# Patient Record
Sex: FEMALE | Race: WHITE | NOT HISPANIC OR LATINO | Employment: OTHER | URBAN - NONMETROPOLITAN AREA
[De-identification: names, ages, dates, MRNs, and addresses within clinical notes are randomized per-mention and may not be internally consistent; named-entity substitution may affect disease eponyms.]

---

## 2017-01-03 ENCOUNTER — APPOINTMENT (OUTPATIENT)
Dept: PHYSICAL THERAPY | Facility: HOSPITAL | Age: 72
End: 2017-01-03

## 2017-01-06 ENCOUNTER — HOSPITAL ENCOUNTER (OUTPATIENT)
Dept: PHYSICAL THERAPY | Facility: HOSPITAL | Age: 72
Setting detail: THERAPIES SERIES
Discharge: HOME | End: 2017-01-06

## 2017-01-06 DIAGNOSIS — M54.50 ACUTE MIDLINE LOW BACK PAIN WITHOUT SCIATICA: Primary | ICD-10-CM

## 2017-01-06 PROCEDURE — 97110 THERAPEUTIC EXERCISES: CPT

## 2017-01-06 NOTE — PROGRESS NOTES
Outpatient Physical Therapy Ortho Treatment Note  [facility]     Patient Name: Shellie Villeda  : 1945  MRN: [id number]  Today's Date: 2017      Visit Date: 2017    Visit Dx:    ICD-10-CM ICD-9-CM   1. Acute midline low back pain without sciatica M54.5 724.2       Patient Active Problem List   Diagnosis   • Closed compression fracture of third lumbar vertebra   • Wedge compression fracture of third lumbar vertebra with delayed healing        Past Medical History   Diagnosis Date   • Osteoporosis    • Osteoporosis         Past Surgical History   Procedure Laterality Date   • Basal cell carcinoma excision       SEVERAL OFF FACE   • Kyphoplasty N/A 2016     Procedure: KYPHOPLASTY L3;  Surgeon: Ricky Ferguson MD;  Location: [facility];  Service:                              PT Assessment/Plan       17 1100          PT Assessment    Assessment Comments The patient is much more aware of her postural control faults.  The hip recruitment is improved as well as the lateral pelvic drift with midstance on the right.  Recommend continuing for another 2-3 weeks in order to improve upon the current deficits.  -RS      PT Plan    PT Plan Comments assess readiness for discharge next session.  We will likely continue an additional 2-3 weeks  -RS        User Key  (r) = Recorded By, (t) = Taken By, (c) = Cosigned By    Initials Name Provider Type    RS Kingsley Su, PT Physical Therapist                    Exercises       17 0800          Subjective Comments    Subjective Comments Patient is doing well.  THe HEP is not causing any pain at this time.  She is working on her posture.mild sharp pain noted in the thoracic with bending last week.  -RS      Subjective Pain    Able to rate subjective pain? yes  -RS      Pre-Treatment Pain Level 2  -RS      Post-Treatment Pain Level 2  -RS      Exercise 1    Exercise Name 1 CKC clamshells with feet on wall  -RS      Cueing 1 Demo  -RS       Sets 1 3  -RS      Reps 1 15   each side  -RS      Exercise 2    Exercise Name 2 bridge with femoral ER  -RS      Cueing 2 Verbal;Tactile  -RS      Equipment 2 Theraband  -RS      Resistance 2 Green  -RS      Sets 2 3  -RS      Reps 2 15  -RS      Time (Minutes) 2 2  -RS      Exercise 3    Exercise Name 3 sidelying hip abduction:  45 cm physioball (Gen)_  -RS      Sets 3 2  -RS      Reps 3 15  -RS      Exercise 4    Exercise Name 4 gen hip abduction wall ball:  45 cm physioball  -RS      Cueing 4 Verbal;Demo  -RS      Sets 4 3  -RS      Reps 4 15  -RS      Exercise 5    Exercise Name 5 back against wall:  thoracic endurance focusing on remaining upright with bilateral UE flexion.  -RS      Cueing 5 Verbal  -RS      Equipment 5 --   pink foam roller  -RS      Time (Minutes) 5 6  -RS        User Key  (r) = Recorded By, (t) = Taken By, (c) = Cosigned By    Initials Name Provider Type    RS Kingsley Su, PT Physical Therapist                               PT OP Goals       01/06/17 0800 12/27/16 1500       PT Short Term Goals    STG Date to Achieve 12/30/16  -RS 12/30/16  -RS     STG 1 Patient will be independent with a HEP for hip strengthening and abdominal strengthening.  -RS Patient will be independent with a HEP for hip strengthening and abdominal strengthening.  -RS     STG 1 Progress Progressing  -RS Progressing  -RS     Long Term Goals    LTG Date to Achieve 01/13/17  -RS 01/13/17  -RS     LTG 1 Patient will demonstrate a more neutral thoracic posture avoiding the upper thoracic kyphosis and posterior sway by discharge.  -RS Patient will demonstrate a more neutral thoracic posture avoiding the upper thoracic kyphosis and posterior sway by discharge.  -RS     LTG 1 Progress Ongoing  -RS Ongoing  -RS     LTG 2 Patient will be able to ambulate without excessive right lateral pelvic drift during midstance by discharge.  -RS Patient will be able to ambulate without excessive right lateral pelvic drift during  midstance by discharge.  -RS     LTG 2 Progress Progressing  -RS Progressing  -RS     LTG 3 Patient will score <20 on the Modified Oswestry Disability Index, indicating improvement in overall function and pain, by discharge.  -RS Patient will score <20 on the Modified Oswestry Disability Index, indicating improvement in overall function and pain, by discharge.  -RS     LTG 3 Progress Ongoing  -RS Ongoing  -RS     LTG 4 Patient will demonstrate bilateral glute med strength to at least 4/5 on MMT by discharge.  -RS Patient will demonstrate bilateral glute med strength to at least 4/5 on MMT by discharge.  -RS     LTG 4 Progress Ongoing  -RS Ongoing  -RS     LTG 4 Progress Comments  3+/5  -RS     LTG 5 Patient will be able to resume all recreational activities and housework with <2/10 low back pain (using VAS) by discharge.  -RS Patient will be able to resume all recreational activities and housework with <2/10 low back pain (using VAS) by discharge.  -RS     LTG 5 Progress Ongoing  -RS Ongoing  -RS     Time Calculation    PT Goal Re-Cert Due Date 01/15/17  -RS 01/15/17  -RS       User Key  (r) = Recorded By, (t) = Taken By, (c) = Cosigned By    Initials Name Provider Type    DAMON Su, PT Physical Therapist                Therapy Education       01/06/17 1100    Therapy Education    Given HEP;Posture/body mechanics  -RS    Program Reinforced  -RS    How Provided Verbal  -RS    Provided to Patient  -RS    Level of Understanding Verbalized  -RS      User Key  (r) = Recorded By, (t) = Taken By, (c) = Cosigned By    Initials Name Provider Type    DAMON Su, PT Physical Therapist                Time Calculation:   Start Time: 0850  Stop Time: 0935  Time Calculation (min): 45 min  PT Non-Billable Time (min): 5 min  Total Timed Code Minutes- PT: 40 minute(s)    Therapy Charges for Today     Code Description Service Date Service Provider Modifiers Qty    80211336413  PT THER PROC EA 15 MIN  1/6/2017 Kingsley Su, PT GP 3                    Kingsley Su, PT  1/6/2017

## 2017-01-10 ENCOUNTER — APPOINTMENT (OUTPATIENT)
Dept: PHYSICAL THERAPY | Facility: HOSPITAL | Age: 72
End: 2017-01-10

## 2017-01-10 ENCOUNTER — HOSPITAL ENCOUNTER (OUTPATIENT)
Dept: PHYSICAL THERAPY | Facility: HOSPITAL | Age: 72
Setting detail: THERAPIES SERIES
Discharge: HOME | End: 2017-01-10

## 2017-01-10 DIAGNOSIS — M54.50 ACUTE MIDLINE LOW BACK PAIN WITHOUT SCIATICA: Primary | ICD-10-CM

## 2017-01-10 PROCEDURE — G8979 MOBILITY GOAL STATUS: HCPCS

## 2017-01-10 PROCEDURE — G8980 MOBILITY D/C STATUS: HCPCS

## 2017-01-10 PROCEDURE — G8978 MOBILITY CURRENT STATUS: HCPCS

## 2017-01-10 PROCEDURE — 97110 THERAPEUTIC EXERCISES: CPT

## 2017-01-10 NOTE — THERAPY DISCHARGE NOTE
Outpatient Physical Therapy Ortho Treatment Note/Discharge Summary  [facility]     Patient Name: Shellie Villeda  : 1945  MRN: [id number]  Today's Date: 1/10/2017      Visit Date: 01/10/2017    Visit Dx:    ICD-10-CM ICD-9-CM   1. Acute midline low back pain without sciatica M54.5 724.2       Patient Active Problem List   Diagnosis   • Closed compression fracture of third lumbar vertebra   • Wedge compression fracture of third lumbar vertebra with delayed healing        Past Medical History   Diagnosis Date   • Osteoporosis    • Osteoporosis         Past Surgical History   Procedure Laterality Date   • Basal cell carcinoma excision       SEVERAL OFF FACE   • Kyphoplasty N/A 2016     Procedure: KYPHOPLASTY L3;  Surgeon: Ricky Ferguson MD;  Location: [facility];  Service:                              PT Assessment/Plan       01/10/17 1300 17 1100       PT Assessment    Assessment Comments The patient is doing very well at this time and is ready for discharge.  The patient is complaining of minor sharp pains in the lower thoracic spine that only last for a short time.  The patient was educated to speak with the MD about this on the follow up sessions going forward.  The pain is likely mechanical from prolonged kyphosis and combination of osteoporosis.   -RS The patient is much more aware of her postural control faults.  The hip recruitment is improved as well as the lateral pelvic drift with midstance on the right.  Recommend continuing for another 2-3 weeks in order to improve upon the current deficits.  -RS     PT Plan    PT Plan Comments d/c to HEP  -RS assess readiness for discharge next session.  We will likely continue an additional 2-3 weeks  -RS       User Key  (r) = Recorded By, (t) = Taken By, (c) = Cosigned By    Initials Name Provider Type    RS Kingsley Su, PT Physical Therapist                    Exercises       01/10/17 1200          Subjective Comments    Subjective  Comments Pt is still doing well.  no issues with sharp pain in the back other than 1-2 occasions  -RS      Subjective Pain    Able to rate subjective pain? yes  -RS      Pre-Treatment Pain Level 1  -RS      Post-Treatment Pain Level 1  -RS      Exercise 1    Exercise Name 1 supine huber clamshells  -RS      Cueing 1 Verbal  -RS      Equipment 1 Theraband  -RS      Resistance 1 Red  -RS      Sets 1 3  -RS      Reps 1 20  -RS      Exercise 2    Exercise Name 2 shuttle press:  green theraband around the knees  -RS      Cueing 2 Demo  -RS      Equipment 2 Theraband  -RS      Weights/Plates 2 5  -RS      Resistance 2 Green  -RS      Sets 2 3  -RS      Reps 2 20  -RS      Exercise 3    Exercise Name 3 EC standing balance on flat side of BOSU  -RS      Reps 3 5  -RS      Time (Seconds) 3 30  -RS      Exercise 4    Exercise Name 4 CKC clamshells (huber)  -RS      Cueing 4 Demo  -RS      Sets 4 3  -RS      Reps 4 15  -RS      Exercise 5    Exercise Name 5 went over HEP for discharge  -RS        User Key  (r) = Recorded By, (t) = Taken By, (c) = Cosigned By    Initials Name Provider Type    RS Kingsley Su, PT Physical Therapist                               PT OP Goals       01/10/17 1200 01/06/17 0800       PT Short Term Goals    STG Date to Achieve 12/30/16  -RS 12/30/16  -RS     STG 1 Patient will be independent with a HEP for hip strengthening and abdominal strengthening.  -RS Patient will be independent with a HEP for hip strengthening and abdominal strengthening.  -RS     STG 1 Progress Met  -RS Progressing  -RS     Long Term Goals    LTG Date to Achieve 01/13/17  -RS 01/13/17  -RS     LTG 1 Patient will demonstrate a more neutral thoracic posture avoiding the upper thoracic kyphosis and posterior sway by discharge.  -RS Patient will demonstrate a more neutral thoracic posture avoiding the upper thoracic kyphosis and posterior sway by discharge.  -RS     LTG 1 Progress Partially Met  -RS Ongoing  -RS     LTG 2  Patient will be able to ambulate without excessive right lateral pelvic drift during midstance by discharge.  -RS Patient will be able to ambulate without excessive right lateral pelvic drift during midstance by discharge.  -RS     LTG 2 Progress Progressing  -RS Progressing  -RS     LTG 3 Patient will score <20 on the Modified Oswestry Disability Index, indicating improvement in overall function and pain, by discharge.  -RS Patient will score <20 on the Modified Oswestry Disability Index, indicating improvement in overall function and pain, by discharge.  -RS     LTG 3 Progress Partially Met  -RS Ongoing  -RS     LTG 4 Patient will demonstrate bilateral glute med strength to at least 4/5 on MMT by discharge.  -RS Patient will demonstrate bilateral glute med strength to at least 4/5 on MMT by discharge.  -RS     LTG 4 Progress Partially Met  -RS Ongoing  -RS     LTG 4 Progress Comments 4-/5  -RS      LTG 5 Patient will be able to resume all recreational activities and housework with <2/10 low back pain (using VAS) by discharge.  -RS Patient will be able to resume all recreational activities and housework with <2/10 low back pain (using VAS) by discharge.  -RS     LTG 5 Progress Met  -RS Ongoing  -RS     LTG 5 Progress Comments 1-2/10  -RS      Time Calculation    PT Goal Re-Cert Due Date 01/15/17  -RS 01/15/17  -RS       User Key  (r) = Recorded By, (t) = Taken By, (c) = Cosigned By    Initials Name Provider Type    RS Kingsley Su, PT Physical Therapist                Therapy Education       01/10/17 1354    Therapy Education    Given HEP  -RS    Program New   SLS on each LE  -RS    How Provided Written  -RS    Provided to Patient  -RS    Level of Understanding Verbalized  -RS      01/06/17 1100    Therapy Education    Given HEP;Posture/body mechanics  -RS    Program Reinforced  -RS    How Provided Verbal  -RS    Provided to Patient  -RS    Level of Understanding Verbalized  -RS      User Key  (r) =  Recorded By, (t) = Taken By, (c) = Cosigned By    Initials Name Provider Type    RS Kingsley Su, PT Physical Therapist                Outcome Measures       01/10/17 1300          Modified Oswestry    Modified Oswestry Score/Comments 21  -RS      Functional Assessment    Outcome Measure Options Modifed Owestry  -RS        User Key  (r) = Recorded By, (t) = Taken By, (c) = Cosigned By    Initials Name Provider Type    RS Kingsley Su, PT Physical Therapist            Time Calculation:   Start Time: 1245  Stop Time: 1330  Time Calculation (min): 45 min  PT Non-Billable Time (min): 4 min  Total Timed Code Minutes- PT: 41 minute(s)    Therapy Charges for Today     Code Description Service Date Service Provider Modifiers Qty    88749090263 HC PT MOBILITY CURRENT 1/10/2017 Kingsley Su, PT GP, CJ 1    90337073208 HC PT MOBILITY PROJECTED 1/10/2017 Kingsley Su, PT GP, CI 1    10269302228 HC PT MOBILITY DISCHARGE 1/10/2017 Kingsley uS, PT GP, CJ 1    01087070244 HC PT THER PROC EA 15 MIN 1/10/2017 Kingsley Su, PT GP 3          PT G-Codes  Outcome Measure Options: Modifed Owestry  Score: 21  Functional Limitation: Mobility: Walking and moving around  Mobility: Walking and Moving Around Current Status (): At least 20 percent but less than 40 percent impaired, limited or restricted  Mobility: Walking and Moving Around Goal Status (): At least 1 percent but less than 20 percent impaired, limited or restricted  Mobility: Walking and Moving Around Discharge Status (): At least 20 percent but less than 40 percent impaired, limited or restricted     OP PT Discharge Summary  Date of Discharge: 01/10/17  Reason for Discharge: Independent  Outcomes Achieved: Patient able to partially acheive established goals, Refer to plan of care for updates on goals achieved  Discharge Destination: Home with home program  Discharge Instructions: Patient was instructed to call  if she needed anything.      Kingsley Su, PT  1/10/2017

## 2017-01-12 ENCOUNTER — APPOINTMENT (OUTPATIENT)
Dept: PHYSICAL THERAPY | Facility: HOSPITAL | Age: 72
End: 2017-01-12

## 2017-01-16 ENCOUNTER — DOCUMENTATION (OUTPATIENT)
Dept: PHYSICAL THERAPY | Facility: HOSPITAL | Age: 72
End: 2017-01-16

## 2017-02-02 PROCEDURE — G0123 SCREEN CERV/VAG THIN LAYER: HCPCS | Performed by: INTERNAL MEDICINE

## 2017-02-02 PROCEDURE — 87624 HPV HI-RISK TYP POOLED RSLT: CPT | Performed by: INTERNAL MEDICINE

## 2017-02-03 ENCOUNTER — LAB REQUISITION (OUTPATIENT)
Dept: LAB | Facility: HOSPITAL | Age: 72
End: 2017-02-03
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
## 2017-02-07 ENCOUNTER — OFFICE VISIT (OUTPATIENT)
Dept: NEUROSURGERY | Facility: CLINIC | Age: 72
End: 2017-02-07

## 2017-02-07 VITALS
WEIGHT: 112 LBS | SYSTOLIC BLOOD PRESSURE: 122 MMHG | HEIGHT: 64 IN | BODY MASS INDEX: 19.12 KG/M2 | DIASTOLIC BLOOD PRESSURE: 70 MMHG

## 2017-02-07 DIAGNOSIS — M54.50 CHRONIC BILATERAL LOW BACK PAIN WITHOUT SCIATICA: Primary | ICD-10-CM

## 2017-02-07 DIAGNOSIS — G89.29 CHRONIC BILATERAL LOW BACK PAIN WITHOUT SCIATICA: Primary | ICD-10-CM

## 2017-02-07 PROCEDURE — 99212 OFFICE O/P EST SF 10 MIN: CPT | Performed by: NEUROLOGICAL SURGERY

## 2017-02-07 NOTE — PROGRESS NOTES
Shellie Villeda  71 y.o.      Chief complaint:   Back pain    Subjective  71-year-old patient went for kyphoplasty at L4 did very well after that.  Most recently with been doing with her  chronic across the back pain. Worse as the day goes on. Better when she gets her feet.  She did a dedicated course of physical therapy for 4-6 weeks and she says it did benefit her to some degree but she has some by back pain she takes no medications for either.    BP: (122)/(70) 122/70      Objective    Neurologic Exam     Mental Status   Oriented to person, place, and time.   Attention: normal.   Speech: speech is normal   Level of consciousness: alert  Knowledge: good.     Cranial Nerves   Cranial nerves II through XII intact.     Motor Exam   Muscle bulk: normal  Overall muscle tone: normal    Strength   Strength 5/5 throughout.     Sensory Exam   Light touch normal.   Pinprick normal.     Gait, Coordination, and Reflexes     Gait  Gait: normal      Active Problems:    * No active hospital problems. *      Lab Results (last 24 hours)     ** No results found for the last 24 hours. **              Plan:   Shellie done very well after her kyphoplasty.  Right now were dealing with her more chronic low back pain she had before her fracture.  She has had some improvement with physical therapy but she still has some pain.  We did offer her referral to pain management for series of injections right now she says the pain is not so bad that she would be interested in that we will see her in follow-up in 3 months.    Ricky Ferguson MD  Answers for HPI/ROS submitted by the patient on 1/31/2017   Back pain  Chronicity: chronic  Onset: more than 1 year ago  Frequency: daily  Progression since onset: waxing and waning  Pain location: sacro-iliac  Pain quality: aching  Radiates to: does not radiate  Pain - numeric: 5/10  Pain is: worse during the day  Aggravated by: bending, sitting  abdominal pain: No  bladder incontinence: No  bowel  incontinence: No  chest pain: No  dysuria: No  fever: No  headaches: No  leg pain: No  numbness: No  paresis: No  paresthesias: No  pelvic pain: No  perianal numbness: No  tingling: No  weakness: No  weight loss: No  Risk factors: history of osteoporosis

## 2017-02-17 LAB
GEN CATEG CVX/VAG CYTO-IMP: NORMAL
LAB AP CASE REPORT: NORMAL
LAB AP GYN ADDITIONAL INFORMATION: NORMAL
Lab: NORMAL
PATH INTERP SPEC-IMP: NORMAL
STAT OF ADQ CVX/VAG CYTO-IMP: NORMAL

## 2017-02-22 ENCOUNTER — OFFICE VISIT (OUTPATIENT)
Dept: GASTROENTEROLOGY | Facility: CLINIC | Age: 72
End: 2017-02-22

## 2017-02-22 VITALS
BODY MASS INDEX: 19.66 KG/M2 | TEMPERATURE: 98.1 F | WEIGHT: 118 LBS | HEART RATE: 70 BPM | SYSTOLIC BLOOD PRESSURE: 112 MMHG | HEIGHT: 65 IN | OXYGEN SATURATION: 99 % | DIASTOLIC BLOOD PRESSURE: 68 MMHG

## 2017-02-22 DIAGNOSIS — R14.0 ABDOMINAL BLOATING: Primary | ICD-10-CM

## 2017-02-22 PROCEDURE — 99214 OFFICE O/P EST MOD 30 MIN: CPT | Performed by: NURSE PRACTITIONER

## 2017-02-22 NOTE — PROGRESS NOTES
[facility] GASTROENTEROLOGY - OFFICE NOTE    2/22/2017    Shellie Villeda   1945    Chief Complaint   Patient presents with   • GI Problem     bloating         HISTORY OF PRESENT ILLNESS    Shellie Villeda is a 71 y.o. female presents  with abdominal bloating.  She has noted generalized abdominal bloating over the last 6 months.  This has been intermittent.  This usually occurs in the evening after eating.  She has tried some digestive enzymes which did not help.  Passing gas does seem to help.  She does eat a lot of fresh fruits and vegetables.  She does move her bowels daily.  Denies constipation or diarrhea.  Denies rectal bleeding.  Denies unintentional weight loss.  No nausea or vomiting.  No fevers.  No abdominal pain.  Colonoscopy was 2006 by Dr. Harris which was normal.  No family history of colon cancer or polyps.  She has had no imaging studies of her abdomen.    Past Medical History   Diagnosis Date   • Compression fracture of lumbar vertebra    • Depression    • Disc degeneration, lumbar    • History of basal cell carcinoma    • History of hyperlipidemia    • History of insomnia    • History of multiple pulmonary nodules    • History of palpitations    • History of urinary tract infection    • Low back pain    • Osteoporosis    • Osteoporosis    • Skin cancer      basal cell   • Vitamin D insufficiency        Past Surgical History   Procedure Laterality Date   • Basal cell carcinoma excision       SEVERAL OFF FACE   • Kyphoplasty N/A 11/11/2016     Procedure: KYPHOPLASTY L3;  Surgeon: Ricky Ferguson MD;  Location: [facility] OR;  Service:    • Breast biopsy       benign   • Colonoscopy  12/09/2006       Outpatient Prescriptions Marked as Taking for the 2/22/17 encounter (Office Visit) with ROZINA Marcus   Medication Sig Dispense Refill   • calcium-vitamin D (OSCAL) 250-125 MG-UNIT per tablet Take 1 tablet by mouth daily.       • Cholecalciferol (VITAMIN D-3 PO) Take  by mouth.     •  denosumab (PROLIA) 60 MG/ML solution syringe Inject 60 mg under the skin 1 (One) Time.     • Strontium Chloride powder Strontium Chloride Powder  1 capsule daily to use with AlgaeCal to boost calcium         Arianna Norris M.D.;  Started 13-Aug-2015  Active         No Known Allergies    Social History     Social History   • Marital status: Single     Spouse name: N/A   • Number of children: N/A   • Years of education: N/A     Occupational History   • Not on file.     Social History Main Topics   • Smoking status: Never Smoker   • Smokeless tobacco: Not on file   • Alcohol use 4.2 oz/week     7 Glasses of wine per week      Comment: daily   • Drug use: No   • Sexual activity: Defer     Other Topics Concern   • Not on file     Social History Narrative       History reviewed. No pertinent family history.    Review of Systems   Constitutional: Negative for appetite change, chills, fatigue, fever and unexpected weight change.   HENT: Positive for hearing loss. Negative for sore throat and trouble swallowing.    Eyes: Negative for pain and visual disturbance.   Respiratory: Negative for cough, chest tightness, shortness of breath and wheezing.    Cardiovascular: Negative for chest pain and palpitations.   Gastrointestinal: Negative for abdominal distention, abdominal pain, anal bleeding, blood in stool, constipation, diarrhea, nausea, rectal pain and vomiting.        As mentioned in hpi   Endocrine: Negative for cold intolerance and heat intolerance.   Genitourinary: Negative for difficulty urinating, dysuria and hematuria.   Musculoskeletal: Positive for back pain. Negative for arthralgias.   Skin: Negative for color change and rash.   Neurological: Negative for dizziness, tremors, seizures, syncope, light-headedness and headaches.   Hematological: Negative for adenopathy. Does not bruise/bleed easily.   Psychiatric/Behavioral: Negative for confusion. The patient is not nervous/anxious.        Vitals:    02/22/17 0852  "  BP: 112/68   BP Location: Left arm   Patient Position: Sitting   Cuff Size: Adult   Pulse: 70   Temp: 98.1 °F (36.7 °C)   SpO2: 99%   Weight: 118 lb (53.5 kg)   Height: 64.5\" (163.8 cm)      Body mass index is 19.94 kg/(m^2).    Physical Exam   Constitutional: She is oriented to person, place, and time. She appears well-developed and well-nourished. No distress.   HENT:   Head: Normocephalic and atraumatic.   Mouth/Throat: Oropharynx is clear and moist.   Eyes: Pupils are equal, round, and reactive to light. No scleral icterus.   Neck: Normal range of motion. Neck supple. No JVD present. No tracheal deviation present.   Cardiovascular: Normal rate, regular rhythm, normal heart sounds and intact distal pulses.  Exam reveals no gallop and no friction rub.    No murmur heard.  Pulmonary/Chest: Effort normal and breath sounds normal. No stridor. No respiratory distress. She has no wheezes. She has no rales.   Abdominal: Soft. Bowel sounds are normal. She exhibits no distension and no mass. There is no tenderness. There is no rebound and no guarding.   Musculoskeletal: Normal range of motion. She exhibits no edema or deformity.   Neurological: She is alert and oriented to person, place, and time. No cranial nerve deficit.   Skin: Skin is warm and dry. No rash noted.   Psychiatric: She has a normal mood and affect. Her behavior is normal.   Vitals reviewed.              ASSESSMENT AND PLAN    Shellie was seen today for gi problem.    Diagnoses and all orders for this visit:    Abdominal bloating  -     Case request    Other orders  -     polyethylene glycol (GOLYTELY) 236 G solution; Take 4,000 mL by mouth 1 (One) Time for 1 dose. Take as directed per instruction sheet.     in regards to abdominal bloating, recommend trial of anti-gas pills such as altered Gas-X or Phazyme.  Also trial of fod map diet.  Also we will plan for colonoscopy.    COLONOSCOPY WITH ANESTHESIA (N/A) All risks, benefits, alternatives, and " indications of colonoscopy procedure have been discussed with the patient. Risks to include perforation of the colon requiring possible surgery or colostomy, risk of bleeding from biopsies or removal of colon tissue, possibility of missing a colon polyp or cancer, or adverse drug reaction.  Benefits to include the diagnosis and management of disease of the colon and rectum. Alternatives to include barium enema, radiographic evaluation, lab testing or no intervention. Pt verbalizes understanding and agrees.         Body mass index is 19.94 kg/(m^2).         ROZINA Christianson    EMR Dragon/transcription disclaimer:  Much of this encounter note is electronic transcription/translation of spoken language to printed text.  The electronic translation of spoken language may be erroneous, or at times, nonsensical words or phrases may be inadvertently transcribed.  Although I have reviewed the note for such errors, some may still exist.

## 2017-03-08 ENCOUNTER — ANESTHESIA EVENT (OUTPATIENT)
Dept: GASTROENTEROLOGY | Facility: HOSPITAL | Age: 72
End: 2017-03-08

## 2017-03-09 ENCOUNTER — ANESTHESIA (OUTPATIENT)
Dept: GASTROENTEROLOGY | Facility: HOSPITAL | Age: 72
End: 2017-03-09

## 2017-03-09 ENCOUNTER — TELEPHONE (OUTPATIENT)
Dept: GASTROENTEROLOGY | Facility: CLINIC | Age: 72
End: 2017-03-09

## 2017-03-09 ENCOUNTER — HOSPITAL ENCOUNTER (OUTPATIENT)
Facility: HOSPITAL | Age: 72
Setting detail: HOSPITAL OUTPATIENT SURGERY
Discharge: HOME | End: 2017-03-09
Attending: INTERNAL MEDICINE | Admitting: INTERNAL MEDICINE

## 2017-03-09 VITALS
HEART RATE: 65 BPM | HEIGHT: 64 IN | DIASTOLIC BLOOD PRESSURE: 95 MMHG | WEIGHT: 116 LBS | RESPIRATION RATE: 20 BRPM | OXYGEN SATURATION: 99 % | BODY MASS INDEX: 19.81 KG/M2 | SYSTOLIC BLOOD PRESSURE: 116 MMHG | TEMPERATURE: 97.2 F

## 2017-03-09 PROCEDURE — 25010000002 PROPOFOL 10 MG/ML EMULSION: Performed by: NURSE ANESTHETIST, CERTIFIED REGISTERED

## 2017-03-09 PROCEDURE — G0121 COLON CA SCRN NOT HI RSK IND: HCPCS | Performed by: INTERNAL MEDICINE

## 2017-03-09 RX ORDER — ONDANSETRON 2 MG/ML
4 INJECTION INTRAMUSCULAR; INTRAVENOUS ONCE AS NEEDED
Status: DISCONTINUED | OUTPATIENT
Start: 2017-03-09 | End: 2017-03-09 | Stop reason: HOSPADM

## 2017-03-09 RX ORDER — SODIUM CHLORIDE 0.9 % (FLUSH) 0.9 %
1-10 SYRINGE (ML) INJECTION AS NEEDED
Status: DISCONTINUED | OUTPATIENT
Start: 2017-03-09 | End: 2017-03-09 | Stop reason: HOSPADM

## 2017-03-09 RX ORDER — LIDOCAINE HYDROCHLORIDE 20 MG/ML
INJECTION, SOLUTION INFILTRATION; PERINEURAL AS NEEDED
Status: DISCONTINUED | OUTPATIENT
Start: 2017-03-09 | End: 2017-03-09 | Stop reason: SURG

## 2017-03-09 RX ORDER — PROPOFOL 10 MG/ML
VIAL (ML) INTRAVENOUS AS NEEDED
Status: DISCONTINUED | OUTPATIENT
Start: 2017-03-09 | End: 2017-03-09 | Stop reason: SURG

## 2017-03-09 RX ORDER — SODIUM CHLORIDE 9 MG/ML
100 INJECTION, SOLUTION INTRAVENOUS CONTINUOUS
Status: DISCONTINUED | OUTPATIENT
Start: 2017-03-09 | End: 2017-03-09 | Stop reason: HOSPADM

## 2017-03-09 RX ADMIN — LIDOCAINE HYDROCHLORIDE 50 MG: 20 INJECTION, SOLUTION INFILTRATION; PERINEURAL at 08:58

## 2017-03-09 RX ADMIN — SODIUM CHLORIDE 100 ML/HR: 9 INJECTION, SOLUTION INTRAVENOUS at 07:49

## 2017-03-09 RX ADMIN — PROPOFOL 200 MG: 10 INJECTION, EMULSION INTRAVENOUS at 08:58

## 2017-03-09 NOTE — PLAN OF CARE
Problem: Patient Care Overview (Adult)  Goal: Adult Individualization and Mutuality    03/09/17 0727   Individualization   Patient Specific Preferences none

## 2017-03-09 NOTE — H&P (VIEW-ONLY)
[facility] GASTROENTEROLOGY - OFFICE NOTE    2/22/2017    Shellie Villeda   1945    Chief Complaint   Patient presents with   • GI Problem     bloating         HISTORY OF PRESENT ILLNESS    Shellie Villeda is a 71 y.o. female presents  with abdominal bloating.  She has noted generalized abdominal bloating over the last 6 months.  This has been intermittent.  This usually occurs in the evening after eating.  She has tried some digestive enzymes which did not help.  Passing gas does seem to help.  She does eat a lot of fresh fruits and vegetables.  She does move her bowels daily.  Denies constipation or diarrhea.  Denies rectal bleeding.  Denies unintentional weight loss.  No nausea or vomiting.  No fevers.  No abdominal pain.  Colonoscopy was 2006 by Dr. Harris which was normal.  No family history of colon cancer or polyps.  She has had no imaging studies of her abdomen.    Past Medical History   Diagnosis Date   • Compression fracture of lumbar vertebra    • Depression    • Disc degeneration, lumbar    • History of basal cell carcinoma    • History of hyperlipidemia    • History of insomnia    • History of multiple pulmonary nodules    • History of palpitations    • History of urinary tract infection    • Low back pain    • Osteoporosis    • Osteoporosis    • Skin cancer      basal cell   • Vitamin D insufficiency        Past Surgical History   Procedure Laterality Date   • Basal cell carcinoma excision       SEVERAL OFF FACE   • Kyphoplasty N/A 11/11/2016     Procedure: KYPHOPLASTY L3;  Surgeon: Ricky Ferguson MD;  Location: [facility] OR;  Service:    • Breast biopsy       benign   • Colonoscopy  12/09/2006       Outpatient Prescriptions Marked as Taking for the 2/22/17 encounter (Office Visit) with ROZINA Marcus   Medication Sig Dispense Refill   • calcium-vitamin D (OSCAL) 250-125 MG-UNIT per tablet Take 1 tablet by mouth daily.       • Cholecalciferol (VITAMIN D-3 PO) Take  by mouth.     •  denosumab (PROLIA) 60 MG/ML solution syringe Inject 60 mg under the skin 1 (One) Time.     • Strontium Chloride powder Strontium Chloride Powder  1 capsule daily to use with AlgaeCal to boost calcium         Arianna Norris M.D.;  Started 13-Aug-2015  Active         No Known Allergies    Social History     Social History   • Marital status: Single     Spouse name: N/A   • Number of children: N/A   • Years of education: N/A     Occupational History   • Not on file.     Social History Main Topics   • Smoking status: Never Smoker   • Smokeless tobacco: Not on file   • Alcohol use 4.2 oz/week     7 Glasses of wine per week      Comment: daily   • Drug use: No   • Sexual activity: Defer     Other Topics Concern   • Not on file     Social History Narrative       History reviewed. No pertinent family history.    Review of Systems   Constitutional: Negative for appetite change, chills, fatigue, fever and unexpected weight change.   HENT: Positive for hearing loss. Negative for sore throat and trouble swallowing.    Eyes: Negative for pain and visual disturbance.   Respiratory: Negative for cough, chest tightness, shortness of breath and wheezing.    Cardiovascular: Negative for chest pain and palpitations.   Gastrointestinal: Negative for abdominal distention, abdominal pain, anal bleeding, blood in stool, constipation, diarrhea, nausea, rectal pain and vomiting.        As mentioned in hpi   Endocrine: Negative for cold intolerance and heat intolerance.   Genitourinary: Negative for difficulty urinating, dysuria and hematuria.   Musculoskeletal: Positive for back pain. Negative for arthralgias.   Skin: Negative for color change and rash.   Neurological: Negative for dizziness, tremors, seizures, syncope, light-headedness and headaches.   Hematological: Negative for adenopathy. Does not bruise/bleed easily.   Psychiatric/Behavioral: Negative for confusion. The patient is not nervous/anxious.        Vitals:    02/22/17 0852  "  BP: 112/68   BP Location: Left arm   Patient Position: Sitting   Cuff Size: Adult   Pulse: 70   Temp: 98.1 °F (36.7 °C)   SpO2: 99%   Weight: 118 lb (53.5 kg)   Height: 64.5\" (163.8 cm)      Body mass index is 19.94 kg/(m^2).    Physical Exam   Constitutional: She is oriented to person, place, and time. She appears well-developed and well-nourished. No distress.   HENT:   Head: Normocephalic and atraumatic.   Mouth/Throat: Oropharynx is clear and moist.   Eyes: Pupils are equal, round, and reactive to light. No scleral icterus.   Neck: Normal range of motion. Neck supple. No JVD present. No tracheal deviation present.   Cardiovascular: Normal rate, regular rhythm, normal heart sounds and intact distal pulses.  Exam reveals no gallop and no friction rub.    No murmur heard.  Pulmonary/Chest: Effort normal and breath sounds normal. No stridor. No respiratory distress. She has no wheezes. She has no rales.   Abdominal: Soft. Bowel sounds are normal. She exhibits no distension and no mass. There is no tenderness. There is no rebound and no guarding.   Musculoskeletal: Normal range of motion. She exhibits no edema or deformity.   Neurological: She is alert and oriented to person, place, and time. No cranial nerve deficit.   Skin: Skin is warm and dry. No rash noted.   Psychiatric: She has a normal mood and affect. Her behavior is normal.   Vitals reviewed.              ASSESSMENT AND PLAN    Shellie was seen today for gi problem.    Diagnoses and all orders for this visit:    Abdominal bloating  -     Case request    Other orders  -     polyethylene glycol (GOLYTELY) 236 G solution; Take 4,000 mL by mouth 1 (One) Time for 1 dose. Take as directed per instruction sheet.     in regards to abdominal bloating, recommend trial of anti-gas pills such as altered Gas-X or Phazyme.  Also trial of fod map diet.  Also we will plan for colonoscopy.    COLONOSCOPY WITH ANESTHESIA (N/A) All risks, benefits, alternatives, and " indications of colonoscopy procedure have been discussed with the patient. Risks to include perforation of the colon requiring possible surgery or colostomy, risk of bleeding from biopsies or removal of colon tissue, possibility of missing a colon polyp or cancer, or adverse drug reaction.  Benefits to include the diagnosis and management of disease of the colon and rectum. Alternatives to include barium enema, radiographic evaluation, lab testing or no intervention. Pt verbalizes understanding and agrees.         Body mass index is 19.94 kg/(m^2).         ROZINA Christianson    EMR Dragon/transcription disclaimer:  Much of this encounter note is electronic transcription/translation of spoken language to printed text.  The electronic translation of spoken language may be erroneous, or at times, nonsensical words or phrases may be inadvertently transcribed.  Although I have reviewed the note for such errors, some may still exist.

## 2017-03-09 NOTE — PLAN OF CARE
Problem: GI Endoscopy (Adult)  Goal: Signs and Symptoms of Listed Potential Problems Will be Absent or Manageable (GI Endoscopy)  Outcome: Outcome(s) achieved Date Met:  03/09/17 03/09/17 0918   GI Endoscopy   Problems Assessed (GI Endoscopy) all   Problems Present (GI Endoscopy) none

## 2017-03-09 NOTE — PLAN OF CARE
Problem: Patient Care Overview (Adult)  Goal: Plan of Care Review  Outcome: Outcome(s) achieved Date Met:  03/09/17 03/09/17 0918   Coping/Psychosocial Response Interventions   Plan Of Care Reviewed With patient;family   Patient Care Overview   Progress no change   Outcome Evaluation   Outcome Summary/Follow up Plan meets discharge criteria

## 2017-03-09 NOTE — PLAN OF CARE
Problem: Patient Care Overview (Adult)  Goal: Plan of Care Review  Outcome: Ongoing (interventions implemented as appropriate)    03/09/17 0914   Coping/Psychosocial Response Interventions   Plan Of Care Reviewed With patient   Patient Care Overview   Progress no change   Outcome Evaluation   Outcome Summary/Follow up Plan pt tolerated procedure well.

## 2017-05-03 ENCOUNTER — OFFICE VISIT (OUTPATIENT)
Dept: NEUROSURGERY | Facility: CLINIC | Age: 72
End: 2017-05-03

## 2017-05-03 VITALS
WEIGHT: 114 LBS | SYSTOLIC BLOOD PRESSURE: 122 MMHG | BODY MASS INDEX: 19.46 KG/M2 | DIASTOLIC BLOOD PRESSURE: 70 MMHG | HEIGHT: 64 IN

## 2017-05-03 DIAGNOSIS — G89.29 CHRONIC BILATERAL LOW BACK PAIN WITHOUT SCIATICA: ICD-10-CM

## 2017-05-03 DIAGNOSIS — M54.50 CHRONIC BILATERAL LOW BACK PAIN WITHOUT SCIATICA: ICD-10-CM

## 2017-05-03 DIAGNOSIS — Z78.9 NON-SMOKER: ICD-10-CM

## 2017-05-03 DIAGNOSIS — S32.030D CLOSED WEDGE COMPRESSION FRACTURE OF THIRD LUMBAR VERTEBRA WITH ROUTINE HEALING: Primary | ICD-10-CM

## 2017-05-03 PROCEDURE — 99213 OFFICE O/P EST LOW 20 MIN: CPT | Performed by: NURSE PRACTITIONER

## 2017-06-22 RX ORDER — ACETAMINOPHEN 160 MG
1 TABLET,DISINTEGRATING ORAL DAILY
COMMUNITY
End: 2020-01-28 | Stop reason: ALTCHOICE

## 2017-06-23 ENCOUNTER — OFFICE VISIT (OUTPATIENT)
Dept: INTERNAL MEDICINE | Age: 72
End: 2017-06-23
Payer: MEDICARE

## 2017-06-23 VITALS
DIASTOLIC BLOOD PRESSURE: 60 MMHG | HEIGHT: 65 IN | OXYGEN SATURATION: 98 % | HEART RATE: 70 BPM | BODY MASS INDEX: 19.16 KG/M2 | SYSTOLIC BLOOD PRESSURE: 104 MMHG | WEIGHT: 115 LBS

## 2017-06-23 DIAGNOSIS — E55.9 VITAMIN D DEFICIENCY: ICD-10-CM

## 2017-06-23 DIAGNOSIS — M80.00XD OSTEOPOROSIS WITH PATHOLOGICAL FRACTURE WITH ROUTINE HEALING: ICD-10-CM

## 2017-06-23 DIAGNOSIS — R14.0 ABDOMINAL DISTENTION: Primary | ICD-10-CM

## 2017-06-23 DIAGNOSIS — E78.00 PURE HYPERCHOLESTEROLEMIA: ICD-10-CM

## 2017-06-23 PROCEDURE — 3017F COLORECTAL CA SCREEN DOC REV: CPT | Performed by: INTERNAL MEDICINE

## 2017-06-23 PROCEDURE — G8427 DOCREV CUR MEDS BY ELIG CLIN: HCPCS | Performed by: INTERNAL MEDICINE

## 2017-06-23 PROCEDURE — 4040F PNEUMOC VAC/ADMIN/RCVD: CPT | Performed by: INTERNAL MEDICINE

## 2017-06-23 PROCEDURE — 1036F TOBACCO NON-USER: CPT | Performed by: INTERNAL MEDICINE

## 2017-06-23 PROCEDURE — 1123F ACP DISCUSS/DSCN MKR DOCD: CPT | Performed by: INTERNAL MEDICINE

## 2017-06-23 PROCEDURE — G8400 PT W/DXA NO RESULTS DOC: HCPCS | Performed by: INTERNAL MEDICINE

## 2017-06-23 PROCEDURE — 3014F SCREEN MAMMO DOC REV: CPT | Performed by: INTERNAL MEDICINE

## 2017-06-23 PROCEDURE — G8420 CALC BMI NORM PARAMETERS: HCPCS | Performed by: INTERNAL MEDICINE

## 2017-06-23 PROCEDURE — 99213 OFFICE O/P EST LOW 20 MIN: CPT | Performed by: INTERNAL MEDICINE

## 2017-06-23 PROCEDURE — 1090F PRES/ABSN URINE INCON ASSESS: CPT | Performed by: INTERNAL MEDICINE

## 2017-06-23 PROCEDURE — 4005F PHARM THX FOR OP RXD: CPT | Performed by: INTERNAL MEDICINE

## 2017-06-23 ASSESSMENT — ENCOUNTER SYMPTOMS
RHINORRHEA: 0
BACK PAIN: 1
SINUS PRESSURE: 0
DIARRHEA: 0
ABDOMINAL DISTENTION: 1
COUGH: 0
EYE REDNESS: 0
VOMITING: 0
NAUSEA: 0
ABDOMINAL PAIN: 0
SHORTNESS OF BREATH: 0
CONSTIPATION: 0

## 2017-06-28 ENCOUNTER — HOSPITAL ENCOUNTER (OUTPATIENT)
Dept: ULTRASOUND IMAGING | Age: 72
Discharge: HOME | End: 2017-06-28
Payer: MEDICARE

## 2017-06-28 DIAGNOSIS — R14.0 ABDOMINAL DISTENTION: ICD-10-CM

## 2017-06-28 PROCEDURE — 76856 US EXAM PELVIC COMPLETE: CPT

## 2017-08-03 ENCOUNTER — OFFICE VISIT (OUTPATIENT)
Dept: NEUROSURGERY | Facility: CLINIC | Age: 72
End: 2017-08-03

## 2017-08-03 VITALS
BODY MASS INDEX: 18.78 KG/M2 | SYSTOLIC BLOOD PRESSURE: 118 MMHG | HEIGHT: 64 IN | WEIGHT: 110 LBS | DIASTOLIC BLOOD PRESSURE: 68 MMHG

## 2017-08-03 DIAGNOSIS — Z78.9 NON-SMOKER: ICD-10-CM

## 2017-08-03 DIAGNOSIS — G89.29 CHRONIC BILATERAL LOW BACK PAIN WITHOUT SCIATICA: Primary | ICD-10-CM

## 2017-08-03 DIAGNOSIS — R14.0 ABDOMINAL BLOATING: ICD-10-CM

## 2017-08-03 DIAGNOSIS — M54.50 CHRONIC BILATERAL LOW BACK PAIN WITHOUT SCIATICA: Primary | ICD-10-CM

## 2017-08-03 DIAGNOSIS — IMO0001 NORMAL BODY MASS INDEX (BMI): ICD-10-CM

## 2017-08-03 DIAGNOSIS — S32.030D CLOSED WEDGE COMPRESSION FRACTURE OF THIRD LUMBAR VERTEBRA WITH ROUTINE HEALING: ICD-10-CM

## 2017-08-03 PROCEDURE — 99213 OFFICE O/P EST LOW 20 MIN: CPT | Performed by: NURSE PRACTITIONER

## 2017-08-03 NOTE — PROGRESS NOTES
"    Chief complaint:   Chief Complaint   Patient presents with   • Back Pain     Patient states she still will have pain from time to time        Subjective     HPI: This is a 71-year-old female patient who went to the operating room in November 2016 for an L3 kyphoplasty.  She is here in follow-up today.  The patient is is very clear that she did get relief from the pain she was having from the kyphoplasty.  She says that since then that she still has had a dull aching pain that is continuing to linger.  She is also felt that she is been slumping over more.  She did go through physical therapy and felt like this helped a little bit but she is still having the pain issue at this time.  She rates a scale 0-10 at a 4.  She does not really interfering with activities of daily living but she does feel tired as the day goes on.  The pain does happen more at the end of the day as opposed to the beginning.  Denies any leg pain.  Denies any numbness or tingling.  The pain does appear to be more in the thoracic lumbar junction.     Review of Systems   Musculoskeletal: Positive for back pain.   Neurological: Positive for weakness.         Objective      Vital Signs  /68 (BP Location: Right arm, Patient Position: Sitting)  Ht 64\" (162.6 cm)  Wt 110 lb (49.9 kg)  BMI 18.88 kg/m2    Physical Exam   Constitutional: She is oriented to person, place, and time. She appears well-developed and well-nourished.   HENT:   Head: Normocephalic.   Eyes: EOM are normal. Pupils are equal, round, and reactive to light.   Neck: Normal range of motion.   Pulmonary/Chest: Effort normal.   Musculoskeletal: Normal range of motion.        Lumbar back: She exhibits pain.   Neurological: She is alert and oriented to person, place, and time. She has normal strength and normal reflexes. No cranial nerve deficit or sensory deficit. Gait normal. GCS eye subscore is 4. GCS verbal subscore is 5. GCS motor subscore is 6.   Skin: Skin is warm. "   Psychiatric: She has a normal mood and affect. Her speech is normal and behavior is normal. Thought content normal.       Results Review: No new imaging          Assessment/Plan: At this point ominous and the patient for set of x-rays of her thoracic and lumbar spine to look at the structure of her spine and also for an MRI of her lumbar spine to make sure that there is no occult fracture that has occurred.  We will follow-up with her once the imaging is completed and she will see Dr. Ferguson.  Should she not have any other surgical issue she would be a good candidate for pain management injections.  We will follow-up with her once this is completed.  BMI shows that she is at a healthy weight.  She is a nonsmoker.         Shellie was seen today for back pain.    Diagnoses and all orders for this visit:    Chronic bilateral low back pain without sciatica  -     XR Spine Lumbar 4+ View; Future  -     XR Spine Thoracic 4+ View; Future  -     MRI Lumbar Spine Without Contrast; Future    Closed wedge compression fracture of third lumbar vertebra with routine healing  -     XR Spine Lumbar 4+ View; Future  -     XR Spine Thoracic 4+ View; Future  -     MRI Lumbar Spine Without Contrast; Future    Abdominal bloating    Non-smoker    Normal body mass index (BMI)      I discussed the patients findings and my recommendations with patient  Chato Howe, ROZINA  08/03/17  1:47 PM

## 2017-08-09 ENCOUNTER — HOSPITAL ENCOUNTER (OUTPATIENT)
Dept: MRI IMAGING | Facility: HOSPITAL | Age: 72
Discharge: HOME | End: 2017-08-09
Admitting: NURSE PRACTITIONER

## 2017-08-09 ENCOUNTER — HOSPITAL ENCOUNTER (OUTPATIENT)
Dept: GENERAL RADIOLOGY | Facility: HOSPITAL | Age: 72
Discharge: HOME | End: 2017-08-09

## 2017-08-09 DIAGNOSIS — M54.50 CHRONIC BILATERAL LOW BACK PAIN WITHOUT SCIATICA: ICD-10-CM

## 2017-08-09 DIAGNOSIS — S32.030D CLOSED WEDGE COMPRESSION FRACTURE OF THIRD LUMBAR VERTEBRA WITH ROUTINE HEALING: ICD-10-CM

## 2017-08-09 DIAGNOSIS — G89.29 CHRONIC BILATERAL LOW BACK PAIN WITHOUT SCIATICA: ICD-10-CM

## 2017-08-09 PROCEDURE — 72110 X-RAY EXAM L-2 SPINE 4/>VWS: CPT

## 2017-08-09 PROCEDURE — 72148 MRI LUMBAR SPINE W/O DYE: CPT

## 2017-08-09 PROCEDURE — 72072 X-RAY EXAM THORAC SPINE 3VWS: CPT

## 2017-09-26 ENCOUNTER — OFFICE VISIT (OUTPATIENT)
Dept: NEUROSURGERY | Facility: CLINIC | Age: 72
End: 2017-09-26

## 2017-09-26 VITALS
WEIGHT: 114 LBS | BODY MASS INDEX: 18.99 KG/M2 | SYSTOLIC BLOOD PRESSURE: 114 MMHG | HEIGHT: 65 IN | DIASTOLIC BLOOD PRESSURE: 70 MMHG

## 2017-09-26 DIAGNOSIS — M54.50 CHRONIC BILATERAL LOW BACK PAIN WITHOUT SCIATICA: Primary | ICD-10-CM

## 2017-09-26 DIAGNOSIS — IMO0001 NORMAL BODY MASS INDEX (BMI): ICD-10-CM

## 2017-09-26 DIAGNOSIS — G89.29 CHRONIC BILATERAL LOW BACK PAIN WITHOUT SCIATICA: Primary | ICD-10-CM

## 2017-09-26 DIAGNOSIS — Z78.9 NON-SMOKER: ICD-10-CM

## 2017-09-26 PROCEDURE — 99213 OFFICE O/P EST LOW 20 MIN: CPT | Performed by: NEUROLOGICAL SURGERY

## 2017-09-26 NOTE — PROGRESS NOTES
SUBJECTIVE:  Patient ID: Shellie Villeda is a 72 y.o. female is here today for follow-up.    Chief Complaint: Back pain  Chief Complaint   Patient presents with   • Back Pain     patient had imaging @ [facility] and is here to discuss results and options       HPI  72-year-old female that we have done a L3 kyphoplasty on 2016.  She developed some new back pain at the end of the summer which was centered along her low back.  She denies any falls or episode which would have precipitated this.  Since then she went to [place] on vacation and then fell and broke her left ankle and she is in a fiberglass cast today.  She says that her back pain however has improved in the last 4 weeks and that she feels that she is doing well from her back.    The following portions of the patient's history were reviewed and updated as appropriate: allergies, current medications, past family history, past medical history, past social history, past surgical history and problem list.    OBJECTIVE:    Review of Systems   Musculoskeletal: Positive for arthralgias and back pain.   All other systems reviewed and are negative.         Physical Exam   Constitutional: She is oriented to person, place, and time. She appears well-developed and well-nourished.   HENT:   Head: Normocephalic and atraumatic.   Right Ear: Hearing normal.   Left Ear: Hearing normal.   Eyes: EOM are normal. Pupils are equal, round, and reactive to light.   Neck: Normal range of motion.   Neurological: She is alert and oriented to person, place, and time. She has normal strength and normal reflexes. No cranial nerve deficit or sensory deficit. She displays a negative Romberg sign. GCS eye subscore is 4. GCS verbal subscore is 5. GCS motor subscore is 6. She displays no Babinski's sign on the right side. She displays no Babinski's sign on the left side.   Psychiatric: Her speech is normal. Judgment normal. Cognition and memory are normal.       Neurologic Exam     Mental Status    Oriented to person, place, and time.   Speech: speech is normal     Cranial Nerves     CN III, IV, VI   Pupils are equal, round, and reactive to light.  Extraocular motions are normal.     Motor Exam     Strength   Strength 5/5 throughout.    left foot swollen and bruised    Independent Review of Radiographic Studies:   MRI shows several acute compression fractures with a kyphotic deformity at the thoracolumbar junction.  There is no new issue on this MRI compared to last year.    ASSESSMENT/PLAN:  Shellie is doing well her back pain is improved.  Her MRI does not demonstrate any new disc herniation or compression fracture.  I only need see her on an as-needed basis.      1. Chronic bilateral low back pain without sciatica    2. Non-smoker    3. Normal body mass index (BMI)            Return if symptoms worsen or fail to improve.      Ricky Ferguson MD

## 2017-10-17 LAB
ALBUMIN SERPL-MCNC: 3.6 G/DL (ref 3.5–5.2)
ALP BLD-CCNC: 87 U/L (ref 35–104)
ALT SERPL-CCNC: 8 U/L (ref 5–33)
ANION GAP SERPL CALCULATED.3IONS-SCNC: 12 MMOL/L (ref 7–19)
AST SERPL-CCNC: 14 U/L (ref 5–32)
BASOPHILS ABSOLUTE: 0.1 K/UL (ref 0–0.2)
BASOPHILS RELATIVE PERCENT: 0.9 % (ref 0–1)
BILIRUB SERPL-MCNC: 0.3 MG/DL (ref 0.2–1.2)
BUN BLDV-MCNC: 15 MG/DL (ref 8–23)
CALCIUM SERPL-MCNC: 8.9 MG/DL (ref 8.8–10.2)
CHLORIDE BLD-SCNC: 102 MMOL/L (ref 98–111)
CO2: 29 MMOL/L (ref 22–29)
CREAT SERPL-MCNC: 0.5 MG/DL (ref 0.5–0.9)
EOSINOPHILS ABSOLUTE: 0.1 K/UL (ref 0–0.6)
EOSINOPHILS RELATIVE PERCENT: 2.1 % (ref 0–5)
GFR NON-AFRICAN AMERICAN: >60
GLUCOSE BLD-MCNC: 87 MG/DL (ref 74–109)
HCT VFR BLD CALC: 40.7 % (ref 37–47)
HEMOGLOBIN: 13.2 G/DL (ref 12–16)
LYMPHOCYTES ABSOLUTE: 2 K/UL (ref 1.1–4.5)
LYMPHOCYTES RELATIVE PERCENT: 30.8 % (ref 20–40)
MCH RBC QN AUTO: 30.2 PG (ref 27–31)
MCHC RBC AUTO-ENTMCNC: 32.4 G/DL (ref 33–37)
MCV RBC AUTO: 93.1 FL (ref 81–99)
MONOCYTES ABSOLUTE: 0.6 K/UL (ref 0–0.9)
MONOCYTES RELATIVE PERCENT: 9.4 % (ref 0–10)
NEUTROPHILS ABSOLUTE: 3.8 K/UL (ref 1.5–7.5)
NEUTROPHILS RELATIVE PERCENT: 56.6 % (ref 50–65)
PDW BLD-RTO: 12.6 % (ref 11.5–14.5)
PLATELET # BLD: 400 K/UL (ref 130–400)
PMV BLD AUTO: 9.9 FL (ref 9.4–12.3)
POTASSIUM SERPL-SCNC: 4.4 MMOL/L (ref 3.5–5)
RBC # BLD: 4.37 M/UL (ref 4.2–5.4)
SODIUM BLD-SCNC: 143 MMOL/L (ref 136–145)
TOTAL PROTEIN: 6.9 G/DL (ref 6.6–8.7)
VITAMIN D 25-HYDROXY: 45.6 NG/ML
WBC # BLD: 6.6 K/UL (ref 4.8–10.8)

## 2017-11-06 PROBLEM — M54.50 LOW BACK PAIN: Status: ACTIVE | Noted: 2017-02-07

## 2017-11-06 PROBLEM — S32.000A CLOSED WEDGE FRACTURE OF LUMBAR VERTEBRA (HCC): Status: ACTIVE | Noted: 2017-05-03

## 2017-11-21 LAB
ANION GAP SERPL CALCULATED.3IONS-SCNC: 11 MMOL/L (ref 7–19)
BUN BLDV-MCNC: 12 MG/DL (ref 8–23)
CALCIUM SERPL-MCNC: 9 MG/DL (ref 8.8–10.2)
CHLORIDE BLD-SCNC: 102 MMOL/L (ref 98–111)
CO2: 28 MMOL/L (ref 22–29)
CREAT SERPL-MCNC: 0.5 MG/DL (ref 0.5–0.9)
GFR NON-AFRICAN AMERICAN: >60
GLUCOSE BLD-MCNC: 91 MG/DL (ref 74–109)
POTASSIUM SERPL-SCNC: 4 MMOL/L (ref 3.5–5)
SODIUM BLD-SCNC: 141 MMOL/L (ref 136–145)

## 2017-12-12 ENCOUNTER — TRANSCRIBE ORDERS (OUTPATIENT)
Dept: PHYSICAL THERAPY | Facility: HOSPITAL | Age: 72
End: 2017-12-12

## 2017-12-12 DIAGNOSIS — M79.672 LEFT FOOT PAIN: Primary | ICD-10-CM

## 2017-12-15 ENCOUNTER — HOSPITAL ENCOUNTER (OUTPATIENT)
Dept: PHYSICAL THERAPY | Facility: HOSPITAL | Age: 72
Setting detail: THERAPIES SERIES
Discharge: HOME | End: 2017-12-15

## 2017-12-15 DIAGNOSIS — R29.898 WEAKNESS OF LEFT FOOT: ICD-10-CM

## 2017-12-15 DIAGNOSIS — M25.572 ACUTE LEFT ANKLE PAIN: Primary | ICD-10-CM

## 2017-12-15 PROCEDURE — 97161 PT EVAL LOW COMPLEX 20 MIN: CPT

## 2017-12-15 PROCEDURE — G8979 MOBILITY GOAL STATUS: HCPCS

## 2017-12-15 PROCEDURE — G8978 MOBILITY CURRENT STATUS: HCPCS

## 2017-12-15 NOTE — THERAPY EVALUATION
Outpatient Physical Therapy Ortho Initial Evaluation  [facility]     Patient Name: Shellie Villeda  : 1945  MRN: [id number]  Today's Date: 12/15/2017      Visit Date: 12/15/2017    Patient Active Problem List   Diagnosis   • Closed compression fracture of third lumbar vertebra   • Wedge compression fracture of third lumbar vertebra with delayed healing   • Chronic bilateral low back pain without sciatica   • Abdominal bloating   • Closed wedge compression fracture of third lumbar vertebra with routine healing   • Non-smoker   • Normal body mass index (BMI)        Past Medical History:   Diagnosis Date   • Compression fracture of lumbar vertebra    • Depression    • Disc degeneration, lumbar    • History of basal cell carcinoma    • History of hyperlipidemia    • History of insomnia    • History of multiple pulmonary nodules    • History of palpitations    • History of urinary tract infection    • Low back pain    • Osteoporosis    • Osteoporosis    • Skin cancer     basal cell   • Vitamin D insufficiency         Past Surgical History:   Procedure Laterality Date   • BASAL CELL CARCINOMA EXCISION      SEVERAL OFF FACE   • BREAST BIOPSY      benign   • COLONOSCOPY  2006   • COLONOSCOPY N/A 3/9/2017    Procedure: COLONOSCOPY WITH ANESTHESIA;  Surgeon: Arthur Harris MD;  Location: [facility] ENDOSCOPY;  Service:    • KYPHOPLASTY N/A 2016    Procedure: KYPHOPLASTY L3;  Surgeon: Ricky Ferguson MD;  Location: [facility] OR;  Service:        Visit Dx:     ICD-10-CM ICD-9-CM   1. Acute left ankle pain M25.572 719.47   2. Weakness of left foot M21.42 734             Patient History       12/15/17 0800          History    Chief Complaint Difficulty Walking;Muscle weakness;Pain  -RS      Type of Pain Ankle pain  -RS      Date Current Problem(s) Began 09/15/17  -RS      Brief Description of Current Complaint Patient fell while on vacation sustaining a tib/fib fracture that was managed with a cast and  walking boot.  she is currently out of the boot and transitioning into an athletic shoe at this time.  she has not fallen since the last session.    -RS      Previous treatment for THIS PROBLEM Other (comment)   walking boot/cast  -RS      Onset Date- PT 12/15/2017  -RS      Patient/Caregiver Goals Return to prior level of function;Improve mobility;Improve strength  -RS      Current Tobacco Use no  -RS      Smoking Status never smoker  -RS      Patient's Rating of General Health Excellent  -RS      Hand Dominance right-handed  -RS      What clinical tests have you had for this problem? X-ray  -RS      Results of Clinical Tests fracture  -RS      Are you or can you be pregnant No  -RS      Pain     Pain Location Ankle  -RS      Pain at Present 0  -RS      Fall Risk Assessment    Any falls in the past year: Yes  -RS      Number of falls reported in the last 12 months 1  -RS      Factors that contributed to the fall: Lost balance  -RS      Services    Prior Rehab/Home Health Experiences Yes  -RS      Do you plan to receive Home Health services in the near future No  -RS      Daily Activities    Primary Language English  -RS      Are you able to read Yes  -RS      Are you able to write Yes  -RS      Teaching needs identified Home Exercise Program;Management of Condition  -RS      Does patient have problems with the following? None  -RS      Pt Participated in POC and Goals Yes  -RS      Safety    Are you being hurt, hit, or frightened by anyone at home or in your life? No  -RS      Are you being neglected by a caregiver No  -RS        User Key  (r) = Recorded By, (t) = Taken By, (c) = Cosigned By    Initials Name Provider Type    RS Kingsley Su, PT DPT Physical Therapist                PT Ortho       12/15/17 0800    Posture/Observations    Thoracic Kyphosis Moderate;Increased  -RS    Rounded Shoulders Bilateral:;Moderate;Increased  -RS    Iliac crests Elevated;Standing posture   relative LLD  -RS    Sensory  Screen for Light Touch- Lower Quarter Clearing    L4 (medial lower leg/foot) Bilateral:;Intact  -RS    L5 (lateral lower leg/great toe) Bilateral:;Intact  -RS    S1 (bottom of foot) Bilateral:;Intact  -RS    Myotomal Screen- Lower Quarter Clearing    Hip flexion (L2) Left:;4 (Good)  -RS    Knee extension (L3) Left:;4 (Good)  -RS    Ankle DF (L4) Left:;3+ (Fair +);4- (Good -)  -RS    Ankle PF (S1) Left:;4 (Good)  -RS    Knee flexion (S2) Left:;4- (Good -);4 (Good)  -RS    Special Tests/Palpation    Special Tests/Palpation Ankle/Foot  -RS    Foot/Ankle Palpation    Distal Tib/Fib Joint Left:;Tender;Swollen  -RS    Lateral Malleolus Left:;Swollen  -RS    Medial Gastroc Left:;Tender;Atrophied  -RS    Lateral Gastroc Left:;Tender;Atrophied;Fibrotic  -RS    Posterior Tibialis Left:;Atrophied;Tender  -RS    Peroneals Left:;Atrophied  -RS    Foot/Ankle Palpation? Yes  -RS    Ankle Accessory Motions    Ankle Accessory Motions Tested? Yes  -RS    Distal tib/fib A-P glide Left:;Hypomobile  -RS    Talocrural (talotibial) distraction Left:;WNL  -RS    Talocrural (talotibial) A-P glide Left:;WNL  -RS    Subtalar medial/lateral tilt Left:;Hypomobile  -RS    Ankle/Foot Special Tests    Talar tilt test (instability) Left:;Negative  -RS    Inversion Stress Test Left:;Negative  -RS    Eversion Stress Test Left:;Negative  -RS    ROM (Range of Motion)    General ROM lower extremity range of motion deficits identified  -RS    General LE Assessment    ROM ankle, left: LE ROM deficit  -RS    ROM Detail Left knee 0-4-WNL  -RS    Left Ankle    Dorsiflexion AROM Deficit 2  -RS    Dorsiflexion PROM Deficit 5  -RS    Eversion AROM Deficit 5  -RS    MMT (Manual Muscle Testing)    General MMT Assessment Detail Peroneals:  4-/5; post tib:  4/5  -RS    Left Hip    Hip ABduction Gross Movement (3+/5) fair plus;(4-/5) good minus  -RS    Hip Abduction Gluteus Medius (3+/5) fair plus  -RS    Pathomechanics    Lower Extremity Pathomechanics Antalgic  "with midstance;Asymmetrical steps;Heel whip with terminal stance  -    Gait Assessment/Treatment    Gait, [place] Level independent  -    Gait, Gait Deviations left:;antalgic;loulou decreased;step length decreased;weight-shifting ability decreased;toe-to-floor clearance decreased  -    Gait, Comment Patient compensates at the trunk with forward lean, lateral pelvic drift to the right, and early termination of stance phase  -    Sensation    Sensation WNL? WNL  -      User Key  (r) = Recorded By, (t) = Taken By, (c) = Cosigned By    Initials Name Provider Type    RS Kingsley Su, PT DPT Physical Therapist                      Therapy Education  Education Details: long sitting or sitting towel gastroc stretch, seated soleus towel stretch  Given: HEP  Program: New  How Provided: Written  Provided to: Patient  Level of Understanding: Demonstrated, Verbalized           PT OP Goals       12/15/17 0800       PT Short Term Goals    STG Date to Achieve 01/05/18  -     STG 1 Patient will demonstrate less lateral ankle swelling with use of the taping and compression stockinette  -     STG 1 Progress New  -     STG 1 Progress Comments Size \"D\" given  -     STG 2 Patient will demonstrate passive left ankle DF to at least 5 degrees  -     STG 2 Progress New  -     Long Term Goals    LTG Date to Achieve 01/26/18  -     LTG 1 Patient will be independent with a comprehensive HEP by discharge  -     LTG 1 Progress New  -     LTG 2 Patien will demonstrate left active ankle DF to 10 degrees without compensation  -     LTG 2 Progress New  -     LTG 3 Patient will demonstrate global left ankle strength to 4+/5 on MMT including ability to perform 10 huber calf raises by discharge  -     LTG 3 Progress New  -     LTG 4 Patient will ambulate without gait compensation of pelvic drift or early termination of midstance by discharge  -     LTG 4 Progress New  -     LTG 5 Patient will " demonstrate no swelling in the left jennifer by discharge  -RS     LTG 5 Progress New  -RS     Time Calculation    PT Goal Re-Cert Due Date 01/14/18  -RS       User Key  (r) = Recorded By, (t) = Taken By, (c) = Cosigned By    Initials Name Provider Type    RS Kingsley Su, PT DPT Physical Therapist                PT Assessment/Plan       12/15/17 0800       PT Assessment    Functional Limitations Impaired gait;Performance in leisure activities  -RS     Impairments Balance;Gait;Edema;Joint mobility;Muscle strength;Pain;Posture;Range of motion  -RS     Assessment Comments Patient presents today s/p left ankle fracture with prior casting and wear of a walking boot.  The patient is currently full weight bearing at this time with no restrictions.  The patient does have significant osteoporosis that is known from a prior plan of care one year ago.  The patient does have a pronounced hallux valgus with a bunion that is not painful, but does effect great toe extension.  The patient has insufficient dorsiflexion at this time that is a contributing factor to the swelling and gait deviations.  The left knee is also not fully extended with quad reflexive inhibition.  The patient was taped today with Kinesiotape to offset the swelling.  Patient was also given a compression garmet as well.  I believe that the patient will do very well.  Thank you for allowing us to work with this patient.   -RS     Please refer to paper survey for additional self-reported information Yes  -RS     Rehab Potential Good  -RS     Patient/caregiver participated in establishment of treatment plan and goals Yes  -RS     Patient would benefit from skilled therapy intervention Yes  -RS     PT Plan    PT Frequency 2x/week  -RS     Predicted Duration of Therapy Intervention (days/wks) 6 weeks  -RS     Planned CPT's? PT EVAL LOW COMPLEXITY: 59699;PT THER PROC EA 15 MIN: 32539;PT MANUAL THERAPY EA 15 MIN: 81912;PT NEUROMUSC RE-EDUCATION EA 15 MIN:  61102;PT GAIT TRAINING EA 15 MIN: 25452;PT HOT OR COLD PACK TREAT MCARE;PT ELECTRICAL STIM UNATTEND: ;PT ELECTRICAL STIM ATTD EA 15 MIN: 80241  -RS     Physical Therapy Interventions (Optional Details) balance training;gait training;home exercise program;joint mobilization;manual therapy techniques;modalities;neuromuscular re-education;patient/family education;ROM (Range of Motion);stair training;strengthening;stretching;taping  -RS     PT Plan Comments We will work to decrease swelling and improve distal tib/fib mobility, improve ankle DF ROM, and work to improve global strength/intrinsic strength.  Modalities will be used sparingly for control of swelling.  We will avoid agreesive manual therapy due to significant osteoporosis managed with Prolia  -RS       User Key  (r) = Recorded By, (t) = Taken By, (c) = Cosigned By    Initials Name Provider Type    DAMON Su, PT DPT Physical Therapist                                        Time Calculation:   Start Time: 0750  Stop Time: 0845  Time Calculation (min): 55 min     Therapy Charges for Today     Code Description Service Date Service Provider Modifiers Qty    22441933364 HC PT MOBILITY CURRENT 12/15/2017 Kingsley Su, PT DPT GP, CJ 1    35491080951 HC PT MOBILITY PROJECTED 12/15/2017 Kingsley Su, PT DPT GP, CI 1    86180645552 HC PT EVAL LOW COMPLEXITY 4 12/15/2017 Kingsley Su, PT DPT GP 1          PT G-Codes  PT Professional Judgement Used?: (S) Yes (per patient report of normal)  Functional Limitation: Mobility: Walking and moving around  Mobility: Walking and Moving Around Current Status (): At least 20 percent but less than 40 percent impaired, limited or restricted  Mobility: Walking and Moving Around Goal Status (): At least 1 percent but less than 20 percent impaired, limited or restricted         ANA LILIA Su, PT DPT  12/15/2017

## 2017-12-20 ENCOUNTER — HOSPITAL ENCOUNTER (OUTPATIENT)
Dept: PHYSICAL THERAPY | Facility: HOSPITAL | Age: 72
Setting detail: THERAPIES SERIES
Discharge: HOME | End: 2017-12-20

## 2017-12-20 DIAGNOSIS — M25.572 ACUTE LEFT ANKLE PAIN: Primary | ICD-10-CM

## 2017-12-20 DIAGNOSIS — R29.898 WEAKNESS OF LEFT FOOT: ICD-10-CM

## 2017-12-20 PROCEDURE — 97140 MANUAL THERAPY 1/> REGIONS: CPT

## 2017-12-20 NOTE — THERAPY TREATMENT NOTE
Outpatient Physical Therapy Ortho Treatment Note  [facility]     Patient Name: Shellie Villeda  : 1945  MRN: [id number]  Today's Date: 2017      Visit Date: 2017    Visit Dx:    ICD-10-CM ICD-9-CM   1. Acute left ankle pain M25.572 719.47   2. Weakness of left foot M21.42 734       Patient Active Problem List   Diagnosis   • Closed compression fracture of third lumbar vertebra   • Wedge compression fracture of third lumbar vertebra with delayed healing   • Chronic bilateral low back pain without sciatica   • Abdominal bloating   • Closed wedge compression fracture of third lumbar vertebra with routine healing   • Non-smoker   • Normal body mass index (BMI)        Past Medical History:   Diagnosis Date   • Compression fracture of lumbar vertebra    • Depression    • Disc degeneration, lumbar    • History of basal cell carcinoma    • History of hyperlipidemia    • History of insomnia    • History of multiple pulmonary nodules    • History of palpitations    • History of urinary tract infection    • Low back pain    • Osteoporosis    • Osteoporosis    • Skin cancer     basal cell   • Vitamin D insufficiency         Past Surgical History:   Procedure Laterality Date   • BASAL CELL CARCINOMA EXCISION      SEVERAL OFF FACE   • BREAST BIOPSY      benign   • COLONOSCOPY  2006   • COLONOSCOPY N/A 3/9/2017    Procedure: COLONOSCOPY WITH ANESTHESIA;  Surgeon: Arthur Harris MD;  Location: [facility] ENDOSCOPY;  Service:    • KYPHOPLASTY N/A 2016    Procedure: KYPHOPLASTY L3;  Surgeon: Ricky Ferguson MD;  Location: [facility] OR;  Service:                              PT Assessment/Plan       17 1500       PT Assessment    Assessment Comments Patient was initially very sore and tender along the posterior tibialis and gastrocnemius medially.  The overall DF is still limited but improved to <5 degrees passively without pain.  The lateral ankle is still quite swollen with use of the  compression sleeve.  I do not suspect any sinister pathology at this time given the lack of symptoms, but we will closely monitor.  -RS     PT Plan    PT Plan Comments continue with soft tissue work to the calf/deep posterior compartment; improve DF; proprioception as ROM improves.  -RS       User Key  (r) = Recorded By, (t) = Taken By, (c) = Cosigned By    Initials Name Provider Type    RS Kingsley Su, PT DPT Physical Therapist                    Exercises       12/20/17 1500          Subjective Comments    Subjective Comments Pt states that the ankle is still swollen but not painful.  She has been working on her HEP  -RS      Subjective Pain    Able to rate subjective pain? yes  -RS      Pre-Treatment Pain Level 0  -RS      Post-Treatment Pain Level 1  -RS      Subjective Pain Comment 2/10 occasionally  -RS        User Key  (r) = Recorded By, (t) = Taken By, (c) = Cosigned By    Initials Name Provider Type    DAMON Su, PT DPT Physical Therapist                        Manual Rx (last 36 hours)      Manual Treatments       12/20/17 1500          Manual Rx 1    Manual Rx 1 Location (L) talocrural  -RS      Manual Rx 1 Type distraction  -RS      Manual Rx 1 Grade 2 sustained  -RS      Manual Rx 1 Duration 3  -RS      Manual Rx 2    Manual Rx 2 Location (L) talocrural  -RS      Manual Rx 2 Type post glide  -RS      Manual Rx 2 Grade 2/3 oscillatory  -RS      Manual Rx 2 Duration 3  -RS      Manual Rx 3    Manual Rx 3 Location left ankle  -RS      Manual Rx 3 Type DF PVMS   -RS      Manual Rx 3 Grade repetition  -RS      Manual Rx 3 Duration 4  -RS      Manual Rx 4    Manual Rx 4 Location (L) gastroc/soleus/posterior tibialis reclined on high/low table  -RS      Manual Rx 4 Type STM  -RS      Manual Rx 4 Grade min to mod OP  -RS      Manual Rx 4 Duration 30  -RS        User Key  (r) = Recorded By, (t) = Taken By, (c) = Cosigned By    Initials Name Provider Type    DAMON Su, PT  DPT Physical Therapist                PT OP Goals       12/20/17 1500       PT Short Term Goals    STG Date to Achieve 01/05/18  -RS     STG 1 Patient will demonstrate less lateral ankle swelling with use of the taping and compression stockinette  -RS     STG 1 Progress Ongoing  -RS     STG 1 Progress Comments still quite swollen overall lateral ankle  -RS     STG 2 Patient will demonstrate passive left ankle DF to at least 5 degrees  -     STG 2 Progress Ongoing  -RS     Long Term Goals    LTG Date to Achieve 01/26/18  -RS     LTG 1 Patient will be independent with a comprehensive HEP by discharge  -     LTG 1 Progress Ongoing  -RS     LTG 2 Patien will demonstrate left active ankle DF to 10 degrees without compensation  -     LTG 2 Progress Ongoing  -RS     LTG 3 Patient will demonstrate global left ankle strength to 4+/5 on MMT including ability to perform 10 huber calf raises by discharge  -     LTG 3 Progress Ongoing  -RS     LTG 4 Patient will ambulate without gait compensation of pelvic drift or early termination of midstance by discharge  -     LTG 4 Progress Ongoing  -     LTG 5 Patient will demonstrate no swelling in the left jennifer by discharge  -     LTG 5 Progress Ongoing  -     Time Calculation    PT Goal Re-Cert Due Date 01/14/18  [facility]       User Key  (r) = Recorded By, (t) = Taken By, (c) = Cosigned By    Initials Name Provider Type     Kingsley Su, PT DPT Physical Therapist          Therapy Education  Given: HEP, Symptoms/condition management  Program: Reinforced  How Provided: Verbal  Provided to: Patient  Level of Understanding: Verbalized              Time Calculation:   Start Time: 1545  Stop Time: 1630  Time Calculation (min): 45 min  Total Timed Code Minutes- PT: 40 minute(s)    Therapy Charges for Today     Code Description Service Date Service Provider Modifiers Qty    45795578675 HC PT MANUAL THERAPY EA 15 MIN 12/20/2017 Kingsley Su, PT DPT GP 3                     ANA LILIA Su, PT DPT  12/20/2017

## 2017-12-22 ENCOUNTER — HOSPITAL ENCOUNTER (OUTPATIENT)
Dept: PHYSICAL THERAPY | Facility: HOSPITAL | Age: 72
Setting detail: THERAPIES SERIES
Discharge: HOME | End: 2017-12-22

## 2017-12-22 ENCOUNTER — APPOINTMENT (OUTPATIENT)
Dept: PHYSICAL THERAPY | Facility: HOSPITAL | Age: 72
End: 2017-12-22

## 2017-12-22 DIAGNOSIS — M25.572 ACUTE LEFT ANKLE PAIN: Primary | ICD-10-CM

## 2017-12-22 DIAGNOSIS — R29.898 WEAKNESS OF LEFT FOOT: ICD-10-CM

## 2017-12-22 PROCEDURE — 97032 APPL MODALITY 1+ESTIM EA 15: CPT

## 2017-12-22 PROCEDURE — 97140 MANUAL THERAPY 1/> REGIONS: CPT

## 2017-12-22 NOTE — THERAPY TREATMENT NOTE
Outpatient Physical Therapy Ortho Treatment Note  [facility]     Patient Name: Shellie Villeda  : 1945  MRN: [id number]  Today's Date: 2017      Visit Date: 2017    Visit Dx:    ICD-10-CM ICD-9-CM   1. Acute left ankle pain M25.572 719.47   2. Weakness of left foot M21.42 734       Patient Active Problem List   Diagnosis   • Closed compression fracture of third lumbar vertebra   • Wedge compression fracture of third lumbar vertebra with delayed healing   • Chronic bilateral low back pain without sciatica   • Abdominal bloating   • Closed wedge compression fracture of third lumbar vertebra with routine healing   • Non-smoker   • Normal body mass index (BMI)        Past Medical History:   Diagnosis Date   • Compression fracture of lumbar vertebra    • Depression    • Disc degeneration, lumbar    • History of basal cell carcinoma    • History of hyperlipidemia    • History of insomnia    • History of multiple pulmonary nodules    • History of palpitations    • History of urinary tract infection    • Low back pain    • Osteoporosis    • Osteoporosis    • Skin cancer     basal cell   • Vitamin D insufficiency         Past Surgical History:   Procedure Laterality Date   • BASAL CELL CARCINOMA EXCISION      SEVERAL OFF FACE   • BREAST BIOPSY      benign   • COLONOSCOPY  2006   • COLONOSCOPY N/A 3/9/2017    Procedure: COLONOSCOPY WITH ANESTHESIA;  Surgeon: Arthur Harris MD;  Location: [facility] ENDOSCOPY;  Service:    • KYPHOPLASTY N/A 2016    Procedure: KYPHOPLASTY L3;  Surgeon: Ricky Ferguson MD;  Location: [facility] OR;  Service:                              PT Assessment/Plan       17 0900       PT Assessment    Assessment Comments The swelling had improved along the lateral ankle post treatment today.  The limited dorsiflexion is our major focus at this time.    -RS     PT Plan    PT Plan Comments continue to improve dorsiflexion; improve soft tissue restrictions;  proprioception  -RS       User Key  (r) = Recorded By, (t) = Taken By, (c) = Cosigned By    Initials Name Provider Type    RS Kingsley Su, PT DPT Physical Therapist                Modalities       12/22/17 0900          ELECTRICAL STIMULATION    Attended/Unattended Attended  -RS      Stimulation Type --   cold laser sweep inflammation mode  -RS      Max mAmp --   very minimal intensity  -RS      Location/Electrode Placement/Other (L) ankle   (L) lateral ankle  -RS      Rx Minutes 15 mins  -RS        User Key  (r) = Recorded By, (t) = Taken By, (c) = Cosigned By    Initials Name Provider Type    RS Kingsley Su, PT DPT Physical Therapist                Exercises       12/22/17 0900          Subjective Comments    Subjective Comments Pt feels that she is getting better; she is not having any new pains in the foot/ankle  -RS      Subjective Pain    Able to rate subjective pain? yes  -RS      Pre-Treatment Pain Level 0  -RS      Post-Treatment Pain Level 0  -RS        User Key  (r) = Recorded By, (t) = Taken By, (c) = Cosigned By    Initials Name Provider Type    RS Kingsley Su, PT DPT Physical Therapist                        Manual Rx (last 36 hours)      Manual Treatments       12/22/17 0900          Manual Rx 1    Manual Rx 1 Location (L) talocrural  -RS      Manual Rx 1 Type distraction  -RS      Manual Rx 1 Grade 2 sustained  -RS      Manual Rx 1 Duration 2  -RS      Manual Rx 2    Manual Rx 2 Location (L) talocrural  -RS      Manual Rx 2 Type post glide  -RS      Manual Rx 2 Grade 2/3 oscillatory  -RS      Manual Rx 2 Duration 2  -RS      Manual Rx 3    Manual Rx 3 Location left ankle  -RS      Manual Rx 3 Type DF PVMS   -RS      Manual Rx 3 Grade repetition  -RS      Manual Rx 3 Duration 2  -RS      Manual Rx 4    Manual Rx 4 Location (L) gastroc/soleus/posterior tibialis reclined on high/low table  -RS      Manual Rx 4 Type STM  -RS      Manual Rx 4 Grade min to mod OP  -RS       Manual Rx 4 Duration 20  -RS        User Key  (r) = Recorded By, (t) = Taken By, (c) = Cosigned By    Initials Name Provider Type    RS Kingsley Su, PT DPT Physical Therapist                PT OP Goals       12/22/17 1100 12/22/17 0900    PT Short Term Goals    STG Date to Achieve  01/05/18  -RS    STG 1  Patient will demonstrate less lateral ankle swelling with use of the taping and compression stockinette  -    STG 1 Progress  Ongoing  -    STG 1 Progress Comments  lateral left ankle is still quite swollen  -    STG 2  Patient will demonstrate passive left ankle DF to at least 5 degrees  -    STG 2 Progress  Partially Met  -    Long Term Goals    LTG Date to Achieve  01/26/18  -    LTG 1  Patient will be independent with a comprehensive HEP by discharge  -    LTG 1 Progress  Ongoing  -    LTG 2  Patien will demonstrate left active ankle DF to 10 degrees without compensation  -    LTG 2 Progress  Ongoing  -    LTG 3  Patient will demonstrate global left ankle strength to 4+/5 on MMT including ability to perform 10 huber calf raises by discharge  -    LTG 3 Progress  Ongoing  -    LTG 4  Patient will ambulate without gait compensation of pelvic drift or early termination of midstance by discharge  -    LTG 4 Progress  Ongoing  -    LTG 5  Patient will demonstrate no swelling in the left jennifer by discharge  -    LTG 5 Progress  Ongoing  -    Time Calculation    PT Goal Re-Cert Due Date --  -RS 01/14/18  [facility]      User Key  (r) = Recorded By, (t) = Taken By, (c) = Cosigned By    Initials Name Provider Type    RS Kingsley Su, PT DPT Physical Therapist                         Time Calculation:   Start Time: 0915  Stop Time: 1000  Time Calculation (min): 45 min  Total Timed Code Minutes- PT: 41 minute(s)    Therapy Charges for Today     Code Description Service Date Service Provider Modifiers Qty    69352548329 HC PT MANUAL THERAPY EA 15 MIN 12/22/2017 Kingsley Carson  Georges, PT DPT GP 2    [id number]  PT ELEC STIM EA-PER 15 MIN 12/22/2017 Kingsley Su, PT DPT GP 1                    ANA LILIA Su, PT DPT  12/22/2017

## 2017-12-28 PROBLEM — R41.3 MEMORY LOSS: Status: ACTIVE | Noted: 2017-12-28

## 2017-12-29 DIAGNOSIS — R41.3 MEMORY LOSS: ICD-10-CM

## 2017-12-29 LAB
ALBUMIN SERPL-MCNC: 4.3 G/DL (ref 3.5–5.2)
ALP BLD-CCNC: 61 U/L (ref 35–104)
ALT SERPL-CCNC: 20 U/L (ref 5–33)
ANION GAP SERPL CALCULATED.3IONS-SCNC: 13 MMOL/L (ref 7–19)
AST SERPL-CCNC: 21 U/L (ref 5–32)
BILIRUB SERPL-MCNC: 0.4 MG/DL (ref 0.2–1.2)
BUN BLDV-MCNC: 15 MG/DL (ref 8–23)
C-REACTIVE PROTEIN: 0.07 MG/DL (ref 0–0.5)
CALCIUM SERPL-MCNC: 9 MG/DL (ref 8.8–10.2)
CHLORIDE BLD-SCNC: 99 MMOL/L (ref 98–111)
CO2: 29 MMOL/L (ref 22–29)
CREAT SERPL-MCNC: 0.5 MG/DL (ref 0.5–0.9)
FOLATE: 11.7 NG/ML (ref 4.8–37.3)
GFR NON-AFRICAN AMERICAN: >60
GLUCOSE BLD-MCNC: 109 MG/DL (ref 74–109)
HCT VFR BLD CALC: 42.3 % (ref 37–47)
HEMOGLOBIN: 13.4 G/DL (ref 12–16)
MAGNESIUM: 2.1 MG/DL (ref 1.6–2.4)
MCH RBC QN AUTO: 29.2 PG (ref 27–31)
MCHC RBC AUTO-ENTMCNC: 31.7 G/DL (ref 33–37)
MCV RBC AUTO: 92.2 FL (ref 81–99)
PDW BLD-RTO: 13.9 % (ref 11.5–14.5)
PLATELET # BLD: 252 K/UL (ref 130–400)
PMV BLD AUTO: 10.4 FL (ref 9.4–12.3)
POTASSIUM SERPL-SCNC: 3.9 MMOL/L (ref 3.5–5)
RBC # BLD: 4.59 M/UL (ref 4.2–5.4)
SEDIMENTATION RATE, ERYTHROCYTE: 4 MM/HR (ref 0–25)
SODIUM BLD-SCNC: 141 MMOL/L (ref 136–145)
T4 FREE: 1.1 NG/DL (ref 0.9–1.7)
TOTAL PROTEIN: 6.8 G/DL (ref 6.6–8.7)
TSH SERPL DL<=0.05 MIU/L-ACNC: 2.86 UIU/ML (ref 0.27–4.2)
VITAMIN B-12: 474 PG/ML (ref 211–946)
WBC # BLD: 7.6 K/UL (ref 4.8–10.8)

## 2018-01-03 ENCOUNTER — HOSPITAL ENCOUNTER (OUTPATIENT)
Dept: PHYSICAL THERAPY | Facility: HOSPITAL | Age: 73
Setting detail: THERAPIES SERIES
Discharge: HOME | End: 2018-01-03

## 2018-01-03 DIAGNOSIS — M54.50 ACUTE MIDLINE LOW BACK PAIN WITHOUT SCIATICA: ICD-10-CM

## 2018-01-03 DIAGNOSIS — M25.572 ACUTE LEFT ANKLE PAIN: Primary | ICD-10-CM

## 2018-01-03 DIAGNOSIS — R29.898 WEAKNESS OF LEFT FOOT: ICD-10-CM

## 2018-01-03 PROCEDURE — 97110 THERAPEUTIC EXERCISES: CPT

## 2018-01-03 PROCEDURE — 97140 MANUAL THERAPY 1/> REGIONS: CPT

## 2018-01-03 NOTE — THERAPY TREATMENT NOTE
Outpatient Physical Therapy Ortho Treatment Note  [facility]     Patient Name: Shellie Villeda  : 1945  MRN: [id number]  Today's Date: 1/3/2018      Visit Date: 2018    Visit Dx:    ICD-10-CM ICD-9-CM   1. Acute left ankle pain M25.572 719.47   2. Weakness of left foot M21.42 734   3. Acute midline low back pain without sciatica M54.5 724.2       Patient Active Problem List   Diagnosis   • Closed compression fracture of third lumbar vertebra   • Wedge compression fracture of third lumbar vertebra with delayed healing   • Chronic bilateral low back pain without sciatica   • Abdominal bloating   • Closed wedge compression fracture of third lumbar vertebra with routine healing   • Non-smoker   • Normal body mass index (BMI)        Past Medical History:   Diagnosis Date   • Compression fracture of lumbar vertebra    • Depression    • Disc degeneration, lumbar    • History of basal cell carcinoma    • History of hyperlipidemia    • History of insomnia    • History of multiple pulmonary nodules    • History of palpitations    • History of urinary tract infection    • Low back pain    • Osteoporosis    • Osteoporosis    • Skin cancer     basal cell   • Vitamin D insufficiency         Past Surgical History:   Procedure Laterality Date   • BASAL CELL CARCINOMA EXCISION      SEVERAL OFF FACE   • BREAST BIOPSY      benign   • COLONOSCOPY  2006   • COLONOSCOPY N/A 3/9/2017    Procedure: COLONOSCOPY WITH ANESTHESIA;  Surgeon: Arthur Harris MD;  Location: [facility] ENDOSCOPY;  Service:    • KYPHOPLASTY N/A 2016    Procedure: KYPHOPLASTY L3;  Surgeon: Ricky Ferguson MD;  Location: [facility] OR;  Service:                              PT Assessment/Plan       18 0900       PT Assessment    Assessment Comments Patient had visibly noted less swelling today in the ankle with the MEL hose.  The partial WB proprioception did not result in any sharp pain or discomfort deep in the ankle joint today.   The fibularis muscles are still quite weak which was addressed with the HEP.  intrinsic strengthening will be initiated next session.  -RS     PT Plan    PT Plan Comments assess the effects of the HEP; continue with proprioception  -RS       User Key  (r) = Recorded By, (t) = Taken By, (c) = Cosigned By    Initials Name Provider Type    RS Kingsley Su, PT DPT Physical Therapist                    Exercises       01/03/18 0800          Subjective Comments    Subjective Comments Pt is wearing a size C MEL hose and feels that this is helping.  Minor lateral ankle discomfort noted 2 days ago that has improved.  ROM feels improves as well.  -RS      Subjective Pain    Able to rate subjective pain? yes  -RS      Pre-Treatment Pain Level 0  -RS      Post-Treatment Pain Level 0  -RS      Exercise 1    Exercise Name 1 isometric INV/EV  -RS      Cueing 1 Tactile  -RS      Sets 1 3  -RS      Reps 1 5  -RS      Time (Seconds) 1 20 sec hold  -RS      Additional Comments reclined  -RS      Exercise 2    Exercise Name 2 short sitting:  curved side of BOSU neutral foot sustained WB press  -RS      Cueing 2 Tactile  -RS      Sets 2 3  -RS      Reps 2 15  -RS      Time (Seconds) 2 5 sec hold  -RS      Exercise 3    Exercise Name 3 short sitting:  curved side of BOSU DF/PF to mid range  -RS      Cueing 3 Tactile  -RS      Sets 3 3  -RS      Reps 3 15  -RS      Additional Comments cued to remain neutral with ankle tripod  -RS      Exercise 4    Exercise Name 4 short sitting:  curved side of BOSU PF sustained hold mid range  -RS      Cueing 4 Tactile  -RS      Sets 4 3  -RS      Reps 4 15  -RS      Time (Seconds) 4 5 sec hold  -RS      Additional Comments no INV/EV with activity  -RS      Exercise 5    Exercise Name 5 short sitting:  curved side of BOSU foot flat inv/ev closed chain rocking  -RS      Cueing 5 Tactile  -RS      Sets 5 3  -RS      Reps 5 15  -RS      Additional Comments avoided pain  -RS        User Key  (r) =  Recorded By, (t) = Taken By, (c) = Cosigned By    Initials Name Provider Type    RS Kingsley Su, PT DPT Physical Therapist                        Manual Rx (last 36 hours)      Manual Treatments       01/03/18 0900          Manual Rx 1    Manual Rx 1 Location (L) talocrural  -RS      Manual Rx 1 Type distraction  -RS      Manual Rx 1 Grade 2 sustained  -RS      Manual Rx 1 Duration 3  -RS      Manual Rx 2    Manual Rx 2 Location (L) talocrural  -RS      Manual Rx 2 Type post glide  -RS      Manual Rx 2 Grade 2/3 oscillatory  -RS      Manual Rx 2 Duration 3  -RS      Manual Rx 3    Manual Rx 3 Location left ankle  -RS      Manual Rx 3 Type DF PVMS   -RS      Manual Rx 3 Grade repetition  -RS      Manual Rx 3 Duration 4  -RS        User Key  (r) = Recorded By, (t) = Taken By, (c) = Cosigned By    Initials Name Provider Type    RS Kingsley Su, PT DPT Physical Therapist                PT OP Goals       01/03/18 0800       PT Short Term Goals    STG Date to Achieve 01/05/18  -     STG 1 Patient will demonstrate less lateral ankle swelling with use of the taping and compression stockinette  -     STG 1 Progress Ongoing  -     STG 2 Patient will demonstrate passive left ankle DF to at least 5 degrees  -     STG 2 Progress Partially Met  -     Long Term Goals    LTG Date to Achieve 01/26/18  -     LTG 1 Patient will be independent with a comprehensive HEP by discharge  -     LTG 1 Progress Ongoing  -     LTG 2 Patien will demonstrate left active ankle DF to 10 degrees without compensation  -     LTG 2 Progress Progressing  -     LTG 2 Progress Comments >5 deg without compensation  -     LTG 3 Patient will demonstrate global left ankle strength to 4+/5 on MMT including ability to perform 10 huber calf raises by discharge  -     LTG 3 Progress Ongoing  -     LTG 4 Patient will ambulate without gait compensation of pelvic drift or early termination of midstance by discharge  -      LTG 4 Progress Ongoing  -RS     LTG 5 Patient will demonstrate no swelling in the left jennifer by discharge  -RS     LTG 5 Progress Ongoing  -RS     Time Calculation    PT Goal Re-Cert Due Date 01/14/18  -RS       User Key  (r) = Recorded By, (t) = Taken By, (c) = Cosigned By    Initials Name Provider Type    RS Kingsley Su, PT DPT Physical Therapist          Therapy Education  Education Details: long sitting red theraband INV/EV   Given: HEP  Program: New  How Provided: Written  Provided to: Patient  Level of Understanding: Demonstrated, Verbalized              Time Calculation:   Start Time: 0850  Stop Time: 0935  Time Calculation (min): 45 min  Total Timed Code Minutes- PT: 41 minute(s)    Therapy Charges for Today     Code Description Service Date Service Provider Modifiers Qty    91291816495 HC PT MANUAL THERAPY EA 15 MIN 1/3/2018 Kingsley Su, PT DPT GP 1    05228547070 HC PT THER PROC EA 15 MIN 1/3/2018 Kingsley Su, PT DPT GP 2                    ANA LILIA uS, PT DPT  1/3/2018

## 2018-01-05 ENCOUNTER — OFFICE VISIT (OUTPATIENT)
Dept: INTERNAL MEDICINE | Age: 73
End: 2018-01-05
Payer: MEDICARE

## 2018-01-05 ENCOUNTER — HOSPITAL ENCOUNTER (OUTPATIENT)
Dept: PHYSICAL THERAPY | Facility: HOSPITAL | Age: 73
Setting detail: THERAPIES SERIES
Discharge: HOME | End: 2018-01-05

## 2018-01-05 VITALS
SYSTOLIC BLOOD PRESSURE: 116 MMHG | DIASTOLIC BLOOD PRESSURE: 70 MMHG | BODY MASS INDEX: 18.66 KG/M2 | HEART RATE: 75 BPM | HEIGHT: 65 IN | OXYGEN SATURATION: 98 % | WEIGHT: 112 LBS

## 2018-01-05 DIAGNOSIS — H81.13 BENIGN PAROXYSMAL POSITIONAL VERTIGO DUE TO BILATERAL VESTIBULAR DISORDER: ICD-10-CM

## 2018-01-05 DIAGNOSIS — G89.29 CHRONIC BILATERAL LOW BACK PAIN WITHOUT SCIATICA: ICD-10-CM

## 2018-01-05 DIAGNOSIS — M54.50 CHRONIC BILATERAL LOW BACK PAIN WITHOUT SCIATICA: ICD-10-CM

## 2018-01-05 DIAGNOSIS — M25.572 ACUTE LEFT ANKLE PAIN: Primary | ICD-10-CM

## 2018-01-05 DIAGNOSIS — M80.00XD OSTEOPOROSIS WITH PATHOLOGICAL FRACTURE WITH ROUTINE HEALING: Primary | ICD-10-CM

## 2018-01-05 DIAGNOSIS — R29.898 WEAKNESS OF LEFT FOOT: ICD-10-CM

## 2018-01-05 PROCEDURE — 3017F COLORECTAL CA SCREEN DOC REV: CPT | Performed by: INTERNAL MEDICINE

## 2018-01-05 PROCEDURE — 1036F TOBACCO NON-USER: CPT | Performed by: INTERNAL MEDICINE

## 2018-01-05 PROCEDURE — 3014F SCREEN MAMMO DOC REV: CPT | Performed by: INTERNAL MEDICINE

## 2018-01-05 PROCEDURE — G8427 DOCREV CUR MEDS BY ELIG CLIN: HCPCS | Performed by: INTERNAL MEDICINE

## 2018-01-05 PROCEDURE — G8400 PT W/DXA NO RESULTS DOC: HCPCS | Performed by: INTERNAL MEDICINE

## 2018-01-05 PROCEDURE — G8484 FLU IMMUNIZE NO ADMIN: HCPCS | Performed by: INTERNAL MEDICINE

## 2018-01-05 PROCEDURE — 97110 THERAPEUTIC EXERCISES: CPT

## 2018-01-05 PROCEDURE — G8420 CALC BMI NORM PARAMETERS: HCPCS | Performed by: INTERNAL MEDICINE

## 2018-01-05 PROCEDURE — 4040F PNEUMOC VAC/ADMIN/RCVD: CPT | Performed by: INTERNAL MEDICINE

## 2018-01-05 PROCEDURE — 1090F PRES/ABSN URINE INCON ASSESS: CPT | Performed by: INTERNAL MEDICINE

## 2018-01-05 PROCEDURE — 99214 OFFICE O/P EST MOD 30 MIN: CPT | Performed by: INTERNAL MEDICINE

## 2018-01-05 PROCEDURE — 1123F ACP DISCUSS/DSCN MKR DOCD: CPT | Performed by: INTERNAL MEDICINE

## 2018-01-05 ASSESSMENT — ENCOUNTER SYMPTOMS
WHEEZING: 0
BACK PAIN: 1
NAUSEA: 1
SHORTNESS OF BREATH: 0

## 2018-01-05 ASSESSMENT — PATIENT HEALTH QUESTIONNAIRE - PHQ9
1. LITTLE INTEREST OR PLEASURE IN DOING THINGS: 0
2. FEELING DOWN, DEPRESSED OR HOPELESS: 0
SUM OF ALL RESPONSES TO PHQ QUESTIONS 1-9: 0
SUM OF ALL RESPONSES TO PHQ9 QUESTIONS 1 & 2: 0

## 2018-01-05 NOTE — PROGRESS NOTES
fractures follow. In terms of the odor her labs are normal and we do not notice anything she is going to follow in terms of the fuzzy tongue it seems that her mouth may be a little dry. Follow.

## 2018-01-05 NOTE — THERAPY TREATMENT NOTE
Outpatient Physical Therapy Ortho Treatment Note  [facility]     Patient Name: Shellie Villeda  : 1945  MRN: [id number]  Today's Date: 2018      Visit Date: 2018    Visit Dx:    ICD-10-CM ICD-9-CM   1. Acute left ankle pain M25.572 719.47   2. Weakness of left foot M21.42 734       Patient Active Problem List   Diagnosis   • Closed compression fracture of third lumbar vertebra   • Wedge compression fracture of third lumbar vertebra with delayed healing   • Chronic bilateral low back pain without sciatica   • Abdominal bloating   • Closed wedge compression fracture of third lumbar vertebra with routine healing   • Non-smoker   • Normal body mass index (BMI)        Past Medical History:   Diagnosis Date   • Compression fracture of lumbar vertebra    • Depression    • Disc degeneration, lumbar    • History of basal cell carcinoma    • History of hyperlipidemia    • History of insomnia    • History of multiple pulmonary nodules    • History of palpitations    • History of urinary tract infection    • Low back pain    • Osteoporosis    • Osteoporosis    • Skin cancer     basal cell   • Vitamin D insufficiency         Past Surgical History:   Procedure Laterality Date   • BASAL CELL CARCINOMA EXCISION      SEVERAL OFF FACE   • BREAST BIOPSY      benign   • COLONOSCOPY  2006   • COLONOSCOPY N/A 3/9/2017    Procedure: COLONOSCOPY WITH ANESTHESIA;  Surgeon: Arthur Harris MD;  Location: [facility] ENDOSCOPY;  Service:    • KYPHOPLASTY N/A 2016    Procedure: KYPHOPLASTY L3;  Surgeon: Ricky Ferguson MD;  Location: [facility] OR;  Service:                              PT Assessment/Plan       18 1300       PT Assessment    Assessment Comments Patient continues to improve ROM as inversion and eversion are WNL actively.  We did begn intrinsic strengthening without an increase in symptoms.  DF ROM and strength are improving.  Gait pattern is now free of forefoot compensation or lateral drift.   Patient is progressing nicely  -RS     PT Plan    PT Plan Comments Assess vertigo if still symptomatic next session after treating the foot/ankle  -RS       User Key  (r) = Recorded By, (t) = Taken By, (c) = Cosigned By    Initials Name Provider Type    RS Kingsley Su, PT DPT Physical Therapist                    Exercises       01/05/18 1300          Subjective Comments    Subjective Comments Pt is doing well today.  She is still having the spinning and her MD gave her exercises for this.  -RS      Subjective Pain    Able to rate subjective pain? yes  -RS      Pre-Treatment Pain Level 0  -RS      Post-Treatment Pain Level 0  -RS      Exercise 1    Exercise Name 1 stretched (L) ankle into DF with therapist OP; light distraction give  -RS      Cueing 1 Tactile  -RS      Time (Minutes) 1 10  -RS      Exercise 2    Exercise Name 2 (L) ankle 20 deg PF:  isometrics INV/EV eyes open/closed  -RS      Cueing 2 Tactile  -RS      Sets 2 2  -RS      Reps 2 10  -RS      Time (Seconds) 2 5 sec hold  -RS      Additional Comments each direction  -RS      Exercise 3    Exercise Name 3 reclined:  green TB resisted DF  -RS      Cueing 3 Tactile  -RS      Sets 3 3  -RS      Reps 3 15  -RS      Exercise 4    Exercise Name 4 reclined:  green TB resisted ankle PF  -RS      Cueing 4 Tactile  -RS      Sets 4 3  -RS      Reps 4 15  -RS      Exercise 5    Exercise Name 5 short sitting:  intrinsic towel curls mid range  -RS      Cueing 5 Demo  -RS      Sets 5 3  -RS      Reps 5 10  -RS      Time (Seconds) 5 5 sec hold  -RS      Exercise 6    Exercise Name 6 discussed HEP and reviewed previous theraband exercises.    -RS      Time (Minutes) 6 3  -RS        User Key  (r) = Recorded By, (t) = Taken By, (c) = Cosigned By    Initials Name Provider Type    RS Kingsley Su, PT DPT Physical Therapist                               PT OP Goals       01/05/18 1300       PT Short Term Goals    STG Date to Achieve 01/05/18  -RS     STG  1 Patient will demonstrate less lateral ankle swelling with use of the taping and compression stockinette  -     STG 1 Progress Progressing  -RS     STG 1 Progress Comments with use of MEL hose and compression  -     STG 2 Patient will demonstrate passive left ankle DF to at least 5 degrees  -     STG 2 Progress Met  -     Long Term Goals    LTG Date to Achieve 01/26/18  -     LTG 1 Patient will be independent with a comprehensive HEP by discharge  -     LTG 1 Progress Progressing  -     LTG 1 Progress Comments working on therband resisted inv/ev  -     LTG 2 Patien will demonstrate left active ankle DF to 10 degrees without compensation  -     LTG 2 Progress Progressing  -     LTG 3 Patient will demonstrate global left ankle strength to 4+/5 on MMT including ability to perform 10 huber calf raises by discharge  -     LTG 3 Progress Ongoing  -     LTG 4 Patient will ambulate without gait compensation of pelvic drift or early termination of midstance by discharge  -     LTG 4 Progress Ongoing  -     LTG 5 Patient will demonstrate no swelling in the left jennifer by discharge  -     LTG 5 Progress Ongoing  -     Time Calculation    PT Goal Re-Cert Due Date 01/14/18  -       User Key  (r) = Recorded By, (t) = Taken By, (c) = Cosigned By    Initials Name Provider Type     Kingsley Su, PT DPT Physical Therapist          Therapy Education  Education Details: continue previous theraband exercises  Given: HEP  Program: Reinforced  How Provided: Verbal, Demonstration  Provided to: Patient  Level of Understanding: Demonstrated, Verbalized              Time Calculation:   Start Time: 1300  Stop Time: 1345  Time Calculation (min): 45 min  Total Timed Code Minutes- PT: 41 minute(s)    Therapy Charges for Today     Code Description Service Date Service Provider Modifiers Qty    53313715040  PT THER PROC EA 15 MIN 1/5/2018 Kingsley Su, PT DPT GP 3                    ANA LILIA Carson  Georges, PT DPT  1/5/2018

## 2018-01-08 ENCOUNTER — HOSPITAL ENCOUNTER (OUTPATIENT)
Dept: PHYSICAL THERAPY | Facility: HOSPITAL | Age: 73
Setting detail: THERAPIES SERIES
Discharge: HOME | End: 2018-01-08

## 2018-01-08 DIAGNOSIS — R29.898 WEAKNESS OF LEFT FOOT: ICD-10-CM

## 2018-01-08 DIAGNOSIS — M54.50 ACUTE MIDLINE LOW BACK PAIN WITHOUT SCIATICA: ICD-10-CM

## 2018-01-08 DIAGNOSIS — M80.00XD OSTEOPOROSIS WITH PATHOLOGICAL FRACTURE WITH ROUTINE HEALING: Primary | ICD-10-CM

## 2018-01-08 DIAGNOSIS — M25.572 ACUTE LEFT ANKLE PAIN: Primary | ICD-10-CM

## 2018-01-08 PROCEDURE — 97140 MANUAL THERAPY 1/> REGIONS: CPT

## 2018-01-08 PROCEDURE — 97110 THERAPEUTIC EXERCISES: CPT

## 2018-01-08 NOTE — THERAPY TREATMENT NOTE
Outpatient Physical Therapy Ortho Treatment Note  [facility]     Patient Name: Shellie Villeda  : 1945  MRN: [id number]  Today's Date: 2018      Visit Date: 2018    Visit Dx:    ICD-10-CM ICD-9-CM   1. Acute left ankle pain M25.572 719.47   2. Weakness of left foot M21.42 734   3. Acute midline low back pain without sciatica M54.5 724.2       Patient Active Problem List   Diagnosis   • Closed compression fracture of third lumbar vertebra   • Wedge compression fracture of third lumbar vertebra with delayed healing   • Chronic bilateral low back pain without sciatica   • Abdominal bloating   • Closed wedge compression fracture of third lumbar vertebra with routine healing   • Non-smoker   • Normal body mass index (BMI)        Past Medical History:   Diagnosis Date   • Compression fracture of lumbar vertebra    • Depression    • Disc degeneration, lumbar    • History of basal cell carcinoma    • History of hyperlipidemia    • History of insomnia    • History of multiple pulmonary nodules    • History of palpitations    • History of urinary tract infection    • Low back pain    • Osteoporosis    • Osteoporosis    • Skin cancer     basal cell   • Vitamin D insufficiency         Past Surgical History:   Procedure Laterality Date   • BASAL CELL CARCINOMA EXCISION      SEVERAL OFF FACE   • BREAST BIOPSY      benign   • COLONOSCOPY  2006   • COLONOSCOPY N/A 3/9/2017    Procedure: COLONOSCOPY WITH ANESTHESIA;  Surgeon: Arthur Harris MD;  Location: [facility] ENDOSCOPY;  Service:    • KYPHOPLASTY N/A 2016    Procedure: KYPHOPLASTY L3;  Surgeon: Ricky Ferguson MD;  Location: [facility] OR;  Service:                              PT Assessment/Plan       18 1000       PT Assessment    Assessment Comments Patient's ankle ROM continues to improve as well as the swelling (which is non existent at this time).  Proprioception was initiated on the BAPS board today and this did not increase any  symptoms.  We will continue to progress proprioception going forward.  Quality of joint mobility is improving each week.    -RS     PT Plan    PT Plan Comments continue with ankle proprioception, functional strength, and begin standing calf strengthening  -RS       User Key  (r) = Recorded By, (t) = Taken By, (c) = Cosigned By    Initials Name Provider Type    RS Kingsley Su, PT DPT Physical Therapist                    Exercises       01/08/18 1000          Subjective Comments    Subjective Comments Pt is doing well overall.  She is not having any increased pain and feels her walking is much more stable.  -RS      Subjective Pain    Able to rate subjective pain? yes  -RS      Pre-Treatment Pain Level 0  -RS      Post-Treatment Pain Level 0  -RS      Exercise 1    Exercise Name 1 sitting BAPS board:  DF/PF level 2  -RS      Cueing 1 Demo  -RS      Sets 1 5  -RS      Time (Minutes) 1 1  -RS      Exercise 2    Exercise Name 2 sitting BAPS board:  INV/EV level 2  -RS      Cueing 2 Demo  -RS      Sets 2 5  -RS      Time (Minutes) 2 1  -RS      Exercise 3    Exercise Name 3 (L) partial lunge curved side of BOSU  -RS      Cueing 3 Verbal  -RS      Sets 3 2  -RS      Reps 3 20  -RS      Additional Comments (R) UE support  -RS      Exercise 4    Exercise Name 4 huber partial calf raises to 50% range with right UE support  -RS      Cueing 4 Verbal  -RS      Sets 4 3  -RS      Reps 4 12  -RS      Exercise 5    Exercise Name 5 assessed and treated for right posterior canalithiasis:  originally tested left side, but this was not symptomatic.  Patient had seen the MD and MD gave exercises, stating that she has a PT referral for this if needed.  Obtained informed consent verbally and explained rationale for treatment.  -RS      Time (Minutes) 5 10  -RS        User Key  (r) = Recorded By, (t) = Taken By, (c) = Cosigned By    Initials Name Provider Type    RS Kingsley Su, PT DPT Physical Therapist                         Manual Rx (last 36 hours)      Manual Treatments       01/08/18 0900          Manual Rx 1    Manual Rx 1 Location (L) talocrural  -RS      Manual Rx 1 Type distraction  -RS      Manual Rx 1 Grade 2 sustained  -RS      Manual Rx 1 Duration 3  -RS      Manual Rx 2    Manual Rx 2 Location (L) talocrural  -RS      Manual Rx 2 Type post glide  -RS      Manual Rx 2 Grade 2/3 oscillatory  -RS      Manual Rx 2 Duration 3  -RS      Manual Rx 3    Manual Rx 3 Location left ankle  -RS      Manual Rx 3 Type DF PVMS/lateral tibial glide PVMS (short of pain)  -RS      Manual Rx 3 Grade repetition  -RS      Manual Rx 3 Duration 4  -RS        User Key  (r) = Recorded By, (t) = Taken By, (c) = Cosigned By    Initials Name Provider Type    RS Kingsley Su, PT DPT Physical Therapist                PT OP Goals       01/08/18 1000       PT Short Term Goals    STG Date to Achieve 01/05/18  -     STG 1 Patient will demonstrate less lateral ankle swelling with use of the taping and compression stockinette  -     STG 1 Progress Progressing  -     STG 2 Patient will demonstrate passive left ankle DF to at least 5 degrees  -     STG 2 Progress Met  -     Long Term Goals    LTG Date to Achieve 01/26/18  -     LTG 1 Patient will be independent with a comprehensive HEP by discharge  -     LTG 1 Progress Progressing  -     LTG 2 Patien will demonstrate left active ankle DF to 10 degrees without compensation  -     LTG 2 Progress Progressing  -     LTG 2 Progress Comments 6 deg today  -     LTG 3 Patient will demonstrate global left ankle strength to 4+/5 on MMT including ability to perform 10 huber calf raises by discharge  -     LTG 3 Progress Partially Met  -     LTG 3 Progress Comments achieved except for calf raises  -     LTG 4 Patient will ambulate without gait compensation of pelvic drift or early termination of midstance by discharge  -     LTG 4 Progress Ongoing  -     LTG 5 Patient will  demonstrate no swelling in the left jennifer by discharge  -RS     LTG 5 Progress Ongoing  -RS     Time Calculation    PT Goal Re-Cert Due Date 01/14/18  -RS       User Key  (r) = Recorded By, (t) = Taken By, (c) = Cosigned By    Initials Name Provider Type    RS Kingsley Su, PT DPT Physical Therapist          Therapy Education  Given: HEP  Program: Reinforced  How Provided: Verbal  Provided to: Patient  Level of Understanding: Verbalized              Time Calculation:   Start Time: 1015  Stop Time: 1113  Time Calculation (min): 58 min  PT Non-Billable Time (min): 13 min  Total Timed Code Minutes- PT: 45 minute(s)    Therapy Charges for Today     Code Description Service Date Service Provider Modifiers Qty    61632257645 HC PT MANUAL THERAPY EA 15 MIN 1/8/2018 Kingsley Su, PT DPT GP 1    56897426462 HC PT THER PROC EA 15 MIN 1/8/2018 Kingsley Su, PT DPT GP 2                    ANA LILIA Su, PT DPT  1/8/2018

## 2018-01-10 ENCOUNTER — HOSPITAL ENCOUNTER (OUTPATIENT)
Dept: PHYSICAL THERAPY | Facility: HOSPITAL | Age: 73
Setting detail: THERAPIES SERIES
Discharge: HOME | End: 2018-01-10

## 2018-01-10 DIAGNOSIS — M25.572 ACUTE LEFT ANKLE PAIN: Primary | ICD-10-CM

## 2018-01-10 DIAGNOSIS — R29.898 WEAKNESS OF LEFT FOOT: ICD-10-CM

## 2018-01-10 PROCEDURE — G8978 MOBILITY CURRENT STATUS: HCPCS

## 2018-01-10 PROCEDURE — G8979 MOBILITY GOAL STATUS: HCPCS

## 2018-01-10 PROCEDURE — 97110 THERAPEUTIC EXERCISES: CPT

## 2018-01-10 NOTE — THERAPY PROGRESS REPORT/RE-CERT
Outpatient Physical Therapy Ortho Progress Note/G code  [facility]     Patient Name: Shellie Villeda  : 1945  MRN: [id number]  Today's Date: 1/10/2018      Visit Date: 01/10/2018    Visit Dx:    ICD-10-CM ICD-9-CM   1. Acute left ankle pain M25.572 719.47   2. Weakness of left foot M21.42 734       Patient Active Problem List   Diagnosis   • Closed compression fracture of third lumbar vertebra   • Wedge compression fracture of third lumbar vertebra with delayed healing   • Chronic bilateral low back pain without sciatica   • Abdominal bloating   • Closed wedge compression fracture of third lumbar vertebra with routine healing   • Non-smoker   • Normal body mass index (BMI)        Past Medical History:   Diagnosis Date   • Compression fracture of lumbar vertebra    • Depression    • Disc degeneration, lumbar    • History of basal cell carcinoma    • History of hyperlipidemia    • History of insomnia    • History of multiple pulmonary nodules    • History of palpitations    • History of urinary tract infection    • Low back pain    • Osteoporosis    • Osteoporosis    • Skin cancer     basal cell   • Vitamin D insufficiency         Past Surgical History:   Procedure Laterality Date   • BASAL CELL CARCINOMA EXCISION      SEVERAL OFF FACE   • BREAST BIOPSY      benign   • COLONOSCOPY  2006   • COLONOSCOPY N/A 3/9/2017    Procedure: COLONOSCOPY WITH ANESTHESIA;  Surgeon: Arthur Harris MD;  Location: [facility] ENDOSCOPY;  Service:    • KYPHOPLASTY N/A 2016    Procedure: KYPHOPLASTY L3;  Surgeon: Ricky Ferguson MD;  Location: [facility] OR;  Service:                              PT Assessment/Plan       01/10/18 1000       PT Assessment    Functional Limitations Impaired gait;Performance in leisure activities  -RS     Impairments Balance;Gait;Edema;Joint mobility;Muscle strength;Pain;Posture;Range of motion  -RS     Assessment Comments Patient is making great progress at this time.  The swelling,  ROM, and pain have all significantly improved.  Patient is still lacking eversion strength and calf strength at this time, and still strugges with proprioception.  We will continue to address these deficits going forward.  I anticipate that the patient will only require an addition 2-4 weeks if needed.   -RS     Please refer to paper survey for additional self-reported information Yes  -RS     Rehab Potential Good  -RS     Patient/caregiver participated in establishment of treatment plan and goals Yes  -RS     Patient would benefit from skilled therapy intervention Yes  -RS     PT Plan    PT Frequency 2x/week  -RS     Predicted Duration of Therapy Intervention (days/wks) 2-4 weeks  -RS     Planned CPT's? PT THER PROC EA 15 MIN: 37447;PT MANUAL THERAPY EA 15 MIN: 12223;PT NEUROMUSC RE-EDUCATION EA 15 MIN: 37190;PT GAIT TRAINING EA 15 MIN: 39629;PT ELECTRICAL STIM ATTD EA 15 MIN: 22055  -RS     Physical Therapy Interventions (Optional Details) balance training;gait training;home exercise program;joint mobilization;manual therapy techniques;modalities;neuromuscular re-education;patient/family education;ROM (Range of Motion);stair training;strengthening;stretching;swiss ball techniques;taping;transfer training  -RS     PT Plan Comments We will continue to progress both at home and in the clinic ankle ROM, ankle strength, and proprioception.  Modalities may be used PRN.    -RS       User Key  (r) = Recorded By, (t) = Taken By, (c) = Cosigned By    Initials Name Provider Type    RS Kingsley Su, PT DPT Physical Therapist                    Exercises       01/10/18 1000          Subjective Comments    Subjective Comments Patient is doing well.  No new issues at this time.  No pain but did notice a sharp twinge in the ankle x 2 yesterday.  She is still woozy from the vestibular treatment last session.  -RS      Subjective Pain    Able to rate subjective pain? yes  -RS      Pre-Treatment Pain Level 0  -RS       Post-Treatment Pain Level 0  -RS      Exercise 1    Exercise Name 1 sitting wobble board DF/PF  -RS      Cueing 1 Verbal  -RS      Sets 1 2  -RS      Reps 1 20  -RS      Exercise 2    Exercise Name 2 standing wobble board DF/PF with UE support  -RS      Cueing 2 Verbal  -RS      Sets 2 3  -RS      Reps 2 20  -RS      Exercise 3    Exercise Name 3 wall sits with PF isometric hold huber  -RS      Cueing 3 Demo  -RS      Sets 3 1  -RS      Reps 3 10  -RS      Time (Seconds) 3 5 sec hold  -RS      Exercise 4    Exercise Name 4 standing (L) LE on wobble board; right hip abductin  -RS      Cueing 4 Verbal  -RS      Sets 4 3  -RS      Reps 4 15  -RS      Exercise 5    Exercise Name 5 (L) SLS in front of CYBEX; right UE support as needed  -RS      Cueing 5 Verbal  -RS      Sets 5 2  -RS      Reps 5 5  -RS      Time (Seconds) 5 20 sec hold  -RS      Exercise 6    Exercise Name 6 assessed all goals and reviewed HEP for progress note.  -RS      Time (Minutes) 6 3  -RS        User Key  (r) = Recorded By, (t) = Taken By, (c) = Cosigned By    Initials Name Provider Type    RS Kingsley Su, PT DPT Physical Therapist                               PT OP Goals       01/10/18 1000       PT Short Term Goals    STG Date to Achieve 01/05/18  -RS     STG 1 Patient will demonstrate less lateral ankle swelling with use of the taping and compression stockinette  -RS     STG 1 Progress Met  -RS     STG 2 Patient will demonstrate passive left ankle DF to at least 5 degrees  -RS     STG 2 Progress Met  -RS     Long Term Goals    LTG Date to Achieve 01/26/18  -RS     LTG 1 Patient will be independent with a comprehensive HEP by discharge  -RS     LTG 1 Progress Partially Met  -RS     LTG 2 Patien will demonstrate left active ankle DF to 10 degrees without compensation  -RS     LTG 2 Progress Progressing  -RS     LTG 2 Progress Comments 6-7 deg; improving.  -RS     LTG 3 Patient will demonstrate global left ankle strength to 4+/5 on MMT  including ability to perform 10 huber calf raises by discharge  -     LTG 3 Progress Partially Met  -     LTG 4 Patient will ambulate without gait compensation of pelvic drift or early termination of midstance by discharge  -     LTG 4 Progress Progressing  -     LTG 4 Progress Comments much improved DF; less lateral drift noted  -RS     LTG 5 Patient will demonstrate no swelling in the left jennifer by discharge  -     LTG 5 Progress Progressing  -     LTG 5 Progress Comments slight increase noted around the lateral malleolus; improving with the MEL hose  -     Time Calculation    PT Goal Re-Cert Due Date 02/09/18  -       User Key  (r) = Recorded By, (t) = Taken By, (c) = Cosigned By    Initials Name Provider Type    RS Kingsley Su, PT DPT Physical Therapist          Therapy Education  Given: HEP, Symptoms/condition management  Program: Reinforced  How Provided: Verbal  Provided to: Patient  Level of Understanding: Verbalized              Time Calculation:   Start Time: 1005  Stop Time: 1048  Time Calculation (min): 43 min  PT Non-Billable Time (min): 3 min  Total Timed Code Minutes- PT: 40 minute(s)    Therapy Charges for Today     Code Description Service Date Service Provider Modifiers Qty    61446779190 HC PT MOBILITY CURRENT 1/10/2018 Kingsley Su, PT DPT GP, CI 1    18933191507 HC PT MOBILITY PROJECTED 1/10/2018 Kingsley Su, PT DPT GP, CI 1    26987006825 HC PT THER PROC EA 15 MIN 1/10/2018 Kingsley Su, PT DPT GP 3          PT G-Codes  PT Professional Judgement Used?: (S) Yes (per patient report of normal)  Functional Limitation: Mobility: Walking and moving around  Mobility: Walking and Moving Around Current Status (): At least 1 percent but less than 20 percent impaired, limited or restricted  Mobility: Walking and Moving Around Goal Status (): At least 1 percent but less than 20 percent impaired, limited or restricted         ANA LILIA Su, PT  DPT  1/10/2018

## 2018-01-16 ENCOUNTER — APPOINTMENT (OUTPATIENT)
Dept: PHYSICAL THERAPY | Facility: HOSPITAL | Age: 73
End: 2018-01-16

## 2018-01-17 ENCOUNTER — HOSPITAL ENCOUNTER (OUTPATIENT)
Dept: PHYSICAL THERAPY | Facility: HOSPITAL | Age: 73
Setting detail: THERAPIES SERIES
Discharge: HOME | End: 2018-01-17

## 2018-01-17 DIAGNOSIS — R29.898 WEAKNESS OF LEFT FOOT: ICD-10-CM

## 2018-01-17 DIAGNOSIS — M25.572 ACUTE LEFT ANKLE PAIN: Primary | ICD-10-CM

## 2018-01-17 PROCEDURE — 97110 THERAPEUTIC EXERCISES: CPT

## 2018-01-17 NOTE — THERAPY TREATMENT NOTE
Outpatient Physical Therapy Ortho Treatment Note  [facility]     Patient Name: Shellie Villeda  : 1945  MRN: [id number]  Today's Date: 2018      Visit Date: 2018    Visit Dx:    ICD-10-CM ICD-9-CM   1. Acute left ankle pain M25.572 719.47   2. Weakness of left foot M21.42 734       Patient Active Problem List   Diagnosis   • Closed compression fracture of third lumbar vertebra   • Wedge compression fracture of third lumbar vertebra with delayed healing   • Chronic bilateral low back pain without sciatica   • Abdominal bloating   • Closed wedge compression fracture of third lumbar vertebra with routine healing   • Non-smoker   • Normal body mass index (BMI)        Past Medical History:   Diagnosis Date   • Compression fracture of lumbar vertebra    • Depression    • Disc degeneration, lumbar    • History of basal cell carcinoma    • History of hyperlipidemia    • History of insomnia    • History of multiple pulmonary nodules    • History of palpitations    • History of urinary tract infection    • Low back pain    • Osteoporosis    • Osteoporosis    • Skin cancer     basal cell   • Vitamin D insufficiency         Past Surgical History:   Procedure Laterality Date   • BASAL CELL CARCINOMA EXCISION      SEVERAL OFF FACE   • BREAST BIOPSY      benign   • COLONOSCOPY  2006   • COLONOSCOPY N/A 3/9/2017    Procedure: COLONOSCOPY WITH ANESTHESIA;  Surgeon: Arthur Harris MD;  Location: [facility] ENDOSCOPY;  Service:    • KYPHOPLASTY N/A 2016    Procedure: KYPHOPLASTY L3;  Surgeon: Ricky Ferguson MD;  Location: [facility] OR;  Service:                              PT Assessment/Plan       18 1100       PT Assessment    Assessment Comments Patient is still not having as much spinning, so the vertigo seems a bit more in check at this time.  The left ankle is doing very well and we will likely d/c next week to focus on the shoulder pain.  Patient will get an order for the shouder from the  MD.  -RS     PT Plan    PT Plan Comments look to discharge next week; assess if the patient has the order for the shoulder.  -RS       User Key  (r) = Recorded By, (t) = Taken By, (c) = Cosigned By    Initials Name Provider Type    DAMON Su, PT DPT Physical Therapist                    Exercises       01/17/18 1000          Subjective Pain    Able to rate subjective pain? yes  -RS      Pre-Treatment Pain Level 0  -RS      Post-Treatment Pain Level 0  -RS      Exercise 1    Exercise Name 1 stretched (L) ankle into DF with therapist OP; light distraction give  -RS      Cueing 1 Tactile  -RS      Time (Minutes) 1 10  -RS      Exercise 2    Exercise Name 2 (L) ankle 20 deg PF:  isometrics INV/EV eyes open/closed  -RS      Cueing 2 Tactile  -RS      Sets 2 2  -RS      Reps 2 10  -RS      Time (Seconds) 2 5 sec hold  -RS      Exercise 3    Exercise Name 3 reclined:  green TB resisted DF  -RS      Cueing 3 Tactile  -RS      Sets 3 3  -RS      Reps 3 15  -RS      Exercise 4    Exercise Name 4 reclined:  green TB resisted ankle PF  -RS      Cueing 4 Tactile  -RS      Sets 4 3  -RS      Reps 4 15  -RS      Exercise 5    Exercise Name 5 short sitting:  intrinsic towel curls mid range  -RS      Cueing 5 Demo  -RS      Sets 5 3  -RS      Reps 5 10  -RS      Time (Seconds) 5 5 sec hold  -RS      Exercise 6    Exercise Name 6 (L) shuttle press:  (L) leg press with blue theraband disc under foot  -RS      Cueing 6 Verbal  -RS      Sets 6 3  -RS      Reps 6 15  -RS      Additional Comments 5 cord resistance  -RS        User Key  (r) = Recorded By, (t) = Taken By, (c) = Cosigned By    Initials Name Provider Type    DAMON Su, PT DPT Physical Therapist                               PT OP Goals       01/17/18 1000       PT Short Term Goals    STG Date to Achieve 01/05/18  -RS     STG 1 Patient will demonstrate less lateral ankle swelling with use of the taping and compression stockinette  -RS     STG 1  Progress Met  -RS     STG 2 Patient will demonstrate passive left ankle DF to at least 5 degrees  -     STG 2 Progress Met  -RS     Long Term Goals    LTG Date to Achieve 01/26/18  -     LTG 1 Patient will be independent with a comprehensive HEP by discharge  -     LTG 1 Progress Partially Met  -RS     LTG 2 Patien will demonstrate left active ankle DF to 10 degrees without compensation  -     LTG 2 Progress Partially Met  -RS     LTG 2 Progress Comments 8 deg before compensation  -     LTG 3 Patient will demonstrate global left ankle strength to 4+/5 on MMT including ability to perform 10 huber calf raises by discharge  -     LTG 3 Progress Partially Met  -RS     LTG 4 Patient will ambulate without gait compensation of pelvic drift or early termination of midstance by discharge  -     LTG 4 Progress Progressing  -     LTG 5 Patient will demonstrate no swelling in the left jennifer by discharge  -     LTG 5 Progress Progressing  -     Time Calculation    PT Goal Re-Cert Due Date 02/09/18  -       User Key  (r) = Recorded By, (t) = Taken By, (c) = Cosigned By    Initials Name Provider Type    RS Kingsley Su, PT DPT Physical Therapist          Therapy Education  Given: HEP, Symptoms/condition management  Program: Reinforced  How Provided: Verbal  Provided to: Patient  Level of Understanding: Verbalized              Time Calculation:   Start Time: 1020  Stop Time: 1105  Time Calculation (min): 45 min  Total Timed Code Minutes- PT: 45 minute(s)    Therapy Charges for Today     Code Description Service Date Service Provider Modifiers Qty    39960952585 HC PT THER PROC EA 15 MIN 1/17/2018 Kingsley Su, PT DPT GP 3                    ANA LILIA Su, PT DPT  1/17/2018

## 2018-01-19 ENCOUNTER — HOSPITAL ENCOUNTER (OUTPATIENT)
Dept: PHYSICAL THERAPY | Facility: HOSPITAL | Age: 73
Setting detail: THERAPIES SERIES
Discharge: HOME | End: 2018-01-19

## 2018-01-19 DIAGNOSIS — M25.572 ACUTE LEFT ANKLE PAIN: Primary | ICD-10-CM

## 2018-01-19 DIAGNOSIS — R29.898 WEAKNESS OF LEFT FOOT: ICD-10-CM

## 2018-01-19 PROCEDURE — 97110 THERAPEUTIC EXERCISES: CPT

## 2018-01-19 NOTE — THERAPY TREATMENT NOTE
Outpatient Physical Therapy Ortho Treatment Note  [facility]     Patient Name: Shellie Villeda  : 1945  MRN: [id number]  Today's Date: 2018      Visit Date: 2018    Visit Dx:    ICD-10-CM ICD-9-CM   1. Acute left ankle pain M25.572 719.47   2. Weakness of left foot M21.42 734       Patient Active Problem List   Diagnosis   • Closed compression fracture of third lumbar vertebra   • Wedge compression fracture of third lumbar vertebra with delayed healing   • Chronic bilateral low back pain without sciatica   • Abdominal bloating   • Closed wedge compression fracture of third lumbar vertebra with routine healing   • Non-smoker   • Normal body mass index (BMI)        Past Medical History:   Diagnosis Date   • Compression fracture of lumbar vertebra    • Depression    • Disc degeneration, lumbar    • History of basal cell carcinoma    • History of hyperlipidemia    • History of insomnia    • History of multiple pulmonary nodules    • History of palpitations    • History of urinary tract infection    • Low back pain    • Osteoporosis    • Osteoporosis    • Skin cancer     basal cell   • Vitamin D insufficiency         Past Surgical History:   Procedure Laterality Date   • BASAL CELL CARCINOMA EXCISION      SEVERAL OFF FACE   • BREAST BIOPSY      benign   • COLONOSCOPY  2006   • COLONOSCOPY N/A 3/9/2017    Procedure: COLONOSCOPY WITH ANESTHESIA;  Surgeon: Arthur Harris MD;  Location: [facility] ENDOSCOPY;  Service:    • KYPHOPLASTY N/A 2016    Procedure: KYPHOPLASTY L3;  Surgeon: Ricky Ferguson MD;  Location: [facility] OR;  Service:                              PT Assessment/Plan       18 1000       PT Assessment    Assessment Comments Patient will be ready for discharge after the next session to focus on the shoulder.  Most of the goals are met for the ankle and the patient has a comprehensive HEP.  Patient is also wishing to start working on the shoulder since the ankle is doing  well.  We will review and discuss the HEP for final prescription next session.    -RS     PT Plan    PT Plan Comments D/C after the next session.  -RS       User Key  (r) = Recorded By, (t) = Taken By, (c) = Cosigned By    Initials Name Provider Type    RS Kingsley Su, PT DPT Physical Therapist                    Exercises       01/19/18 1000          Subjective Comments    Subjective Comments Patient has had no falls since the last session.  No ankle pain or increased swelling is noted  -RS      Subjective Pain    Able to rate subjective pain? yes  -RS      Pre-Treatment Pain Level 0  -RS      Post-Treatment Pain Level 0  -RS      Exercise 1    Exercise Name 1 isometric INV/EV  -RS      Cueing 1 Tactile  -RS      Sets 1 3  -RS      Reps 1 5  -RS      Time (Seconds) 1 20 sec hold  -RS      Exercise 2    Exercise Name 2 short sitting:  curved side of BOSU neutral foot sustained WB press  -RS      Cueing 2 Tactile  -RS      Sets 2 3  -RS      Reps 2 15  -RS      Time (Seconds) 2 5 sec hold  -RS      Exercise 3    Exercise Name 3 short sitting:  curved side of BOSU DF/PF to mid range  -RS      Cueing 3 Tactile  -RS      Sets 3 3  -RS      Reps 3 15  -RS      Exercise 4    Exercise Name 4 short sitting:  curved side of BOSU PF sustained hold mid range  -RS      Cueing 4 Tactile  -RS      Sets 4 3  -RS      Reps 4 15  -RS      Time (Seconds) 4 5 sec hold  -RS      Exercise 5    Exercise Name 5 standing hip abduction wall ball (Left SLS) using 45 cm physioball  -RS      Cueing 5 Demo  -RS      Sets 5 3  -RS      Reps 5 15  -RS      Additional Comments chair used for (L) UE support  -RS      Exercise 6    Exercise Name 6 (L) SLS flat side of BOSU  -RS      Cueing 6 Demo  -RS      Sets 6 1  -RS      Reps 6 10  -RS      Time (Seconds) 6 15 sec hold  -RS      Additional Comments (R) UE support as needed  -RS        User Key  (r) = Recorded By, (t) = Taken By, (c) = Cosigned By    Initials Name Provider Type    RS  Kingsley Su, PT DPT Physical Therapist                               PT OP Goals       01/19/18 1000       PT Short Term Goals    STG Date to Achieve 01/05/18  -RS     STG 1 Patient will demonstrate less lateral ankle swelling with use of the taping and compression stockinette  -RS     STG 1 Progress Met  -RS     STG 2 Patient will demonstrate passive left ankle DF to at least 5 degrees  -RS     STG 2 Progress Met  -RS     Long Term Goals    LTG Date to Achieve 01/26/18  -RS     LTG 1 Patient will be independent with a comprehensive HEP by discharge  -RS     LTG 1 Progress Partially Met  -RS     LTG 2 Patien will demonstrate left active ankle DF to 10 degrees without compensation  -RS     LTG 2 Progress Partially Met  -RS     LTG 3 Patient will demonstrate global left ankle strength to 4+/5 on MMT including ability to perform 10 huber calf raises by discharge  -RS     LTG 3 Progress Partially Met  -RS     LTG 4 Patient will ambulate without gait compensation of pelvic drift or early termination of midstance by discharge  -RS     LTG 4 Progress Met  -RS     LTG 5 Patient will demonstrate no swelling in the left jennifer by discharge  -RS     LTG 5 Progress Progressing  -RS     LTG 5 Progress Comments slightly noted but vastly improved with compression garmet  -RS     Time Calculation    PT Goal Re-Cert Due Date 02/09/18  -       User Key  (r) = Recorded By, (t) = Taken By, (c) = Cosigned By    Initials Name Provider Type    DAMON Su, PT DPT Physical Therapist          Therapy Education  Given: HEP, Symptoms/condition management  Program: Reinforced  How Provided: Verbal  Provided to: Patient  Level of Understanding: Verbalized              Time Calculation:   Start Time: 1017  Stop Time: 1101  Time Calculation (min): 44 min  Total Timed Code Minutes- PT: 44 minute(s)    Therapy Charges for Today     Code Description Service Date Service Provider Modifiers Qty    37510673080 HC PT THER PROC EA 15  MIN 1/19/2018 Kingsley Su, PT DPT GP 3                    ANA LILIA Su, PT DPT  1/19/2018

## 2018-01-22 ENCOUNTER — HOSPITAL ENCOUNTER (OUTPATIENT)
Dept: PHYSICAL THERAPY | Facility: HOSPITAL | Age: 73
Setting detail: THERAPIES SERIES
Discharge: HOME | End: 2018-01-22

## 2018-01-22 DIAGNOSIS — M25.572 ACUTE LEFT ANKLE PAIN: Primary | ICD-10-CM

## 2018-01-22 DIAGNOSIS — R29.898 WEAKNESS OF LEFT FOOT: ICD-10-CM

## 2018-01-22 PROCEDURE — 97110 THERAPEUTIC EXERCISES: CPT

## 2018-01-22 NOTE — THERAPY TREATMENT NOTE
Outpatient Physical Therapy Ortho Treatment Note  [facility]     Patient Name: Shellie Villeda  : 1945  MRN: [id number]  Today's Date: 2018      Visit Date: 2018    Visit Dx:    ICD-10-CM ICD-9-CM   1. Acute left ankle pain M25.572 719.47   2. Weakness of left foot M21.42 734       Patient Active Problem List   Diagnosis   • Closed compression fracture of third lumbar vertebra   • Wedge compression fracture of third lumbar vertebra with delayed healing   • Chronic bilateral low back pain without sciatica   • Abdominal bloating   • Closed wedge compression fracture of third lumbar vertebra with routine healing   • Non-smoker   • Normal body mass index (BMI)        Past Medical History:   Diagnosis Date   • Compression fracture of lumbar vertebra    • Depression    • Disc degeneration, lumbar    • History of basal cell carcinoma    • History of hyperlipidemia    • History of insomnia    • History of multiple pulmonary nodules    • History of palpitations    • History of urinary tract infection    • Low back pain    • Osteoporosis    • Osteoporosis    • Skin cancer     basal cell   • Vitamin D insufficiency         Past Surgical History:   Procedure Laterality Date   • BASAL CELL CARCINOMA EXCISION      SEVERAL OFF FACE   • BREAST BIOPSY      benign   • COLONOSCOPY  2006   • COLONOSCOPY N/A 3/9/2017    Procedure: COLONOSCOPY WITH ANESTHESIA;  Surgeon: Arthur Harris MD;  Location: [facility] ENDOSCOPY;  Service:    • KYPHOPLASTY N/A 2016    Procedure: KYPHOPLASTY L3;  Surgeon: Ricky Ferguson MD;  Location: [facility] OR;  Service:                              PT Assessment/Plan       18 1000       PT Assessment    Assessment Comments Patient is nearing discharge at this time as we look to re focus the POC on the shoulder.  We did discuss the importance of continuing with a gym based program going forward which the patient agreed upon.  Gait is still symmetrical with improved  forefoot clearance during swing.  Patient is making excellent progress.  -RS     PT Plan    PT Plan Comments we will continue the POC For the remaining visits at this time.  Plan to begin shoulder rehab once MD order is received electronically.  -RS       User Key  (r) = Recorded By, (t) = Taken By, (c) = Cosigned By    Initials Name Provider Type    DAMON Su, PT DPT Physical Therapist                    Exercises       01/22/18 1000          Subjective Comments    Subjective Comments No ankle pain today; she did inquire about her shoulder from Dr. Norris  -RS      Subjective Pain    Able to rate subjective pain? yes  -RS      Pre-Treatment Pain Level 0  -RS      Exercise 1    Exercise Name 1 supine huber hip bridge using 65 cm physioball  -RS      Cueing 1 Verbal  -RS      Sets 1 3  -RS      Reps 1 15  -RS      Exercise 2    Exercise Name 2 supine (L) bridge progression:  (R) hip flexed to 90  -RS      Cueing 2 Verbal  -RS      Sets 2 3  -RS      Reps 2 12  -RS      Exercise 3    Exercise Name 3 supine (L) bridge progression:  (R) knee extended  -RS      Cueing 3 Verbal  -RS      Sets 3 3  -RS      Reps 3 12  -RS      Exercise 4    Exercise Name 4 (L) shuttle press:  5-6 cords (each set)  -RS      Cueing 4 Verbal  -RS      Sets 4 4  -RS      Reps 4 15  -RS      Additional Comments red dynadisk under the foot  -RS      Exercise 5    Exercise Name 5 (L) SLS flat side of BOSU  -RS      Cueing 5 Tactile  -RS      Sets 5 1  -RS      Reps 5 10  -RS      Time (Seconds) 5 15 sec hold  -RS      Additional Comments (R) UE support as needed.  -RS        User Key  (r) = Recorded By, (t) = Taken By, (c) = Cosigned By    Initials Name Provider Type    DAMON Su, PT DPT Physical Therapist                               PT OP Goals       01/22/18 1000       PT Short Term Goals    STG Date to Achieve 01/05/18  -RS     STG 1 Patient will demonstrate less lateral ankle swelling with use of the taping and  compression stockinette  -     STG 1 Progress Met  -RS     STG 2 Patient will demonstrate passive left ankle DF to at least 5 degrees  -     STG 2 Progress Met  -RS     Long Term Goals    LTG Date to Achieve 01/26/18  -     LTG 1 Patient will be independent with a comprehensive HEP by discharge  -     LTG 1 Progress Partially Met  -RS     LTG 2 Patien will demonstrate left active ankle DF to 10 degrees without compensation  -     LTG 2 Progress Partially Met  -RS     LTG 3 Patient will demonstrate global left ankle strength to 4+/5 on MMT including ability to perform 10 huber calf raises by discharge  -     LTG 3 Progress Met  -RS     LTG 3 Progress Comments 4+/5 in all major muscle groups.  -     LTG 4 Patient will ambulate without gait compensation of pelvic drift or early termination of midstance by discharge  -     LTG 4 Progress Met  -RS     LTG 5 Patient will demonstrate no swelling in the left jennifer by discharge  -     LTG 5 Progress Progressing  -     LTG 5 Progress Comments still wearinng compression stockinette  -     Time Calculation    PT Goal Re-Cert Due Date 02/09/18  -       User Key  (r) = Recorded By, (t) = Taken By, (c) = Cosigned By    Initials Name Provider Type    RS Kingsley Su, PT DPT Physical Therapist          Therapy Education  Given: HEP  Program: Reinforced  How Provided: Verbal  Provided to: Patient  Level of Understanding: Verbalized              Time Calculation:   Start Time: 1017  Stop Time: 1102  Time Calculation (min): 45 min  Total Timed Code Minutes- PT: 41 minute(s)    Therapy Charges for Today     Code Description Service Date Service Provider Modifiers Qty    59108777992 HC PT THER PROC EA 15 MIN 1/22/2018 Kingsley Su, PT DPT GP 3                    ANA LILIA Su, PT DPT  1/22/2018

## 2018-01-24 ENCOUNTER — HOSPITAL ENCOUNTER (OUTPATIENT)
Dept: PHYSICAL THERAPY | Facility: HOSPITAL | Age: 73
Setting detail: THERAPIES SERIES
Discharge: HOME | End: 2018-01-24

## 2018-01-24 DIAGNOSIS — M25.572 ACUTE LEFT ANKLE PAIN: Primary | ICD-10-CM

## 2018-01-24 DIAGNOSIS — M54.50 ACUTE MIDLINE LOW BACK PAIN WITHOUT SCIATICA: ICD-10-CM

## 2018-01-24 DIAGNOSIS — R29.898 WEAKNESS OF LEFT FOOT: ICD-10-CM

## 2018-01-24 PROCEDURE — 97110 THERAPEUTIC EXERCISES: CPT

## 2018-01-24 NOTE — THERAPY TREATMENT NOTE
Outpatient Physical Therapy Ortho Treatment Note  [facility]     Patient Name: Shellie Villeda  : 1945  MRN: [id number]  Today's Date: 2018      Visit Date: 2018    Visit Dx:    ICD-10-CM ICD-9-CM   1. Acute left ankle pain M25.572 719.47   2. Weakness of left foot M21.42 734   3. Acute midline low back pain without sciatica M54.5 724.2       Patient Active Problem List   Diagnosis   • Closed compression fracture of third lumbar vertebra   • Wedge compression fracture of third lumbar vertebra with delayed healing   • Chronic bilateral low back pain without sciatica   • Abdominal bloating   • Closed wedge compression fracture of third lumbar vertebra with routine healing   • Non-smoker   • Normal body mass index (BMI)        Past Medical History:   Diagnosis Date   • Compression fracture of lumbar vertebra    • Depression    • Disc degeneration, lumbar    • History of basal cell carcinoma    • History of hyperlipidemia    • History of insomnia    • History of multiple pulmonary nodules    • History of palpitations    • History of urinary tract infection    • Low back pain    • Osteoporosis    • Osteoporosis    • Skin cancer     basal cell   • Vitamin D insufficiency         Past Surgical History:   Procedure Laterality Date   • BASAL CELL CARCINOMA EXCISION      SEVERAL OFF FACE   • BREAST BIOPSY      benign   • COLONOSCOPY  2006   • COLONOSCOPY N/A 3/9/2017    Procedure: COLONOSCOPY WITH ANESTHESIA;  Surgeon: Arthur Harris MD;  Location: [facility] ENDOSCOPY;  Service:    • KYPHOPLASTY N/A 2016    Procedure: KYPHOPLASTY L3;  Surgeon: Ricky Ferguson MD;  Location: [facility] OR;  Service:                              PT Assessment/Plan       18 1020       PT Assessment    Assessment Comments Patient reports she is very close to her prior functional level.  She does tend to be more apprehensive with movement with the left ankle compared to the right ankle, but she reports more  from fear of movement and not from pain.  Her ankle is symmetrical to her right ankle for dorisflexion, however she continues to compensate at her end range.  She also continues to have swelling in the left ankle, and she continues to wear compression stockings for this.  Her HEP was reviewed with her today.  -JESUS MANUEL     PT Plan    PT Plan Comments We are planning to discharge and are cancelling her next appointments.  If we do not hear from patient in the next week, we will discharge.  Once we recieve MD order for the shoulder, we will transition to POC for shoulder.  -JESUS MANUEL       User Key  (r) = Recorded By, (t) = Taken By, (c) = Cosigned By    Initials Name Provider Type    JESUS MANUEL Valverde PTA Physical Therapy Assistant                    Exercises       01/24/18 1020          Subjective Comments    Subjective Comments Patient reports she has pain in the lateral ankle occasionally, which is new.  She reports this started a few days ago and describes it has quick twinges of pain.  She reports she is very close to being at baseline again, but she does continue to be more cautious with activities. She reports she continues to wear her compression stockings.  She reports her foot continues to fatigue in the evening.  -JESUS MANUEL      Subjective Pain    Able to rate subjective pain? yes  -JESUS MANUEL      Pre-Treatment Pain Level 0  -JESUS MANUEL      Post-Treatment Pain Level 0  -JESUS MANUEL      Exercise 1    Exercise Name 1 supine huber hip bridge using 65 cm physioball  -JESUS MANUEL      Cueing 1 Verbal  -JESUS MANUEL      Sets 1 3  -EJSUS MANUEL      Reps 1 15  -JESUS MANUEL      Exercise 2    Exercise Name 2 supine (L) bridge progression:  (R) hip flexed to 90  -JESUS MANUEL      Cueing 2 Verbal  -JESUS MANUEL      Sets 2 2  -JESUS MANUEL      Reps 2 12  -JESUS MANUEL      Exercise 3    Exercise Name 3 supine (L) bridge progression:  (R) knee extended  -JESUS MANUEL      Cueing 3 Verbal  -JESUS MANUEL      Sets 3 2  -JESUS MANUEL      Reps 3 12  -JESUS MANUEL      Exercise 4    Exercise Name 4 (L) resisted eversion and inversion in long sitting  -JESUS MANUEL      Cueing 4  Verbal;Tactile  -JESUS MANUEL      Sets 4 3   each  -JESUS MANUEL      Reps 4 15  -JESUS MANUEL      Additional Comments red band, reviewed for HEP  -JESUS MANUEL      Exercise 5    Exercise Name 5 assessed goals  -JESUS MANUEL        User Key  (r) = Recorded By, (t) = Taken By, (c) = Cosigned By    Initials Name Provider Type    JESUS MANUEL Valverde, PTA Physical Therapy Assistant                               PT OP Goals       01/24/18 1020       PT Short Term Goals    STG Date to Achieve 01/05/18  -JESUS MANUEL     STG 1 Patient will demonstrate less lateral ankle swelling with use of the taping and compression stockinette  -JESUS MANUEL     STG 1 Progress Met  -JESUS MANUEL     STG 2 Patient will demonstrate passive left ankle DF to at least 5 degrees  -JESUS MANUEL     STG 2 Progress Met  -JESUS MANUEL     Long Term Goals    LTG Date to Achieve 01/26/18  -JESUS MANUEL     LTG 1 Patient will be independent with a comprehensive HEP by discharge  -JESUS MANUEL     LTG 1 Progress Partially Met  -JESUS MANUEL     LTG 1 Progress Comments Reviewed HEP today  -JESUS MANUEL     LTG 2 Patien will demonstrate left active ankle DF to 10 degrees without compensation  -JESUS MANUEL     LTG 2 Progress Partially Met  -JESUS MANUEL     LTG 2 Progress Comments She could acheive 10 degrees today, however after 8 degrees she started to have compensations.  She also reported slight 3/10 burning sensation in toes and top of foot.  -JESUS MANUEL     LTG 3 Patient will demonstrate global left ankle strength to 4+/5 on MMT including ability to perform 10 huber calf raises by discharge  -JESUS MANUEL     LTG 3 Progress Met  -JESUS MANUEL     LTG 4 Patient will ambulate without gait compensation of pelvic drift or early termination of midstance by discharge  -JESUS MANUEL     LTG 4 Progress Met  -JESUS MANUEL     LTG 5 Patient will demonstrate no swelling in the left jennifer by discharge  -JESUS MANUEL     LTG 5 Progress Progressing  -JESUS MANUEL     LTG 5 Progress Comments She continues to have swelling compared to her right ankle. (L) ankle girth figure 8: 52 cm and (R) 48cm.  She continues to wear her compression stockings.   -JESUS MANUEL     Time Calculation    PT Goal  Re-Cert Due Date 02/09/18  -JESUS MANUEL       User Key  (r) = Recorded By, (t) = Taken By, (c) = Cosigned By    Initials Name Provider Type    JESUS MANUEL Valverde PTA Physical Therapy Assistant          Therapy Education  Education Details: reviewed resisted inversion/eversion  Given: HEP  Program: Reinforced  How Provided: Verbal, Written  Provided to: Patient  Level of Understanding: Verbalized              Time Calculation:   Start Time: 1020  Stop Time: 1106  Time Calculation (min): 46 min  Total Timed Code Minutes- PT: 46 minute(s)    Therapy Charges for Today     Code Description Service Date Service Provider Modifiers Qty    01124671254 HC PT THER PROC EA 15 MIN 1/24/2018 Dilan Valverde PTA GP 3                    Dilan Valverde PTA  1/24/2018

## 2018-01-26 ENCOUNTER — TRANSCRIBE ORDERS (OUTPATIENT)
Dept: PHYSICAL THERAPY | Facility: HOSPITAL | Age: 73
End: 2018-01-26

## 2018-01-26 DIAGNOSIS — M25.512 ACUTE PAIN OF LEFT SHOULDER: Primary | ICD-10-CM

## 2018-01-29 ENCOUNTER — APPOINTMENT (OUTPATIENT)
Dept: PHYSICAL THERAPY | Facility: HOSPITAL | Age: 73
End: 2018-01-29

## 2018-01-31 ENCOUNTER — DOCUMENTATION (OUTPATIENT)
Dept: PHYSICAL THERAPY | Facility: HOSPITAL | Age: 73
End: 2018-01-31

## 2018-01-31 ENCOUNTER — HOSPITAL ENCOUNTER (OUTPATIENT)
Dept: PHYSICAL THERAPY | Facility: HOSPITAL | Age: 73
Setting detail: THERAPIES SERIES
Discharge: HOME | End: 2018-01-31

## 2018-01-31 ENCOUNTER — APPOINTMENT (OUTPATIENT)
Dept: PHYSICAL THERAPY | Facility: HOSPITAL | Age: 73
End: 2018-01-31

## 2018-01-31 DIAGNOSIS — M25.572 ACUTE LEFT ANKLE PAIN: Primary | ICD-10-CM

## 2018-01-31 DIAGNOSIS — M25.512 LEFT SHOULDER PAIN, UNSPECIFIED CHRONICITY: Primary | ICD-10-CM

## 2018-01-31 DIAGNOSIS — R29.898 WEAKNESS OF LEFT FOOT: ICD-10-CM

## 2018-01-31 PROCEDURE — G8979 MOBILITY GOAL STATUS: HCPCS

## 2018-01-31 PROCEDURE — 97161 PT EVAL LOW COMPLEX 20 MIN: CPT

## 2018-01-31 PROCEDURE — G8978 MOBILITY CURRENT STATUS: HCPCS

## 2018-01-31 NOTE — THERAPY DISCHARGE NOTE
Outpatient Physical Therapy Discharge Summary         Patient Name: Shellie Villeda  : 1945  MRN: [id number]    Today's Date: 2018    Visit Dx:    ICD-10-CM ICD-9-CM   1. Acute left ankle pain M25.572 719.47   2. Weakness of left foot M21.42 734             PT OP Goals       18 0909       PT Short Term Goals    STG Date to Achieve 18  -JESUS MANUEL     STG 1 Patient will demonstrate less lateral ankle swelling with use of the taping and compression stockinette  -JESUS MANUEL     STG 1 Progress Met  -JESUS MANUEL     STG 2 Patient will demonstrate passive left ankle DF to at least 5 degrees  -JESUS MANUEL     STG 2 Progress Met  -JESUS MANUEL     Long Term Goals    LTG Date to Achieve 18  -JESUS MANUEL     LTG 1 Patient will be independent with a comprehensive HEP by discharge  -JESUS MANUEL     LTG 1 Progress Partially Met  -JESUS MANUEL     LTG 1 Progress Comments During her last visit her HEP was reviewed with her and a print out was given.  -JESUS MANUEL     LTG 2 Patien will demonstrate left active ankle DF to 10 degrees without compensation  -JESUS MANUEL     LTG 2 Progress Partially Met  -JESUS MANUEL     LTG 2 Progress Comments During her last visit she could achieve 10 degrees, however after 8 degrees she started to have compensations.  She also reported slight 3/10 burning sensation in toes and top of foot.  -JESUS MANUEL     LTG 3 Patient will demonstrate global left ankle strength to 4+/5 on MMT including ability to perform 10 huber calf raises by discharge  -JESUS MANUEL     LTG 3 Progress Met  -JESUS MANUEL     LTG 4 Patient will ambulate without gait compensation of pelvic drift or early termination of midstance by discharge  -JESUS MANUEL     LTG 4 Progress Met  -JESUS MANUEL     LTG 5 Patient will demonstrate no swelling in the left jennifer by discharge  -JESUS MANUEL     LTG 5 Progress Not Met  -JESUS MANUEL     LTG 5 Progress Comments During her last visit she continues to have swelling in her left ankle compared to her right ankle. (L) ankle girth figure 8: 52 cm and (R) 48cm.  She continued to wear her compression stockings.   -JESUS MANUEL       User  Key  (r) = Recorded By, (t) = Taken By, (c) = Cosigned By    Initials Name Provider Type    JESUS MANUEL Valverde PTA Physical Therapy Assistant          OP PT Discharge Summary  Date of Discharge: 01/31/18  Reason for Discharge: Independent  Outcomes Achieved: Patient able to partially acheive established goals, Refer to plan of care for updates on goals achieved  Discharge Destination: Home with home program  Discharge Instructions: Patient was seen a week ago and discussed discharging at that point.  Her HEP was reviewed with her, and she was aware to call our office if she had any problems.  However, it has been a week since we have heard from her and therefore discharging.  She also is coming in today for an evaluation on her shoulder aand therefore this POC is being discharged.      Time Calculation:                    Dilan Valverde PTA  1/31/2018

## 2018-02-02 ENCOUNTER — HOSPITAL ENCOUNTER (OUTPATIENT)
Dept: PHYSICAL THERAPY | Facility: HOSPITAL | Age: 73
Setting detail: THERAPIES SERIES
Discharge: HOME | End: 2018-02-02

## 2018-02-02 DIAGNOSIS — M25.512 LEFT SHOULDER PAIN, UNSPECIFIED CHRONICITY: Primary | ICD-10-CM

## 2018-02-02 PROCEDURE — 97140 MANUAL THERAPY 1/> REGIONS: CPT

## 2018-02-02 NOTE — THERAPY TREATMENT NOTE
Outpatient Physical Therapy Ortho Treatment Note  [facility]     Patient Name: Shellie Villeda  : 1945  MRN: [id number]  Today's Date: 2018      Visit Date: 2018    Visit Dx:    ICD-10-CM ICD-9-CM   1. Left shoulder pain, unspecified chronicity M25.512 719.41       Patient Active Problem List   Diagnosis   • Closed compression fracture of third lumbar vertebra   • Wedge compression fracture of third lumbar vertebra with delayed healing   • Chronic bilateral low back pain without sciatica   • Abdominal bloating   • Closed wedge compression fracture of third lumbar vertebra with routine healing   • Non-smoker   • Normal body mass index (BMI)        Past Medical History:   Diagnosis Date   • Compression fracture of lumbar vertebra    • Depression    • Disc degeneration, lumbar    • History of basal cell carcinoma    • History of hyperlipidemia    • History of insomnia    • History of multiple pulmonary nodules    • History of palpitations    • History of urinary tract infection    • Low back pain    • Osteoporosis    • Osteoporosis    • Skin cancer     basal cell   • Vitamin D insufficiency         Past Surgical History:   Procedure Laterality Date   • BASAL CELL CARCINOMA EXCISION      SEVERAL OFF FACE   • BREAST BIOPSY      benign   • COLONOSCOPY  2006   • COLONOSCOPY N/A 3/9/2017    Procedure: COLONOSCOPY WITH ANESTHESIA;  Surgeon: Arthur Harris MD;  Location: [facility] ENDOSCOPY;  Service:    • KYPHOPLASTY N/A 2016    Procedure: KYPHOPLASTY L3;  Surgeon: Ricky Ferguson MD;  Location: [facility] OR;  Service:              PT Ortho       18 1000    Posture/Observations    Alignment Options Thoracic kyphosis;Rounded shoulders  -RS    Observations Muscle atrophy  -RS    Posture/Observations Comments left posterior cuff atrophy  -RS    Quarter Clearing    Quarter Clearing Upper Quarter Clearing  -RS    DTR- Upper Quarter Clearing    Biceps (C5/6) Bilateral:;2- Normal response  -RS     Brachioradialis (C6) Bilateral:;2- Normal response  -RS    Sensory Screen for Light Touch- Upper Quarter Clearing    C4 (posterior shoulder) Bilateral:;Intact  -RS    C5 (lateral upper arm) Bilateral:;Intact  -RS    C6 (tip of thumb) Bilateral:;Intact  -RS    C7 (tip of 3rd finger) Bilateral:;Intact  -RS    C8 (tip of 5th finger) Bilateral:;Intact  -RS    T1 (medial lower arm) Bilateral:;Intact  -RS    Myotomal Screen- Upper Quarter Clearing    Shoulder flexion (C5) Left:;Unable to assess   pain  -RS    Elbow flexion/wrist extension (C6) Left:;5 (Normal)  -RS    Elbow extension/wrist flexion (C7) Left:;5 (Normal)  -RS    Finger flexion/ (C8) Left:;5 (Normal)  -RS    Finger abduction (T1) Left:;5 (Normal)  -RS     Left:;5 (Normal)  -RS    Cervical/Shoulder ROM Screen    Cervical flexion Normal  -RS    Cervical extension Normal  -RS    Cervical rotation Normal  -RS    Cervical quadrant (Spurling's) Normal   cleared   -RS    Special Tests/Palpation    Special Tests/Palpation Shoulder  -RS    Shoulder Girdle Palpation    Deltoid Left:;Atrophied  -RS    Infraspinatus Left:;Tender;Atrophied  -RS    Teres Minor Left:;Atrophied  -RS    Upper Trap Left:;Tender;Guarded/taut  -RS    Levator Scapula Left:;Tender  -RS    Middle Trap Left:;Atrophied  -RS    Lower Trap Left:;Atrophied  -RS    Shoulder Girdle Palpation? Yes  -RS    Shoulder Girdle Accessory Motions    Shoulder Girdle Accessory Motions Tested? Yes  -RS    Posterior glide of humerus Left:;WNL;Left pain  -RS    Inferior glide of humerus Left:;Hypomobile;Left pain  -RS    Superior mobility of scapula Left:;Hypomobile  -RS    Upward rotation mobility of scapula Left:;Hypomobile  -RS    Shoulder Impingement/Rotator Cuff Special Tests    Mason-Juan Francisco Test (RC Lesion vs. Bursitis) Left:;Positive   in scapular plane and horizontal add  -RS    Full Can Test (RC Lesion) Left:;Negative   painful  -RS    Drop Arm Test (Full Thickness RC Lesion) Left:;Negative  -RS     Skyla's Test, Dynamic (Subacromial Impingement) Left:;Positive  -RS    Shoulder Laxity/Instability Special Tests    Load and Shift Test Left:;Negative  -RS    Sulcus Sign, 0 Degrees Left:;Negative  -RS    Scapular Special Tests    Scapular Assistance Test (Scapular Dyskinesia) Left:;Positive  -RS    ROM (Range of Motion)    General ROM upper extremity range of motion deficits identified  -RS    Left Shoulder    Flexion AROM Deficit 88  -RS    Flexion PROM Deficit 100  -RS    ABduction AROM Deficit 78  -RS    ABduction PROM Deficit 90  -RS    ABduction ROM Details superior glide noted with guarding  -RS    External Rotation PROM Deficit 75  -RS    Internal Rotation PROM Deficit 10   at 90 deg abd; 38 deg IR at 30 deg abd  -RS    Impairments Contrib to Yelitza ROM Deficit joint capsule tightness;ligamentous tightness;pain  -RS    General UE Assessment    ROM shoulder, left: UE ROM deficit  -RS    ROM Detail cross body adduction:  limited to the left ear  -RS    MMT (Manual Muscle Testing)    General MMT Assessment other (see comments)  -RS    General MMT Assessment Detail Gross MMT left shoulder:  infraspinatus:  3+/5, supraspinatus:  3+/5; teres minor: 3+/5, subscapularis: 4/5  -RS    Pathomechanics    Upper Extremity Pathomechanics Limited scapular upward rotation;Limited thoracic extension;Excessive abduction/internal rotation with reaching;Limited glenohumeral internal rotation  -RS    Pathomechanics Comments Humeral anterior glide noted with superior glide at end of available AROM and PROM.    -RS    Sensation    Sensation WNL? WNL  -RS      User Key  (r) = Recorded By, (t) = Taken By, (c) = Cosigned By    Initials Name Provider Type    RS Kingsley Su, PT DPT Physical Therapist                            PT Assessment/Plan       02/02/18 0900       PT Assessment    Assessment Comments Today is the first session since the evaluation so no goals are met at this time.  The posterior and inferior capsule are  the most limited today and improved in flexibility after stretching today.  Patient understands that this will be a process.  -RS     PT Plan    PT Plan Comments continue with soft tissue mobility, stretching without superior glide; accessory motion.  -RS       User Key  (r) = Recorded By, (t) = Taken By, (c) = Cosigned By    Initials Name Provider Type    RS Kingsley Su, PT DPT Physical Therapist                    Exercises       02/02/18 0700          Subjective Comments    Subjective Comments Patient is doing well at this time.  She is not having pain at rest just with movement  -RS      Subjective Pain    Able to rate subjective pain? yes  -RS      Pre-Treatment Pain Level 0  -RS      Post-Treatment Pain Level 0  -RS      Subjective Pain Comment 0/10 at rest; 6/10 with motion  -RS        User Key  (r) = Recorded By, (t) = Taken By, (c) = Cosigned By    Initials Name Provider Type    RS Kingsley Su, PT DPT Physical Therapist                        Manual Rx (last 36 hours)      Manual Treatments       02/02/18 0700          Manual Rx 1    Manual Rx 1 Location (L) shoulder  -RS      Manual Rx 1 Type post/inf oscillatory  -RS      Manual Rx 1 Grade 2/3  -RS      Manual Rx 1 Duration 2 on each  -RS      Manual Rx 2    Manual Rx 2 Location (L) shoulder  -RS      Manual Rx 2 Type repetitive stretching into flexion avoiding superior glide  -RS      Manual Rx 2 Grade min OP end range  -RS      Manual Rx 2 Duration 12  -RS      Manual Rx 3    Manual Rx 3 Location (L) shoulder  -RS      Manual Rx 3 Type scapular plane abduction repetition  -RS      Manual Rx 3 Grade min OP end range  -RS      Manual Rx 3 Duration 12  -RS      Manual Rx 4    Manual Rx 4 Location (L) shoulder  -RS      Manual Rx 4 Type cross body adduction short of pain  -RS      Manual Rx 4 Grade repetition; min OP  -RS      Manual Rx 4 Duration 8  -RS      Manual Rx 5    Manual Rx 5 Location (L) shoulder  -RS      Manual Rx 5 Type  ER/IR LLLD holds in 45 deg abd  -RS      Manual Rx 5 Grade min OP  -RS      Manual Rx 5 Duration 3 each way  -RS        User Key  (r) = Recorded By, (t) = Taken By, (c) = Cosigned By    Initials Name Provider Type    DAMON Su, PT DPT Physical Therapist                PT OP Goals       02/02/18 0900       PT Short Term Goals    STG Date to Achieve 02/21/18  -RS     STG 1 Patient will improve left shoulder passive flexion to 120 or greater without guarding  -RS     STG 1 Progress Ongoing  -RS     STG 2 Patient will improve left shoulder passive abduction to 100 degrees without guarding  -RS     STG 2 Progress Ongoing  -RS     STG 3 Patient will improve left shoulder passive IR at 90 deg abduction to 25 degrees without guarding  -RS     STG 3 Progress Ongoing  -     Long Term Goals    LTG Date to Achieve 03/14/18  -     LTG 1 Patient will be independent with a comprehensive HEP by discharge  -     LTG 1 Progress Ongoing  -     LTG 1 Progress Comments progressed today  -     LTG 2 Patient will improve Quick Dash score to reflect <25% impairment using the G code calculator to demonstrate a clinically significant improvement in function by discharge  -     LTG 2 Progress Ongoing  -     LTG 3 Patient will improve (L) shoulder AROM to within 10 degrees of the (R) shoulder into flexion, abduction, ER, and IR at 90 degrees by discharge  -     LTG 3 Progress Ongoing  -     LTG 4 Patient will report ability to participate in ADL's such as dressing, bathing, putting on makeup with symptoms <3/10 in the left shoulder by discharge.  -     LTG 4 Progress Ongoing  -     LTG 5 Patient will improve gross posterior rotator cuff strength to 4+/5 on MMT by discharge  -     LTG 5 Progress Ongoing  -     Time Calculation    PT Goal Re-Cert Due Date 03/02/18  -       User Key  (r) = Recorded By, (t) = Taken By, (c) = Cosigned By    Initials Name Provider Type    DAMON Su, PT DPT  Physical Therapist          Therapy Education  Education Details: facing wall slides  Given: HEP  Program: New  How Provided: Written, Demonstration  Provided to: Patient  Level of Understanding: Demonstrated, Verbalized              Time Calculation:   Start Time: 0746  Stop Time: 0831  Time Calculation (min): 45 min  Total Timed Code Minutes- PT: 41 minute(s)    Therapy Charges for Today     Code Description Service Date Service Provider Modifiers Qty    38474726175 HC PT MANUAL THERAPY EA 15 MIN 2/2/2018 Kingsley Su, PT DPT GP 3                    ANA LILIA Su, PT DPT  2/2/2018

## 2018-02-05 ENCOUNTER — HOSPITAL ENCOUNTER (OUTPATIENT)
Dept: PHYSICAL THERAPY | Facility: HOSPITAL | Age: 73
Setting detail: THERAPIES SERIES
Discharge: HOME | End: 2018-02-05

## 2018-02-05 DIAGNOSIS — M25.512 LEFT SHOULDER PAIN, UNSPECIFIED CHRONICITY: Primary | ICD-10-CM

## 2018-02-05 PROCEDURE — 97140 MANUAL THERAPY 1/> REGIONS: CPT

## 2018-02-05 PROCEDURE — 97110 THERAPEUTIC EXERCISES: CPT

## 2018-02-05 NOTE — THERAPY TREATMENT NOTE
Outpatient Physical Therapy Ortho Treatment Note  [facility]     Patient Name: Shellie Villeda  : 1945  MRN: [id number]  Today's Date: 2018      Visit Date: 2018    Visit Dx:    ICD-10-CM ICD-9-CM   1. Left shoulder pain, unspecified chronicity M25.512 719.41       Patient Active Problem List   Diagnosis   • Closed compression fracture of third lumbar vertebra   • Wedge compression fracture of third lumbar vertebra with delayed healing   • Chronic bilateral low back pain without sciatica   • Abdominal bloating   • Closed wedge compression fracture of third lumbar vertebra with routine healing   • Non-smoker   • Normal body mass index (BMI)        Past Medical History:   Diagnosis Date   • Compression fracture of lumbar vertebra    • Depression    • Disc degeneration, lumbar    • History of basal cell carcinoma    • History of hyperlipidemia    • History of insomnia    • History of multiple pulmonary nodules    • History of palpitations    • History of urinary tract infection    • Low back pain    • Osteoporosis    • Osteoporosis    • Skin cancer     basal cell   • Vitamin D insufficiency         Past Surgical History:   Procedure Laterality Date   • BASAL CELL CARCINOMA EXCISION      SEVERAL OFF FACE   • BREAST BIOPSY      benign   • COLONOSCOPY  2006   • COLONOSCOPY N/A 3/9/2017    Procedure: COLONOSCOPY WITH ANESTHESIA;  Surgeon: Arthur Harris MD;  Location: [facility] ENDOSCOPY;  Service:    • KYPHOPLASTY N/A 2016    Procedure: KYPHOPLASTY L3;  Surgeon: Ricky Ferguson MD;  Location: [facility] OR;  Service:                              PT Assessment/Plan       18 0904       PT Assessment    Assessment Comments She did not have pain today.  She continues to be limited into flexion and abduction, but special care was taken to avoid superior glide of humerus during PROM.  She was introduced to shoulder stability activities today without increasing pain.  -JESUS MANUEL     PT Plan    PT  Plan Comments We will continue with increasing her mobility and soft tissue stretching.  We will also continue with shoulder stability activities and proximal strengthening.  -JESUS MANUEL       User Key  (r) = Recorded By, (t) = Taken By, (c) = Cosigned By    Initials Name Provider Type    JESUS MANUEL Valverde PTA Physical Therapy Assistant                    Exercises       02/05/18 0904          Subjective Comments    Subjective Comments She reports over the weekend her shoulder was better.  She thinks slowly she is improving.  She reports she is getting intermitent  pain in her lower back, but she thinks this is from sitting alot.  -JESUS MANUEL      Subjective Pain    Able to rate subjective pain? yes  -JESUS MANUEL      Pre-Treatment Pain Level 0  -JESUS MANUEL      Post-Treatment Pain Level 0  -JESUS MANUEL      Exercise 1    Exercise Name 1 place and holds at 90 degree flexion  -JESUS MANUEL      Cueing 1 Verbal;Tactile  -JESUS MANUEL      Sets 1 3  -JESUS MANUEL      Time (Seconds) 1 30 seconds each  -JESUS MANUEL      Exercise 2    Exercise Name 2 place and holds at 90 degree flexion with minimal pertubations  -JESUS MANUEL      Cueing 2 Verbal;Tactile  -JESUS MANUEL      Sets 2 3  -JESUS MANUEL      Time (Seconds) 2 30 seconds each  -JESUS MANUEL      Exercise 3    Exercise Name 3 cw/ccw at 90 degree flexion  -JESUS MANUEL      Cueing 3 Verbal;Tactile  -JESUS MANUEL      Sets 3 2  -JESUS MANUEL      Reps 3 10   each direction  -JESUS MANUEL        User Key  (r) = Recorded By, (t) = Taken By, (c) = Cosigned By    Initials Name Provider Type    JESUS MANUEL Valverde PTA Physical Therapy Assistant                        Manual Rx (last 36 hours)      Manual Treatments       02/05/18 0907          Manual Rx 1    Manual Rx 1 Location (L) shoulder  -JESUS MANUEL      Manual Rx 1 Type post/inf oscillatory  -JESUS MANUEL      Manual Rx 1 Grade 1/2  -JESUS MANUEL      Manual Rx 1 Duration 2 on each ( 4 total)  -JESUS MANUEL      Manual Rx 2    Manual Rx 2 Location (L) shoulder  -JESUS MANUEL      Manual Rx 2 Type repetitive stretching into flexion avoiding superior glide  -JESUS MANUEL      Manual Rx 2 Grade min OP end range  -JESUS MANUEL       Manual Rx 2 Duration 8  -JESUS MANUEL      Manual Rx 3    Manual Rx 3 Location (L) shoulder  -JESUS MANUEL      Manual Rx 3 Type abduction repetition  -JESUS MANUEL      Manual Rx 3 Duration 5  -JESUS MANUEL      Manual Rx 4    Manual Rx 4 Location (L) shoulder  -JESUS MANUEL      Manual Rx 4 Type cross body adduction short of pain  -JESUS MANUEL      Manual Rx 4 Grade repetition  -JESUS MANUEL      Manual Rx 4 Duration 3  -JESUS MANUEL      Manual Rx 5    Manual Rx 5 Location (L) shoulder  -JESUS MANUEL      Manual Rx 5 Type ER/IR LLLD holds in 45 deg abd  -JESUS MANUEL      Manual Rx 5 Grade min OP  -JESUS MANUEL      Manual Rx 5 Duration 3 each way( 6 total)  -JESUS MANUEL        User Key  (r) = Recorded By, (t) = Taken By, (c) = Cosigned By    Initials Name Provider Type    JESUS MANUEL Valverde, PTA Physical Therapy Assistant                PT OP Goals       02/05/18 0904       PT Short Term Goals    STG Date to Achieve 02/21/18  -JESUS MANUEL     STG 1 Patient will improve left shoulder passive flexion to 120 or greater without guarding  -JESUS MANUEL     STG 1 Progress Ongoing  -JESUS MANUEL     STG 2 Patient will improve left shoulder passive abduction to 100 degrees without guarding  -JESUS MANUEL     STG 2 Progress Ongoing  -JESUS MANUEL     STG 3 Patient will improve left shoulder passive IR at 90 deg abduction to 25 degrees without guarding  -JESUS MANUEL     STG 3 Progress Ongoing  -JESUS MANUEL     Long Term Goals    LTG Date to Achieve 03/14/18  -JESUS MANUEL     LTG 1 Patient will be independent with a comprehensive HEP by discharge  -JESUS MANUEL     LTG 1 Progress Ongoing  -JESUS MANUEL     LTG 2 Patient will improve Quick Dash score to reflect <25% impairment using the G code calculator to demonstrate a clinically significant improvement in function by discharge  -JESUS MANUEL     LTG 2 Progress Ongoing  -JESUS MANUEL     LTG 3 Patient will improve (L) shoulder AROM to within 10 degrees of the (R) shoulder into flexion, abduction, ER, and IR at 90 degrees by discharge  -JESUS MANUEL     LTG 3 Progress Ongoing  -JESUS MANUEL     LTG 4 Patient will report ability to participate in ADL's such as dressing, bathing, putting on makeup with symptoms <3/10 in  the left shoulder by discharge.  -JESUS MANUEL     LTG 4 Progress Ongoing  -JESUS MANUEL     LTG 4 Progress Comments She reports the pain can reach an 8/10,  but she stops when she gets into pain.  -JESUS MANUEL     LTG 5 Patient will improve gross posterior rotator cuff strength to 4+/5 on MMT by discharge  -JESUS MANUEL     LTG 5 Progress Ongoing  -JESUS MANUEL     Time Calculation    PT Goal Re-Cert Due Date 03/02/18  -JESUS MANUEL       User Key  (r) = Recorded By, (t) = Taken By, (c) = Cosigned By    Initials Name Provider Type    JESUS MANUEL Valverde PTA Physical Therapy Assistant          Therapy Education  Given: HEP  Program: Reinforced  How Provided: Verbal  Provided to: Patient  Level of Understanding: Verbalized              Time Calculation:   Start Time: 0904  Stop Time: 0945  Time Calculation (min): 41 min  Total Timed Code Minutes- PT: 41 minute(s)    Therapy Charges for Today     Code Description Service Date Service Provider Modifiers Qty    21743881531 HC PT MANUAL THERAPY EA 15 MIN 2/5/2018 Dilan Valverde PTA GP 2    27127783145 HC PT THER PROC EA 15 MIN 2/5/2018 Dilan Valverde PTA GP 1                    Dilan Valverde PTA  2/5/2018

## 2018-02-07 ENCOUNTER — HOSPITAL ENCOUNTER (OUTPATIENT)
Dept: PHYSICAL THERAPY | Facility: HOSPITAL | Age: 73
Setting detail: THERAPIES SERIES
Discharge: HOME | End: 2018-02-07

## 2018-02-07 DIAGNOSIS — M25.512 LEFT SHOULDER PAIN, UNSPECIFIED CHRONICITY: Primary | ICD-10-CM

## 2018-02-07 PROCEDURE — 97140 MANUAL THERAPY 1/> REGIONS: CPT

## 2018-02-07 PROCEDURE — 97110 THERAPEUTIC EXERCISES: CPT

## 2018-02-07 NOTE — THERAPY TREATMENT NOTE
Outpatient Physical Therapy Ortho Treatment Note  [facility]     Patient Name: Shellie Villeda  : 1945  MRN: [id number]  Today's Date: 2018      Visit Date: 2018    Visit Dx:    ICD-10-CM ICD-9-CM   1. Left shoulder pain, unspecified chronicity M25.512 719.41       Patient Active Problem List   Diagnosis   • Closed compression fracture of third lumbar vertebra   • Wedge compression fracture of third lumbar vertebra with delayed healing   • Chronic bilateral low back pain without sciatica   • Abdominal bloating   • Closed wedge compression fracture of third lumbar vertebra with routine healing   • Non-smoker   • Normal body mass index (BMI)        Past Medical History:   Diagnosis Date   • Compression fracture of lumbar vertebra    • Depression    • Disc degeneration, lumbar    • History of basal cell carcinoma    • History of hyperlipidemia    • History of insomnia    • History of multiple pulmonary nodules    • History of palpitations    • History of urinary tract infection    • Low back pain    • Osteoporosis    • Osteoporosis    • Skin cancer     basal cell   • Vitamin D insufficiency         Past Surgical History:   Procedure Laterality Date   • BASAL CELL CARCINOMA EXCISION      SEVERAL OFF FACE   • BREAST BIOPSY      benign   • COLONOSCOPY  2006   • COLONOSCOPY N/A 3/9/2017    Procedure: COLONOSCOPY WITH ANESTHESIA;  Surgeon: Arthur Harris MD;  Location: [facility] ENDOSCOPY;  Service:    • KYPHOPLASTY N/A 2016    Procedure: KYPHOPLASTY L3;  Surgeon: Ricky Ferguson MD;  Location: [facility] OR;  Service:                              PT Assessment/Plan       18 1000       PT Assessment    Assessment Comments Patient did have three adhesions release today with PROM.  The IR is still the most limited at 90 deg abduction today which was the most painful motion as well.  Less superior glide is noted with PROM and supine AAROM.  Patient is doing well.    -RS     PT Plan    PT  Plan Comments as ROM progresses add stability and proprioception.  -RS       User Key  (r) = Recorded By, (t) = Taken By, (c) = Cosigned By    Initials Name Provider Type    RS Kingsley Su, PT DPT Physical Therapist                    Exercises       02/07/18 1000          Subjective Comments    Subjective Comments pt is doing well with no falls.    -RS      Subjective Pain    Able to rate subjective pain? yes  -RS      Pre-Treatment Pain Level 0  -RS      Post-Treatment Pain Level 0  -RS      Exercise 1    Exercise Name 1 supine AAROM huber wand flexion  -RS      Cueing 1 Verbal  -RS      Sets 1 5  -RS      Reps 1 15  -RS      Additional Comments 2# dowel  -RS      Exercise 2    Exercise Name 2 reviewed thoracic posture and mechanics with shoulder flexion.  -RS        User Key  (r) = Recorded By, (t) = Taken By, (c) = Cosigned By    Initials Name Provider Type    RS Kingsley Su, PT DPT Physical Therapist                        Manual Rx (last 36 hours)      Manual Treatments       02/07/18 1000          Manual Rx 1    Manual Rx 1 Location (L) shoulder  -RS      Manual Rx 1 Type post/inf oscillatory  -RS      Manual Rx 1 Grade 1/2  -RS      Manual Rx 1 Duration 2 on each ( 4 total)  -RS      Manual Rx 2    Manual Rx 2 Location (L) shoulder  -RS      Manual Rx 2 Type repetitive stretching into flexion avoiding superior glide  -RS      Manual Rx 2 Grade min OP end range  -RS      Manual Rx 2 Duration 8  -RS      Manual Rx 3    Manual Rx 3 Location (L) shoulder  -RS      Manual Rx 3 Type abduction repetition  -RS      Manual Rx 3 Duration 5  -RS      Manual Rx 4    Manual Rx 4 Location (L) shoulder  -RS      Manual Rx 4 Type cross body adduction short of pain  -RS      Manual Rx 4 Grade repetition  -RS      Manual Rx 4 Duration 3  -RS      Manual Rx 5    Manual Rx 5 Location (L) shoulder  -RS      Manual Rx 5 Type ER/IR LLLD holds in 45 deg abd  -RS      Manual Rx 5 Grade min OP  -RS      Manual Rx 5  Duration 3 each way( 6 total)  -        User Key  (r) = Recorded By, (t) = Taken By, (c) = Cosigned By    Initials Name Provider Type    RS Kingsley Su, PT DPT Physical Therapist                PT OP Goals       02/07/18 1000       PT Short Term Goals    STG Date to Achieve 02/21/18  -     STG 1 Patient will improve left shoulder passive flexion to 120 or greater without guarding  -RS     STG 1 Progress Partially Met  -RS     STG 1 Progress Comments motion is met with slight guarding noted  -RS     STG 2 Patient will improve left shoulder passive abduction to 100 degrees without guarding  -RS     STG 2 Progress Ongoing  -RS     STG 3 Patient will improve left shoulder passive IR at 90 deg abduction to 25 degrees without guarding  -RS     STG 3 Progress Ongoing  -     Long Term Goals    LTG Date to Achieve 03/14/18  -     LTG 1 Patient will be independent with a comprehensive HEP by discharge  -     LTG 1 Progress Progressing  -     LTG 1 Progress Comments compliant with this  -     LTG 2 Patient will improve Quick Dash score to reflect <25% impairment using the G code calculator to demonstrate a clinically significant improvement in function by discharge  -     LTG 2 Progress Ongoing  -RS     LTG 3 Patient will improve (L) shoulder AROM to within 10 degrees of the (R) shoulder into flexion, abduction, ER, and IR at 90 degrees by discharge  -     LTG 3 Progress Ongoing  -     LTG 4 Patient will report ability to participate in ADL's such as dressing, bathing, putting on makeup with symptoms <3/10 in the left shoulder by discharge.  -     LTG 4 Progress Ongoing  -     LTG 5 Patient will improve gross posterior rotator cuff strength to 4+/5 on MMT by discharge  -[facility] 5 Progress Ongoing  -     Time Calculation    PT Goal Re-Cert Due Date 03/02/18  -       User Key  (r) = Recorded By, (t) = Taken By, (c) = Cosigned By    Initials Name Provider Type    RS Kingsley Su,  PT DPT Physical Therapist          Therapy Education  Given: HEP, Symptoms/condition management  Program: Reinforced  How Provided: Verbal  Provided to: Patient  Level of Understanding: Verbalized              Time Calculation:   Start Time: 1025  Stop Time: 1110  Time Calculation (min): 45 min  Total Timed Code Minutes- PT: 41 minute(s)    Therapy Charges for Today     Code Description Service Date Service Provider Modifiers Qty    60401019295 HC PT THER PROC EA 15 MIN 2/7/2018 Kingsley Su, PT DPT GP 1    05748609827 HC PT MANUAL THERAPY EA 15 MIN 2/7/2018 Kingsley Su, PT DPT GP 2                    ANA LILIA Su, PT DPT  2/7/2018

## 2018-02-12 ENCOUNTER — HOSPITAL ENCOUNTER (OUTPATIENT)
Dept: PHYSICAL THERAPY | Facility: HOSPITAL | Age: 73
Setting detail: THERAPIES SERIES
Discharge: HOME | End: 2018-02-12

## 2018-02-12 DIAGNOSIS — M25.572 ACUTE LEFT ANKLE PAIN: ICD-10-CM

## 2018-02-12 DIAGNOSIS — M25.512 LEFT SHOULDER PAIN, UNSPECIFIED CHRONICITY: Primary | ICD-10-CM

## 2018-02-12 PROCEDURE — 97110 THERAPEUTIC EXERCISES: CPT

## 2018-02-12 PROCEDURE — 97140 MANUAL THERAPY 1/> REGIONS: CPT

## 2018-02-12 NOTE — THERAPY TREATMENT NOTE
Outpatient Physical Therapy Ortho Treatment Note  [facility]     Patient Name: Shellie Villeda  : 1945  MRN: [id number]  Today's Date: 2018      Visit Date: 2018    Visit Dx:    ICD-10-CM ICD-9-CM   1. Left shoulder pain, unspecified chronicity M25.512 719.41   2. Acute left ankle pain M25.572 719.47       Patient Active Problem List   Diagnosis   • Closed compression fracture of third lumbar vertebra   • Wedge compression fracture of third lumbar vertebra with delayed healing   • Chronic bilateral low back pain without sciatica   • Abdominal bloating   • Closed wedge compression fracture of third lumbar vertebra with routine healing   • Non-smoker   • Normal body mass index (BMI)        Past Medical History:   Diagnosis Date   • Compression fracture of lumbar vertebra    • Depression    • Disc degeneration, lumbar    • History of basal cell carcinoma    • History of hyperlipidemia    • History of insomnia    • History of multiple pulmonary nodules    • History of palpitations    • History of urinary tract infection    • Low back pain    • Osteoporosis    • Osteoporosis    • Skin cancer     basal cell   • Vitamin D insufficiency         Past Surgical History:   Procedure Laterality Date   • BASAL CELL CARCINOMA EXCISION      SEVERAL OFF FACE   • BREAST BIOPSY      benign   • COLONOSCOPY  2006   • COLONOSCOPY N/A 3/9/2017    Procedure: COLONOSCOPY WITH ANESTHESIA;  Surgeon: Arthur Harris MD;  Location: [facility] ENDOSCOPY;  Service:    • KYPHOPLASTY N/A 2016    Procedure: KYPHOPLASTY L3;  Surgeon: Ricky Ferguson MD;  Location: [facility] OR;  Service:                              PT Assessment/Plan       18 1023       PT Assessment    Assessment Comments Patient's range of motion continues to improve with less guarding and less superior glide during PROM/AAROM.  During AAROM she did begin to compensate around 120 degrees of flexion and therefore she was instructed to not go  beyond that point for HEP. She does lack proprioception in the left shoulder during activities today.  She only had pain during cross body horizontal adduction at end range, and this was not pushed past pain today.  -JESUS MANUEL     PT Plan    PT Plan Comments We will continue to work on increasing ROM and progressing towards proprioception and stability.  -JESUS MANUEL       User Key  (r) = Recorded By, (t) = Taken By, (c) = Cosigned By    Initials Name Provider Type    JESUS MANUEL Valverde PTA Physical Therapy Assistant                    Exercises       02/12/18 1023          Subjective Comments    Subjective Comments Patient reports since last treatment her shoulder seems to be loosened up.  She feels her shoulder is better.  She reports her back is still hurting.  -JESUS MANUEL      Subjective Pain    Able to rate subjective pain? yes  -JESUS MANUEL      Pre-Treatment Pain Level 0  -JESUS MANUEL      Post-Treatment Pain Level 0  -JESUS MANUEL      Exercise 1    Exercise Name 1 supine AAROM huber wand flexion  -JESUS MANUEL      Cueing 1 Verbal  -JESUS MANUEL      Sets 1 5  -JESUS MANUEL      Reps 1 15  -JESUS MANUEL      Additional Comments 2# dowel.  Added to HEP  -JESUS MANUEL      Exercise 2    Exercise Name 2 place and holds at 90 and 110 degree flexion  -JESUS MANUEL      Cueing 2 Verbal;Tactile  -JESUS MANUEL      Sets 2 2  -JESUS MANUEL      Time (Seconds) 2 30 seconds each position  -JESUS MANUEL      Exercise 3    Exercise Name 3 place and holds at 90 degree flexion with minimal pertubations  -JESUS MANUEL      Cueing 3 Verbal;Tactile  -JESUS MANUEL      Sets 3 3  -JESUS MANUEL      Time (Seconds) 3 30 seconds each  -JESUS MANUEL        User Key  (r) = Recorded By, (t) = Taken By, (c) = Cosigned By    Initials Name Provider Type    JESUS MANUEL Valverde PTA Physical Therapy Assistant                        Manual Rx (last 36 hours)      Manual Treatments       02/12/18 1025          Manual Rx 1    Manual Rx 1 Location (L) shoulder  -JESUS MANUEL      Manual Rx 1 Type post/inf oscillatory  -JESUS MANUEL      Manual Rx 1 Grade 1/2  -JESUS MANUEL      Manual Rx 1 Duration 2 on each ( 4 total)  -JESUS MANUEL      Manual Rx 2     Manual Rx 2 Location (L) shoulder  -JESUS MANUEL      Manual Rx 2 Type repetitive stretching into flexion avoiding superior glide  -JESUS MANUEL      Manual Rx 2 Grade min OP end range  -JESUS MANUEL      Manual Rx 2 Duration 8  -JESUS MANUEL      Manual Rx 3    Manual Rx 3 Location (L) shoulder  -JESUS MANUEL      Manual Rx 3 Type scapular plane abduction repetition  -JESUS MANUEL      Manual Rx 3 Grade min OP end range  -JESUS MANUEL      Manual Rx 3 Duration 5  -JESUS MANUEL      Manual Rx 4    Manual Rx 4 Location (L) shoulder  -JESUS MANUEL      Manual Rx 4 Type cross body adduction short of pain  -JESUS MANUEL      Manual Rx 4 Grade repetition  -JESUS MANUEL      Manual Rx 4 Duration 3  -JESUS MANUEL      Manual Rx 5    Manual Rx 5 Location (L) shoulder  -JESUS MANUEL      Manual Rx 5 Type ER/IR LLLD holds in 45 deg abd  -JESUS MANUEL      Manual Rx 5 Grade min OP  -JESUS MANUEL      Manual Rx 5 Duration 3 each way( 6 total)  -JESUS MANUEL        User Key  (r) = Recorded By, (t) = Taken By, (c) = Cosigned By    Initials Name Provider Type    JESUS MANUEL Valverde, PTA Physical Therapy Assistant                PT OP Goals       02/12/18 1023       PT Short Term Goals    STG Date to Achieve 02/21/18  -JESUS MANUEL     STG 1 Patient will improve left shoulder passive flexion to 120 or greater without guarding  -JESUS MANUEL     STG 1 Progress Partially Met  -JESUS MANUEL     STG 1 Progress Comments She did not have as much guarding today.  -JESUS MANUEL     STG 2 Patient will improve left shoulder passive abduction to 100 degrees without guarding  -JESUS MANUEL     STG 2 Progress Ongoing  -JESUS MANUEL     STG 3 Patient will improve left shoulder passive IR at 90 deg abduction to 25 degrees without guarding  -JESUS MANUEL     STG 3 Progress Ongoing  -JESUS MANUEL     STG 3 Progress Comments She continues to lack IR  -JESUS MANUEL     Long Term Goals    LTG Date to Achieve 03/14/18  -JESUS MANUEL     LTG 1 Patient will be independent with a comprehensive HEP by discharge  -JESUS MANUEL     LTG 1 Progress Progressing  -JESUS MANUEL     LTG 1 Progress Comments added AAROM flexion with cane  -JESUS MANUEL     LTG 2 Patient will improve Quick Dash score to reflect <25% impairment using the G code  calculator to demonstrate a clinically significant improvement in function by discharge  -JESUS MANUEL     LTG 2 Progress Ongoing  -JESUS MANUEL     LTG 3 Patient will improve (L) shoulder AROM to within 10 degrees of the (R) shoulder into flexion, abduction, ER, and IR at 90 degrees by discharge  -JESUS MANUEL     LTG 3 Progress Ongoing  -JESUS MANUEL     LTG 4 Patient will report ability to participate in ADL's such as dressing, bathing, putting on makeup with symptoms <3/10 in the left shoulder by discharge.  -JESUS MANUEL     LTG 4 Progress Ongoing  -JESUS MANUEL     LTG 5 Patient will improve gross posterior rotator cuff strength to 4+/5 on MMT by discharge  -     LTG 5 Progress Ongoing  -JESUS MANUEL     Time Calculation    PT Goal Re-Cert Due Date 03/02/18  -       User Key  (r) = Recorded By, (t) = Taken By, (c) = Cosigned By    Initials Name Provider Type    JESUS MANUEL Valverde PTA Physical Therapy Assistant          Therapy Education  Education Details: supine AAROM flexion with cane  Given: HEP  Program: New  How Provided: Verbal, Written  Provided to: Patient  Level of Understanding: Verbalized, Demonstrated              Time Calculation:   Start Time: 1023  Stop Time: 1115  Time Calculation (min): 52 min  Total Timed Code Minutes- PT: 52 minute(s)    Therapy Charges for Today     Code Description Service Date Service Provider Modifiers Qty    29031696873 HC PT MANUAL THERAPY EA 15 MIN 2/12/2018 Dilan Valverde PTA GP 2    47830387877 HC PT THER PROC EA 15 MIN 2/12/2018 Dilan Valverde PTA GP 1                    Dilan Valverde PTA  2/12/2018

## 2018-02-15 ENCOUNTER — HOSPITAL ENCOUNTER (OUTPATIENT)
Dept: PHYSICAL THERAPY | Facility: HOSPITAL | Age: 73
Setting detail: THERAPIES SERIES
Discharge: HOME | End: 2018-02-15

## 2018-02-15 DIAGNOSIS — M25.572 ACUTE LEFT ANKLE PAIN: ICD-10-CM

## 2018-02-15 DIAGNOSIS — M25.512 LEFT SHOULDER PAIN, UNSPECIFIED CHRONICITY: Primary | ICD-10-CM

## 2018-02-15 PROCEDURE — 97110 THERAPEUTIC EXERCISES: CPT

## 2018-02-15 PROCEDURE — 97140 MANUAL THERAPY 1/> REGIONS: CPT

## 2018-02-15 NOTE — THERAPY TREATMENT NOTE
Outpatient Physical Therapy Ortho Treatment Note  [facility]     Patient Name: Shellie Villeda  : 1945  MRN: [id number]  Today's Date: 2/15/2018      Visit Date: 02/15/2018    Visit Dx:    ICD-10-CM ICD-9-CM   1. Left shoulder pain, unspecified chronicity M25.512 719.41   2. Acute left ankle pain M25.572 719.47       Patient Active Problem List   Diagnosis   • Closed compression fracture of third lumbar vertebra   • Wedge compression fracture of third lumbar vertebra with delayed healing   • Chronic bilateral low back pain without sciatica   • Abdominal bloating   • Closed wedge compression fracture of third lumbar vertebra with routine healing   • Non-smoker   • Normal body mass index (BMI)        Past Medical History:   Diagnosis Date   • Compression fracture of lumbar vertebra    • Depression    • Disc degeneration, lumbar    • History of basal cell carcinoma    • History of hyperlipidemia    • History of insomnia    • History of multiple pulmonary nodules    • History of palpitations    • History of urinary tract infection    • Low back pain    • Osteoporosis    • Osteoporosis    • Skin cancer     basal cell   • Vitamin D insufficiency         Past Surgical History:   Procedure Laterality Date   • BASAL CELL CARCINOMA EXCISION      SEVERAL OFF FACE   • BREAST BIOPSY      benign   • COLONOSCOPY  2006   • COLONOSCOPY N/A 3/9/2017    Procedure: COLONOSCOPY WITH ANESTHESIA;  Surgeon: Arthur Harris MD;  Location: [facility] ENDOSCOPY;  Service:    • KYPHOPLASTY N/A 2016    Procedure: KYPHOPLASTY L3;  Surgeon: Ricky Ferguson MD;  Location: [facility] OR;  Service:                              PT Assessment/Plan       02/15/18 1305       PT Assessment    Assessment Comments Patient's range continues to improve with each session.  She has 135 degrees of PROM shoulder flexion, which is a big improvement. She is not having superior glide with flexion.   We are progressing towards more proprioception  "and strengthening as her motion continues to improve.    -JESUS MANUEL     PT Plan    PT Plan Comments We will continue with ROM and progress towards more proximal strengthening.  -JESUS MANUEL       User Key  (r) = Recorded By, (t) = Taken By, (c) = Cosigned By    Initials Name Provider Type    JESUS MANUEL Valverde PTA Physical Therapy Assistant                    Exercises       02/15/18 1305          Subjective Comments    Subjective Comments Patient reports she is doing better.  She reports she was able to put her sweatshirt on with minimal pain.  Her back is not hurting today.  -JESUS MANUEL      Subjective Pain    Able to rate subjective pain? yes  -JESUS MANUEL      Pre-Treatment Pain Level 0  -JESUS MANUEL      Post-Treatment Pain Level 0  -JESUS MANUEL      Exercise 1    Exercise Name 1 educated on sleeping position for \"bridging\" the shoulder in sidelying  -JESUS MANUEL      Cueing 1 Verbal;Tactile  -JESUS MANUEL      Sets 1 --  -JESUS MANUEL      Reps 1 --  -JESUS MANUEL      Additional Comments --  -JESUS MANUEL      Exercise 2    Exercise Name 2 supine AAROM huber wand flexion to 130  degree flexion  -JESUS MANUEL      Cueing 2 Verbal  -JESUS MANUEL      Sets 2 2  -JESUS MANUEL      Reps 2 15  -JESUS MANUEL      Additional Comments 2# dowel. Painfree  -JESUS MANUEL      Exercise 3    Exercise Name 3 place and holds at 90,110 degree flexion with pertubations with flex bar  -JESUS MANUEL      Cueing 3 Verbal;Tactile  -JESUS MANUEL      Sets 3 3  -JESUS MANUEL      Time (Seconds) 3 30 seconds each  -JESUS MANUEL      Additional Comments last set with EC  -JESUS MANUEL      Exercise 4    Exercise Name 4 cw/ccw at 90 degree flexion  -JESUS MANUEL      Cueing 4 Verbal;Tactile  -JESUS MANUEL      Sets 4 2   each direction  -JESUS MANUEL      Reps 4 20  -JESUS MANUEL      Additional Comments 1# weight  -JESUS MANUEL        User Key  (r) = Recorded By, (t) = Taken By, (c) = Cosigned By    Initials Name Provider Type    JESUS MANUEL Valverde PTA Physical Therapy Assistant                        Manual Rx (last 36 hours)      Manual Treatments       02/15/18 1310          Manual Rx 1    Manual Rx 1 Location (L) shoulder  -JESUS MANUEL      Manual Rx 1 Type repetitive stretching " into flexion avoiding superior glide  -JESUS MANUEL      Manual Rx 1 Grade min OP end range  -JESUS MANUEL      Manual Rx 1 Duration 8  -JESUS MANUEL      Manual Rx 2    Manual Rx 2 Location (L) shoulder  -JESUS MANUEL      Manual Rx 2 Type scapular plane abduction repetition  -JESUS MANUEL      Manual Rx 2 Grade min OP end range  -JESUS MANUEL      Manual Rx 2 Duration 8  -JESUS MANUEL      Manual Rx 3    Manual Rx 3 Location (L) shoulder  -JESUS MANUEL      Manual Rx 3 Type cross body adduction short of pain  -JESUS MANUEL      Manual Rx 3 Grade repetition  -JESUS MANUEL      Manual Rx 3 Duration 3  -JESUS MANUEL      Manual Rx 4    Manual Rx 4 Location (L) shoulder  -JESUS MANUEL      Manual Rx 4 Type ER/IR LLLD holds in 45 deg abd  -JESUS MANUEL      Manual Rx 4 Grade min OP  -JESUS MANUEL      Manual Rx 4 Duration 3 each way( 6 total)  -JESUS MANUEL      Manual Rx 5    Manual Rx 5 Location (L) shoulder   -JESUS MANUEL      Manual Rx 5 Type IR LLLD  hold at 70 degree  abduction  -JESUS MANUEL      Manual Rx 5 Duration 3  -JESUS MANUEL        User Key  (r) = Recorded By, (t) = Taken By, (c) = Cosigned By    Initials Name Provider Type    JESUS MANUEL Valverde, PTA Physical Therapy Assistant                PT OP Goals       02/15/18 1305       PT Short Term Goals    STG Date to Achieve 02/21/18  -JESUS MANUEL     STG 1 Patient will improve left shoulder passive flexion to 120 or greater without guarding  -JESUS MANUEL     STG 1 Progress Partially Met  -JESUS MANUEL     STG 1 Progress Comments PROM 135 degrees today with minimal guarding at end range  -JESUS MANUEL     STG 2 Patient will improve left shoulder passive abduction to 100 degrees without guarding  -JESUS MANUEL     STG 2 Progress Ongoing  -JESUS MANUEL     STG 3 Patient will improve left shoulder passive IR at 90 deg abduction to 25 degrees without guarding  -JESUS MANUEL     STG 3 Progress Ongoing;Progressing  -JESUS MANUEL     STG 3 Progress Comments She continues to lack IR, but she did have 23 degrees at 70 degree shoulder abduction.  She does tend to have guarding at end range still, but no pain.  -JESUS MANUEL     Long Term Goals    LTG Date to Achieve 03/14/18  -JESUS MANUEL     LTG 1 Patient will be independent with a  comprehensive HEP by discharge  -JESUS MANUEL     LTG 1 Progress Progressing  -JESUS MANUEL     LTG 1 Progress Comments reviewed HEP and the importance of HEP  -JESUS MANUEL     LTG 2 Patient will improve Quick Dash score to reflect <25% impairment using the G code calculator to demonstrate a clinically significant improvement in function by discharge  -JESUS MANUEL     LTG 2 Progress Ongoing  -JESUS MANUEL     LTG 3 Patient will improve (L) shoulder AROM to within 10 degrees of the (R) shoulder into flexion, abduction, ER, and IR at 90 degrees by discharge  -JESUS MANUEL     LTG 3 Progress Ongoing  -JESUS MANUEL     LTG 4 Patient will report ability to participate in ADL's such as dressing, bathing, putting on makeup with symptoms <3/10 in the left shoulder by discharge.  -JESUS MANUEL     LTG 4 Progress Ongoing  -JESUS MANUEL     LTG 5 Patient will improve gross posterior rotator cuff strength to 4+/5 on MMT by discharge  -JESUS MANUEL     LTG 5 Progress Ongoing  -JESUS MANUEL     Time Calculation    PT Goal Re-Cert Due Date 03/02/18  -       User Key  (r) = Recorded By, (t) = Taken By, (c) = Cosigned By    Initials Name Provider Type    JESUS MANUEL Valverde PTA Physical Therapy Assistant          Therapy Education  Education Details: reviewed HEP and the importance of compliance with HEP.  Issued written program for AAROM with flexion with cane  Given: HEP  Program: Reinforced  How Provided: Verbal, Written  Provided to: Patient  Level of Understanding: Verbalized, Demonstrated              Time Calculation:   Start Time: 1305  Stop Time: 1405  Time Calculation (min): 60 min  Total Timed Code Minutes- PT: 60 minute(s)    Therapy Charges for Today     Code Description Service Date Service Provider Modifiers Qty    23299435599 HC PT MANUAL THERAPY EA 15 MIN 2/15/2018 Dilan Valverde PTA GP 2    48377857356 HC PT THER PROC EA 15 MIN 2/15/2018 Dilan Valverde PTA GP 2                    Dilan Valverde PTA  2/15/2018

## 2018-02-19 ENCOUNTER — HOSPITAL ENCOUNTER (OUTPATIENT)
Dept: PHYSICAL THERAPY | Facility: HOSPITAL | Age: 73
Setting detail: THERAPIES SERIES
Discharge: HOME | End: 2018-02-19

## 2018-02-19 DIAGNOSIS — M25.512 LEFT SHOULDER PAIN, UNSPECIFIED CHRONICITY: Primary | ICD-10-CM

## 2018-02-19 PROCEDURE — 97140 MANUAL THERAPY 1/> REGIONS: CPT

## 2018-02-19 PROCEDURE — 97110 THERAPEUTIC EXERCISES: CPT

## 2018-02-19 NOTE — THERAPY TREATMENT NOTE
Outpatient Physical Therapy Ortho Treatment Note  [facility]     Patient Name: Shellie Villeda  : 1945  MRN: [id number]  Today's Date: 2018      Visit Date: 2018    Visit Dx:    ICD-10-CM ICD-9-CM   1. Left shoulder pain, unspecified chronicity M25.512 719.41       Patient Active Problem List   Diagnosis   • Closed compression fracture of third lumbar vertebra   • Wedge compression fracture of third lumbar vertebra with delayed healing   • Chronic bilateral low back pain without sciatica   • Abdominal bloating   • Closed wedge compression fracture of third lumbar vertebra with routine healing   • Non-smoker   • Normal body mass index (BMI)        Past Medical History:   Diagnosis Date   • Compression fracture of lumbar vertebra    • Depression    • Disc degeneration, lumbar    • History of basal cell carcinoma    • History of hyperlipidemia    • History of insomnia    • History of multiple pulmonary nodules    • History of palpitations    • History of urinary tract infection    • Low back pain    • Osteoporosis    • Osteoporosis    • Skin cancer     basal cell   • Vitamin D insufficiency         Past Surgical History:   Procedure Laterality Date   • BASAL CELL CARCINOMA EXCISION      SEVERAL OFF FACE   • BREAST BIOPSY      benign   • COLONOSCOPY  2006   • COLONOSCOPY N/A 3/9/2017    Procedure: COLONOSCOPY WITH ANESTHESIA;  Surgeon: Arthur Harris MD;  Location: [facility] ENDOSCOPY;  Service:    • KYPHOPLASTY N/A 2016    Procedure: KYPHOPLASTY L3;  Surgeon: Ricky Ferguson MD;  Location: [facility] OR;  Service:                              PT Assessment/Plan       18 1108       PT Assessment    Assessment Comments She overall had slight increase in guarding with PROM today compared to the previous session.  She also continues to be most limited in internal rotation.  She is slowly being progressed with more proprioception and stability activities.  Her AAROM and AROM continues  to improve as well.   She did have slight guarding in her left pectoralis minor and biceps today, which is where she has some referred pain.  -JESUS MANUEL     PT Plan    PT Plan Comments We will continue to increase ROM while also progressing proximal strengthening and proprioception.  -JESUS MANUEL       User Key  (r) = Recorded By, (t) = Taken By, (c) = Cosigned By    Initials Name Provider Type    JESUS MANUEL Valverde PTA Physical Therapy Assistant                    Exercises       02/19/18 1108          Subjective Comments    Subjective Comments Patient reports she was good over the weekend.  She reports her shoulder has been a little sore with  movement.  -JESUS MANUEL      Subjective Pain    Able to rate subjective pain? yes  -JESUS MANUEL      Pre-Treatment Pain Level 2   with movement, 0/10 at rest  -JESUS MANUEL      Post-Treatment Pain Level 0  -JESUS MANUEL      Exercise 1    Exercise Name 1 supine AAROM huber wand flexion to 130  degree flexion  -JESUS MANUEL      Cueing 1 Verbal;Tactile  -JESUS MANUEL      Sets 1 1  -JESUS MANUEL      Reps 1 20  -JESUS MANUEL      Additional Comments 2# dowel.  -JESUS MANUEL      Exercise 2    Exercise Name 2 place and holds at 90,110 degree flexion with pertubations  -JESUS MANUEL      Cueing 2 Verbal;Tactile  -JESUS MANUEL      Sets 2 3  -JESUS MANUEL      Time (Seconds) 2 30 seconds each position  -JESUS MANUEL      Additional Comments 1# dumbell  -JESUS MANUEL      Exercise 3    Exercise Name 3 standing cw/ccw at wall  -JESUS MANUEL      Cueing 3 Verbal;Tactile  -JESUS MANUEL      Reps 3 20   each direction  -JESUS MANUEL      Additional Comments added to HEP  -JESUS MANUEL        User Key  (r) = Recorded By, (t) = Taken By, (c) = Cosigned By    Initials Name Provider Type    JESUS MANUEL Valverde PTA Physical Therapy Assistant                        Manual Rx (last 36 hours)      Manual Treatments       02/19/18 1110          Manual Rx 1    Manual Rx 1 Location (L) shoulder  -JESUS MANUEL      Manual Rx 1 Type repetitive stretching into flexion and abduction in scaption avoiding superior glide  -JESUS MANUEL      Manual Rx 1 Grade min OP end range  -JESUS MANUEL      Manual Rx 1 Duration 15   -JESUS MANUEL      Manual Rx 2    Manual Rx 2 Location (L) shoulder   -JESUS MANUEL      Manual Rx 2 Type IR LLLD holds in 45 degree abd and 70 degree abduction( with towel under arm)  -JESUS MANUEL      Manual Rx 2 Grade 30 seconds holds each stretch  -JESUS MANUEL      Manual Rx 2 Duration 6  -JESUS MANUEL      Manual Rx 3    Manual Rx 3 Location (L) shoulder  -JESUS MANUEL      Manual Rx 3 Type cross body adduction short of pain  -JESUS MANUEL      Manual Rx 3 Grade repetition  -JESUS MANUEL      Manual Rx 3 Duration 2  -JESUS MANUEL      Manual Rx 4    Manual Rx 4 Location (L) pectoralis minor and biceps  -JESUS MANUEL      Manual Rx 4 Type STM  -JESUS MANUEL      Manual Rx 4 Grade min  -JESUS MANUEL      Manual Rx 4 Duration 2  -JESUS MANUEL        User Key  (r) = Recorded By, (t) = Taken By, (c) = Cosigned By    Initials Name Provider Type    JESUS MANUEL Valverde, PTA Physical Therapy Assistant                PT OP Goals       02/19/18 1108       PT Short Term Goals    STG Date to Achieve 02/21/18  -JESUS MANUEL     STG 1 Patient will improve left shoulder passive flexion to 120 or greater without guarding  -JESUS MANUEL     STG 1 Progress Partially Met  -JESUS MANUEL     STG 2 Patient will improve left shoulder passive abduction to 100 degrees without guarding  -JESUS MANUEL     STG 2 Progress Ongoing  -JESUS MANUEL     STG 3 Patient will improve left shoulder passive IR at 90 deg abduction to 25 degrees without guarding  -JESUS MANUEL     STG 3 Progress Ongoing;Progressing  -JESUS MANUEL     STG 3 Progress Comments She continues to lack IR with more guarding present today than previous session  -JESUS MANUEL     Long Term Goals    LTG Date to Achieve 03/14/18  -JESUS MANUEL     LTG 1 Patient will be independent with a comprehensive HEP by discharge  -JESUS MANUEL     LTG 1 Progress Progressing  -JESUS MANUEL     LTG 2 Patient will improve Quick Dash score to reflect <25% impairment using the G code calculator to demonstrate a clinically significant improvement in function by discharge  -JESUS MANUEL     LTG 2 Progress Ongoing  -JESUS MANUEL     LTG 3 Patient will improve (L) shoulder AROM to within 10 degrees of the (R) shoulder into flexion, abduction, ER, and IR  at 90 degrees by discharge  -JESUS MANUEL     LTG 3 Progress Ongoing  -JESUS MANUEL     LTG 4 Patient will report ability to participate in ADL's such as dressing, bathing, putting on makeup with symptoms <3/10 in the left shoulder by discharge.  -JESUS MANUEL     LTG 4 Progress Ongoing  -JESUS MANUEL     LTG 5 Patient will improve gross posterior rotator cuff strength to 4+/5 on MMT by discharge  -JESUS MANUEL     LTG 5 Progress Ongoing  -JESUS MANUEL     Time Calculation    PT Goal Re-Cert Due Date 03/20/18  -JESUS MANUEL       User Key  (r) = Recorded By, (t) = Taken By, (c) = Cosigned By    Initials Name Provider Type    JESUS MANUEL Valverde PTA Physical Therapy Assistant          Therapy Education  Education Details: standing cw/ccw at wall, sleeping position to create bridge for shoulder  Given: HEP  Program: New  How Provided: Verbal, Written  Provided to: Patient  Level of Understanding: Verbalized, Demonstrated              Time Calculation:   Start Time: 1108  Stop Time: 1155  Time Calculation (min): 47 min  Total Timed Code Minutes- PT: 47 minute(s)    Therapy Charges for Today     Code Description Service Date Service Provider Modifiers Qty    17842703287 HC PT MANUAL THERAPY EA 15 MIN 2/19/2018 Dilan Valverde PTA GP 2    02968966814 HC PT THER PROC EA 15 MIN 2/19/2018 Dilan Valverde PTA GP 1                    Dilan Valverde PTA  2/19/2018

## 2018-02-21 ENCOUNTER — HOSPITAL ENCOUNTER (OUTPATIENT)
Dept: PHYSICAL THERAPY | Facility: HOSPITAL | Age: 73
Setting detail: THERAPIES SERIES
Discharge: HOME | End: 2018-02-21

## 2018-02-21 DIAGNOSIS — M25.512 LEFT SHOULDER PAIN, UNSPECIFIED CHRONICITY: Primary | ICD-10-CM

## 2018-02-21 PROCEDURE — 97110 THERAPEUTIC EXERCISES: CPT

## 2018-02-21 PROCEDURE — 97140 MANUAL THERAPY 1/> REGIONS: CPT

## 2018-02-21 NOTE — THERAPY TREATMENT NOTE
Outpatient Physical Therapy Ortho Treatment Note  [facility]     Patient Name: Shellie Villeda  : 1945  MRN: [id number]  Today's Date: 2018      Visit Date: 2018    Visit Dx:    ICD-10-CM ICD-9-CM   1. Left shoulder pain, unspecified chronicity M25.512 719.41       Patient Active Problem List   Diagnosis   • Closed compression fracture of third lumbar vertebra   • Wedge compression fracture of third lumbar vertebra with delayed healing   • Chronic bilateral low back pain without sciatica   • Abdominal bloating   • Closed wedge compression fracture of third lumbar vertebra with routine healing   • Non-smoker   • Normal body mass index (BMI)        Past Medical History:   Diagnosis Date   • Compression fracture of lumbar vertebra    • Depression    • Disc degeneration, lumbar    • History of basal cell carcinoma    • History of hyperlipidemia    • History of insomnia    • History of multiple pulmonary nodules    • History of palpitations    • History of urinary tract infection    • Low back pain    • Osteoporosis    • Osteoporosis    • Skin cancer     basal cell   • Vitamin D insufficiency         Past Surgical History:   Procedure Laterality Date   • BASAL CELL CARCINOMA EXCISION      SEVERAL OFF FACE   • BREAST BIOPSY      benign   • COLONOSCOPY  2006   • COLONOSCOPY N/A 3/9/2017    Procedure: COLONOSCOPY WITH ANESTHESIA;  Surgeon: Arthur Harris MD;  Location: [facility] ENDOSCOPY;  Service:    • KYPHOPLASTY N/A 2016    Procedure: KYPHOPLASTY L3;  Surgeon: Ricky Ferguson MD;  Location: [facility] OR;  Service:                              PT Assessment/Plan       18 0808       PT Assessment    Assessment Comments Patient's ROM continues to progressively improve each session especially with flexion, but she continues to be most limited with internal rotation.  Her proprioception is improving as well.  Today we began to progress towards proximal strengthening.  -JESUS MANUEL     PT Plan     PT Plan Comments We will contine to work on ROM, proprioception, and proximal strengthening.  -JESUS MANUEL       User Key  (r) = Recorded By, (t) = Taken By, (c) = Cosigned By    Initials Name Provider Type    JESUS MANUEL Valverde PTA Physical Therapy Assistant                    Exercises       02/21/18 0803          Subjective Comments    Subjective Comments She reports she is about the same.  She reports she slept with bridge with pillows and reports she went to sleep well.  -JESUS MANUEL      Subjective Pain    Able to rate subjective pain? yes  -JESUS MANUEL      Pre-Treatment Pain Level 0  -JESUS MANUEL      Post-Treatment Pain Level 0  -JESUS MANUEL      Exercise 1    Exercise Name 1 place and holds at 45 ,90,110, 130 degree flexion with pertubations  -JESUS MANUEL      Cueing 1 Verbal;Tactile  -JESUS MANUEL      Sets 1 2  -JESUS MANUEL      Time (Seconds) 1 30 seconds  -JESUS MANUEL      Additional Comments 1#  -JESUS MANUEL      Exercise 2    Exercise Name 2 supine SA punches  -JESUS MANUEL      Cueing 2 Tactile;Verbal  -JESUS MANUEL      Sets 2 2  -JESUS MANUEL      Reps 2 15  -JESUS MANUEL      Exercise 3    Exercise Name 3 (R) sidelying (L) ER place and holds  -JESUS MANUEL      Cueing 3 Verbal;Tactile  -JESUS MANUEL      Sets 3 3  -JESUS MANUEL      Time (Seconds) 3 15 seconds  -JESUSM ANUEL      Additional Comments 1#, towel under arm  -JESUS MANUEL      Exercise 4    Exercise Name 4 sidelying (L) ER AROM  -JESUS MANUEL      Cueing 4 Verbal;Tactile  -JESUS MANUEL      Sets 4 2  -JESUS MANUEL      Reps 4 10  -JESUS MANUEL      Additional Comments towel under arm  -JESUS MANUEL      Exercise 5    Exercise Name 5 standing cw/ccw at wall  -JESUS MANUEL      Cueing 5 Verbal  -JESUS MANUEL      Sets 5 1  -JESUS MANUEL      Reps 5 20   each direction  -JESUS MANUEL        User Key  (r) = Recorded By, (t) = Taken By, (c) = Cosigned By    Initials Name Provider Type    JESUS MANUEL Valverde PTA Physical Therapy Assistant                        Manual Rx (last 36 hours)      Manual Treatments       02/21/18 0806          Manual Rx 1    Manual Rx 1 Location (L) shoulder  -JESUS MANUEL      Manual Rx 1 Type repetitive stretching into flexion and abduction in scaption avoiding superior glide  -JESUS MANUEL       Manual Rx 1 Grade min OP end range  -JESUS MANUEL      Manual Rx 1 Duration 10  -JESUS MANUEL      Manual Rx 2    Manual Rx 2 Location (L) shoulder   -JESUS MANUEL      Manual Rx 2 Type IR LLLD holds in 45 degree abd and 90 degree abduction( with towel under arm)  -JESUS MANUEL      Manual Rx 2 Grade 30 seconds holds each stretch  -JESUS MANUEL      Manual Rx 2 Duration 6  -JESUS MANUEL        User Key  (r) = Recorded By, (t) = Taken By, (c) = Cosigned By    Initials Name Provider Type    JESUS MANUEL Valverde, PTA Physical Therapy Assistant                PT OP Goals       02/21/18 0808       PT Short Term Goals    STG Date to Achieve 02/21/18  -JESUS MANUEL     STG 1 Patient will improve left shoulder passive flexion to 120 or greater without guarding  -JESUS MANUEL     STG 1 Progress Partially Met  -JESUS MANUEL     STG 1 Progress Comments 142 PROM today  -JESUS MANUEL     STG 2 Patient will improve left shoulder passive abduction to 100 degrees without guarding  -JESUS MANUEL     STG 2 Progress Ongoing  -JESUS MANUEL     STG 3 Patient will improve left shoulder passive IR at 90 deg abduction to 25 degrees without guarding  -JESUS MANUEL     STG 3 Progress Ongoing;Progressing  -JESUS MANUEL     STG 3 Progress Comments 30 degrees at 45 degree abduction, 12 degrees at 90 degree abduction  -JESUS MANUEL     Long Term Goals    LTG Date to Achieve 03/14/18  -JESUS MANUEL     LTG 1 Patient will be independent with a comprehensive HEP by discharge  -JESUS MANUEL     LTG 1 Progress Progressing  -JESUS MANUEL     LTG 2 Patient will improve Quick Dash score to reflect <25% impairment using the G code calculator to demonstrate a clinically significant improvement in function by discharge  -JESUS MANUEL     LTG 2 Progress Ongoing  -JESUS MANUEL     LTG 3 Patient will improve (L) shoulder AROM to within 10 degrees of the (R) shoulder into flexion, abduction, ER, and IR at 90 degrees by discharge  -JESUS MANUEL     LTG 3 Progress Ongoing  -JESUS MANUEL     LTG 3 Progress Comments (L) 138 (R) 160 AROM flexion  -JESUS MANUEL     LTG 4 Patient will report ability to participate in ADL's such as dressing, bathing, putting on makeup with symptoms <3/10  in the left shoulder by discharge.  -JESUS MANUEL     LTG 4 Progress Ongoing  -JESUS MANUEL     LTG 5 Patient will improve gross posterior rotator cuff strength to 4+/5 on MMT by discharge  -JESUS MANUEL     LTG 5 Progress Ongoing  -JESUS MANUEL     Time Calculation    PT Goal Re-Cert Due Date 03/20/18  -JESUS MANUEL       User Key  (r) = Recorded By, (t) = Taken By, (c) = Cosigned By    Initials Name Provider Type    JESUS MANUEL Valverde PTA Physical Therapy Assistant          Therapy Education  Given: HEP  Program: Reinforced  How Provided: Verbal  Provided to: Patient  Level of Understanding: Verbalized              Time Calculation:   Start Time: 0803  Stop Time: 0845  Time Calculation (min): 42 min  Total Timed Code Minutes- PT: 42 minute(s)    Therapy Charges for Today     Code Description Service Date Service Provider Modifiers Qty    48000816990 HC PT MANUAL THERAPY EA 15 MIN 2/21/2018 Dilan Valverde PTA GP 1    30684930856 HC PT THER PROC EA 15 MIN 2/21/2018 Dialn Valverde PTA GP 2                    Dilan Valverde PTA  2/21/2018

## 2018-02-26 ENCOUNTER — HOSPITAL ENCOUNTER (OUTPATIENT)
Dept: PHYSICAL THERAPY | Facility: HOSPITAL | Age: 73
Setting detail: THERAPIES SERIES
Discharge: HOME | End: 2018-02-26

## 2018-02-26 DIAGNOSIS — M25.512 LEFT SHOULDER PAIN, UNSPECIFIED CHRONICITY: Primary | ICD-10-CM

## 2018-02-26 PROCEDURE — 97110 THERAPEUTIC EXERCISES: CPT

## 2018-02-26 PROCEDURE — 97140 MANUAL THERAPY 1/> REGIONS: CPT

## 2018-02-26 NOTE — THERAPY TREATMENT NOTE
Outpatient Physical Therapy Ortho Treatment Note  [facility]     Patient Name: Shellie Villeda  : 1945  MRN: [id number]  Today's Date: 2018      Visit Date: 2018    Visit Dx:    ICD-10-CM ICD-9-CM   1. Left shoulder pain, unspecified chronicity M25.512 719.41       Patient Active Problem List   Diagnosis   • Closed compression fracture of third lumbar vertebra   • Wedge compression fracture of third lumbar vertebra with delayed healing   • Chronic bilateral low back pain without sciatica   • Abdominal bloating   • Closed wedge compression fracture of third lumbar vertebra with routine healing   • Non-smoker   • Normal body mass index (BMI)        Past Medical History:   Diagnosis Date   • Compression fracture of lumbar vertebra    • Depression    • Disc degeneration, lumbar    • History of basal cell carcinoma    • History of hyperlipidemia    • History of insomnia    • History of multiple pulmonary nodules    • History of palpitations    • History of urinary tract infection    • Low back pain    • Osteoporosis    • Osteoporosis    • Skin cancer     basal cell   • Vitamin D insufficiency         Past Surgical History:   Procedure Laterality Date   • BASAL CELL CARCINOMA EXCISION      SEVERAL OFF FACE   • BREAST BIOPSY      benign   • COLONOSCOPY  2006   • COLONOSCOPY N/A 3/9/2017    Procedure: COLONOSCOPY WITH ANESTHESIA;  Surgeon: Arthur Harris MD;  Location: [facility] ENDOSCOPY;  Service:    • KYPHOPLASTY N/A 2016    Procedure: KYPHOPLASTY L3;  Surgeon: Ricky Ferguson MD;  Location: [facility] OR;  Service:                              PT Assessment/Plan       18 0800       PT Assessment    Assessment Comments Barbara's internal rotation improved greatly today, but she does continue to have guarding and fear avoidance with this motion.  She met two of her short term goals today for range of motion for flexion and abduction,  but she continues to lack motion compared to her  uninvolved shoulder.  Her proprioception has improved, but she continues to have decreased proximal strength.  -JESUS MANUEL     PT Plan    PT Plan Comments We will continue with ROM and proximal strengthening.  She is due G-code and progress note next session.  -JESUS MANUEL       User Key  (r) = Recorded By, (t) = Taken By, (c) = Cosigned By    Initials Name Provider Type    JESUS MANUEL Valverde PTA Physical Therapy Assistant                    Exercises       02/26/18 0800          Subjective Comments    Subjective Comments Patient reports she did her HEP over the weekend, and she is doing well.  She reports she still has pain when she is washing her hair with a pulling pain in the shoulder.  -JESUS MANUEL      Subjective Pain    Able to rate subjective pain? yes  -JESUS MANUEL      Pre-Treatment Pain Level 0  -JESUS MANUEL      Post-Treatment Pain Level 0  -JESUS MANUEL      Exercise 1    Exercise Name 1 small arch flexion: 45 degrees to 120 degrees  -JESUS MANUEL      Cueing 1 Verbal;Tactile  -JESUS MANUEL      Sets 1 3  -JESUS MANUEL      Time (Minutes) 1 1 minute each  -JESUS MANUEL      Additional Comments 2#  -JESUS MANUEL      Exercise 2    Exercise Name 2 (R) sidelying (L) ER place and holds  -JESUS MANUEL      Cueing 2 Verbal;Tactile  -JESUS MANUEL      Sets 2 3  -JESUS MANUEL      Time (Seconds) 2 15 seconds   -JESUS MANUEL      Additional Comments 2#, towel under arm  -JESUS MANUEL      Exercise 3    Exercise Name 3 sidelying (L) ER AROM  -JESUS MANUEL      Cueing 3 Verbal;Tactile  -JESUS MANUEL      Sets 3 3  -JESUS MANUEL      Reps 3 10  -JESUS MANUEL      Additional Comments 1#, towel under arm  -JESUS MANUEL        User Key  (r) = Recorded By, (t) = Taken By, (c) = Cosigned By    Initials Name Provider Type    JESUS MANUEL Valverde PTA Physical Therapy Assistant                        Manual Rx (last 36 hours)      Manual Treatments       02/26/18 0805          Manual Rx 1    Manual Rx 1 Location (L) shoulder  -JESUS MANUEL      Manual Rx 1 Type repetitive stretching into flexion and abduction in scaption avoiding superior glide  -JESUS MANUEL      Manual Rx 1 Grade min OP end range  -JESUS MANUEL      Manual Rx 1 Duration 11  -JESUS MANUEL       Manual Rx 2    Manual Rx 2 Location (L) shoulder bicep  -JESUS MANUEL      Manual Rx 2 Type STM  -JESUS MANUEL      Manual Rx 2 Grade min  -JESUS MANUEL      Manual Rx 2 Duration 4  -JESUS MANUEL      Manual Rx 3    Manual Rx 3 Location (L) shoulder   -JESUS MANUEL      Manual Rx 3 Type IR LLLD holds at side,45 degree abd, and 90 degree abduction( with towel under arm)  -JESUS MANUEL      Manual Rx 3 Grade 30 seconds holds each stretch  -JESUS MANUEL      Manual Rx 3 Duration 8  -JESUS MANUEL        User Key  (r) = Recorded By, (t) = Taken By, (c) = Cosigned By    Initials Name Provider Type    JESUS MANUEL Valverde, PTA Physical Therapy Assistant                PT OP Goals       02/26/18 0800       PT Short Term Goals    STG Date to Achieve 02/21/18  -JESUS MANUEL     STG 1 Patient will improve left shoulder passive flexion to 120 or greater without guarding  -JESUS MANUEL     STG 1 Progress Met  -JESUS MANUEL     STG 1 Progress Comments 140 PROM  -JESUS MANUEL     STG 2 Patient will improve left shoulder passive abduction to 100 degrees without guarding  -JESUS MANUEL     STG 2 Progress Met  -JESUS MANUEL     STG 2 Progress Comments 120 degrees today  -JESUS MANUEL     STG 3 Patient will improve left shoulder passive IR at 90 deg abduction to 25 degrees without guarding  -JESUS MANUEL     STG 3 Progress Progressing;Partially Met  -JESUS MANUEL     STG 3 Progress Comments 30 degrees at 90 degrees today, but she does continue to have guarding along her shoulder during this motion  -JESUS MANUEL     Long Term Goals    LTG Date to Achieve 03/14/18  -JESUS MANUEL     LTG 1 Patient will be independent with a comprehensive HEP by discharge  -JESUS MANUEL     LTG 1 Progress Progressing  -JESUS MANUEL     LTG 2 Patient will improve Quick Dash score to reflect <25% impairment using the G code calculator to demonstrate a clinically significant improvement in function by discharge  -JESUS MANUEL     LTG 2 Progress Ongoing  -JESUS MANUEL     LTG 3 Patient will improve (L) shoulder AROM to within 10 degrees of the (R) shoulder into flexion, abduction, ER, and IR at 90 degrees by discharge  -JESUS MANUEL     LTG 3 Progress Ongoing  -JESUS MANUEL     LTG 4 Patient will  report ability to participate in ADL's such as dressing, bathing, putting on makeup with symptoms <3/10 in the left shoulder by discharge.  -JESUS MANUEL     LTG 4 Progress Ongoing  -JESUS MANUEL     LTG 4 Progress Comments She has the most difficulty with washing her hair.  -JESUS MANUEL     LTG 5 Patient will improve gross posterior rotator cuff strength to 4+/5 on MMT by discharge  -JESUS MANUEL     LTG 5 Progress Ongoing  -JESUS MANUEL     Time Calculation    PT Goal Re-Cert Due Date 03/02/18  -JESUS MANUEL       User Key  (r) = Recorded By, (t) = Taken By, (c) = Cosigned By    Initials Name Provider Type    JESUS MANUEL Valverde PTA Physical Therapy Assistant          Therapy Education  Education Details: sidelying ER AROM wtih 1# weight dumbell; she thinks she has a 1# weight  Program: New  How Provided: Verbal, Written  Provided to: Patient  Level of Understanding: Verbalized, Demonstrated              Time Calculation:   Start Time: 0800  Stop Time: 0847  Time Calculation (min): 47 min  Total Timed Code Minutes- PT: 47 minute(s)    Therapy Charges for Today     Code Description Service Date Service Provider Modifiers Qty    57546626613 HC PT MANUAL THERAPY EA 15 MIN 2/26/2018 Dilan Valverde PTA GP 2    04850022389 HC PT THER PROC EA 15 MIN 2/26/2018 Dilan Valverde PTA GP 1                    Dilan Valverde PTA  2/26/2018

## 2018-02-28 ENCOUNTER — HOSPITAL ENCOUNTER (OUTPATIENT)
Dept: PHYSICAL THERAPY | Facility: HOSPITAL | Age: 73
Setting detail: THERAPIES SERIES
Discharge: HOME | End: 2018-02-28

## 2018-02-28 DIAGNOSIS — M25.512 LEFT SHOULDER PAIN, UNSPECIFIED CHRONICITY: Primary | ICD-10-CM

## 2018-02-28 PROCEDURE — G8979 MOBILITY GOAL STATUS: HCPCS

## 2018-02-28 PROCEDURE — 97110 THERAPEUTIC EXERCISES: CPT

## 2018-02-28 PROCEDURE — G8978 MOBILITY CURRENT STATUS: HCPCS

## 2018-02-28 PROCEDURE — 97140 MANUAL THERAPY 1/> REGIONS: CPT

## 2018-03-05 ENCOUNTER — HOSPITAL ENCOUNTER (OUTPATIENT)
Dept: PHYSICAL THERAPY | Facility: HOSPITAL | Age: 73
Setting detail: THERAPIES SERIES
Discharge: HOME | End: 2018-03-05

## 2018-03-05 DIAGNOSIS — M25.512 LEFT SHOULDER PAIN, UNSPECIFIED CHRONICITY: Primary | ICD-10-CM

## 2018-03-05 PROCEDURE — 97140 MANUAL THERAPY 1/> REGIONS: CPT

## 2018-03-05 PROCEDURE — 97110 THERAPEUTIC EXERCISES: CPT

## 2018-03-05 NOTE — THERAPY TREATMENT NOTE
Outpatient Physical Therapy Ortho Treatment Note  [facility]     Patient Name: Shellie Villeda  : 1945  MRN: [id number]  Today's Date: 3/5/2018      Visit Date: 2018    Visit Dx:    ICD-10-CM ICD-9-CM   1. Left shoulder pain, unspecified chronicity M25.512 719.41       Patient Active Problem List   Diagnosis   • Closed compression fracture of third lumbar vertebra   • Wedge compression fracture of third lumbar vertebra with delayed healing   • Chronic bilateral low back pain without sciatica   • Abdominal bloating   • Closed wedge compression fracture of third lumbar vertebra with routine healing   • Non-smoker   • Normal body mass index (BMI)        Past Medical History:   Diagnosis Date   • Compression fracture of lumbar vertebra    • Depression    • Disc degeneration, lumbar    • History of basal cell carcinoma    • History of hyperlipidemia    • History of insomnia    • History of multiple pulmonary nodules    • History of palpitations    • History of urinary tract infection    • Low back pain    • Osteoporosis    • Osteoporosis    • Skin cancer     basal cell   • Vitamin D insufficiency         Past Surgical History:   Procedure Laterality Date   • BASAL CELL CARCINOMA EXCISION      SEVERAL OFF FACE   • BREAST BIOPSY      benign   • COLONOSCOPY  2006   • COLONOSCOPY N/A 3/9/2017    Procedure: COLONOSCOPY WITH ANESTHESIA;  Surgeon: Arthur Harris MD;  Location: [facility] ENDOSCOPY;  Service:    • KYPHOPLASTY N/A 2016    Procedure: KYPHOPLASTY L3;  Surgeon: Ricky Ferguson MD;  Location: [facility] OR;  Service:                              PT Assessment/Plan       18 1116       PT Assessment    Assessment Comments Patient's pain continues to be gone at rest, but she does continue to have pain with certain movements into left shoulder.  Even though a formal assessment was not made today, upon observation her internal rotation was improved today.  Her proprioception continues to  improve, and we are gradually increasing her resistance and strengthening while avoiding pain.  -JESUS MANUEL     PT Plan    PT Plan Comments We will continue to work on shoulder mobility and ROM and continue with proprioception and proximal strengthening.  -JESUS MANUEL       User Key  (r) = Recorded By, (t) = Taken By, (c) = Cosigned By    Initials Name Provider Type    JESUS MANUEL Valverde PTA Physical Therapy Assistant                    Exercises       03/05/18 1116          Subjective Comments    Subjective Comments She reports she has had a lot of pain in her back lately, and she thinks it is from the cane HEP exercise.  She reports she does not have pain at rest,  but continues to have pain certain movements into left shoulder.  -JESUS MANUEL      Subjective Pain    Able to rate subjective pain? yes  -JESUS MANUEL      Pre-Treatment Pain Level 0  -JESUS MANUEL      Post-Treatment Pain Level 0  -JESUS MANUEL      Exercise 1    Exercise Name 1 supine (L) shoulder flexion full available range with 1# weight  -JESUS MANUEL      Cueing 1 Verbal;Tactile  -JESUS MANUEL      Sets 1 3  -JESUS MANUEL      Reps 1 10  -JESUS MANUEL      Additional Comments added to HEP  -JESUS MANUEL      Exercise 2    Exercise Name 2 sidelying (L) ER AROM  -JESUS MANUEL      Cueing 2 Verbal;Tactile  -JESUS MANUEL      Sets 2 3  -JESUS MANUEL      Reps 2 10  -JESUS MANUEL      Additional Comments 1#, towel under arm  -JESUS MANUEL      Exercise 3    Exercise Name 3 (R) sidelying (L) ER place and holds  -JESUS MANUEL      Cueing 3 Verbal;Tactile  -JESUS MANUEL      Sets 3 3  -JESUS MANUEL      Time (Minutes) 3 --  -JESUS MANUEL      Time (Seconds) 3 15-20 seconds  -JESUS MANUEL      Additional Comments with green flex bar  -JESUS MANUEL      Exercise 4    Exercise Name 4 standing [facility] wall clocks 1-5 with red theraband  -JESUS MANUEL      Cueing 4 Verbal;Tactile  -JESUS MANUEL      Sets 4 1  -JESUS MANUEL      Time (Minutes) 4 1 minute each  -JESUS MANUEL      Additional Comments huber UE  -JESUS MANUEL        User Key  (r) = Recorded By, (t) = Taken By, (c) = Cosigned By    Initials Name Provider Type    JESUS MANUEL Valverde PTA Physical Therapy Assistant                        Manual Rx (last 36 hours)       Manual Treatments       03/05/18 1119          Manual Rx 1    Manual Rx 1 Location (L) shoulder  -JESUS MANUEL      Manual Rx 1 Type repetitive stretching into flexion and abduction in scaption avoiding superior glide  -JESUS MANUEL      Manual Rx 1 Grade min OP end range  -JESUS MANUEL      Manual Rx 1 Duration 10  -JESUS MANUEL      Manual Rx 2    Manual Rx 2 Location (L) shoulder cross body adduction repetitive  -JESUS MANUEL      Manual Rx 2 Grade min  -JESUS MANUEL      Manual Rx 2 Duration 4  -JESUS MANUEL      Manual Rx 3    Manual Rx 3 Location (L) shoulder   -JESUS MANUEL      Manual Rx 3 Type IR LLLD holds at side,45 degree abd, and 90 degree abduction( with towel under arm)  -JESUS MANUEL      Manual Rx 3 Grade 30-45 second holds each stretch  -JESUS MANUEL      Manual Rx 3 Duration 6  -JESUS MANUEL        User Key  (r) = Recorded By, (t) = Taken By, (c) = Cosigned By    Initials Name Provider Type    JESUS MANUEL Valverde, PTA Physical Therapy Assistant                PT OP Goals       03/05/18 1116       PT Short Term Goals    STG Date to Achieve 03/14/18  -JESUS MANUEL     STG 1 Patient will improve left shoulder passive flexion to 120 or greater without guarding  -JESUS MANUEL     STG 1 Progress Met  -JESUS MANUEL     STG 2 Patient will improve left shoulder passive abduction to 100 degrees without guarding  -JESUS MANUEL     STG 2 Progress Met  -JESUS MANUEL     STG 3 Patient will improve left shoulder passive IR at 90 deg abduction to 25 degrees without guarding  -JESUS MANUEL     STG 3 Progress Partially Met  -JESUS MANUEL     Long Term Goals    LTG Date to Achieve 03/28/18  -JESUS MANUEL     LTG 1 Patient will be independent with a comprehensive HEP by discharge  -JESUS MANUEL     LTG 1 Progress Progressing  -JESUS MANUEL     LTG 2 Patient will improve Quick Dash score to reflect <25% impairment using the G code calculator to demonstrate a clinically significant improvement in function by discharge  -JESUS MANUEL     LTG 2 Progress Met  -JESUS MANUEL     LTG 3 Patient will improve (L) shoulder AROM to within 10 degrees of the (R) shoulder into flexion, abduction, ER, and IR at 90 degrees by discharge  -JESUS MANUEL     LTG 3  Progress Ongoing  -JESUS MANUEL     LTG 4 Patient will report ability to participate in ADL's such as dressing, bathing, putting on makeup with symptoms <3/10 in the left shoulder by discharge.  -JESUS MANUEL     LTG 4 Progress Progressing  -JESUS MANUEL     LTG 5 Patient will improve gross posterior rotator cuff strength to 4+/5 on MMT by discharge  -JESUS MANUEL     LTG 5 Progress Progressing  -JESUS MANUEL     LTG 5 Progress Comments We continue to gradually increase resistance and strengthening as proprioception and ROM continue to improve  -JESUS MANUEL     Time Calculation    PT Goal Re-Cert Due Date 03/30/18  -JESUS MANUEL       User Key  (r) = Recorded By, (t) = Taken By, (c) = Cosigned By    Initials Name Provider Type    JESUS MANUEL Valverde PTA Physical Therapy Assistant          Therapy Education  Education Details: replaced hooklying AAROM flexion with wand to unilateral (L) flexion with 1# weight  Given: HEP  Program: New  How Provided: Verbal, Written  Provided to: Patient  Level of Understanding: Verbalized, Demonstrated              Time Calculation:   Start Time: 1116  Stop Time: 1202  Time Calculation (min): 46 min  Total Timed Code Minutes- PT: 46 minute(s)    Therapy Charges for Today     Code Description Service Date Service Provider Modifiers Qty    49522262549 HC PT MANUAL THERAPY EA 15 MIN 3/5/2018 Dilan Valverde PTA GP 1    34479522220 HC PT THER PROC EA 15 MIN 3/5/2018 Dilan Valverde PTA GP 2                    Dilan Valverde PTA  3/5/2018

## 2018-03-07 ENCOUNTER — HOSPITAL ENCOUNTER (OUTPATIENT)
Dept: PHYSICAL THERAPY | Facility: HOSPITAL | Age: 73
Setting detail: THERAPIES SERIES
Discharge: HOME | End: 2018-03-07

## 2018-03-07 DIAGNOSIS — M25.512 LEFT SHOULDER PAIN, UNSPECIFIED CHRONICITY: Primary | ICD-10-CM

## 2018-03-07 PROCEDURE — 97110 THERAPEUTIC EXERCISES: CPT

## 2018-03-12 ENCOUNTER — HOSPITAL ENCOUNTER (OUTPATIENT)
Dept: PHYSICAL THERAPY | Facility: HOSPITAL | Age: 73
Setting detail: THERAPIES SERIES
Discharge: HOME | End: 2018-03-12

## 2018-03-12 DIAGNOSIS — M25.512 LEFT SHOULDER PAIN, UNSPECIFIED CHRONICITY: Primary | ICD-10-CM

## 2018-03-12 PROCEDURE — 97110 THERAPEUTIC EXERCISES: CPT

## 2018-03-12 NOTE — THERAPY TREATMENT NOTE
Outpatient Physical Therapy Ortho Treatment Note  [facility]     Patient Name: Shellie Villeda  : 1945  MRN: [id number]  Today's Date: 3/12/2018      Visit Date: 2018    Visit Dx:    ICD-10-CM ICD-9-CM   1. Left shoulder pain, unspecified chronicity M25.512 719.41       Patient Active Problem List   Diagnosis   • Closed compression fracture of third lumbar vertebra   • Wedge compression fracture of third lumbar vertebra with delayed healing   • Chronic bilateral low back pain without sciatica   • Abdominal bloating   • Closed wedge compression fracture of third lumbar vertebra with routine healing   • Non-smoker   • Normal body mass index (BMI)        Past Medical History:   Diagnosis Date   • Compression fracture of lumbar vertebra    • Depression    • Disc degeneration, lumbar    • History of basal cell carcinoma    • History of hyperlipidemia    • History of insomnia    • History of multiple pulmonary nodules    • History of palpitations    • History of urinary tract infection    • Low back pain    • Osteoporosis    • Osteoporosis    • Skin cancer     basal cell   • Vitamin D insufficiency         Past Surgical History:   Procedure Laterality Date   • BASAL CELL CARCINOMA EXCISION      SEVERAL OFF FACE   • BREAST BIOPSY      benign   • COLONOSCOPY  2006   • COLONOSCOPY N/A 3/9/2017    Procedure: COLONOSCOPY WITH ANESTHESIA;  Surgeon: Arthur Harris MD;  Location: [facility] ENDOSCOPY;  Service:    • KYPHOPLASTY N/A 2016    Procedure: KYPHOPLASTY L3;  Surgeon: Ricky Ferguson MD;  Location: [facility] OR;  Service:                              PT Assessment/Plan     Row Name 18 1100          PT Assessment    Assessment Comments Patient is making good progress at this time.  We plan to discharge next session to a [facility] as the ROM is progressing nicely and the patient is having minimal pain.  The condition will continue to improve with time as we move forward regardless.   -RS         PT Plan    PT Plan Comments [facility] HEP for final session next session as we move toward discharge.  -RS       User Key  (r) = Recorded By, (t) = Taken By, (c) = Cosigned By    Initials Name Provider Type    RS Kingsley Su, PT DPT Physical Therapist                    Exercises     Row Name 03/12/18 1100 03/12/18 1000          Subjective Pain    Able to rate subjective pain? yes  -RS  --     Pre-Treatment Pain Level 0  -RS  --     Post-Treatment Pain Level 0  -RS  --        Exercise 1    Exercise Name 1  -- supine (L) shoulder flexion full available range with 1# weight  -RS     Cueing 1  -- Verbal;Tactile  -RS     Sets 1  -- 3  -RS     Reps 1  -- 10  -RS        Exercise 2    Exercise Name 2  -- sidelying (L) ER AROM  -RS     Cueing 2  -- Verbal;Tactile  -RS     Sets 2  -- 3  -RS     Reps 2  -- 10  -RS        Exercise 3    Exercise Name 3  -- (R) sidelying (L) ER place and holds  -RS     Cueing 3  -- Verbal;Tactile  -RS     Sets 3  -- 3  -RS     Time (Seconds) 3  -- 15-20 seconds  -RS        Exercise 4    Exercise Name 4  -- standing [facility] wall clocks 1-5 with red theraband  -RS     Cueing 4  -- Verbal;Tactile  -RS     Time 4  -- 1 minute each  -RS     Sets 4  -- 1  -RS        Exercise 5    Exercise Name 5  -- standing perturbations with green therabar and red theraband resistance  -RS     Cueing 5  -- Tactile  -RS     Time 5  -- 20-30  -RS     Sets 5  -- 3  -RS        Exercise 6    Exercise Name 6  -- supine therapist resisted PNF low to high chop (L) UE  -RS     Cueing 6  -- Tactile  -RS     Sets 6  -- 3  -RS     Reps 6  -- 12  -RS        Exercise 7    Exercise Name 7  -- UBE 3f/3b with level 2.0 resistance  -RS     Time 7  -- 6  -RS       User Key  (r) = Recorded By, (t) = Taken By, (c) = Cosigned By    Initials Name Provider Type    DAMON Su, PT DPT Physical Therapist                               PT OP Goals     Row Name 03/12/18 1100          PT Short Term Goals    STG Date to Achieve  03/14/18  -RS     STG 1 Patient will improve left shoulder passive flexion to 120 or greater without guarding  -RS     STG 1 Progress Met  -RS     STG 2 Patient will improve left shoulder passive abduction to 100 degrees without guarding  -RS     STG 2 Progress Met  -RS     STG 3 Patient will improve left shoulder passive IR at 90 deg abduction to 25 degrees without guarding  -RS     STG 3 Progress Met  -RS        Long Term Goals    LTG Date to Achieve 03/28/18  -RS     LTG 1 Patient will be independent with a comprehensive HEP by discharge  -RS     LTG 1 Progress Progressing  -RS     LTG 2 Patient will improve Quick Dash score to reflect <25% impairment using the G code calculator to demonstrate a clinically significant improvement in function by discharge  -RS     LTG 2 Progress Met  -RS     LTG 3 Patient will improve (L) shoulder AROM to within 10 degrees of the (R) shoulder into flexion, abduction, ER, and IR at 90 degrees by discharge  -RS     LTG 3 Progress Ongoing  -RS     LTG 4 Patient will report ability to participate in ADL's such as dressing, bathing, putting on makeup with symptoms <3/10 in the left shoulder by discharge.  -RS     LTG 4 Progress Partially Met  -RS     LTG 5 Patient will improve gross posterior rotator cuff strength to 4+/5 on MMT by discharge  -RS     LTG 5 Progress Partially Met  -RS     LTG 5 Progress Comments 4/t to 4+/5 post cuff  -RS        Time Calculation    PT Goal Re-Cert Due Date 03/30/18  [facility]       User Key  (r) = Recorded By, (t) = Taken By, (c) = Cosigned By    Initials Name Provider Type    RS Kingsley Su, PT DPT Physical Therapist                         Time Calculation:   Start Time: 1115  Stop Time: 1200  Time Calculation (min): 45 min  Total Timed Code Minutes- PT: 41 minute(s)    Therapy Charges for Today     Code Description Service Date Service Provider Modifiers Qty    22277795178 HC PT THER PROC EA 15 MIN 3/12/2018 Kingsley Su, PT DPT GP 3                     ANA LILIA Su, PT DPT  3/12/2018

## 2018-03-14 ENCOUNTER — HOSPITAL ENCOUNTER (OUTPATIENT)
Dept: PHYSICAL THERAPY | Facility: HOSPITAL | Age: 73
Setting detail: THERAPIES SERIES
Discharge: HOME | End: 2018-03-14

## 2018-03-14 DIAGNOSIS — M25.512 LEFT SHOULDER PAIN, UNSPECIFIED CHRONICITY: Primary | ICD-10-CM

## 2018-03-14 PROCEDURE — 97110 THERAPEUTIC EXERCISES: CPT

## 2018-03-14 NOTE — THERAPY DISCHARGE NOTE
Outpatient Physical Therapy Ortho Treatment Note/Discharge Summary  [facility]     Patient Name: Shellie Villeda  : 1945  MRN: [id number]  Today's Date: 3/14/2018      Visit Date: 2018    Visit Dx:    ICD-10-CM ICD-9-CM   1. Left shoulder pain, unspecified chronicity M25.512 719.41       Patient Active Problem List   Diagnosis   • Closed compression fracture of third lumbar vertebra   • Wedge compression fracture of third lumbar vertebra with delayed healing   • Chronic bilateral low back pain without sciatica   • Abdominal bloating   • Closed wedge compression fracture of third lumbar vertebra with routine healing   • Non-smoker   • Normal body mass index (BMI)        Past Medical History:   Diagnosis Date   • Compression fracture of lumbar vertebra    • Depression    • Disc degeneration, lumbar    • History of basal cell carcinoma    • History of hyperlipidemia    • History of insomnia    • History of multiple pulmonary nodules    • History of palpitations    • History of urinary tract infection    • Low back pain    • Osteoporosis    • Osteoporosis    • Skin cancer     basal cell   • Vitamin D insufficiency         Past Surgical History:   Procedure Laterality Date   • BASAL CELL CARCINOMA EXCISION      SEVERAL OFF FACE   • BREAST BIOPSY      benign   • COLONOSCOPY  2006   • COLONOSCOPY N/A 3/9/2017    Procedure: COLONOSCOPY WITH ANESTHESIA;  Surgeon: Arthur Harris MD;  Location: [facility] ENDOSCOPY;  Service:    • KYPHOPLASTY N/A 2016    Procedure: KYPHOPLASTY L3;  Surgeon: Ricky Ferguson MD;  Location: [facility] OR;  Service:                              PT Assessment/Plan     Row Name 18 1100          PT Assessment    Assessment Comments Patient has met all but one goal at this time.  The Quick DASH score is markedly improved.  See outcome assessment for details.  Patient was instructed that the ROM deficits will resolve with time.  Patient was also instructed to avoid  sharp pain with the HEP.  Patient made excelllent progress with rehab.    -RS        PT Plan    PT Plan Comments D/C to HEP  -RS       User Key  (r) = Recorded By, (t) = Taken By, (c) = Cosigned By    Initials Name Provider Type    RS Kingsley Su, PT DPT Physical Therapist                    Exercises     Row Name 03/14/18 1100             Subjective Pain    Able to rate subjective pain? yes  -RS      Pre-Treatment Pain Level 0  -RS      Post-Treatment Pain Level 0  -RS         Exercise 1    Exercise Name 1 supine (L) shoulder flexion full available range with 1# weight  -RS      Cueing 1 Verbal;Tactile  -RS      Sets 1 3  -RS      Reps 1 10  -RS         Exercise 2    Exercise Name 2 sidelying (L) ER AROM  -RS      Cueing 2 Verbal;Tactile  -RS      Sets 2 3  -RS      Reps 2 10  -RS         Exercise 3    Exercise Name 3 (R) sidelying (L) ER place and holds  -RS      Cueing 3 Verbal;Tactile  -RS      Sets 3 3  -RS      Time (Seconds) 3 15-20 seconds  -RS         Exercise 4    Exercise Name 4 standing [facility] wall clocks 1-5 with red theraband  -RS      Cueing 4 Verbal;Tactile  -RS      Time 4 1 minute each  -RS      Sets 4 1  -RS         Exercise 5    Exercise Name 5 standing perturbations with green therabar and red theraband resistance  -RS      Cueing 5 Tactile  -RS      Time 5 20-30  -RS      Sets 5 3  -RS         Exercise 6    Exercise Name 6 supine therapist resisted PNF low to high chop (L) UE  -RS      Time 6 5  -RS      Cueing 6 Tactile  -RS      Sets 6 3  -RS      Reps 6 12  -RS         Exercise 7    Exercise Name 7 UBE 3f/3b with level 2.0 resistance  -RS      Time 7 6  -RS        User Key  (r) = Recorded By, (t) = Taken By, (c) = Cosigned By    Initials Name Provider Type    RS Kingsley Su, PT DPT Physical Therapist                               PT OP Goals     Row Name 03/14/18 1100          PT Short Term Goals    STG Date to Achieve 03/14/18  -RS     STG 1 Patient will improve left  shoulder passive flexion to 120 or greater without guarding  -RS     STG 1 Progress Met  -RS     STG 2 Patient will improve left shoulder passive abduction to 100 degrees without guarding  -RS     STG 2 Progress Met  -RS     STG 3 Patient will improve left shoulder passive IR at 90 deg abduction to 25 degrees without guarding  -RS     STG 3 Progress Met  -RS        Long Term Goals    LTG Date to Achieve 03/28/18  -RS     LTG 1 Patient will be independent with a comprehensive HEP by discharge  -RS     LTG 1 Progress Met  -RS     LTG 2 Patient will improve Quick Dash score to reflect <25% impairment using the G code calculator to demonstrate a clinically significant improvement in function by discharge  -RS     LTG 2 Progress Met  -RS     LTG 3 Patient will improve (L) shoulder AROM to within 10 degrees of the (R) shoulder into flexion, abduction, ER, and IR at 90 degrees by discharge  -RS     LTG 3 Progress Not Met  -RS     LTG 3 Progress Comments (R) 146 (L) 123 flexion; 85 strict abductin  -RS     LTG 4 Patient will report ability to participate in ADL's such as dressing, bathing, putting on makeup with symptoms <3/10 in the left shoulder by discharge.  -RS     LTG 4 Progress Met  -RS     LTG 5 Patient will improve gross posterior rotator cuff strength to 4+/5 on MMT by discharge  -RS     LTG 5 Progress Met  -RS        Time Calculation    PT Goal Re-Cert Due Date 03/30/18  -RS       User Key  (r) = Recorded By, (t) = Taken By, (c) = Cosigned By    Initials Name Provider Type    RS Kingsley Su, PT DPT Physical Therapist               Outcome Measure Options: Quick DASH  Quick DASH  Open a tight or new jar.: No Difficulty  Do heavy household chores (e.g., wash walls, wash floors): Mild Difficulty  Carry a shopping bag or briefcase: No Difficulty  Wash your back: Mild Difficulty  Use a knife to cut food: No Difficulty  Recreational activities in which you take some force or impact through your arm, should or  hand (e.g. golf, hammering, tennis, etc.): Mild Difficulty  During the past week, to what extent has your arm, shoulder, or hand problem interfered with your normal social activites with family, friends, neighbors or groups?: Slightly  During the past week, were you limited in your work or other regular daily activities as a result of your arm, shoulder or hand problem?: Slightly Limited  Arm, Shoulder, or hand pain: Mild  Tingling (pins and needles) in your arm, shoulder, or hand: None  During the past week, how much difficulty have you had sleeping because of the pain in your arm, shoulder or hand?: No difficulty  Number of Questions Answered: 11  Quick DASH Score: 13.64  Quick Dash Comments: 1-19%         Time Calculation:   Start Time: 1120  Stop Time: 1200  Time Calculation (min): 40 min  Total Timed Code Minutes- PT: 40 minute(s)    Therapy Charges for Today     Code Description Service Date Service Provider Modifiers Qty    97574896868 HC PT THER PROC EA 15 MIN 3/14/2018 Kingsley Su, PT DPT GP 3          PT G-Codes  Outcome Measure Options: Quick DASH  Functional Limitation: Carrying, moving and handling objects     OP PT Discharge Summary  Date of Discharge: 03/14/18  Reason for Discharge: Maximum functional potential achieved, Independent  Outcomes Achieved: Refer to plan of care for updates on goals achieved  Discharge Destination: Home with home program  Discharge Instructions/Additional Comments: Patient will call if she needs anything going forward      ANA LILIA Su, PT DPT  3/14/2018

## 2018-04-25 LAB
ALBUMIN SERPL-MCNC: 4.4 G/DL (ref 3.5–5.2)
ALP BLD-CCNC: 44 U/L (ref 35–104)
ALT SERPL-CCNC: 9 U/L (ref 5–33)
ANION GAP SERPL CALCULATED.3IONS-SCNC: 14 MMOL/L (ref 7–19)
AST SERPL-CCNC: 15 U/L (ref 5–32)
BASOPHILS ABSOLUTE: 0.1 K/UL (ref 0–0.2)
BASOPHILS RELATIVE PERCENT: 0.8 % (ref 0–1)
BILIRUB SERPL-MCNC: 0.4 MG/DL (ref 0.2–1.2)
BUN BLDV-MCNC: 22 MG/DL (ref 8–23)
CALCIUM SERPL-MCNC: 9.2 MG/DL (ref 8.8–10.2)
CHLORIDE BLD-SCNC: 101 MMOL/L (ref 98–111)
CO2: 28 MMOL/L (ref 22–29)
CREAT SERPL-MCNC: 0.5 MG/DL (ref 0.5–0.9)
EOSINOPHILS ABSOLUTE: 0.1 K/UL (ref 0–0.6)
EOSINOPHILS RELATIVE PERCENT: 1.7 % (ref 0–5)
GFR NON-AFRICAN AMERICAN: >60
GLUCOSE BLD-MCNC: 105 MG/DL (ref 74–109)
HCT VFR BLD CALC: 42.9 % (ref 37–47)
HEMOGLOBIN: 13.8 G/DL (ref 12–16)
LYMPHOCYTES ABSOLUTE: 1.4 K/UL (ref 1.1–4.5)
LYMPHOCYTES RELATIVE PERCENT: 22.8 % (ref 20–40)
MCH RBC QN AUTO: 29.8 PG (ref 27–31)
MCHC RBC AUTO-ENTMCNC: 32.2 G/DL (ref 33–37)
MCV RBC AUTO: 92.7 FL (ref 81–99)
MONOCYTES ABSOLUTE: 0.7 K/UL (ref 0–0.9)
MONOCYTES RELATIVE PERCENT: 10.3 % (ref 0–10)
NEUTROPHILS ABSOLUTE: 4.1 K/UL (ref 1.5–7.5)
NEUTROPHILS RELATIVE PERCENT: 64.1 % (ref 50–65)
PDW BLD-RTO: 13.7 % (ref 11.5–14.5)
PLATELET # BLD: 248 K/UL (ref 130–400)
PMV BLD AUTO: 10.8 FL (ref 9.4–12.3)
POTASSIUM SERPL-SCNC: 4.2 MMOL/L (ref 3.5–5)
RBC # BLD: 4.63 M/UL (ref 4.2–5.4)
SODIUM BLD-SCNC: 143 MMOL/L (ref 136–145)
TOTAL PROTEIN: 6.8 G/DL (ref 6.6–8.7)
VITAMIN D 25-HYDROXY: 48.9 NG/ML
WBC # BLD: 6.3 K/UL (ref 4.8–10.8)

## 2018-05-21 DIAGNOSIS — R14.0 ABDOMINAL DISTENTION: ICD-10-CM

## 2018-05-21 DIAGNOSIS — M80.00XD OSTEOPOROSIS WITH PATHOLOGICAL FRACTURE WITH ROUTINE HEALING: ICD-10-CM

## 2018-05-21 DIAGNOSIS — E78.00 PURE HYPERCHOLESTEROLEMIA: ICD-10-CM

## 2018-05-21 DIAGNOSIS — E55.9 VITAMIN D DEFICIENCY: ICD-10-CM

## 2018-05-21 LAB
ALBUMIN SERPL-MCNC: 4.1 G/DL (ref 3.5–5.2)
ALP BLD-CCNC: 44 U/L (ref 35–104)
ALT SERPL-CCNC: 20 U/L (ref 5–33)
ANION GAP SERPL CALCULATED.3IONS-SCNC: 12 MMOL/L (ref 7–19)
AST SERPL-CCNC: 20 U/L (ref 5–32)
BILIRUB SERPL-MCNC: 0.3 MG/DL (ref 0.2–1.2)
BUN BLDV-MCNC: 13 MG/DL (ref 8–23)
CALCIUM SERPL-MCNC: 8.8 MG/DL (ref 8.8–10.2)
CHLORIDE BLD-SCNC: 102 MMOL/L (ref 98–111)
CHOLESTEROL, TOTAL: 246 MG/DL (ref 160–199)
CO2: 27 MMOL/L (ref 22–29)
CREAT SERPL-MCNC: 0.5 MG/DL (ref 0.5–0.9)
GFR NON-AFRICAN AMERICAN: >60
GLUCOSE BLD-MCNC: 93 MG/DL (ref 74–109)
HCT VFR BLD CALC: 40.9 % (ref 37–47)
HDLC SERPL-MCNC: 85 MG/DL (ref 65–121)
HEMOGLOBIN: 13.3 G/DL (ref 12–16)
LDL CHOLESTEROL CALCULATED: 149 MG/DL
MCH RBC QN AUTO: 30.4 PG (ref 27–31)
MCHC RBC AUTO-ENTMCNC: 32.5 G/DL (ref 33–37)
MCV RBC AUTO: 93.4 FL (ref 81–99)
PDW BLD-RTO: 13.7 % (ref 11.5–14.5)
PLATELET # BLD: 239 K/UL (ref 130–400)
PMV BLD AUTO: 10.6 FL (ref 9.4–12.3)
POTASSIUM SERPL-SCNC: 4.2 MMOL/L (ref 3.5–5)
RBC # BLD: 4.38 M/UL (ref 4.2–5.4)
SODIUM BLD-SCNC: 141 MMOL/L (ref 136–145)
TOTAL PROTEIN: 6.8 G/DL (ref 6.6–8.7)
TRIGL SERPL-MCNC: 62 MG/DL (ref 0–149)
TSH SERPL DL<=0.05 MIU/L-ACNC: 3.38 UIU/ML (ref 0.27–4.2)
VITAMIN D 25-HYDROXY: 48.9 NG/ML
WBC # BLD: 5.4 K/UL (ref 4.8–10.8)

## 2018-06-15 ENCOUNTER — TRANSCRIBE ORDERS (OUTPATIENT)
Dept: ADMINISTRATIVE | Facility: HOSPITAL | Age: 73
End: 2018-06-15

## 2018-06-15 DIAGNOSIS — Z78.0 ASYMPTOMATIC POSTMENOPAUSAL STATE: ICD-10-CM

## 2018-06-15 DIAGNOSIS — M80.00XD OSTEOPOROSIS WITH PATHOLOGICAL FRACTURE WITH ROUTINE HEALING: Primary | ICD-10-CM

## 2018-06-26 ENCOUNTER — TRANSCRIBE ORDERS (OUTPATIENT)
Dept: ADMINISTRATIVE | Facility: HOSPITAL | Age: 73
End: 2018-06-26

## 2018-06-26 DIAGNOSIS — M54.50 CHRONIC BILATERAL LOW BACK PAIN WITHOUT SCIATICA: Primary | ICD-10-CM

## 2018-06-26 DIAGNOSIS — G89.29 CHRONIC BILATERAL LOW BACK PAIN WITHOUT SCIATICA: Primary | ICD-10-CM

## 2018-06-27 ENCOUNTER — HOSPITAL ENCOUNTER (OUTPATIENT)
Dept: GENERAL RADIOLOGY | Facility: HOSPITAL | Age: 73
Discharge: HOME | End: 2018-06-27

## 2018-06-27 ENCOUNTER — TRANSCRIBE ORDERS (OUTPATIENT)
Dept: PHYSICAL THERAPY | Facility: HOSPITAL | Age: 73
End: 2018-06-27

## 2018-06-27 ENCOUNTER — TRANSCRIBE ORDERS (OUTPATIENT)
Dept: ADMINISTRATIVE | Facility: HOSPITAL | Age: 73
End: 2018-06-27

## 2018-06-27 ENCOUNTER — HOSPITAL ENCOUNTER (OUTPATIENT)
Dept: BONE DENSITY | Facility: HOSPITAL | Age: 73
Discharge: HOME | End: 2018-06-27
Admitting: INTERNAL MEDICINE

## 2018-06-27 DIAGNOSIS — Z78.0 ASYMPTOMATIC POSTMENOPAUSAL STATE: ICD-10-CM

## 2018-06-27 DIAGNOSIS — G89.29 CHRONIC BILATERAL LOW BACK PAIN WITHOUT SCIATICA: ICD-10-CM

## 2018-06-27 DIAGNOSIS — M54.50 CHRONIC BILATERAL LOW BACK PAIN WITHOUT SCIATICA: ICD-10-CM

## 2018-06-27 DIAGNOSIS — M80.00XD OSTEOPOROSIS WITH PATHOLOGICAL FRACTURE WITH ROUTINE HEALING: ICD-10-CM

## 2018-06-27 DIAGNOSIS — S32.030A CLOSED WEDGE COMPRESSION FRACTURE OF THIRD LUMBAR VERTEBRA, INITIAL ENCOUNTER: Primary | ICD-10-CM

## 2018-06-27 DIAGNOSIS — M54.50 CHRONIC BILATERAL LOW BACK PAIN WITHOUT SCIATICA: Primary | ICD-10-CM

## 2018-06-27 DIAGNOSIS — G89.29 CHRONIC BILATERAL LOW BACK PAIN WITHOUT SCIATICA: Primary | ICD-10-CM

## 2018-06-27 PROCEDURE — 72070 X-RAY EXAM THORAC SPINE 2VWS: CPT

## 2018-06-27 PROCEDURE — 77080 DXA BONE DENSITY AXIAL: CPT

## 2018-06-27 PROCEDURE — 72100 X-RAY EXAM L-S SPINE 2/3 VWS: CPT

## 2018-07-05 ENCOUNTER — HOSPITAL ENCOUNTER (OUTPATIENT)
Dept: PHYSICAL THERAPY | Facility: HOSPITAL | Age: 73
Setting detail: THERAPIES SERIES
Discharge: HOME | End: 2018-07-05

## 2018-07-05 DIAGNOSIS — G89.29 CHRONIC RIGHT-SIDED LOW BACK PAIN, WITH SCIATICA PRESENCE UNSPECIFIED: Primary | ICD-10-CM

## 2018-07-05 DIAGNOSIS — M54.16 LUMBAR RADICULOPATHY: ICD-10-CM

## 2018-07-05 DIAGNOSIS — M54.5 CHRONIC RIGHT-SIDED LOW BACK PAIN, WITH SCIATICA PRESENCE UNSPECIFIED: Primary | ICD-10-CM

## 2018-07-05 PROCEDURE — 97161 PT EVAL LOW COMPLEX 20 MIN: CPT

## 2018-07-05 PROCEDURE — G8979 MOBILITY GOAL STATUS: HCPCS

## 2018-07-05 PROCEDURE — G8978 MOBILITY CURRENT STATUS: HCPCS

## 2018-07-05 NOTE — THERAPY EVALUATION
Outpatient Physical Therapy Ortho Initial Evaluation  [facility]     Patient Name: Shellie Villeda  : 1945  MRN: [id number]  Today's Date: 2018      Visit Date: 2018    Patient Active Problem List   Diagnosis   • Closed compression fracture of third lumbar vertebra (CMS/HCC)   • Wedge compression fracture of third lumbar vertebra with delayed healing   • Chronic bilateral low back pain without sciatica   • Abdominal bloating   • Closed wedge compression fracture of third lumbar vertebra with routine healing   • Non-smoker   • Normal body mass index (BMI)        Past Medical History:   Diagnosis Date   • Compression fracture of lumbar vertebra (CMS/HCC)    • Depression    • Disc degeneration, lumbar    • History of basal cell carcinoma    • History of hyperlipidemia    • History of insomnia    • History of multiple pulmonary nodules    • History of palpitations    • History of urinary tract infection    • Low back pain    • Osteoporosis    • Osteoporosis    • Skin cancer     basal cell   • Vitamin D insufficiency         Past Surgical History:   Procedure Laterality Date   • BASAL CELL CARCINOMA EXCISION      SEVERAL OFF FACE   • BREAST BIOPSY      benign   • COLONOSCOPY  2006   • COLONOSCOPY N/A 3/9/2017    Procedure: COLONOSCOPY WITH ANESTHESIA;  Surgeon: Arthru Harris MD;  Location: [facility] ENDOSCOPY;  Service:    • KYPHOPLASTY N/A 2016    Procedure: KYPHOPLASTY L3;  Surgeon: Ricky Ferguson MD;  Location: [facility] OR;  Service:        Visit Dx:     ICD-10-CM ICD-9-CM   1. Chronic right-sided low back pain, with sciatica presence unspecified M54.5 724.2    G89.29 338.29   2. Lumbar radiculopathy M54.16 724.4             Patient History     Row Name 18 1400             History    Chief Complaint Pain  -RS      Type of Pain Back pain;Lower Extremity / Leg   right LE  -RS      Date Current Problem(s) Began 18  -RS      Brief Description of Current Complaint Patient  reports an increase in back pain and right LE radicular pain over a month ago.  She denies any specific ANAND (patient has chronic memory problems).  The left LE does not have any reported pain.  No N/T is reported in either LE's.  No saddle anesthesia is noted as well.  Pain is improved with laying down.  Symptoms are noted with standing/weight bearing.  No bowel or bladder changes are reported.  Patient has had recent DEXA scan and X rays.    -RS      Previous treatment for THIS PROBLEM Rehabilitation  -RS      Patient/Caregiver Goals Relieve pain;Return to prior level of function  -RS      Current Tobacco Use no  -RS      Smoking Status never smoker  -RS      Patient's Rating of General Health Very good  -RS      Hand Dominance right-handed  -RS      Occupation/sports/leisure activities patient is retired and does travel frequently  -RS      How has patient tried to help current problem? resting, laying down when pain is increased  -RS      What clinical tests have you had for this problem? X-ray  -RS      Results of Clinical Tests no new compression fractures  -RS      Are you or can you be pregnant No  -RS         Pain     Pain Location Back  -RS      Pain at Present 4  -RS      Pain at Best 1  -RS      Pain at Worst 6;7  -RS      Pain Frequency Intermittent;Several days a week  -RS      Pain Description Aching;Radiating  -RS      Pain Comments Pain is reported down the right LE only to the lateral ankle.  No left sided pain.  No numbness or tingling is noted  -RS      What position do you sleep in? Right sidelying  -RS         Fall Risk Assessment    Any falls in the past year: Yes  -RS      Number of falls reported in the last 12 months 1  -RS      Factors that contributed to the fall: Lost balance  -RS         Services    Prior Rehab/Home Health Experiences Yes  -RS      When was the prior experience with Rehab/Home Health January to March 2018  -RS      Do you plan to receive Home Health services in the near  future No  -RS         Daily Activities    Primary Language English  -RS      Are you able to read Yes  -RS      Are you able to write Yes  -RS      Teaching needs identified Home Exercise Program;Management of Condition  -RS      Does patient have problems with the following? None  -RS      Pt Participated in POC and Goals Yes  -RS         Safety    Are you being hurt, hit, or frightened by anyone at home or in your life? No  -RS      Are you being neglected by a caregiver No  -RS        User Key  (r) = Recorded By, (t) = Taken By, (c) = Cosigned By    Initials Name Provider Type    RS Kingsley Su, PT, DPT, OCS Physical Therapist                PT Ortho     Row Name 07/05/18 1400       Posture/Observations    Alignment Options Thoracic kyphosis;Lumbar lordosis;Iliac crests  -RS    Thoracic Kyphosis Moderate;Increased;Sitting posture;Standing posture  -RS    Lumbar lordosis Moderate;Increased;Standing posture  -RS    Iliac crests Right:;Elevated;Standing posture  -RS    Observations Muscle atrophy  -RS    Posture/Observations Comments exaggerated kypholordosis with prominent TL junction  -RS       Quarter Clearing    Quarter Clearing Lower Quarter Clearing  -RS       DTR- Lower Quarter Clearing    Patellar tendon (L2-4) Bilateral:;2- Normal response  -RS    Achilles tendon (S1-2) Bilateral:;2- Normal response  -RS       Neural Tension Signs- Lower Quarter Clearing    Slump Right:;Negative  -RS    Well Slump Right:;Negative  -RS    SLR Right:;Negative  -RS       Sensory Screen for Light Touch- Lower Quarter Clearing    L1 (inguinal area) Bilateral:;Intact  -RS    L2 (anterior mid thigh) Bilateral:;Intact  -RS    L3 (distal anterior thigh) Bilateral:;Intact  -RS    L4 (medial lower leg/foot) Bilateral:;Intact  -RS    L5 (lateral lower leg/great toe) Bilateral:;Intact  -RS    S1 (bottom of foot) Bilateral:;Intact  -RS       Myotomal Screen- Lower Quarter Clearing    Hip flexion (L2) Right:;2+ (Poor  +);Left:;3- (Fair -)  -RS    Knee extension (L3) Bilateral:;5 (Normal)  -RS    Ankle DF (L4) Right:;5 (Normal);Left:;4 (Good)  -RS    Great toe extension (L5) Bilateral:;5 (Normal)  -RS    Ankle PF (S1) Bilateral:;4 (Good)  -RS    Knee flexion (S2) Bilateral:;4+ (Good +)  -RS       Lumbar ROM Screen- Lower Quarter Clearing    Lumbar Flexion Impaired   no reversal of curve (partially due to comp fx_  -RS    Lumbar Extension Impaired  -RS    Lumbar Lateral Flexion Impaired   right due to iliac crest height  -RS       SI/Hip Screen- Lower Quarter Clearing    Pain in Shelley's area Negative  -RS       Special Tests/Palpation    Special Tests/Palpation Lumbar/SI  -RS       Lumbosacral Palpation    Piriformis Right:;Guarded/taut  -RS    Gluteus Adal Right:;Atrophied  -RS    Erector Spinae (Paraspinals) Bilateral:;Guarded/taut  -RS    Lumbosacral Palpation? Yes  -RS       Lumbar/SI Special Tests    Trendelenburg Test (Gluteus Medius Weakness) Bilateral:;Positive  -RS    Mala Uche Test (HNP) Negative  -RS    CARLOS (hip vs. SI Dysfunction) Bilateral:;Negative  -RS    Lumbar/SI Special Tests Comments no reproduction of right LE pain noted  -RS       General ROM    RT Lower Ext Rt Hip Internal Rotation;Rt Hip External Rotation  -RS    LT Lower Ext Lt Hip Internal Rotation;Lt Hip External Rotation  -RS    GENERAL ROM COMMENTS Right hip extension shy of neutral before lumbar compensation  -RS       Right Lower Ext    Rt Hip External Rotation PROM 25  -RS    Rt Hip Internal Rotation PROM 27  -RS       Left Lower Ext    Lt Hip External Rotation PROM 25  -RS    Lt Hip Internal Rotation PROM 29  -RS       MMT (Manual Muscle Testing)    Additional Documentation General Assessment (Manual Muscle Testing) (Group)  -RS       General Assessment (Manual Muscle Testing)    General Manual Muscle Testing (MMT) Assessment lower extremity strength deficits identified  -RS       Lower Extremity (Manual Muscle Testing)    Lower Extremity:  Manual Muscle Testing (MMT) right hip strength deficit  -RS       Right Hip (Manual Muscle Testing)    Right Hip Manual Muscle Testing (MMT) extension;abduction;external rotation  -RS    MMT: Extension, Right Hip extension  -RS    MMT, Gross Movement: Right Hip Extension (3+/5) fair plus  -RS    MMT: ABduction, Right Hip abduction  -RS    MMT, Gross Movement: Right Hip ABduction (3-/5) fair minus  -RS    MMT, Gross Movement: Right Hip External (Lateral) Rotation (3+/5) fair plus  -RS    MMT, Right Hip: Manual Muscle Testing (MMT) TFL substitution noted  -RS       Pathomechanics    Lower Extremity Pathomechanics Trendelenburg with midstance;Asymmetrical steps  -RS    Spine Pathomechanics Bends knees with attempted lumbar extension;Hinges into extension at one segment in lumbar;Limited lumbar flattening with forward bend;Excessive thoracic kyphosis with forward bend  -RS    Pathomechanics Comments Poor lumbopelvic control noted with ambulation, especially during midstance.  -RS      User Key  (r) = Recorded By, (t) = Taken By, (c) = Cosigned By    Initials Name Provider Type    RS Kingsley Su, PT, DPT, OCS Physical Therapist                      Therapy Education  Education Details: ambulating with stable hips avoiding lateral drift  Given: Symptoms/condition management, Mobility training  Program: New  How Provided: Verbal, Demonstration  Provided to: Patient  Level of Understanding: Verbalized, Demonstrated           PT OP Goals     Row Name 07/05/18 1400          PT Short Term Goals    STG Date to Achieve 07/26/18  -RS     STG 1 Patient will perform a supported SLS without hip drop or lateral pelvic drift  -RS     STG 1 Progress New  -RS     STG 2 Patient will report no right LE pain below the knee  -RS     STG 2 Progress New  -RS        Long Term Goals    LTG Date to Achieve 08/16/18  -RS     LTG 1 Patient will be independent with a comprehensive HEP  -RS     LTG 1 Progress New  -RS     LTG 2 Patient  will improve Modified Oswestry score by 9 points indicating clinically significant change   -RS     LTG 2 Progress New  -RS     LTG 3 Patient will improve (R) hip abductor strength to at least 4/5 on MMT  -RS     LTG 3 Progress New  -RS     LTG 4 Patient will demonstrate no lateral hip drift during midstance on either LE with observed ambulation  -RS     LTG 4 Progress New  -RS     LTG 5 Patient will return to ambulating for exercise and performing all ADL's with symptoms <3/10  -RS     LTG 5 Progress New  -RS        Time Calculation    PT Goal Re-Cert Due Date 08/04/18  -RS       User Key  (r) = Recorded By, (t) = Taken By, (c) = Cosigned By    Initials Name Provider Type    RS Kingsley Su, PT, DPT, OCS Physical Therapist                PT Assessment/Plan     Row Name 07/05/18 1400          PT Assessment    Functional Limitations Performance in leisure activities;Limitation in home management;Limitations in community activities  -RS     Impairments Balance;Impaired aerobic capacity;Joint mobility;Muscle strength;Pain;Posture;Range of motion  -RS     Assessment Comments Patient returns to the clinic with an acute exacerbation of chronic back pain.  This was treated in 2016 and had excellent results.  The patient does not show any signs of sinister pathology or new compression fractures.  The pain down the right LE is consistent with referred pain vs. radicular pain.  The upper lumbar compression fractures are keeping the lumbar spine hyperlordotic.  The apparent leg length descrepancy does place the lumbar spine into a chronic right side bend.  This posturing is exaggerated with midstance during gait due to poor gluteal strength.  Deficits in abdominal and gluteal strength also place more strain on the lumbar spine with activity.  A true lumbar stenosis pattern is not observed at this time. Patient is highly motivated to participate in rehab and we thank you for allowing us to work with this patient.   -RS      Please refer to paper survey for additional self-reported information Yes  -RS     Rehab Potential Good  -RS     Patient/caregiver participated in establishment of treatment plan and goals Yes  -RS     Patient would benefit from skilled therapy intervention Yes  -RS        PT Plan    PT Frequency 2x/week  -RS     Predicted Duration of Therapy Intervention (Therapy Eval) 6 weeks  -RS     Planned CPT's? PT EVAL LOW COMPLEXITY: 58902;PT THER PROC EA 15 MIN: 37778;PT MANUAL THERAPY EA 15 MIN: 34888;PT NEUROMUSC RE-EDUCATION EA 15 MIN: 52775;PT GAIT TRAINING EA 15 MIN: 71746;PT HOT OR COLD PACK TREAT MCARE;PT ELECTRICAL STIM UNATTEND: ;PT ELECTRICAL STIM ATTD EA 15 MIN: 39119  -RS     Physical Therapy Interventions (Optional Details) balance training;home exercise program;joint mobilization;lumbar stabilization;manual therapy techniques;modalities;neuromuscular re-education;patient/family education;postural re-education;ROM (Range of Motion);stair training;strengthening;stretching;swiss ball techniques  -RS     PT Plan Comments We will work to address the abdominal and hip strength deficits, hip extension mobility deficits, and postural endurance to reduce lumbar strain.  Modalities may only be sparingly used for pain control.  Patient will be progressed on a comprehensive HEP.  -RS       User Key  (r) = Recorded By, (t) = Taken By, (c) = Cosigned By    Initials Name Provider Type    RS Kingsley Su, PT, DPT, OCS Physical Therapist                              Outcome Measure Options: Modifed Owestry  Modified Oswestry  Modified Oswestry Score/Comments: 18%       Time Calculation:     Therapy Suggested Charges     Code   Minutes Charges    None             Start Time: 1446  Stop Time: 1546  Time Calculation (min): 60 min     Therapy Charges for Today     Code Description Service Date Service Provider Modifiers Qty    43814543003  PT MOBILITY CURRENT 7/5/2018 Kingsley Su, PT, DPT, OCS GP, CI  1    79970573573 HC PT MOBILITY PROJECTED 7/5/2018 Kingsley Su, PT, DPT, OCS GP,  1    03258016601 HC PT EVAL LOW COMPLEXITY 4 7/5/2018 Kingsley Su, PT, DPT, OCS GP 1          PT G-Codes  Outcome Measure Options: Renetta Mccollum  Score: 18%  Functional Limitation: Mobility: Walking and moving around  Mobility: Walking and Moving Around Current Status (): At least 1 percent but less than 20 percent impaired, limited or restricted  Mobility: Walking and Moving Around Goal Status (): 0 percent impaired, limited or restricted         ANA LILIA Su, PT, DPT, OCS  7/5/2018

## 2018-07-09 ENCOUNTER — HOSPITAL ENCOUNTER (OUTPATIENT)
Dept: PHYSICAL THERAPY | Facility: HOSPITAL | Age: 73
Setting detail: THERAPIES SERIES
Discharge: HOME | End: 2018-07-09

## 2018-07-09 DIAGNOSIS — G89.29 CHRONIC RIGHT-SIDED LOW BACK PAIN, WITH SCIATICA PRESENCE UNSPECIFIED: Primary | ICD-10-CM

## 2018-07-09 DIAGNOSIS — M54.5 CHRONIC RIGHT-SIDED LOW BACK PAIN, WITH SCIATICA PRESENCE UNSPECIFIED: Primary | ICD-10-CM

## 2018-07-09 DIAGNOSIS — M54.16 LUMBAR RADICULOPATHY: ICD-10-CM

## 2018-07-09 PROCEDURE — 97140 MANUAL THERAPY 1/> REGIONS: CPT

## 2018-07-09 PROCEDURE — 97110 THERAPEUTIC EXERCISES: CPT

## 2018-07-09 NOTE — THERAPY TREATMENT NOTE
Outpatient Physical Therapy Ortho Treatment Note  [facility]     Patient Name: Shellie Villeda  : 1945  MRN: [id number]  Today's Date: 2018      Visit Date: 2018    Visit Dx:    ICD-10-CM ICD-9-CM   1. Chronic right-sided low back pain, with sciatica presence unspecified M54.5 724.2    G89.29 338.29   2. Lumbar radiculopathy M54.16 724.4       Patient Active Problem List   Diagnosis   • Closed compression fracture of third lumbar vertebra (CMS/HCC)   • Wedge compression fracture of third lumbar vertebra with delayed healing   • Chronic bilateral low back pain without sciatica   • Abdominal bloating   • Closed wedge compression fracture of third lumbar vertebra with routine healing   • Non-smoker   • Normal body mass index (BMI)        Past Medical History:   Diagnosis Date   • Compression fracture of lumbar vertebra (CMS/HCC)    • Depression    • Disc degeneration, lumbar    • History of basal cell carcinoma    • History of hyperlipidemia    • History of insomnia    • History of multiple pulmonary nodules    • History of palpitations    • History of urinary tract infection    • Low back pain    • Osteoporosis    • Osteoporosis    • Skin cancer     basal cell   • Vitamin D insufficiency         Past Surgical History:   Procedure Laterality Date   • BASAL CELL CARCINOMA EXCISION      SEVERAL OFF FACE   • BREAST BIOPSY      benign   • COLONOSCOPY  2006   • COLONOSCOPY N/A 3/9/2017    Procedure: COLONOSCOPY WITH ANESTHESIA;  Surgeon: Arthur Harris MD;  Location: [facility] ENDOSCOPY;  Service:    • KYPHOPLASTY N/A 2016    Procedure: KYPHOPLASTY L3;  Surgeon: Ricky Ferguson MD;  Location: [facility] OR;  Service:                              PT Assessment/Plan     Row Name 18 0800          PT Assessment    Assessment Comments (P)  This is the patient's first visit since the initial evaluation, therefore no goals have been met at this time. Patient continues to demonstrate limited  hip extension mobility and weakness in her gluteals, bilaterally. The focus of today's treatment was improving her hip extension mobility and beginning to strengthen her gluteals with isometric exercises. The patient did well with her exercises, but fatigued quickly. Patient also requires moderate cueing due to her short term memory deficits.  -PD        PT Plan    PT Plan Comments (P)  We will continue to work on improving her hip extension mobility, bilaterally. We will also continue to address the weakness in her abdominals and gluteals, which will reduce the stress being placed on the lumbar spine.   -PD       User Key  (r) = Recorded By, (t) = Taken By, (c) = Cosigned By    Initials Name Provider Type    PD Timur Stallworth, PT Student PT Student                    Exercises     Row Name 07/09/18 0800             Subjective Comments    Subjective Comments (P)  Patient reports she is feeling well this morning, but admits her back pain is usually better in the morning. She reports she has not fallen since the initial evaluation.  -PD         Subjective Pain    Able to rate subjective pain? (P)  yes  -PD      Pre-Treatment Pain Level (P)  0  -PD      Post-Treatment Pain Level (P)  0  -PD         Total Minutes    63985 - PT Therapeutic Exercise Minutes (P)  28  -PD      13663 - PT Manual Therapy Minutes (P)  12  -PD         Exercise 1    Exercise Name 1 (P)  huber side-lying clamshells with knee stacked on two pillows  -PD      Cueing 1 (P)  Verbal;Tactile  -PD      Sets 1 (P)  3  -PD      Reps 1 (P)  10  -PD      Time 1 (P)  3 sec hold  -PD         Exercise 2    Exercise Name 2 (P)  huber place and hold hip abduction  -PD      Cueing 2 (P)  Verbal;Tactile  -PD      Sets 2 (P)  --  -PD      Reps 2 (P)  5  -PD      Time 2 (P)  10 sec hold  -PD         Exercise 3    Exercise Name 3 (P)  Seated heel pushes with green Theraband around knees  -PD      Cueing 3 (P)  Demo;Verbal  -PD      Sets 3 (P)  2  -PD      Reps 3 (P)  10   -PD      Time 3 (P)  5 sec hold  -PD        User Key  (r) = Recorded By, (t) = Taken By, (c) = Cosigned By    Initials Name Provider Type    PD Timur HECK Basim, PT Student PT Student                        Manual Rx (last 36 hours)      Manual Treatments     Row Name 07/09/18 0800 07/09/18 0700          Total Minutes    40849 - PT Manual Therapy Minutes (P)  12  -PD  --        Manual Rx 1    Manual Rx 1 Location (P)  huber side-lying hip extension  -PD (P)  --  -PD     Manual Rx 1 Type (P)  oscillatory P-A mobilization  -PD (P)  --  -PD     Manual Rx 1 Grade (P)  2/3  -PD (P)  --  -PD     Manual Rx 1 Duration (P)  6  -PD (P)  --  -PD        Manual Rx 2    Manual Rx 2 Location (P)  huber side-lying hip flexor stretch  -PD  --     Manual Rx 2 Type (P)  mod  -PD  --     Manual Rx 2 Grade (P)  sustained  -PD  --     Manual Rx 2 Duration (P)  6  -PD  --       User Key  (r) = Recorded By, (t) = Taken By, (c) = Cosigned By    Initials Name Provider Type    PD Timur Stallworth, PT Student PT Student                PT OP Goals     Row Name 07/09/18 0800          PT Short Term Goals    STG Date to Achieve (P)  07/26/18  -PD     STG 1 (P)  Patient will perform a supported SLS without hip drop or lateral pelvic drift  -PD     STG 1 Progress (P)  Ongoing  -PD     STG 2 (P)  Patient will report no right LE pain below the knee  -PD     STG 2 Progress (P)  Ongoing  -PD     STG 2 Progress Comments (P)  no right LE pain below the knee this visit  -PD        Long Term Goals    LTG Date to Achieve (P)  08/16/18  -PD     LTG 1 (P)  Patient will be independent with a comprehensive HEP  -PD     LTG 1 Progress (P)  Ongoing  -PD     LTG 2 (P)  Patient will improve Modified Oswestry score by 9 points indicating clinically significant change   -PD     LTG 2 Progress (P)  Ongoing  -PD     LTG 3 (P)  Patient will improve (R) hip abductor strength to at least 4/5 on MMT  -PD     LTG 3 Progress (P)  Ongoing  -PD     LTG 4 (P)  Patient will demonstrate no  lateral hip drift during midstance on either LE with observed ambulation  -PD     LTG 4 Progress (P)  Ongoing  -PD     LTG 5 (P)  Patient will return to ambulating for exercise and performing all ADL's with symptoms <3/10  -PD     LTG 5 Progress (P)  Ongoing  -PD        Time Calculation    PT Goal Re-Cert Due Date (P)  08/04/18  -PD       User Key  (r) = Recorded By, (t) = Taken By, (c) = Cosigned By    Initials Name Provider Type    PD Timur Stallworth, PT Student PT Student          Therapy Education  Education Details: (P) sleeping with pillow between knees; seated heel pushes  Given: (P) HEP, Symptoms/condition management  Program: (P) New  How Provided: (P) Verbal, Demonstration, Written  Provided to: (P) Patient  Level of Understanding: (P) Verbalized, Demonstrated              Time Calculation:   Start Time: (P) 0835  Stop Time: (P) 0918  Time Calculation (min): (P) 43 min  Total Timed Code Minutes- PT: (P) 40 minute(s)  Therapy Suggested Charges     Code   Minutes Charges    88685 (CPT®) Hc Pt Neuromusc Re Education Ea 15 Min      79117 (CPT®) Hc Pt Ther Proc Ea 15 Min 28 2    27138 (CPT®) Hc Gait Training Ea 15 Min      07595 (CPT®) Hc Pt Therapeutic Act Ea 15 Min      71320 (CPT®) Hc Pt Manual Therapy Ea 15 Min 12 1    76041 (CPT®) Hc Pt Ther Massage- Per 15 Min      49525 (CPT®) Hc Pt Iontophoresis Ea 15 Min      29786 (CPT®) Hc Pt Elec Stim Ea-Per 15 Min      16601 (CPT®) Hc Pt Ultrasound Ea 15 Min      25942 (CPT®) Hc Pt Self Care/Mgmt/Train Ea 15 Min      Total  40 3        Therapy Charges for Today     Code Description Service Date Service Provider Modifiers Qty    46197420123 HC PT THER PROC EA 15 MIN 7/9/2018 Timur Stallworth, PT Student GP 2    97839262853 HC PT MANUAL THERAPY EA 15 MIN 7/9/2018 Timur Stallworth, PT Student GP 1                    Timur Stallworth, PT Student  7/9/2018

## 2018-07-10 ENCOUNTER — OFFICE VISIT (OUTPATIENT)
Dept: INTERNAL MEDICINE | Age: 73
End: 2018-07-10
Payer: MEDICARE

## 2018-07-10 VITALS
OXYGEN SATURATION: 98 % | HEIGHT: 64 IN | BODY MASS INDEX: 18.78 KG/M2 | DIASTOLIC BLOOD PRESSURE: 74 MMHG | HEART RATE: 78 BPM | WEIGHT: 110 LBS | SYSTOLIC BLOOD PRESSURE: 126 MMHG

## 2018-07-10 DIAGNOSIS — M80.00XD OSTEOPOROSIS WITH PATHOLOGICAL FRACTURE WITH ROUTINE HEALING: ICD-10-CM

## 2018-07-10 DIAGNOSIS — R41.3 MEMORY LOSS: ICD-10-CM

## 2018-07-10 DIAGNOSIS — Z23 NEED FOR PROPHYLACTIC VACCINATION AND INOCULATION AGAINST VARICELLA: ICD-10-CM

## 2018-07-10 DIAGNOSIS — Z12.31 ENCOUNTER FOR SCREENING MAMMOGRAM FOR BREAST CANCER: ICD-10-CM

## 2018-07-10 DIAGNOSIS — Z00.00 ROUTINE GENERAL MEDICAL EXAMINATION AT A HEALTH CARE FACILITY: Primary | ICD-10-CM

## 2018-07-10 DIAGNOSIS — G89.29 CHRONIC BILATERAL LOW BACK PAIN WITHOUT SCIATICA: ICD-10-CM

## 2018-07-10 DIAGNOSIS — M54.50 CHRONIC BILATERAL LOW BACK PAIN WITHOUT SCIATICA: ICD-10-CM

## 2018-07-10 PROCEDURE — 4040F PNEUMOC VAC/ADMIN/RCVD: CPT | Performed by: INTERNAL MEDICINE

## 2018-07-10 PROCEDURE — G8420 CALC BMI NORM PARAMETERS: HCPCS | Performed by: INTERNAL MEDICINE

## 2018-07-10 PROCEDURE — 99213 OFFICE O/P EST LOW 20 MIN: CPT | Performed by: INTERNAL MEDICINE

## 2018-07-10 PROCEDURE — 3017F COLORECTAL CA SCREEN DOC REV: CPT | Performed by: INTERNAL MEDICINE

## 2018-07-10 PROCEDURE — 1036F TOBACCO NON-USER: CPT | Performed by: INTERNAL MEDICINE

## 2018-07-10 PROCEDURE — G8400 PT W/DXA NO RESULTS DOC: HCPCS | Performed by: INTERNAL MEDICINE

## 2018-07-10 PROCEDURE — 1090F PRES/ABSN URINE INCON ASSESS: CPT | Performed by: INTERNAL MEDICINE

## 2018-07-10 PROCEDURE — 1101F PT FALLS ASSESS-DOCD LE1/YR: CPT | Performed by: INTERNAL MEDICINE

## 2018-07-10 PROCEDURE — 1123F ACP DISCUSS/DSCN MKR DOCD: CPT | Performed by: INTERNAL MEDICINE

## 2018-07-10 PROCEDURE — G0439 PPPS, SUBSEQ VISIT: HCPCS | Performed by: INTERNAL MEDICINE

## 2018-07-10 PROCEDURE — G8427 DOCREV CUR MEDS BY ELIG CLIN: HCPCS | Performed by: INTERNAL MEDICINE

## 2018-07-10 ASSESSMENT — LIFESTYLE VARIABLES
HAS A RELATIVE, FRIEND, DOCTOR, OR ANOTHER HEALTH PROFESSIONAL EXPRESSED CONCERN ABOUT YOUR DRINKING OR SUGGESTED YOU CUT DOWN: 0
HOW MANY STANDARD DRINKS CONTAINING ALCOHOL DO YOU HAVE ON A TYPICAL DAY: 0
HOW OFTEN DURING THE LAST YEAR HAVE YOU NEEDED AN ALCOHOLIC DRINK FIRST THING IN THE MORNING TO GET YOURSELF GOING AFTER A NIGHT OF HEAVY DRINKING: 0
AUDIT TOTAL SCORE: 4
AUDIT-C TOTAL SCORE: 4
HOW OFTEN DURING THE LAST YEAR HAVE YOU HAD A FEELING OF GUILT OR REMORSE AFTER DRINKING: 0
HOW OFTEN DO YOU HAVE A DRINK CONTAINING ALCOHOL: 4
HOW OFTEN DURING THE LAST YEAR HAVE YOU BEEN UNABLE TO REMEMBER WHAT HAPPENED THE NIGHT BEFORE BECAUSE YOU HAD BEEN DRINKING: 0
HOW OFTEN DO YOU HAVE SIX OR MORE DRINKS ON ONE OCCASION: 0
HOW OFTEN DURING THE LAST YEAR HAVE YOU FAILED TO DO WHAT WAS NORMALLY EXPECTED FROM YOU BECAUSE OF DRINKING: 0
HAVE YOU OR SOMEONE ELSE BEEN INJURED AS A RESULT OF YOUR DRINKING: 0
HOW OFTEN DURING THE LAST YEAR HAVE YOU FOUND THAT YOU WERE NOT ABLE TO STOP DRINKING ONCE YOU HAD STARTED: 0

## 2018-07-10 ASSESSMENT — ENCOUNTER SYMPTOMS
COUGH: 0
NAUSEA: 0
EYE REDNESS: 0
DIARRHEA: 0
SHORTNESS OF BREATH: 0
SINUS PRESSURE: 0
ABDOMINAL DISTENTION: 0
CONSTIPATION: 0
EYE DISCHARGE: 0
ABDOMINAL PAIN: 0

## 2018-07-10 ASSESSMENT — PATIENT HEALTH QUESTIONNAIRE - PHQ9: SUM OF ALL RESPONSES TO PHQ QUESTIONS 1-9: 0

## 2018-07-10 ASSESSMENT — ANXIETY QUESTIONNAIRES: GAD7 TOTAL SCORE: 0

## 2018-07-10 NOTE — PROGRESS NOTES
Medicare Annual Wellness Visit  Name: Patience Elise Date: 2018   MRN: [id number] Sex: Female   Age: 81 y.o. Ethnicity: Non-/Non    : 1945 Race: Trent Jensen is here for Medicare AWV (medicare annual wellness visit)    Screenings for behavioral, psychosocial and functional/safety risks, and cognitive dysfunction are all negative except as indicated below. These results, as well as other patient data from the [address] [facility] [address] form, are documented in Flowsheets linked to this Encounter. No Known Allergies    Prior to Visit Medications    Medication Sig Taking? Authorizing Provider   zoster recombinant adjuvanted vaccine (SHINGRIX) 50 MCG SUSR injection 50 MCG IM then repeat 2-6 months. Yes Livier Romano MD   denosumab (PROLIA) 60 MG/ML SOLN SC injection Inject 60 mg into the skin once Indications: every 6 months Yes Historical Provider, MD   Cholecalciferol (VITAMIN D3) 2000 units CAPS Take 1 capsule by mouth daily Yes Historical Provider, MD   Strontium Chloride POWD 1 capsule by Does not apply route daily Indications: to help boost calcium (use with AlgaeCal) Yes Historical Provider, MD   calcium-vitamin D (OSCAL) 250-125 MG-UNIT per tablet Take 1 tablet by mouth daily.    Yes Historical Provider, MD       Past Medical History:   Diagnosis Date    Cancer ([address]     basil cell skin cancers removed     Low back pain 2017    Osteoporosis     Osteoporosis with pathological fracture with routine healing 2017     Past Surgical History:   Procedure Laterality Date    SKIN CANCER EXCISION         Family History   Problem Relation Age of Onset    Kidney Disease Mother     Cancer Father         Prostate        CareTeam (Including outside providers/suppliers regularly involved in providing care):   Patient Care Team:  Livier Romano MD as PCP - General (Internal Medicine)    Wt Readings from Last 3 Encounters:   07/10/18 110 lb (49.9 kg)   18 112 lb (50.8 removed     Low back pain 2/7/2017    Osteoporosis     Osteoporosis with pathological fracture with routine healing 6/23/2017       Past Surgical History:   Procedure Laterality Date    SKIN CANCER EXCISION         Family History   Problem Relation Age of Onset    Kidney Disease Mother     Cancer Father         Prostate        Social History     Social History    Marital status: Single     Spouse name: N/A    Number of children: N/A    Years of education: N/A     Occupational History    Not on file. Social History Main Topics    Smoking status: Never Smoker    Smokeless tobacco: Never Used    Alcohol use 1.8 oz/week     3 Glasses of wine per week    Drug use: No    Sexual activity: Not on file     Other Topics Concern    Not on file     Social History Narrative    No narrative on file       No Known Allergies    Current Outpatient Prescriptions   Medication Sig Dispense Refill    zoster recombinant adjuvanted vaccine (SHINGRIX) 50 MCG SUSR injection 50 MCG IM then repeat 2-6 months. 0.5 mL 1    denosumab (PROLIA) 60 MG/ML SOLN SC injection Inject 60 mg into the skin once Indications: every 6 months      Cholecalciferol (VITAMIN D3) 2000 units CAPS Take 1 capsule by mouth daily      Strontium Chloride POWD 1 capsule by Does not apply route daily Indications: to help boost calcium (use with AlgaeCal)      calcium-vitamin D (OSCAL) 250-125 MG-UNIT per tablet Take 1 tablet by mouth daily. No current facility-administered medications for this visit. Review of Systems   Constitutional: Positive for fatigue. Negative for chills and fever. HENT: Negative for congestion and sinus pressure. Eyes: Negative for discharge and redness. Respiratory: Negative for cough and shortness of breath. Cardiovascular: Negative for chest pain, palpitations and leg swelling. Gastrointestinal: Negative for abdominal distention, abdominal pain, constipation, diarrhea and nausea.         Gets nipple discharge. Results for orders placed or performed in visit on 05/21/18   CBC   Result Value Ref Range    WBC 5.4 4.8 - 10.8 K/uL    RBC 4.38 4.20 - 5.40 M/uL    Hemoglobin 13.3 12.0 - 16.0 g/dL    Hematocrit 40.9 37.0 - 47.0 %    MCV 93.4 81.0 - 99.0 fL    MCH 30.4 27.0 - 31.0 pg    MCHC 32.5 (L) 33.0 - 37.0 g/dL    RDW 13.7 11.5 - 14.5 %    Platelets 181 182 - 404 K/uL    MPV 10.6 9.4 - 12.3 fL   Comprehensive Metabolic Panel   Result Value Ref Range    Sodium 141 136 - 145 mmol/L    Potassium 4.2 3.5 - 5.0 mmol/L    Chloride 102 98 - 111 mmol/L    CO2 27 22 - 29 mmol/L    Anion Gap 12 7 - 19 mmol/L    Glucose 93 74 - 109 mg/dL    BUN 13 8 - 23 mg/dL    CREATININE 0.5 0.5 - 0.9 mg/dL    GFR Non-African American >60 >60    Calcium 8.8 8.8 - 10.2 mg/dL    Total Protein 6.8 6.6 - 8.7 g/dL    Alb 4.1 3.5 - 5.2 g/dL    Total Bilirubin 0.3 0.2 - 1.2 mg/dL    Alkaline Phosphatase 44 35 - 104 U/L    ALT 20 5 - 33 U/L    AST 20 5 - 32 U/L   TSH without Reflex   Result Value Ref Range    TSH 3.380 0.270 - 4.200 uIU/mL   Vitamin D 25 Hydroxy   Result Value Ref Range    Vit D, 25-Hydroxy 48.9 >=30 ng/mL   Lipid Panel   Result Value Ref Range    Cholesterol, Total 246 (H) 160 - 199 mg/dL    Triglycerides 62 0 - 149 mg/dL    HDL 85 65 - 121 mg/dL    LDL Calculated 149 <100 mg/dL       ASSESSMENT/ PLAN:  1. Routine general medical examination at a health care facility  Chart medications labs vaccines reviewed we recommended a shingles vaccine and the pneumonia vaccine she declines. We recommend continued Prolia for osteoporosis it is not due yet she's going to think about it. We talked about calcium and vitamin D requirements. We talked about healthy diet exercise and activities. 2. Encounter for screening mammogram for breast cancer    - GHULAM Digital Screen Bilateral [[id number]]; Future    3.  Need for prophylactic vaccination and inoculation against varicella    - zoster recombinant adjuvanted vaccine [facility]) 50

## 2018-07-11 ASSESSMENT — ENCOUNTER SYMPTOMS: BACK PAIN: 1

## 2018-07-13 ENCOUNTER — HOSPITAL ENCOUNTER (OUTPATIENT)
Dept: PHYSICAL THERAPY | Facility: HOSPITAL | Age: 73
Setting detail: THERAPIES SERIES
Discharge: HOME | End: 2018-07-13

## 2018-07-13 DIAGNOSIS — G89.29 CHRONIC RIGHT-SIDED LOW BACK PAIN, WITH SCIATICA PRESENCE UNSPECIFIED: Primary | ICD-10-CM

## 2018-07-13 DIAGNOSIS — M54.5 CHRONIC RIGHT-SIDED LOW BACK PAIN, WITH SCIATICA PRESENCE UNSPECIFIED: Primary | ICD-10-CM

## 2018-07-13 DIAGNOSIS — M54.16 LUMBAR RADICULOPATHY: ICD-10-CM

## 2018-07-13 PROCEDURE — 97110 THERAPEUTIC EXERCISES: CPT

## 2018-07-13 PROCEDURE — 97140 MANUAL THERAPY 1/> REGIONS: CPT

## 2018-07-13 NOTE — THERAPY TREATMENT NOTE
Outpatient Physical Therapy Ortho Treatment Note  [facility]     Patient Name: Shellie Villeda  : 1945  MRN: [id number]  Today's Date: 2018      Visit Date: 2018    Visit Dx:    ICD-10-CM ICD-9-CM   1. Chronic right-sided low back pain, with sciatica presence unspecified M54.5 724.2    G89.29 338.29   2. Lumbar radiculopathy M54.16 724.4       Patient Active Problem List   Diagnosis   • Closed compression fracture of third lumbar vertebra (CMS/HCC)   • Wedge compression fracture of third lumbar vertebra with delayed healing   • Chronic bilateral low back pain without sciatica   • Abdominal bloating   • Closed wedge compression fracture of third lumbar vertebra with routine healing   • Non-smoker   • Normal body mass index (BMI)        Past Medical History:   Diagnosis Date   • Compression fracture of lumbar vertebra (CMS/HCC)    • Depression    • Disc degeneration, lumbar    • History of basal cell carcinoma    • History of hyperlipidemia    • History of insomnia    • History of multiple pulmonary nodules    • History of palpitations    • History of urinary tract infection    • Low back pain    • Osteoporosis    • Osteoporosis    • Skin cancer     basal cell   • Vitamin D insufficiency         Past Surgical History:   Procedure Laterality Date   • BASAL CELL CARCINOMA EXCISION      SEVERAL OFF FACE   • BREAST BIOPSY      benign   • COLONOSCOPY  2006   • COLONOSCOPY N/A 3/9/2017    Procedure: COLONOSCOPY WITH ANESTHESIA;  Surgeon: Arthur Harris MD;  Location: [facility] ENDOSCOPY;  Service:    • KYPHOPLASTY N/A 2016    Procedure: KYPHOPLASTY L3;  Surgeon: Ricky Ferguson MD;  Location: [facility] OR;  Service:                              PT Assessment/Plan     Row Name 18 1000          PT Assessment    Assessment Comments (P)  The patient is doing well with therapy and continues to tolerate her exercise program without increased pain or disomfort. The focus of today's treatment  was to continue to improve her hip extension mobility and address the tightness in her hip flexors, bilaterally. Patient's exercises were progressed to continue strengthening the hip abductors and extensors with light resistance to reduce the overall stress being placed on the lumbar spine.  -PD        PT Plan    PT Plan Comments (P)  We will continue to work on improving her hip extension mobility, bilaterally. We will continue to strengthen her hip abductors and extensors and incorporate balance activities to decrease her overall fall risk.  -PD       User Key  (r) = Recorded By, (t) = Taken By, (c) = Cosigned By    Initials Name Provider Type    PD Timur UMANG Stallworth, PT Student PT Student                    Exercises     Row Name 07/13/18 1100 07/13/18 1000          Subjective Comments    Subjective Comments  -- (P)  Patient reports she is feeling well today, but admits most of her pain occurs in the late afternoon/early evening. She reports being compliant with her HEP, but has a couple of questions regarding her exercises.  -PD        Subjective Pain    Able to rate subjective pain?  -- (P)  yes  -PD     Pre-Treatment Pain Level  -- (P)  0  -PD     Post-Treatment Pain Level  -- (P)  0  -PD        Total Minutes    28063 - PT Therapeutic Exercise Minutes  -- (P)  33  -PD     48079 - PT Manual Therapy Minutes (P)  --  -PD (P)  12  -PD        Exercise 1    Exercise Name 1  -- (P)  huber side-lying clamshells using red Theraband with knee stacked on one pillow  -PD     Cueing 1  -- (P)  Verbal;Tactile  -PD     Sets 1  -- (P)  3  -PD     Reps 1  -- (P)  10  -PD     Time 1  -- (P)  3 sec hold  -PD        Exercise 2    Exercise Name 2  -- (P)  supine bridges with isometric hip abduction using green Theraband  -PD     Cueing 2  -- (P)  Verbal;Tactile  -PD     Sets 2  -- (P)  3  -PD     Reps 2  -- (P)  10  -PD     Time 2  -- (P)  3 sec hold  -PD       User Key  (r) = Recorded By, (t) = Taken By, (c) = Cosigned By    Initials  Name Provider Type    PD Timur Stallworth, PT Student PT Student                        Manual Rx (last 36 hours)      Manual Treatments     Row Name 07/13/18 1100 07/13/18 1000          Total Minutes    25472 - PT Manual Therapy Minutes (P)  --  -PD (P)  12  -PD        Manual Rx 1    Manual Rx 1 Location (P)  --  -PD (P)  huber side-lying hip extension  -PD     Manual Rx 1 Type (P)  --  -PD (P)  oscillatory P-A mobilization  -PD     Manual Rx 1 Grade (P)  --  -PD (P)  2/3  -PD     Manual Rx 1 Duration (P)  --  -PD (P)  6  -PD        Manual Rx 2    Manual Rx 2 Location (P)  --  -PD (P)  huber side-lying hip flexor stretch  -PD     Manual Rx 2 Type (P)  --  -PD (P)  mod  -PD     Manual Rx 2 Grade (P)  --  -PD (P)  sustained  -PD     Manual Rx 2 Duration (P)  --  -PD (P)  6  -PD       User Key  (r) = Recorded By, (t) = Taken By, (c) = Cosigned By    Initials Name Provider Type    PD Timur Stallworth, PT Student PT Student                PT OP Goals     Row Name 07/13/18 1000          PT Short Term Goals    STG Date to Achieve (P)  07/26/18  -PD     STG 1 (P)  Patient will perform a supported SLS without hip drop or lateral pelvic drift  -PD     STG 1 Progress (P)  Ongoing  -PD     STG 2 (P)  Patient will report no right LE pain below the knee  -PD     STG 2 Progress (P)  Progressing  -PD     STG 2 Progress Comments (P)  Patient reports she has not experienced pain below the knee in the right LE.  -PD        Long Term Goals    LTG Date to Achieve (P)  08/16/18  -PD     LTG 1 (P)  Patient will be independent with a comprehensive HEP  -PD     LTG 1 Progress (P)  Ongoing  -PD     LTG 1 Progress Comments (P)  Patient reports she has been compliant with her HEP, but is unsure is she is doing her exercises collect. Reviewed all components of the HEP.  -PD     LTG 2 (P)  Patient will improve Modified Oswestry score by 9 points indicating clinically significant change   -PD     LTG 2 Progress (P)  Ongoing  -PD     LTG 3 (P)  Patient  will improve (R) hip abductor strength to at least 4/5 on MMT  -PD     LTG 3 Progress (P)  Ongoing  -PD     LTG 4 (P)  Patient will demonstrate no lateral hip drift during midstance on either LE with observed ambulation  -PD     LTG 4 Progress (P)  Ongoing  -PD     LTG 5 (P)  Patient will return to ambulating for exercise and performing all ADL's with symptoms <3/10  -PD     LTG 5 Progress (P)  Ongoing  -PD        Time Calculation    PT Goal Re-Cert Due Date (P)  08/04/18  -PD       User Key  (r) = Recorded By, (t) = Taken By, (c) = Cosigned By    Initials Name Provider Type    PD Timur Stallworth, PT Student PT Student          Therapy Education  Education Details: (P) reviwed seated heel pushses for HEP  Given: (P) HEP  Program: (P) Reinforced  How Provided: (P) Verbal, Demonstration  Provided to: (P) Patient  Level of Understanding: (P) Verbalized, Demonstrated              Time Calculation:   Start Time: (P) 1058  Stop Time: (P) 1148  Time Calculation (min): (P) 50 min  Total Timed Code Minutes- PT: (P) 45 minute(s)  Therapy Suggested Charges     Code   Minutes Charges    66878 (CPT®) Hc Pt Neuromusc Re Education Ea 15 Min      75258 (CPT®) Hc Pt Ther Proc Ea 15 Min 33 2    65464 (CPT®) Hc Gait Training Ea 15 Min      01946 (CPT®) Hc Pt Therapeutic Act Ea 15 Min      31970 (CPT®) Hc Pt Manual Therapy Ea 15 Min 12 1    48611 (CPT®) Hc Pt Ther Massage- Per 15 Min      32207 (CPT®) Hc Pt Iontophoresis Ea 15 Min      45099 (CPT®) Hc Pt Elec Stim Ea-Per 15 Min      98300 (CPT®) Hc Pt Ultrasound Ea 15 Min      22026 (CPT®) Hc Pt Self Care/Mgmt/Train Ea 15 Min      Total  45 3        Therapy Charges for Today     Code Description Service Date Service Provider Modifiers Qty    43551799610 HC PT THER PROC EA 15 MIN 7/13/2018 Timur Stallworth, PT Student GP 2    73560132883 HC PT MANUAL THERAPY EA 15 MIN 7/13/2018 Timur Stallworth, PT Student GP 1                    Timur Stallworth, PT Student  7/13/2018

## 2018-07-17 ENCOUNTER — HOSPITAL ENCOUNTER (OUTPATIENT)
Dept: PHYSICAL THERAPY | Facility: HOSPITAL | Age: 73
Setting detail: THERAPIES SERIES
Discharge: HOME | End: 2018-07-17

## 2018-07-17 DIAGNOSIS — M54.16 LUMBAR RADICULOPATHY: ICD-10-CM

## 2018-07-17 DIAGNOSIS — G89.29 CHRONIC RIGHT-SIDED LOW BACK PAIN, WITH SCIATICA PRESENCE UNSPECIFIED: Primary | ICD-10-CM

## 2018-07-17 DIAGNOSIS — M54.5 CHRONIC RIGHT-SIDED LOW BACK PAIN, WITH SCIATICA PRESENCE UNSPECIFIED: Primary | ICD-10-CM

## 2018-07-17 PROCEDURE — 97110 THERAPEUTIC EXERCISES: CPT

## 2018-07-17 NOTE — THERAPY TREATMENT NOTE
Outpatient Physical Therapy Ortho Treatment Note  [facility]     Patient Name: Shellie Villeda  : 1945  MRN: [id number]  Today's Date: 2018      Visit Date: 2018    Visit Dx:    ICD-10-CM ICD-9-CM   1. Chronic right-sided low back pain, with sciatica presence unspecified M54.5 724.2    G89.29 338.29   2. Lumbar radiculopathy M54.16 724.4       Patient Active Problem List   Diagnosis   • Closed compression fracture of third lumbar vertebra (CMS/HCC)   • Wedge compression fracture of third lumbar vertebra with delayed healing   • Chronic bilateral low back pain without sciatica   • Abdominal bloating   • Closed wedge compression fracture of third lumbar vertebra with routine healing   • Non-smoker   • Normal body mass index (BMI)        Past Medical History:   Diagnosis Date   • Compression fracture of lumbar vertebra (CMS/HCC)    • Depression    • Disc degeneration, lumbar    • History of basal cell carcinoma    • History of hyperlipidemia    • History of insomnia    • History of multiple pulmonary nodules    • History of palpitations    • History of urinary tract infection    • Low back pain    • Osteoporosis    • Osteoporosis    • Skin cancer     basal cell   • Vitamin D insufficiency         Past Surgical History:   Procedure Laterality Date   • BASAL CELL CARCINOMA EXCISION      SEVERAL OFF FACE   • BREAST BIOPSY      benign   • COLONOSCOPY  2006   • COLONOSCOPY N/A 3/9/2017    Procedure: COLONOSCOPY WITH ANESTHESIA;  Surgeon: Arthur Harris MD;  Location: [facility] ENDOSCOPY;  Service:    • KYPHOPLASTY N/A 2016    Procedure: KYPHOPLASTY L3;  Surgeon: Ricky Ferguson MD;  Location: [facility] OR;  Service:                              PT Assessment/Plan     Row Name 18 1400 18 1300       PT Assessment    Assessment Comments --  -RS (r) PD (t) RS (c) The patient is continuing to tolerate therapy without increase pain in her low back or right lower extremity. The focus  "of today's treatment was continuing to improve her proximal hip strength and transitioning to more activities in weight bearing positions. The patient tolerated exercise progression with minimal complaints of muscle soreness in her gluteals, bilaterally. We will continue to work on addressing her strength deficits to decrease the stress on the lumbar spine with functional activities such as walking and squatting.  -RS (r) PD (t) RS (c)       PT Plan    PT Plan Comments  -- Continue to improve her limited hip extension mobility and improve the strength of her gluteals and spinal extensors with an emphasis on functional activities and decreasing her overall fall risk.  -RS (r) PD (t) RS (c)      User Key  (r) = Recorded By, (t) = Taken By, (c) = Cosigned By    Initials Name Provider Type    RS Kingsley Su, PT, DPT, OCS Physical Therapist    ION Stallworth, PT Student PT Student                    Exercises     Row Name 07/17/18 1300             Subjective Comments    Subjective Comments Patient reports she is feeling well today and that she is not having any pain in her low back or leg.  -RS (r) PD (t) RS (c)         Subjective Pain    Able to rate subjective pain? yes  -RS (r) PD (t) RS (c)      Pre-Treatment Pain Level 0  -RS (r) PD (t) RS (c)      Post-Treatment Pain Level 0  -RS (r) PD (t) RS (c)         Total Minutes    52230 - PT Therapeutic Exercise Minutes 30  -RS (r) PD (t) RS (c)      19838 - PT Manual Therapy Minutes 10  -RS (r) PD (t) RS (c)         Exercise 1    Exercise Name 1 side-lying hip extension/abduction with Physioball  -RS (r) PD (t) RS (c)      Cueing 1 Verbal;Tactile  -RS (r) PD (t) RS (c)      Sets 1 3  -RS (r) PD (t) RS (c)      Reps 1 10  -RS (r) PD (t) RS (c)      Time 1 2 sec hold  -RS (r) PD (t) RS (c)      Additional Comments bilateral  -RS (r) PD (t) RS (c)         Exercise 2    Exercise Name 2 standing hip abduction on 2\" box step  -RS (r) PD (t) RS (c)      Cueing 2 " Demo;Tactile  -RS (r) PD (t) RS (c)      Sets 2 2  -RS (r) PD (t) RS (c)      Reps 2 15  -RS (r) PD (t) RS (c)      Time 2 3 sec hold  -RS (r) PD (t) RS (c)      Additional Comments bilateral; verbal cues provided at pelvis/trunk to ensurge neutral pelvic alignment during exercise  -RS (r) PD (t) RS (c)         Exercise 3    Exercise Name 3 mini-squats on shuttle press; 5 bands  -RS (r) PD (t) RS (c)      Cueing 3 Verbal;Tactile  -RS (r) PD (t) RS (c)      Sets 3 2  -RS (r) PD (t) RS (c)      Reps 3 15  -RS (r) PD (t) RS (c)      Time 3 2 sec hold  -RS (r) PD (t) RS (c)        User Key  (r) = Recorded By, (t) = Taken By, (c) = Cosigned By    Initials Name Provider Type    RS Kingsley Su, PT, DPT, OCS Physical Therapist    PD Timur Stallworth, PT Student PT Student                        Manual Rx (last 36 hours)      Manual Treatments     Row Name 07/17/18 1300             Total Minutes    13346 - PT Manual Therapy Minutes 10  -RS (r) PD (t) RS (c)         Manual Rx 1    Manual Rx 1 Location huber side-lying hip extension  -RS (r) PD (t) RS (c)      Manual Rx 1 Type oscillatory P-A mobilization  -RS (r) PD (t) RS (c)      Manual Rx 1 Grade 2/3  -RS (r) PD (t) RS (c)      Manual Rx 1 Duration 4  -RS (r) PD (t) RS (c)         Manual Rx 2    Manual Rx 2 Location huber side-lying hip flexor stretch  -RS (r) PD (t) RS (c)      Manual Rx 2 Type mod  -RS (r) PD (t) RS (c)      Manual Rx 2 Grade sustained  -RS (r) PD (t) RS (c)      Manual Rx 2 Duration 6  -RS (r) PD (t) RS (c)        User Key  (r) = Recorded By, (t) = Taken By, (c) = Cosigned By    Initials Name Provider Type    RS Kingsley Su, PT, DPT, OCS Physical Therapist    PD Timur Stallworth, PT Student PT Student                PT OP Goals     Row Name 07/17/18 1300          PT Short Term Goals    STG Date to Achieve 07/26/18  -RS (r) PD (t) RS (c)     STG 1 Patient will perform a supported SLS without hip drop or lateral pelvic drift  -RS (r) PD (t) RS  (c)     STG 1 Progress Ongoing  -RS (r) PD (t) RS (c)     STG 2 Patient will report no right LE pain below the knee  -RS (r) PD (t) RS (c)     STG 2 Progress Progressing  -RS (r) PD (t) RS (c)     STG 2 Progress Comments Patient reports she continues to experience occasional pain in her right LE that radiates below the knee, but states it has not been as severe over the past week as before starting therapy.  -RS (r) PD (t) RS (c)        Long Term Goals    LTG Date to Achieve 08/16/18  -RS (r) PD (t) RS (c)     LTG 1 Patient will be independent with a comprehensive HEP  -RS (r) PD (t) RS (c)     LTG 1 Progress Ongoing  -RS (r) PD (t) RS (c)     LTG 2 Patient will improve Modified Oswestry score by 9 points indicating clinically significant change   -RS (r) PD (t) RS (c)     LTG 2 Progress Ongoing  -RS (r) PD (t) RS (c)     LTG 3 Patient will improve (R) hip abductor strength to at least 4/5 on MMT  -RS (r) PD (t) RS (c)     LTG 3 Progress Ongoing  -RS (r) PD (t) RS (c)     LTG 4 Patient will demonstrate no lateral hip drift during midstance on either LE with observed ambulation  -RS (r) PD (t) RS (c)     LTG 4 Progress Ongoing  -RS (r) PD (t) RS (c)     LTG 5 Patient will return to ambulating for exercise and performing all ADL's with symptoms <3/10  -RS (r) PD (t) RS (c)     LTG 5 Progress Ongoing  -RS (r) PD (t) RS (c)        Time Calculation    PT Goal Re-Cert Due Date 08/04/18  -RS (r) PD (t) RS (c)       User Key  (r) = Recorded By, (t) = Taken By, (c) = Cosigned By    Initials Name Provider Type    RS Kingsley Su, PT, DPT, OCS Physical Therapist    PD Timur Stallworth, PT Student PT Student          Therapy Education  Education Details: sidelying clamshells with green Theraband; reviewed HEP  Given: HEP, Posture/body mechanics  Program: New  How Provided: Verbal, Written  Provided to: Patient  Level of Understanding: Verbalized              Time Calculation:   Start Time: 1355  Stop Time: 1440  Time  Calculation (min): 45 min  Total Timed Code Minutes- PT: 40 minute(s)  Therapy Suggested Charges     Code   Minutes Charges    11548 (CPT®) Hc Pt Neuromusc Re Education Ea 15 Min      40611 (CPT®) Hc Pt Ther Proc Ea 15 Min 30 2    41202 (CPT®) Hc Gait Training Ea 15 Min      97697 (CPT®) Hc Pt Therapeutic Act Ea 15 Min      86505 (CPT®) Hc Pt Manual Therapy Ea 15 Min 10 1    73562 (CPT®) Hc Pt Ther Massage- Per 15 Min      84865 (CPT®) Hc Pt Iontophoresis Ea 15 Min      68300 (CPT®) Hc Pt Elec Stim Ea-Per 15 Min      65506 (CPT®) Hc Pt Ultrasound Ea 15 Min      77424 (CPT®) Hc Pt Self Care/Mgmt/Train Ea 15 Min      Total  40 3        Therapy Charges for Today     Code Description Service Date Service Provider Modifiers Qty    94209795453 HC PT THER PROC EA 15 MIN 7/17/2018 Timur Stallworth, PT Student GP 2                    Timur Stallworth, PT Student  7/17/2018

## 2018-07-20 ENCOUNTER — HOSPITAL ENCOUNTER (OUTPATIENT)
Dept: PHYSICAL THERAPY | Facility: HOSPITAL | Age: 73
Setting detail: THERAPIES SERIES
Discharge: HOME | End: 2018-07-20

## 2018-07-20 DIAGNOSIS — M54.16 LUMBAR RADICULOPATHY: ICD-10-CM

## 2018-07-20 DIAGNOSIS — G89.29 CHRONIC RIGHT-SIDED LOW BACK PAIN, WITH SCIATICA PRESENCE UNSPECIFIED: Primary | ICD-10-CM

## 2018-07-20 DIAGNOSIS — M54.5 CHRONIC RIGHT-SIDED LOW BACK PAIN, WITH SCIATICA PRESENCE UNSPECIFIED: Primary | ICD-10-CM

## 2018-07-20 PROCEDURE — 97110 THERAPEUTIC EXERCISES: CPT

## 2018-07-20 NOTE — THERAPY TREATMENT NOTE
Outpatient Physical Therapy Ortho Treatment Note  [facility]     Patient Name: Shellie Villeda  : 1945  MRN: [id number]  Today's Date: 2018      Visit Date: 2018    Visit Dx:    ICD-10-CM ICD-9-CM   1. Chronic right-sided low back pain, with sciatica presence unspecified M54.5 724.2    G89.29 338.29   2. Lumbar radiculopathy M54.16 724.4       Patient Active Problem List   Diagnosis   • Closed compression fracture of third lumbar vertebra (CMS/HCC)   • Wedge compression fracture of third lumbar vertebra with delayed healing   • Chronic bilateral low back pain without sciatica   • Abdominal bloating   • Closed wedge compression fracture of third lumbar vertebra with routine healing   • Non-smoker   • Normal body mass index (BMI)        Past Medical History:   Diagnosis Date   • Compression fracture of lumbar vertebra (CMS/HCC)    • Depression    • Disc degeneration, lumbar    • History of basal cell carcinoma    • History of hyperlipidemia    • History of insomnia    • History of multiple pulmonary nodules    • History of palpitations    • History of urinary tract infection    • Low back pain    • Osteoporosis    • Osteoporosis    • Skin cancer     basal cell   • Vitamin D insufficiency         Past Surgical History:   Procedure Laterality Date   • BASAL CELL CARCINOMA EXCISION      SEVERAL OFF FACE   • BREAST BIOPSY      benign   • COLONOSCOPY  2006   • COLONOSCOPY N/A 3/9/2017    Procedure: COLONOSCOPY WITH ANESTHESIA;  Surgeon: Arthur Harris MD;  Location: [facility] ENDOSCOPY;  Service:    • KYPHOPLASTY N/A 2016    Procedure: KYPHOPLASTY L3;  Surgeon: Ricky Ferguson MD;  Location: [facility] OR;  Service:                              PT Assessment/Plan     Row Name 18 1400          PT Assessment    Assessment Comments (P)  The patient is doing well with therapy and continues to tolerate exercise program without pain. She did report increased pain today due to her  appointment being later in the afternoon, which is usually when she experiences the most low back pain. The focus of today's treatment was to continue strengthening her hip abductors and spinal extensors to reduce the stress on the lumbar spine, with an emphasis on closed kinetic chain activities. Balance exercises were also incorporated due to her osteoporosis and risk for potential compression fracture. Patient reported significantly improved pain at the end of the treatment session and seemed pleased with this.  -PD        PT Plan    PT Plan Comments (P)  We will continue to work on improving her proximal hip and trunk musculature with an emphasis on closed kinetic chain, functional activities. We will also continue to bolster her home exercise program as tolerated.  -PD       User Key  (r) = Recorded By, (t) = Taken By, (c) = Cosigned By    Initials Name Provider Type    PD Timur Stallworth, PT Student PT Student                    Exercises     Row Name 07/20/18 1400             Subjective Comments    Subjective Comments (P)  Patient states she is doing well this afternoon, but reports this is her worst time of the day and she feels as if she may experience an increase in her low back pain. She denies experiencing a fall since her last treatment session.  -PD         Subjective Pain    Able to rate subjective pain? (P)  yes  -PD      Pre-Treatment Pain Level (P)  2  -PD      Post-Treatment Pain Level (P)  0  -PD         Total Minutes    56412 - PT Therapeutic Exercise Minutes (P)  41  -PD         Exercise 1    Exercise Name 1 (P)  huber side-lying hip flexor stretch  -PD      Cueing 1 (P)  Tactile  -PD      Reps 1 (P)  3  -PD      Time 1 (P)  30 sec hold  -PD         Exercise 2    Exercise Name 2 (P)  standing glut med trunk lean  -PD      Cueing 2 (P)  Tactile  -PD      Reps 2 (P)  15  -PD      Time 2 (P)  2 sec hold  -PD      Additional Comments (P)  bilateraal  -PD         Exercise 3    Exercise Name 3 (P)   sidesteppers with red Theraband  -PD      Cueing 3 (P)  Demo;Verbal  -PD      Reps 3 (P)  2 laps  -PD      Time 3 (P)  40 feet each  -PD      Additional Comments (P)  160 feet total  -PD         Exercise 4    Exercise Name 4 (P)  SLS with eyes closed  -PD      Cueing 4 (P)  Verbal;Tactile  -PD      Reps 4 (P)  3  -PD      Time 4 (P)  30 sec each  -PD      Additional Comments (P)  bilateral  -PD         Exercise 5    Exercise Name 5 (P)  bridging with hip abduction hold using green Theraband  -PD      Cueing 5 (P)  Verbal;Tactile  -PD      Sets 5 (P)  2  -PD      Reps 5 (P)  15  -PD      Time 5 (P)  5 sec hold  -PD      Additional Comments (P)  added to HEP  -PD        User Key  (r) = Recorded By, (t) = Taken By, (c) = Cosigned By    Initials Name Provider Type    PD Timur Stallworth, PT Student PT Student                               PT OP Goals     Row Name 07/20/18 1400          PT Short Term Goals    STG Date to Achieve (P)  07/26/18  -PD     STG 1 (P)  Patient will perform a supported SLS without hip drop or lateral pelvic drift  -PD     STG 1 Progress (P)  Ongoing  -PD     STG 1 Progress Comments (P)  Patient continues to demonstrate trendelenburg sign during SLS, bilaterally (right>left)  -PD     STG 2 (P)  Patient will report no right LE pain below the knee  -PD     STG 2 Progress (P)  Progressing  -PD     STG 2 Progress Comments (P)  Patient reports she continues to experience pain in her right LE, but states it is not as frequent and usually only occurs when she sneezes.  -PD        Long Term Goals    LTG Date to Achieve (P)  08/16/18  -PD     LTG 1 (P)  Patient will be independent with a comprehensive HEP  -PD     LTG 1 Progress (P)  Ongoing  -PD     LTG 2 (P)  Patient will improve Modified Oswestry score by 9 points indicating clinically significant change   -PD     LTG 2 Progress (P)  Ongoing  -PD     LTG 3 (P)  Patient will improve (R) hip abductor strength to at least 4/5 on MMT  -PD     LTG 3 Progress  (P)  Ongoing  -PD     LTG 4 (P)  Patient will demonstrate no lateral hip drift during midstance on either LE with observed ambulation  -PD     LTG 4 Progress (P)  Ongoing  -PD     LTG 5 (P)  Patient will return to ambulating for exercise and performing all ADL's with symptoms <3/10  -PD     LTG 5 Progress (P)  Ongoing  -PD        Time Calculation    PT Goal Re-Cert Due Date (P)  08/04/18  -PD       User Key  (r) = Recorded By, (t) = Taken By, (c) = Cosigned By    Initials Name Provider Type    PD Timur Stallworth, PT Student PT Student          Therapy Education  Education Details: (P) supine bridges with hip abduction hold using green Theraband  Given: (P) HEP  Program: (P) New  How Provided: (P) Verbal, Written  Provided to: (P) Patient  Level of Understanding: (P) Verbalized, Demonstrated              Time Calculation:   Start Time: (P) 1447  Stop Time: (P) 1533  Time Calculation (min): (P) 46 min  Total Timed Code Minutes- PT: (P) 41 minute(s)  Therapy Suggested Charges     Code   Minutes Charges    82501 (CPT®) Hc Pt Neuromusc Re Education Ea 15 Min      65760 (CPT®) Hc Pt Ther Proc Ea 15 Min 41 3    00734 (CPT®) Hc Gait Training Ea 15 Min      49260 (CPT®) Hc Pt Therapeutic Act Ea 15 Min      74556 (CPT®) Hc Pt Manual Therapy Ea 15 Min      28149 (CPT®) Hc Pt Ther Massage- Per 15 Min      84996 (CPT®) Hc Pt Iontophoresis Ea 15 Min      01398 (CPT®) Hc Pt Elec Stim Ea-Per 15 Min      61522 (CPT®) Hc Pt Ultrasound Ea 15 Min      74516 (CPT®) Hc Pt Self Care/Mgmt/Train Ea 15 Min      Total  41 3        Therapy Charges for Today     Code Description Service Date Service Provider Modifiers Qty    55364298567 HC PT THER PROC EA 15 MIN 7/20/2018 Timur Stallworth, PT Student GP 3                    Timur Stallworth, PT Student  7/20/2018

## 2018-07-24 ENCOUNTER — HOSPITAL ENCOUNTER (OUTPATIENT)
Dept: PHYSICAL THERAPY | Facility: HOSPITAL | Age: 73
Setting detail: THERAPIES SERIES
Discharge: HOME | End: 2018-07-24

## 2018-07-24 DIAGNOSIS — G89.29 CHRONIC RIGHT-SIDED LOW BACK PAIN, WITH SCIATICA PRESENCE UNSPECIFIED: Primary | ICD-10-CM

## 2018-07-24 DIAGNOSIS — M54.16 LUMBAR RADICULOPATHY: ICD-10-CM

## 2018-07-24 DIAGNOSIS — M54.5 CHRONIC RIGHT-SIDED LOW BACK PAIN, WITH SCIATICA PRESENCE UNSPECIFIED: Primary | ICD-10-CM

## 2018-07-24 PROCEDURE — 97110 THERAPEUTIC EXERCISES: CPT

## 2018-07-24 NOTE — THERAPY TREATMENT NOTE
Outpatient Physical Therapy Ortho Treatment Note  [facility]     Patient Name: Shellie Villeda  : 1945  MRN: [id number]  Today's Date: 2018      Visit Date: 2018    Visit Dx:    ICD-10-CM ICD-9-CM   1. Chronic right-sided low back pain, with sciatica presence unspecified M54.5 724.2    G89.29 338.29   2. Lumbar radiculopathy M54.16 724.4       Patient Active Problem List   Diagnosis   • Closed compression fracture of third lumbar vertebra (CMS/HCC)   • Wedge compression fracture of third lumbar vertebra with delayed healing   • Chronic bilateral low back pain without sciatica   • Abdominal bloating   • Closed wedge compression fracture of third lumbar vertebra with routine healing   • Non-smoker   • Normal body mass index (BMI)        Past Medical History:   Diagnosis Date   • Compression fracture of lumbar vertebra (CMS/HCC)    • Depression    • Disc degeneration, lumbar    • History of basal cell carcinoma    • History of hyperlipidemia    • History of insomnia    • History of multiple pulmonary nodules    • History of palpitations    • History of urinary tract infection    • Low back pain    • Osteoporosis    • Osteoporosis    • Skin cancer     basal cell   • Vitamin D insufficiency         Past Surgical History:   Procedure Laterality Date   • BASAL CELL CARCINOMA EXCISION      SEVERAL OFF FACE   • BREAST BIOPSY      benign   • COLONOSCOPY  2006   • COLONOSCOPY N/A 3/9/2017    Procedure: COLONOSCOPY WITH ANESTHESIA;  Surgeon: Arthur Harris MD;  Location: [facility] ENDOSCOPY;  Service:    • KYPHOPLASTY N/A 2016    Procedure: KYPHOPLASTY L3;  Surgeon: Ricky Ferguson MD;  Location: [facility] OR;  Service:                              PT Assessment/Plan     Row Name 18 1300          PT Assessment    Assessment Comments (P)  Patient is doing well with therapy and continues to tolerate exercise progression without increased pain in her low back or right lower extremity. The  patient's exercise program was progressed today to continue strengthening her proximal hip musculature and spinal extensors to reduce the stress on the lumbar spine with functional activities. Patient tolerated her exercise program today, but did report increased muscle soreness with standing hip abduction exercises. We will continue to advance her exercise program to improve her dynamic lumbopelvic stability as tolerated.  -PD        PT Plan    PT Plan Comments (P)  We will continue to progress her exercise program to continue strengthening her core and proximal hip musculature, with an emphasis on functional activities such as squatting and lifting. We will also continue to address her balance deficits and bolster her HEP as appropriate.  -PD       User Key  (r) = Recorded By, (t) = Taken By, (c) = Cosigned By    Initials Name Provider Type    PD Timur Stallworth, PT Student PT Student                    Exercises     Row Name 07/24/18 1300             Subjective Comments    Subjective Comments (P)  Patient states she is doing well today and reports that she is not having any low back pain this afternoon. She states she was a little sore in her hips after her last treatment session. She denies experiencing any falls since her last treatment session.  -PD         Subjective Pain    Able to rate subjective pain? (P)  yes  -PD      Pre-Treatment Pain Level (P)  0  -PD      Post-Treatment Pain Level (P)  0  -PD         Total Minutes    53157 - PT Therapeutic Exercise Minutes (P)  39  -PD         Exercise 1    Exercise Name 1 (P)  huber side-lying hip flexor stretch  -PD      Cueing 1 (P)  Tactile  -PD      Reps 1 (P)  3  -PD      Time 1 (P)  30 sec hold  -PD         Exercise 2    Exercise Name 2 (P)  bridges on Bosu ball  -PD      Cueing 2 (P)  Tactile;Verbal  -PD      Sets 2 (P)  2  -PD      Reps 2 (P)  10  -PD      Time 2 (P)  5 sec hold  -PD         Exercise 3    Exercise Name 3 (P)  standing hip abduction with red  Theraband around knees  -PD      Cueing 3 (P)  Demo;Tactile  -PD      Sets 3 (P)  2  -PD      Reps 3 (P)  10  -PD      Time 3 (P)  3 sec hold  -PD      Additional Comments (P)  bilateral  -PD         Exercise 4    Exercise Name 4 (P)  standing hip flex/abd with 55 cm Physioball  -PD      Cueing 4 (P)  Demo;Tactile  -PD      Sets 4 (P)  1  -PD      Reps 4 (P)  10  -PD      Time 4 (P)  3 sec hold  -PD      Additional Comments (P)  bilateral  -PD         Exercise 5    Exercise Name 5 (P)  mini-squats on shuttle press with 6 bands  -PD      Cueing 5 (P)  Verbal;Tactile  -PD      Sets 5 (P)  2  -PD      Reps 5 (P)  15  -PD      Time 5 (P)  2 sec hold  -PD        User Key  (r) = Recorded By, (t) = Taken By, (c) = Cosigned By    Initials Name Provider Type    PD Timur Stallworth, PT Student PT Student                               PT OP Goals     Row Name 07/24/18 1300          PT Short Term Goals    STG Date to Achieve (P)  07/26/18  -PD     STG 1 (P)  Patient will perform a supported SLS without hip drop or lateral pelvic drift  -PD     STG 1 Progress (P)  Ongoing  -PD     STG 2 (P)  Patient will report no right LE pain below the knee  -PD     STG 2 Progress (P)  Met  -PD     STG 2 Progress Comments (P)  Patient reports she has not experienced pain in her right LE below the knee over the past week, especially with sneezing.  -PD        Long Term Goals    LTG Date to Achieve (P)  08/16/18  -PD     LTG 1 (P)  Patient will be independent with a comprehensive HEP  -PD     LTG 1 Progress (P)  Ongoing  -PD     LTG 2 (P)  Patient will improve Modified Oswestry score by 9 points indicating clinically significant change   -PD     LTG 2 Progress (P)  Ongoing  -PD     LTG 3 (P)  Patient will improve (R) hip abductor strength to at least 4/5 on MMT  -PD     LTG 3 Progress (P)  Ongoing  -PD     LTG 4 (P)  Patient will demonstrate no lateral hip drift during midstance on either LE with observed ambulation  -PD     LTG 4 Progress (P)   Ongoing  -PD     LTG 5 (P)  Patient will return to ambulating for exercise and performing all ADL's with symptoms <3/10  -PD     LTG 5 Progress (P)  Ongoing  -PD        Time Calculation    PT Goal Re-Cert Due Date (P)  08/04/18  -PD       User Key  (r) = Recorded By, (t) = Taken By, (c) = Cosigned By    Initials Name Provider Type    PD Timur Stallworth, PT Student PT Student          Therapy Education  Given: (P) HEP  Program: (P) New  How Provided: (P) Verbal  Provided to: (P) Patient  Level of Understanding: (P) Verbalized, Demonstrated              Time Calculation:   Start Time: (P) 1350  Stop Time: (P) 1432  Time Calculation (min): (P) 42 min  Total Timed Code Minutes- PT: (P) 39 minute(s)  Therapy Suggested Charges     Code   Minutes Charges    32389 (CPT®) Hc Pt Neuromusc Re Education Ea 15 Min      32604 (CPT®) Hc Pt Ther Proc Ea 15 Min 39 3    90126 (CPT®) Hc Gait Training Ea 15 Min      99189 (CPT®) Hc Pt Therapeutic Act Ea 15 Min      65183 (CPT®) Hc Pt Manual Therapy Ea 15 Min      37658 (CPT®) Hc Pt Ther Massage- Per 15 Min      02505 (CPT®) Hc Pt Iontophoresis Ea 15 Min      10464 (CPT®) Hc Pt Elec Stim Ea-Per 15 Min      47018 (CPT®) Hc Pt Ultrasound Ea 15 Min      75030 (CPT®) Hc Pt Self Care/Mgmt/Train Ea 15 Min      Total  39 3        Therapy Charges for Today     Code Description Service Date Service Provider Modifiers Qty    73384210036 HC PT THER PROC EA 15 MIN 7/24/2018 Timur Stallworth, PT Student GP 3                    Timur Stallworth, PT Student  7/24/2018

## 2018-07-27 ENCOUNTER — HOSPITAL ENCOUNTER (OUTPATIENT)
Dept: PHYSICAL THERAPY | Facility: HOSPITAL | Age: 73
Setting detail: THERAPIES SERIES
Discharge: HOME | End: 2018-07-27

## 2018-07-27 DIAGNOSIS — M54.5 CHRONIC RIGHT-SIDED LOW BACK PAIN, WITH SCIATICA PRESENCE UNSPECIFIED: Primary | ICD-10-CM

## 2018-07-27 DIAGNOSIS — M54.16 LUMBAR RADICULOPATHY: ICD-10-CM

## 2018-07-27 DIAGNOSIS — G89.29 CHRONIC RIGHT-SIDED LOW BACK PAIN, WITH SCIATICA PRESENCE UNSPECIFIED: Primary | ICD-10-CM

## 2018-07-27 PROCEDURE — 97110 THERAPEUTIC EXERCISES: CPT

## 2018-07-27 NOTE — THERAPY TREATMENT NOTE
Outpatient Physical Therapy Ortho Treatment Note  [facility]     Patient Name: Shellie Villeda  : 1945  MRN: [id number]  Today's Date: 2018      Visit Date: 2018    Visit Dx:    ICD-10-CM ICD-9-CM   1. Chronic right-sided low back pain, with sciatica presence unspecified M54.5 724.2    G89.29 338.29   2. Lumbar radiculopathy M54.16 724.4       Patient Active Problem List   Diagnosis   • Closed compression fracture of third lumbar vertebra (CMS/HCC)   • Wedge compression fracture of third lumbar vertebra with delayed healing   • Chronic bilateral low back pain without sciatica   • Abdominal bloating   • Closed wedge compression fracture of third lumbar vertebra with routine healing   • Non-smoker   • Normal body mass index (BMI)        Past Medical History:   Diagnosis Date   • Compression fracture of lumbar vertebra (CMS/HCC)    • Depression    • Disc degeneration, lumbar    • History of basal cell carcinoma    • History of hyperlipidemia    • History of insomnia    • History of multiple pulmonary nodules    • History of palpitations    • History of urinary tract infection    • Low back pain    • Osteoporosis    • Osteoporosis    • Skin cancer     basal cell   • Vitamin D insufficiency         Past Surgical History:   Procedure Laterality Date   • BASAL CELL CARCINOMA EXCISION      SEVERAL OFF FACE   • BREAST BIOPSY      benign   • COLONOSCOPY  2006   • COLONOSCOPY N/A 3/9/2017    Procedure: COLONOSCOPY WITH ANESTHESIA;  Surgeon: Arthur Harris MD;  Location: [facility] ENDOSCOPY;  Service:    • KYPHOPLASTY N/A 2016    Procedure: KYPHOPLASTY L3;  Surgeon: Ricky Ferguson MD;  Location: [facility] OR;  Service:                              PT Assessment/Plan     Row Name 18 0900          PT Assessment    Assessment Comments (P)  Patient did report increased discomfort in her left lumbar spine and right hip today, but relates this increase in her symptoms to her increased activity  over the past few days. The patient continues to tolerate her exercise program without increased pain in her low back or right leg. The patient does demonstrate improved upright posture with gait, but continues to exhibit excessive lateral drift with midstance, bilaterally, which places more stress on the lumbar spine with loading activities. Patient's proximal hip strength has also improved since the initial evaluation, but she still exhibits weakness in her bilateral hip abductors, which is likely contributing to her low back and hip pain in the late afternoon.  -PD        PT Plan    PT Plan Comments (P)  We will continue to work on improving her proximal hip strength to reduce the strain on the lumbar spine when loaded during functional activities. We will continue to bolster her HEP as appropriate to assess her ability to independently manage her pain following discharge.  -PD       User Key  (r) = Recorded By, (t) = Taken By, (c) = Cosigned By    Initials Name Provider Type    PD Timur Stallworth, PT Student PT Student                    Exercises     Row Name 07/27/18 0900             Subjective Comments    Subjective Comments (P)  Patient reports she has started to experience a little more pain in the left side of her lumbar spine. She also reports her right hip has felt a little weak over the past few days, but she relates her weakness due to her increased activity She denies experiencing any falls since her last treatment session.  -PD         Subjective Pain    Able to rate subjective pain? (P)  yes  -PD      Pre-Treatment Pain Level (P)  0  -PD      Post-Treatment Pain Level (P)  0  -PD         Total Minutes    89890 - PT Therapeutic Exercise Minutes (P)  38  -PD         Exercise 1    Exercise Name 1 (P)  huber side-lying hip flexor stretch  -PD      Cueing 1 (P)  Tactile  -PD      Reps 1 (P)  3  -PD      Time 1 (P)  30 sec hold  -PD         Exercise 2    Exercise Name 2 (P)  bridges on Bosu ball with blue  side down  -PD      Cueing 2 (P)  Tactile;Verbal  -PD      Sets 2 (P)  2  -PD      Reps 2 (P)  10  -PD         Exercise 3    Exercise Name 3 (P)  sidesteppers with green Theraband  -PD      Cueing 3 (P)  Verbal;Tactile  -PD      Reps 3 (P)  2 laps  -PD      Time 3 (P)  40 feet each way  -PD      Additional Comments (P)  160 feet total  -PD        User Key  (r) = Recorded By, (t) = Taken By, (c) = Cosigned By    Initials Name Provider Type    PD Timur Stallworth, PT Student PT Student                               PT OP Goals     Row Name 07/27/18 0900          PT Short Term Goals    STG Date to Achieve (P)  07/26/18  -PD     STG 1 (P)  Patient will perform a supported SLS without hip drop or lateral pelvic drift  -PD     STG 1 Progress (P)  Ongoing  -PD     STG 2 (P)  Patient will report no right LE pain below the knee  -PD     STG 2 Progress (P)  Met  -PD        Long Term Goals    LTG Date to Achieve (P)  08/16/18  -PD     LTG 1 (P)  Patient will be independent with a comprehensive HEP  -PD     LTG 1 Progress (P)  Ongoing  -PD     LTG 2 (P)  Patient will improve Modified Oswestry score by 9 points indicating clinically significant change   -PD     LTG 2 Progress (P)  Ongoing  -PD     LTG 3 (P)  Patient will improve (R) hip abductor strength to at least 4/5 on MMT  -PD     LTG 3 Progress (P)  Progressing  -PD     LTG 3 Progress Comments (P)  4-/5 (L); 3+/5 (R)  -PD     LTG 4 (P)  Patient will demonstrate no lateral hip drift during midstance on either LE with observed ambulation  -PD     LTG 4 Progress (P)  Ongoing  -PD     LTG 4 Progress Comments (P)  Patient continues to demonstrate lateral hip drift during midstance with gait, but exhibits improved upright trunk posture with ambulation this date.  -PD     LTG 5 (P)  Patient will return to ambulating for exercise and performing all ADL's with symptoms <3/10  -PD     LTG 5 Progress (P)  Ongoing  -PD        Time Calculation    PT Goal Re-Cert Due Date (P)  08/04/18   -PD       User Key  (r) = Recorded By, (t) = Taken By, (c) = Cosigned By    Initials Name Provider Type    PD Timur Stallworth, PT Student PT Student          Therapy Education  Education Details: (P) standing hip abduction/extension at countertop  Given: (P) HEP  Program: (P) New  How Provided: (P) Verbal, Written  Provided to: (P) Patient  Level of Understanding: (P) Verbalized, Demonstrated              Time Calculation:   Start Time: (P) 0920  Stop Time: (P) 1001  Time Calculation (min): (P) 41 min  Total Timed Code Minutes- PT: (P) 38 minute(s)  Therapy Suggested Charges     Code   Minutes Charges    22934 (CPT®) Hc Pt Neuromusc Re Education Ea 15 Min      69458 (CPT®) Hc Pt Ther Proc Ea 15 Min 38 3    84619 (CPT®) Hc Gait Training Ea 15 Min      24845 (CPT®) Hc Pt Therapeutic Act Ea 15 Min      70044 (CPT®) Hc Pt Manual Therapy Ea 15 Min      80216 (CPT®) Hc Pt Ther Massage- Per 15 Min      63463 (CPT®) Hc Pt Iontophoresis Ea 15 Min      93622 (CPT®) Hc Pt Elec Stim Ea-Per 15 Min      18464 (CPT®) Hc Pt Ultrasound Ea 15 Min      91561 (CPT®) Hc Pt Self Care/Mgmt/Train Ea 15 Min      Total  38 3        Therapy Charges for Today     Code Description Service Date Service Provider Modifiers Qty    63122939649 HC PT THER PROC EA 15 MIN 7/27/2018 Timur Stallworth, PT Student GP 3                    Timur Stallworth, PT Student  7/27/2018

## 2018-07-30 ENCOUNTER — HOSPITAL ENCOUNTER (OUTPATIENT)
Dept: WOMENS IMAGING | Age: 73
Discharge: HOME | End: 2018-07-30
Payer: MEDICARE

## 2018-07-30 DIAGNOSIS — Z12.31 ENCOUNTER FOR SCREENING MAMMOGRAM FOR BREAST CANCER: ICD-10-CM

## 2018-07-30 PROCEDURE — 77063 BREAST TOMOSYNTHESIS BI: CPT

## 2018-07-31 ENCOUNTER — HOSPITAL ENCOUNTER (OUTPATIENT)
Dept: PHYSICAL THERAPY | Facility: HOSPITAL | Age: 73
Setting detail: THERAPIES SERIES
Discharge: HOME | End: 2018-07-31

## 2018-07-31 DIAGNOSIS — M54.16 LUMBAR RADICULOPATHY: ICD-10-CM

## 2018-07-31 DIAGNOSIS — G89.29 CHRONIC RIGHT-SIDED LOW BACK PAIN, WITH SCIATICA PRESENCE UNSPECIFIED: Primary | ICD-10-CM

## 2018-07-31 DIAGNOSIS — M54.5 CHRONIC RIGHT-SIDED LOW BACK PAIN, WITH SCIATICA PRESENCE UNSPECIFIED: Primary | ICD-10-CM

## 2018-07-31 PROCEDURE — 97110 THERAPEUTIC EXERCISES: CPT

## 2018-08-02 ENCOUNTER — HOSPITAL ENCOUNTER (OUTPATIENT)
Dept: PHYSICAL THERAPY | Facility: HOSPITAL | Age: 73
Setting detail: THERAPIES SERIES
Discharge: HOME | End: 2018-08-02

## 2018-08-02 DIAGNOSIS — G89.29 CHRONIC RIGHT-SIDED LOW BACK PAIN, WITH SCIATICA PRESENCE UNSPECIFIED: Primary | ICD-10-CM

## 2018-08-02 DIAGNOSIS — M54.5 CHRONIC RIGHT-SIDED LOW BACK PAIN, WITH SCIATICA PRESENCE UNSPECIFIED: Primary | ICD-10-CM

## 2018-08-02 DIAGNOSIS — M54.16 LUMBAR RADICULOPATHY: ICD-10-CM

## 2018-08-02 PROCEDURE — 97110 THERAPEUTIC EXERCISES: CPT

## 2018-08-02 PROCEDURE — G8978 MOBILITY CURRENT STATUS: HCPCS

## 2018-08-02 PROCEDURE — G8979 MOBILITY GOAL STATUS: HCPCS

## 2018-08-02 PROCEDURE — 97140 MANUAL THERAPY 1/> REGIONS: CPT

## 2018-08-02 NOTE — THERAPY PROGRESS REPORT/RE-CERT
Outpatient Physical Therapy Ortho Progress Note  [facility]     Patient Name: Shellie Villeda  : 1945  MRN: [id number]  Today's Date: 2018      Visit Date: 2018    Visit Dx:    ICD-10-CM ICD-9-CM   1. Chronic right-sided low back pain, with sciatica presence unspecified M54.5 724.2    G89.29 338.29   2. Lumbar radiculopathy M54.16 724.4       Patient Active Problem List   Diagnosis   • Closed compression fracture of third lumbar vertebra (CMS/HCC)   • Wedge compression fracture of third lumbar vertebra with delayed healing   • Chronic bilateral low back pain without sciatica   • Abdominal bloating   • Closed wedge compression fracture of third lumbar vertebra with routine healing   • Non-smoker   • Normal body mass index (BMI)        Past Medical History:   Diagnosis Date   • Compression fracture of lumbar vertebra (CMS/HCC)    • Depression    • Disc degeneration, lumbar    • History of basal cell carcinoma    • History of hyperlipidemia    • History of insomnia    • History of multiple pulmonary nodules    • History of palpitations    • History of urinary tract infection    • Low back pain    • Osteoporosis    • Osteoporosis    • Skin cancer     basal cell   • Vitamin D insufficiency         Past Surgical History:   Procedure Laterality Date   • BASAL CELL CARCINOMA EXCISION      SEVERAL OFF FACE   • BREAST BIOPSY      benign   • COLONOSCOPY  2006   • COLONOSCOPY N/A 3/9/2017    Procedure: COLONOSCOPY WITH ANESTHESIA;  Surgeon: Arthur Harris MD;  Location: [facility] ENDOSCOPY;  Service:    • KYPHOPLASTY N/A 2016    Procedure: KYPHOPLASTY L3;  Surgeon: Ricky Ferguson MD;  Location: [facility] OR;  Service:                              PT Assessment/Plan     Row Name 18 0900          PT Assessment    Functional Limitations (P)  Performance in leisure activities;Limitation in home management;Limitations in community activities  -PD     Impairments (P)  Balance;Impaired aerobic  capacity;Joint mobility;Muscle strength;Pain;Posture;Range of motion  -PD     Assessment Comments (P)  Overall, the patient is doing well with therapy and continues to make steady progress toward all of her goals. The patient demonstrates improvements in her core and proximal hip musculature, as well as her ability to complete functional activities without pain. The patient does continue to exhibit some proximal hip weakness which will need to be addressed to improve her performance with functional activities. The patient will also benefit from additional therapy to improve her balance to reduce her fall risk and potential for fracture given her osteporosis.  -PD     Please refer to paper survey for additional self-reported information (P)  Yes  -PD     Rehab Potential (P)  Good  -PD     Patient/caregiver participated in establishment of treatment plan and goals (P)  Yes  -PD     Patient would benefit from skilled therapy intervention (P)  Yes  -PD        PT Plan    PT Frequency (P)  2x/week  -PD     Predicted Duration of Therapy Intervention (Therapy Eval) (P)  3-4 weeks  -PD     Planned CPT's? (P)  PT THER PROC EA 15 MIN: 98952;PT MANUAL THERAPY EA 15 MIN: 14306;PT NEUROMUSC RE-EDUCATION EA 15 MIN: 56856;PT GAIT TRAINING EA 15 MIN: 90233;PT HOT OR COLD PACK TREAT MCARE;PT ELECTRICAL STIM UNATTEND: ;PT ELECTRICAL STIM ATTD EA 15 MIN: 10394  -PD     Physical Therapy Interventions (Optional Details) (P)  balance training;home exercise program;joint mobilization;lumbar stabilization;manual therapy techniques;modalities;neuromuscular re-education;patient/family education;postural re-education;ROM (Range of Motion);stair training;strengthening;stretching;swiss ball techniques  -PD     PT Plan Comments (P)  We will continue to work to address her proximal hip strength weakness and improve her balance under various conditions. We will continue to work to improve her gait mechanics to reduce the stress on the lumbar  spine. Bolstering her HEP will continue to be a focus moving forward.  -PD       User Key  (r) = Recorded By, (t) = Taken By, (c) = Cosigned By    Initials Name Provider Type    PD Basim Timur C, PT Student PT Student                    Exercises     Row Name 08/02/18 1100 08/02/18 1000 08/02/18 0900       Subjective Comments    Subjective Comments  --  -- (P)  Patient reports she is doing well today and that she has no new complaints. She denies experiencing any falls since her last treatment session. She has been doing her home exercise program.  -PD       Subjective Pain    Able to rate subjective pain?  --  -- (P)  yes  -PD    Pre-Treatment Pain Level  --  -- (P)  0  -PD    Post-Treatment Pain Level  --  -- (P)  0  -PD       Total Minutes    20660 - PT Therapeutic Exercise Minutes  --  -- (P)  30  -PD    52357 - PT Manual Therapy Minutes (P)  --  -PD (P)  --  -PD (P)  8  -PD       Exercise 1    Exercise Name 1  --  -- (P)  huber side-lying hip flexor stretch  -PD    Cueing 1  --  -- (P)  Tactile  -PD    Reps 1  --  -- (P)  3  -PD    Time 1  --  -- (P)  30 sec hold  -PD       Exercise 2    Exercise Name 2  --  -- (P)  tandem stance on blue disc  -PD    Cueing 2  --  -- (P)  Verbal;Tactile  -PD    Reps 2  --  -- (P)  3  -PD    Time 2  --  -- (P)  30 sec hold  -PD    Additional Comments  --  -- (P)  bilateral; noticeable difficulty maintaining upright posture on the left versus the right with LOB x2 observed  -PD       Exercise 3    Exercise Name 3  --  -- (P)  side-lying hip extension/abduction with 65 cm Physioball  -PD    Cueing 3  --  -- (P)  Verbal;Tactile  -PD    Sets 3  --  -- (P)  2  -PD    Reps 3  --  -- (P)  15  -PD    Time 3  --  -- (P)  3 sec hold  -PD       Exercise 4    Exercise Name 4  --  -- (P)  assessed all goals for progress note  -PD    Time 4  --  -- (P)  5 min  -PD      User Key  (r) = Recorded By, (t) = Taken By, (c) = Cosigned By    Initials Name Provider Type    PD Timur Stallworth, PT Student  PT Student                        Manual Rx (last 36 hours)      Manual Treatments     Row Name 08/02/18 1100 08/02/18 1000 08/02/18 0900       Total Minutes    43345 - PT Manual Therapy Minutes  -- (P)  --  -PD (P)  8  -PD       Manual Rx 1    Manual Rx 1 Location (P)  --  -PD (P)  --  -PD (P)  (R) L3-L4 & L4-L5  -PD    Manual Rx 1 Type (P)  --  -PD (P)  --  -PD (P)  rotary mobilizations  -PD    Manual Rx 1 Grade (P)  --  -PD (P)  --  -PD (P)  2/3  -PD    Manual Rx 1 Duration (P)  --  -PD (P)  --  -PD (P)   4 min each; 8 min total  -PD      User Key  (r) = Recorded By, (t) = Taken By, (c) = Cosigned By    Initials Name Provider Type    PD Basim Timur UMANG, PT Student PT Student                PT OP Goals     Row Name 08/02/18 1000 08/02/18 0900       PT Short Term Goals    STG Date to Achieve  -- (P)  08/16/18  -PD    STG 1  -- (P)  Patient will perform a supported SLS without hip drop or lateral pelvic drift  -PD    STG 1 Progress  -- (P)  Ongoing  -PD    STG 1 Progress Comments  -- (P)  Patient continues to demonstrate hip drop with SLS, bilaterally, but demonstrates improved hip abduction strength and ability to maintain SLS.  -PD    STG 2  -- (P)  Patient will report no right LE pain below the knee  -PD    STG 2 Progress  -- (P)  Met  -PD       Long Term Goals    LTG Date to Achieve  -- (P)  09/01/18  -PD    LTG 1  -- (P)  Patient will be independent with a comprehensive HEP  -PD    LTG 1 Progress  -- (P)  Progressing  -PD    LTG 1 Progress Comments  -- (P)  Patient continues to be compliant with her HEP.  -PD    LTG 2  -- (P)  Patient will improve Modified Oswestry score by 9 points indicating clinically significant change   -PD    LTG 2 Progress  -- (P)  Ongoing  -PD    LTG 2 Progress Comments  -- (P)  26% today  -PD    LTG 3  -- (P)  Patient will improve (R) hip abductor strength to at least 4/5 on MMT  -PD    LTG 3 Progress  -- (P)  Progressing  -PD    LTG 3 Progress Comments  -- (P)  (L) hip abd: 4-/5; (R)  hip abd: 4-/5  -PD    LTG 4  -- (P)  Patient will demonstrate no lateral hip drift during midstance on either LE with observed ambulation  -PD    LTG 4 Progress  -- (P)  Progressing  -PD    LTG 4 Progress Comments  -- (P)  Patient continues to demonstrate mild lateral pelvic drift during midstance, bilaterally.  -PD    LTG 5  -- (P)  Patient will return to ambulating for exercise and performing all ADL's with symptoms <3/10  -PD    LTG 5 Progress  -- (P)  Progressing  -PD    LTG 5 Progress Comments  -- (P)  Patient has consistently reported no greater than 2/10 pain with her ADL's over the past week.  -PD       Time Calculation    PT Goal Re-Cert Due Date (P)  --  -PD (P)  09/01/18  -PD      User Key  (r) = Recorded By, (t) = Taken By, (c) = Cosigned By    Initials Name Provider Type    Timur Bolaños, PT Student PT Student          Therapy Education  Given: (P) HEP, Symptoms/condition management  Program: (P) Reinforced  How Provided: (P) Verbal  Provided to: (P) Patient  Level of Understanding: (P) Verbalized    Outcome Measure Options: (P) Renetta Mccollum  Modified Oswestry  Modified Oswestry Score/Comments: (P) 26%      Time Calculation:   Start Time: (P) 0934  Stop Time: (P) 1015  Time Calculation (min): (P) 41 min  Total Timed Code Minutes- PT: (P) 38 minute(s)  Therapy Suggested Charges     Code   Minutes Charges    65944 (CPT®) Hc Pt Neuromusc Re Education Ea 15 Min      89705 (CPT®) Hc Pt Ther Proc Ea 15 Min 30 2    43698 (CPT®) Hc Gait Training Ea 15 Min      69991 (CPT®) Hc Pt Therapeutic Act Ea 15 Min      69707 (CPT®) Hc Pt Manual Therapy Ea 15 Min 8 1    03151 (CPT®) Hc Pt Ther Massage- Per 15 Min      02428 (CPT®) Hc Pt Iontophoresis Ea 15 Min      35187 (CPT®) Hc Pt Elec Stim Ea-Per 15 Min      30345 (CPT®) Hc Pt Ultrasound Ea 15 Min      20605 (CPT®) Hc Pt Self Care/Mgmt/Train Ea 15 Min      Total  38 3        Therapy Charges for Today     Code Description Service Date Service Provider Modifiers  Qty    56986792043  PT THER PROC EA 15 MIN 8/2/2018 Timur Stallworth, PT Student GP 2    [id number]  PT MANUAL THERAPY EA 15 MIN 8/2/2018 Timur Stallworth, PT Student GP 1          PT G-Codes  Outcome Measure Options: (P) Modifed Owestry  Score: (P) 26%  Functional Limitation: (P) Mobility: Walking and moving around  Mobility: Walking and Moving Around Current Status (): (P) At least 20 percent but less than 40 percent impaired, limited or restricted  Mobility: Walking and Moving Around Goal Status (): (P) 0 percent impaired, limited or restricted         Timur Stallworth, PT Student  8/2/2018

## 2018-08-07 ENCOUNTER — HOSPITAL ENCOUNTER (OUTPATIENT)
Dept: PHYSICAL THERAPY | Facility: HOSPITAL | Age: 73
Setting detail: THERAPIES SERIES
Discharge: HOME | End: 2018-08-07

## 2018-08-07 DIAGNOSIS — M54.16 LUMBAR RADICULOPATHY: ICD-10-CM

## 2018-08-07 DIAGNOSIS — G89.29 CHRONIC RIGHT-SIDED LOW BACK PAIN, WITH SCIATICA PRESENCE UNSPECIFIED: Primary | ICD-10-CM

## 2018-08-07 DIAGNOSIS — M54.5 CHRONIC RIGHT-SIDED LOW BACK PAIN, WITH SCIATICA PRESENCE UNSPECIFIED: Primary | ICD-10-CM

## 2018-08-07 PROCEDURE — 97110 THERAPEUTIC EXERCISES: CPT

## 2018-08-07 NOTE — THERAPY TREATMENT NOTE
Outpatient Physical Therapy Ortho Treatment Note  [facility]     Patient Name: Shellie Villeda  : 1945  MRN: [id number]  Today's Date: 2018      Visit Date: 2018    Visit Dx:    ICD-10-CM ICD-9-CM   1. Chronic right-sided low back pain, with sciatica presence unspecified M54.5 724.2    G89.29 338.29   2. Lumbar radiculopathy M54.16 724.4       Patient Active Problem List   Diagnosis   • Closed compression fracture of third lumbar vertebra (CMS/HCC)   • Wedge compression fracture of third lumbar vertebra with delayed healing   • Chronic bilateral low back pain without sciatica   • Abdominal bloating   • Closed wedge compression fracture of third lumbar vertebra with routine healing   • Non-smoker   • Normal body mass index (BMI)        Past Medical History:   Diagnosis Date   • Compression fracture of lumbar vertebra (CMS/HCC)    • Depression    • Disc degeneration, lumbar    • History of basal cell carcinoma    • History of hyperlipidemia    • History of insomnia    • History of multiple pulmonary nodules    • History of palpitations    • History of urinary tract infection    • Low back pain    • Osteoporosis    • Osteoporosis    • Skin cancer     basal cell   • Vitamin D insufficiency         Past Surgical History:   Procedure Laterality Date   • BASAL CELL CARCINOMA EXCISION      SEVERAL OFF FACE   • BREAST BIOPSY      benign   • COLONOSCOPY  2006   • COLONOSCOPY N/A 3/9/2017    Procedure: COLONOSCOPY WITH ANESTHESIA;  Surgeon: Arthur Harris MD;  Location: [facility] ENDOSCOPY;  Service:    • KYPHOPLASTY N/A 2016    Procedure: KYPHOPLASTY L3;  Surgeon: Ricky Ferguson MD;  Location: [facility] OR;  Service:                              PT Assessment/Plan     Row Name 18 1000 18 0900       PT Assessment    Assessment Comments (P)  Patient is doing well with therapy and continues to tolerate exercise progression with minimal increase in her low back or right lower  extremity symptoms. Today's treatment session focused on continuing to load the spine while focusing on improving her spinal extensor and proximal hip strength. The patient tolerated exercise progression, but did report experiencing fatigue in her low back following several CKC exercises. The patient was educated that this is a normal response to exercises given her osteoporosis and strength deficits. We will continue to progress her exercise program as tolerated.  -PD (P)  --  -PD       PT Plan    PT Plan Comments (P)  We will continue to progress her exercise program to strengthen her spinal extensors and proximal hip musculature. We will continue to emphasize functional activities in weight bearing positions. Bolstering her HEP will continue to be a focus of her plan of care as we begin planning for discharge.  -PD  --      User Key  (r) = Recorded By, (t) = Taken By, (c) = Cosigned By    Initials Name Provider Type    PD Timur Stallworth, PT Student PT Student                    Exercises     Row Name 08/07/18 1000             Subjective Comments    Subjective Comments (P)  Patient reports she is feeling okay today. She reports she has begun to feel like her hip is giving way on her.  -PD         Subjective Pain    Able to rate subjective pain? (P)  yes  -PD      Pre-Treatment Pain Level (P)  0  -PD      Post-Treatment Pain Level (P)  0  -PD         Total Minutes    62380 - PT Therapeutic Exercise Minutes (P)  40  -PD         Exercise 1    Exercise Name 1 (P)  huber side-lying hip flexor stretch  -PD      Cueing 1 (P)  Tactile  -PD      Reps 1 (P)  3  -PD      Time 1 (P)  30 sec hold  -PD         Exercise 2    Exercise Name 2 (P)  wall squats with 65 cm Physioball placed behind trunk and green Theraband around knees to encourage hip abduction  -PD      Cueing 2 (P)  Verbal;Tactile  -PD      Sets 2 (P)  3  -PD      Reps 2 (P)  10  -PD      Time 2 (P)  3 sec hold  -PD         Exercise 3    Exercise Name 3 (P)  nael  on Bosu ball  -PD      Cueing 3 (P)  Demo;Verbal  -PD      Sets 3 (P)  2  -PD      Reps 3 (P)  10  -PD      Time 3 (P)  3 sec hold  -PD      Additional Comments (P)  bilateral  -PD         Exercise 4    Exercise Name 4 (P)  standing rows at [employer]; 2 plates used  -PD      Cueing 4 (P)  Verbal;Tactile  -PD      Sets 4 (P)  2  -PD      Reps 4 (P)  12  -PD      Time 4 (P)  3 sec hold  -PD         Exercise 5    Exercise Name 5 (P)  seated alternating marches on 65 cm Physioball  -PD      Cueing 5 (P)  Demo;Verbal;Tactile  -PD      Sets 5 (P)  2  -PD      Reps 5 (P)  15  -PD      Time 5 (P)  5 sec hold  -PD      Additional Comments (P)  bilateral  -PD        User Key  (r) = Recorded By, (t) = Taken By, (c) = Cosigned By    Initials Name Provider Type    PD Timur Stallworth, PT Student PT Student                               PT OP Goals     Row Name 08/07/18 1000          PT Short Term Goals    STG Date to Achieve (P)  08/16/18  -PD     STG 1 (P)  Patient will perform a supported SLS without hip drop or lateral pelvic drift  -PD     STG 1 Progress (P)  Ongoing  -PD     STG 2 (P)  Patient will report no right LE pain below the knee  -PD     STG 2 Progress (P)  Met  -PD        Long Term Goals    LTG Date to Achieve (P)  09/01/18  -PD     LTG 1 (P)  Patient will be independent with a comprehensive HEP  -PD     LTG 1 Progress (P)  Progressing  -PD     LTG 2 (P)  Patient will improve Modified Oswestry score by 9 points indicating clinically significant change   -PD     LTG 2 Progress (P)  Ongoing  -PD     LTG 3 (P)  Patient will improve (R) hip abductor strength to at least 4/5 on MMT  -PD     LTG 3 Progress (P)  Progressing  -PD     LTG 3 Progress Comments (P)  Patient continues to demonstrate improved ability to tolerate exercises to strengthen her hip abductors.  -PD     LTG 4 (P)  Patient will demonstrate no lateral hip drift during midstance on either LE with observed ambulation  -PD     LTG 4 Progress (P)  Progressing  -PD      LTG 4 Progress Comments (P)  Patient continues to demonstrate lateral hip drift during midstance (left > right).  -PD     LTG 5 (P)  Patient will return to ambulating for exercise and performing all ADL's with symptoms <3/10  -PD     LTG 5 Progress (P)  Progressing  -PD        Time Calculation    PT Goal Re-Cert Due Date (P)  09/01/18  -PD       User Key  (r) = Recorded By, (t) = Taken By, (c) = Cosigned By    Initials Name Provider Type    PD Timur Stallworth, PT Student PT Student          Therapy Education  Given: (P) HEP, Symptoms/condition management  Program: (P) Reinforced  How Provided: (P) Verbal  Provided to: (P) Patient  Level of Understanding: (P) Verbalized              Time Calculation:   Start Time: (P) 1019  Stop Time: (P) 1102  Time Calculation (min): (P) 43 min  Total Timed Code Minutes- PT: (P) 40 minute(s)  Therapy Suggested Charges     Code   Minutes Charges    38328 (CPT®) Hc Pt Neuromusc Re Education Ea 15 Min      28343 (CPT®) Hc Pt Ther Proc Ea 15 Min 40 3    52046 (CPT®) Hc Gait Training Ea 15 Min      69895 (CPT®) Hc Pt Therapeutic Act Ea 15 Min      10689 (CPT®) Hc Pt Manual Therapy Ea 15 Min      16246 (CPT®) Hc Pt Ther Massage- Per 15 Min      68891 (CPT®) Hc Pt Iontophoresis Ea 15 Min      12391 (CPT®) Hc Pt Elec Stim Ea-Per 15 Min      15279 (CPT®) Hc Pt Ultrasound Ea 15 Min      45957 (CPT®) Hc Pt Self Care/Mgmt/Train Ea 15 Min      Total  40 3        Therapy Charges for Today     Code Description Service Date Service Provider Modifiers Qty    56150988336 HC PT THER PROC EA 15 MIN 8/7/2018 Timur Stallworth, PT Student GP 3                    Timur Stallworth, PT Student  8/7/2018

## 2018-08-17 ENCOUNTER — HOSPITAL ENCOUNTER (OUTPATIENT)
Dept: PHYSICAL THERAPY | Facility: HOSPITAL | Age: 73
Setting detail: THERAPIES SERIES
Discharge: HOME | End: 2018-08-17

## 2018-08-17 PROCEDURE — 97110 THERAPEUTIC EXERCISES: CPT

## 2018-08-17 NOTE — THERAPY TREATMENT NOTE
Outpatient Physical Therapy Ortho Treatment Note  [facility]     Patient Name: Shellie Villeda  : 1945  MRN: [id number]  Today's Date: 2018      Visit Date: 2018    Visit Dx:  No diagnosis found.    Patient Active Problem List   Diagnosis   • Closed compression fracture of third lumbar vertebra (CMS/HCC)   • Wedge compression fracture of third lumbar vertebra with delayed healing   • Chronic bilateral low back pain without sciatica   • Abdominal bloating   • Closed wedge compression fracture of third lumbar vertebra with routine healing   • Non-smoker   • Normal body mass index (BMI)        Past Medical History:   Diagnosis Date   • Compression fracture of lumbar vertebra (CMS/HCC)    • Depression    • Disc degeneration, lumbar    • History of basal cell carcinoma    • History of hyperlipidemia    • History of insomnia    • History of multiple pulmonary nodules    • History of palpitations    • History of urinary tract infection    • Low back pain    • Osteoporosis    • Osteoporosis    • Skin cancer     basal cell   • Vitamin D insufficiency         Past Surgical History:   Procedure Laterality Date   • BASAL CELL CARCINOMA EXCISION      SEVERAL OFF FACE   • BREAST BIOPSY      benign   • COLONOSCOPY  2006   • COLONOSCOPY N/A 3/9/2017    Procedure: COLONOSCOPY WITH ANESTHESIA;  Surgeon: Arthur Harris MD;  Location: [facility] ENDOSCOPY;  Service:    • KYPHOPLASTY N/A 2016    Procedure: KYPHOPLASTY L3;  Surgeon: Ricky Ferguson MD;  Location: [facility] OR;  Service:                              PT Assessment/Plan     Row Name 18 0900          PT Assessment    Assessment Comments Patient had no flares after travel.  We are approaching discharge next week barring any flares.  Postural control is much improved with less posterior sway.  -RS        PT Plan    PT Plan Comments D/C next week after 2 sessions; hung HEP to work toward final progression.  -RS       User Key  (r) =  Recorded By, (t) = Taken By, (c) = Cosigned By    Initials Name Provider Type    Kingsley Subramanian, PT, DPT, OCS Physical Therapist                    Exercises     Row Name 08/17/18 0900             Subjective Comments    Subjective Comments Patient is fatigued from her trip.  she has no new issues or pain other than mild swelling after walking long distances.  The low back held up well.   -RS         Subjective Pain    Able to rate subjective pain? yes  -RS      Pre-Treatment Pain Level 0  -RS      Post-Treatment Pain Level 0  -RS         Total Minutes    82108 - PT Therapeutic Exercise Minutes 40  -RS         Exercise 1    Exercise Name 1 huber side-lying hip flexor stretch  -RS      Cueing 1 Tactile  -RS      Reps 1 3  -RS      Time 1 30 sec hold  -RS         Exercise 2    Exercise Name 2 wall squats with 65 cm Physioball placed behind trunk and green Theraband around knees to encourage hip abduction  -RS      Cueing 2 Verbal;Tactile  -RS      Sets 2 3  -RS      Reps 2 10  -RS      Time 2 3 sec hold  -RS         Exercise 3    Exercise Name 3 lunges on Bosu ball  -RS      Cueing 3 Demo;Verbal  -RS      Sets 3 2  -RS      Reps 3 10  -RS      Time 3 3 sec hold  -RS         Exercise 4    Exercise Name 4 standing rows at [employer]; 2 plates used  -RS      Cueing 4 Verbal;Tactile  -RS      Sets 4 2  -RS      Reps 4 12  -RS      Time 4 3 sec hold  -RS         Exercise 5    Exercise Name 5 seated alternating marches on 65 cm Physioball  -RS      Cueing 5 Demo;Verbal;Tactile  -RS      Sets 5 2  -RS      Reps 5 15  -RS      Time 5 5 sec hold  -RS         Exercise 6    Exercise Name 6 facing wall:  wall ball march with 45 cm physioball  -RS      Cueing 6 Demo  -RS      Sets 6 3  -RS      Reps 6 15  -RS      Additional Comments cues to avoid posterior lean  -RS        User Key  (r) = Recorded By, (t) = Taken By, (c) = Cosigned By    Initials Name Provider Type    Kingsley Subramanian, PT, DPT, OCS Physical Therapist                                PT OP Goals     Row Name 08/17/18 0900          PT Short Term Goals    STG Date to Achieve 08/16/18  -RS     STG 1 Patient will perform a supported SLS without hip drop or lateral pelvic drift  -RS     STG 1 Progress Ongoing  -RS     STG 2 Patient will report no right LE pain below the knee  -RS     STG 2 Progress Met  -RS        Long Term Goals    LTG Date to Achieve 09/01/18  -RS     LTG 1 Patient will be independent with a comprehensive HEP  -RS     LTG 1 Progress Progressing  -RS     LTG 1 Progress Comments compliant  -RS     LTG 2 Patient will improve Modified Oswestry score by 9 points indicating clinically significant change   -RS     LTG 2 Progress Ongoing  -RS     LTG 3 Patient will improve (R) hip abductor strength to at least 4/5 on MMT  -RS     LTG 3 Progress Progressing  -RS     LTG 4 Patient will demonstrate no lateral hip drift during midstance on either LE with observed ambulation  -RS     LTG 4 Progress Progressing  -RS     LTG 4 Progress Comments less lateral drift noted  -RS     LTG 5 Patient will return to ambulating for exercise and performing all ADL's with symptoms <3/10  -RS     LTG 5 Progress Progressing  -RS        Time Calculation    PT Goal Re-Cert Due Date 09/01/18  -       User Key  (r) = Recorded By, (t) = Taken By, (c) = Cosigned By    Initials Name Provider Type    Kingsley Subramanian, PT, DPT, OCS Physical Therapist          Therapy Education  Given: HEP  Program: Reinforced  How Provided: Verbal  Provided to: Patient  Level of Understanding: Verbalized              Time Calculation:   Start Time: 0918  Stop Time: 1001  Time Calculation (min): 43 min  Total Timed Code Minutes- PT: 40 minute(s)  Therapy Suggested Charges     Code   Minutes Charges    48097 (CPT®) Hc Pt Neuromusc Re Education Ea 15 Min      00424 (CPT®) Hc Pt Ther Proc Ea 15 Min 40 3    84764 (CPT®) Hc Gait Training Ea 15 Min      57806 (CPT®) Hc Pt Therapeutic Act Ea 15 Min       16874 (CPT®) Hc Pt Manual Therapy Ea 15 Min      63654 (CPT®) Hc Pt Ther Massage- Per 15 Min      86828 (CPT®) Hc Pt Iontophoresis Ea 15 Min      36953 (CPT®) Hc Pt Elec Stim Ea-Per 15 Min      69543 (CPT®) Hc Pt Ultrasound Ea 15 Min      73004 (CPT®) Hc Pt Self Care/Mgmt/Train Ea 15 Min      53737 (CPT®) Hc Pt Prosthetic (S) Train Initial Encounter, Each 15 Min      83884 (CPT®) Hc Orthotic(S) Mgmt/Train Initial Encounter, Each 15min      05856 (CPT®) Hc Pt Aquatic Therapy Ea 15 Min      89838 (CPT®) Hc Pt Orthotic(S)/Prosthetic(S) Encounter, Each 15 Min      Total  40 3        Therapy Charges for Today     Code Description Service Date Service Provider Modifiers Qty    85948393653 HC PT THER PROC EA 15 MIN 8/17/2018 Kingsley Su, PT, DPT, OCS GP 3                    ANA LILIA Su, PT, DPT, OCS  8/17/2018

## 2018-08-20 ENCOUNTER — HOSPITAL ENCOUNTER (OUTPATIENT)
Dept: PHYSICAL THERAPY | Facility: HOSPITAL | Age: 73
Setting detail: THERAPIES SERIES
Discharge: HOME | End: 2018-08-20

## 2018-08-20 DIAGNOSIS — M54.5 CHRONIC RIGHT-SIDED LOW BACK PAIN, WITH SCIATICA PRESENCE UNSPECIFIED: Primary | ICD-10-CM

## 2018-08-20 DIAGNOSIS — M54.16 LUMBAR RADICULOPATHY: ICD-10-CM

## 2018-08-20 DIAGNOSIS — M54.50 ACUTE MIDLINE LOW BACK PAIN WITHOUT SCIATICA: ICD-10-CM

## 2018-08-20 DIAGNOSIS — G89.29 CHRONIC RIGHT-SIDED LOW BACK PAIN, WITH SCIATICA PRESENCE UNSPECIFIED: Primary | ICD-10-CM

## 2018-08-20 PROCEDURE — 97110 THERAPEUTIC EXERCISES: CPT

## 2018-08-20 NOTE — THERAPY TREATMENT NOTE
Outpatient Physical Therapy Ortho Treatment Note  [facility]     Patient Name: Shellie Villeda  : 1945  MRN: [id number]  Today's Date: 2018      Visit Date: 2018    Visit Dx:    ICD-10-CM ICD-9-CM   1. Chronic right-sided low back pain, with sciatica presence unspecified M54.5 724.2    G89.29 338.29   2. Lumbar radiculopathy M54.16 724.4   3. Acute midline low back pain without sciatica M54.5 724.2       Patient Active Problem List   Diagnosis   • Closed compression fracture of third lumbar vertebra (CMS/HCC)   • Wedge compression fracture of third lumbar vertebra with delayed healing   • Chronic bilateral low back pain without sciatica   • Abdominal bloating   • Closed wedge compression fracture of third lumbar vertebra with routine healing   • Non-smoker   • Normal body mass index (BMI)        Past Medical History:   Diagnosis Date   • Compression fracture of lumbar vertebra (CMS/HCC)    • Depression    • Disc degeneration, lumbar    • History of basal cell carcinoma    • History of hyperlipidemia    • History of insomnia    • History of multiple pulmonary nodules    • History of palpitations    • History of urinary tract infection    • Low back pain    • Osteoporosis    • Osteoporosis    • Skin cancer     basal cell   • Vitamin D insufficiency         Past Surgical History:   Procedure Laterality Date   • BASAL CELL CARCINOMA EXCISION      SEVERAL OFF FACE   • BREAST BIOPSY      benign   • COLONOSCOPY  2006   • COLONOSCOPY N/A 3/9/2017    Procedure: COLONOSCOPY WITH ANESTHESIA;  Surgeon: Arthur Harris MD;  Location: [facility] ENDOSCOPY;  Service:    • KYPHOPLASTY N/A 2016    Procedure: KYPHOPLASTY L3;  Surgeon: Ricky Ferguson MD;  Location: [facility] OR;  Service:                              PT Assessment/Plan     Row Name 18 0900          PT Assessment    Assessment Comments We will use the next session to bolster the HEP for final program.  Patient has met all but one  goal at this time and will benefit from one additional session before  formal discharge.  -RS        PT Plan    PT Plan Comments D/C and G code next session.  -RS       User Key  (r) = Recorded By, (t) = Taken By, (c) = Cosigned By    Initials Name Provider Type    Kingsley Subramanian, PT, DPT, OCS Physical Therapist                    Exercises     Row Name 08/20/18 0900             Subjective Comments    Subjective Comments pt reports that she is doing well today.  No falls and no new issues  -RS         Subjective Pain    Able to rate subjective pain? yes  -RS      Pre-Treatment Pain Level 0  -RS      Post-Treatment Pain Level 0  -RS         Total Minutes    08221 - PT Therapeutic Exercise Minutes 41  -RS         Exercise 1    Exercise Name 1 huber side-lying hip flexor stretch  -RS      Cueing 1 Tactile  -RS      Reps 1 3  -RS      Time 1 30 sec hold  -RS         Exercise 2    Exercise Name 2 tandem stance on blue disc  -RS      Cueing 2 Verbal;Tactile  -RS      Reps 2 3  -RS      Time 2 30 sec hold  -RS         Exercise 3    Exercise Name 3 side-lying hip extension/abduction with 65 cm Physioball  -RS      Cueing 3 Verbal;Tactile  -RS      Sets 3 2  -RS      Reps 3 15  -RS      Time 3 3 sec hold  -RS         Exercise 4    Exercise Name 4 standing balance with CW circles around cone target:  stood on blue dynadisk  -RS      Cueing 4 Verbal  -RS      Sets 4 3  -RS      Time 4 1 min   -RS      Additional Comments each LE  -RS         Exercise 5    Exercise Name 5 bridge with march without elbow support  -RS      Cueing 5 Demo  -RS      Sets 5 3  -RS      Reps 5 15  -RS        User Key  (r) = Recorded By, (t) = Taken By, (c) = Cosigned By    Initials Name Provider Type    Kingsley Subramanian, PT, DPT, OCS Physical Therapist                               PT OP Goals     Row Name 08/20/18 0900          PT Short Term Goals    STG Date to Achieve 08/16/18  -RS     STG 1 Patient will perform a supported SLS  without hip drop or lateral pelvic drift  -RS     STG 1 Progress Met  -RS     STG 2 Patient will report no right LE pain below the knee  -RS     STG 2 Progress Met  -RS        Long Term Goals    LTG Date to Achieve 09/01/18  -RS     LTG 1 Patient will be independent with a comprehensive HEP  -RS     LTG 1 Progress Met  -RS     LTG 2 Patient will improve Modified Oswestry score by 9 points indicating clinically significant change   -RS     LTG 2 Progress Ongoing  -RS     LTG 2 Progress Comments will assess next session for discharge  -RS     LTG 3 Patient will improve (R) hip abductor strength to at least 4/5 on MMT  -RS     LTG 3 Progress Met  -RS     LTG 4 Patient will demonstrate no lateral hip drift during midstance on either LE with observed ambulation  -RS     LTG 4 Progress Met  -RS     LTG 5 Patient will return to ambulating for exercise and performing all ADL's with symptoms <3/10  -RS     LTG 5 Progress Met  -RS       User Key  (r) = Recorded By, (t) = Taken By, (c) = Cosigned By    Initials Name Provider Type    Kingsley Subramanian, PT, DPT, OCS Physical Therapist                         Time Calculation:      Therapy Suggested Charges     Code   Minutes Charges    62380 (CPT®) Hc Pt Neuromusc Re Education Ea 15 Min      29364 (CPT®) Hc Pt Ther Proc Ea 15 Min 41 3    87160 (CPT®) Hc Gait Training Ea 15 Min      65240 (CPT®) Hc Pt Therapeutic Act Ea 15 Min      78908 (CPT®) Hc Pt Manual Therapy Ea 15 Min      79095 (CPT®) Hc Pt Ther Massage- Per 15 Min      34972 (CPT®) Hc Pt Iontophoresis Ea 15 Min      60194 (CPT®) Hc Pt Elec Stim Ea-Per 15 Min      61189 (CPT®) Hc Pt Ultrasound Ea 15 Min      76419 (CPT®) Hc Pt Self Care/Mgmt/Train Ea 15 Min      66418 (CPT®) Hc Pt Prosthetic (S) Train Initial Encounter, Each 15 Min      92356 (CPT®) Hc Orthotic(S) Mgmt/Train Initial Encounter, Each 15min      31480 (CPT®) Hc Pt Aquatic Therapy Ea 15 Min      79453 (CPT®) Hc Pt Orthotic(S)/Prosthetic(S) Encounter,  Each 15 Min      Total  41 3        Therapy Charges for Today     Code Description Service Date Service Provider Modifiers Qty    39445515191 HC PT THER PROC EA 15 MIN 8/20/2018 Kingsley Su, PT, DPT, OCS GP 3                    ANA LILIA Su, PT, DPT, OCS  8/20/2018

## 2018-08-23 ENCOUNTER — HOSPITAL ENCOUNTER (OUTPATIENT)
Dept: PHYSICAL THERAPY | Facility: HOSPITAL | Age: 73
Setting detail: THERAPIES SERIES
Discharge: HOME | End: 2018-08-23

## 2018-08-23 DIAGNOSIS — M54.50 ACUTE MIDLINE LOW BACK PAIN WITHOUT SCIATICA: ICD-10-CM

## 2018-08-23 DIAGNOSIS — M54.16 LUMBAR RADICULOPATHY: ICD-10-CM

## 2018-08-23 DIAGNOSIS — M54.5 CHRONIC RIGHT-SIDED LOW BACK PAIN, WITH SCIATICA PRESENCE UNSPECIFIED: Primary | ICD-10-CM

## 2018-08-23 DIAGNOSIS — G89.29 CHRONIC RIGHT-SIDED LOW BACK PAIN, WITH SCIATICA PRESENCE UNSPECIFIED: Primary | ICD-10-CM

## 2018-08-23 PROCEDURE — 97110 THERAPEUTIC EXERCISES: CPT

## 2018-08-23 PROCEDURE — G8979 MOBILITY GOAL STATUS: HCPCS

## 2018-08-23 PROCEDURE — G8980 MOBILITY D/C STATUS: HCPCS

## 2018-08-23 NOTE — THERAPY DISCHARGE NOTE
Outpatient Physical Therapy Ortho Treatment Note/Discharge Summary  [facility]     Patient Name: Shellie Villeda  : 1945  MRN: [id number]  Today's Date: 2018      Visit Date: 2018    Visit Dx:    ICD-10-CM ICD-9-CM   1. Chronic right-sided low back pain, with sciatica presence unspecified M54.5 724.2    G89.29 338.29   2. Lumbar radiculopathy M54.16 724.4   3. Acute midline low back pain without sciatica M54.5 724.2       Patient Active Problem List   Diagnosis   • Closed compression fracture of third lumbar vertebra (CMS/HCC)   • Wedge compression fracture of third lumbar vertebra with delayed healing   • Chronic bilateral low back pain without sciatica   • Abdominal bloating   • Closed wedge compression fracture of third lumbar vertebra with routine healing   • Non-smoker   • Normal body mass index (BMI)        Past Medical History:   Diagnosis Date   • Compression fracture of lumbar vertebra (CMS/HCC)    • Depression    • Disc degeneration, lumbar    • History of basal cell carcinoma    • History of hyperlipidemia    • History of insomnia    • History of multiple pulmonary nodules    • History of palpitations    • History of urinary tract infection    • Low back pain    • Osteoporosis    • Osteoporosis    • Skin cancer     basal cell   • Vitamin D insufficiency         Past Surgical History:   Procedure Laterality Date   • BASAL CELL CARCINOMA EXCISION      SEVERAL OFF FACE   • BREAST BIOPSY      benign   • COLONOSCOPY  2006   • COLONOSCOPY N/A 3/9/2017    Procedure: COLONOSCOPY WITH ANESTHESIA;  Surgeon: Arthur Harris MD;  Location: [facility] ENDOSCOPY;  Service:    • KYPHOPLASTY N/A 2016    Procedure: KYPHOPLASTY L3;  Surgeon: Ricky Ferguson MD;  Location: [facility] OR;  Service:                              PT Assessment/Plan     Row Name 18 1000          PT Assessment    Assessment Comments Patient has met all goals and is ready for discharge.  Patient has been  compliant with the HEP and is ready to continue on her own.  The patient was instructed to call if she has any additional questions.  -RS        PT Plan    PT Plan Comments D/C  -RS       User Key  (r) = Recorded By, (t) = Taken By, (c) = Cosigned By    Initials Name Provider Type    Kingsley Subramanian, PT, DPT, OCS Physical Therapist                    Exercises     Row Name 08/23/18 1000 08/23/18 0900          Subjective Comments    Subjective Comments Patient is a little more flared today from pulling weeds in the yard.  She is ready to be discharged.  -RS  --        Subjective Pain    Able to rate subjective pain? yes  -RS --  -RS     Pre-Treatment Pain Level 2  -RS --  -RS     Post-Treatment Pain Level 2  -RS  --        Total Minutes    20105 - PT Therapeutic Exercise Minutes 41  -RS  --        Exercise 1    Exercise Name 1 supine bridge with lumbar locked single leg  -RS  --     Cueing 1 Demo  -RS  --     Sets 1 3  -RS  --     Reps 1 15  -RS  --     Additional Comments huber  -RS  --        Exercise 2    Exercise Name 2 standing paloff press blue theraband  -RS  --     Cueing 2 Demo  -RS  --     Sets 2 3  -RS  --     Reps 2 15  -RS  --     Additional Comments huber  -RS  --        Exercise 3    Exercise Name 3 facing wall modified plank with UE liftoff  -RS  --     Cueing 3 Demo  -RS  --     Sets 3 5  -RS  --     Time 3 1 min  -RS  --        Exercise 4    Exercise Name 4 side plank with hip abduction modified standing at wall  -RS  --     Cueing 4 Demo  -RS  --     Reps 4 5  -RS  --     Time 4 1 min  -RS  --     Additional Comments each side  -RS  --        Exercise 5    Exercise Name 5 patient filled out modified oswestry and address all goals and anwered questions prior to discharge  -RS  --     Time 5 8 minn  -RS  --       User Key  (r) = Recorded By, (t) = Taken By, (c) = Cosigned By    Initials Name Provider Type    Kingsley Subramanian, PT, DPT, OCS Physical Therapist                                PT OP Goals     Row Name 08/23/18 1000          PT Short Term Goals    STG Date to Achieve 08/16/18  -RS     STG 1 Patient will perform a supported SLS without hip drop or lateral pelvic drift  -RS     STG 1 Progress Met  -RS     STG 2 Patient will report no right LE pain below the knee  -RS     STG 2 Progress Met  -RS        Long Term Goals    LTG Date to Achieve 09/01/18  -RS     LTG 1 Patient will be independent with a comprehensive HEP  -RS     LTG 1 Progress Met  -RS     LTG 2 Patient will improve Modified Oswestry score by 9 points indicating clinically significant change   -RS     LTG 2 Progress Met  -RS     LTG 3 Patient will improve (R) hip abductor strength to at least 4/5 on MMT  -RS     LTG 3 Progress Met  -RS     LTG 4 Patient will demonstrate no lateral hip drift during midstance on either LE with observed ambulation  -RS     LTG 4 Progress Met  -RS     LTG 5 Patient will return to ambulating for exercise and performing all ADL's with symptoms <3/10  -RS     LTG 5 Progress Met  -RS        Time Calculation    PT Goal Re-Cert Due Date 09/01/18  [facility]       User Key  (r) = Recorded By, (t) = Taken By, (c) = Cosigned By    Initials Name Provider Type    RS Kingsley Su, PT, DPT, OCS Physical Therapist          Therapy Education  Education Details: single leg bridge, standing wall plank with anti rotation and lateral plank, paloff press  Given: HEP, Symptoms/condition management  Program: New  How Provided: Written  Provided to: Patient  Level of Understanding: Verbalized, Demonstrated    Outcome Measure Options: Modifedat Mccollum  Modified Oswestry  Modified Oswestry Score/Comments: 10%      Time Calculation:   Start Time: 0930  Stop Time: 1016  Time Calculation (min): 46 min  Total Timed Code Minutes- PT: 41 minute(s)  Therapy Suggested Charges     Code   Minutes Charges    15017 (CPT®) Hc Pt Neuromusc Re Education Ea 15 Min      84278 (CPT®) Hc Pt Ther Proc Ea 15 Min 41 3    09253 (CPT®) Hc Gait  Training Ea 15 Min      12148 (CPT®) Hc Pt Therapeutic Act Ea 15 Min      74176 (CPT®) Hc Pt Manual Therapy Ea 15 Min      44745 (CPT®) Hc Pt Ther Massage- Per 15 Min      04805 (CPT®) Hc Pt Iontophoresis Ea 15 Min      58503 (CPT®) Hc Pt Elec Stim Ea-Per 15 Min      46111 (CPT®) Hc Pt Ultrasound Ea 15 Min      15305 (CPT®) Hc Pt Self Care/Mgmt/Train Ea 15 Min      36235 (CPT®) Hc Pt Prosthetic (S) Train Initial Encounter, Each 15 Min      36203 (CPT®) Hc Orthotic(S) Mgmt/Train Initial Encounter, Each 15min      64307 (CPT®) Hc Pt Aquatic Therapy Ea 15 Min      65502 (CPT®) Hc Pt Orthotic(S)/Prosthetic(S) Encounter, Each 15 Min      Total  41 3        Therapy Charges for Today     Code Description Service Date Service Provider Modifiers Qty    87544295162 HC PT MOBILITY PROJECTED 8/23/2018 Kingsley Su, PT, DPT, OCS GP, CH 1    23569774216 HC PT MOBILITY DISCHARGE 8/23/2018 Kingsley Su, PT, DPT, OCS GP, CI 1    08363658845 HC PT THER PROC EA 15 MIN 8/23/2018 Kingsley Su, PT, DPT, OCS GP 3          PT G-Codes  Outcome Measure Options: Renetta Housedanny  Score: 10%  Functional Limitation: Mobility: Walking and moving around  Mobility: Walking and Moving Around Goal Status (): 0 percent impaired, limited or restricted  Mobility: Walking and Moving Around Discharge Status (): At least 1 percent but less than 20 percent impaired, limited or restricted     OP PT Discharge Summary  Date of Discharge: 08/23/18  Outcomes Achieved: Able to achieve all goals within established timeline  Discharge Destination: Home with home program  Discharge Instructions/Additional Comments: Patient was instructed to call with any questions.  Continue HEP as prescribed (4 final exercises)      ANA LILIA Su, PT, DPT, OCS  8/23/2018

## 2018-10-15 ENCOUNTER — TELEPHONE (OUTPATIENT)
Dept: INTERNAL MEDICINE | Age: 73
End: 2018-10-15

## 2018-10-15 DIAGNOSIS — E55.9 VITAMIN D DEFICIENCY: ICD-10-CM

## 2018-10-15 DIAGNOSIS — E78.2 MIXED HYPERLIPIDEMIA: ICD-10-CM

## 2018-10-15 DIAGNOSIS — R41.3 MEMORY LOSS: Primary | ICD-10-CM

## 2018-10-15 DIAGNOSIS — M80.00XD OSTEOPOROSIS WITH PATHOLOGICAL FRACTURE WITH ROUTINE HEALING: ICD-10-CM

## 2018-10-15 LAB
ALBUMIN SERPL-MCNC: 4.4 G/DL (ref 3.5–5.2)
ALP BLD-CCNC: 44 U/L (ref 35–104)
ALT SERPL-CCNC: 11 U/L (ref 5–33)
ANION GAP SERPL CALCULATED.3IONS-SCNC: 16 MMOL/L (ref 7–19)
AST SERPL-CCNC: 17 U/L (ref 5–32)
BILIRUB SERPL-MCNC: 0.4 MG/DL (ref 0.2–1.2)
BUN BLDV-MCNC: 15 MG/DL (ref 8–23)
CALCIUM SERPL-MCNC: 9.5 MG/DL (ref 8.8–10.2)
CHLORIDE BLD-SCNC: 100 MMOL/L (ref 98–111)
CHOLESTEROL, TOTAL: 251 MG/DL (ref 160–199)
CO2: 26 MMOL/L (ref 22–29)
CREAT SERPL-MCNC: 0.5 MG/DL (ref 0.5–0.9)
GFR NON-AFRICAN AMERICAN: >60
GLUCOSE BLD-MCNC: 96 MG/DL (ref 74–109)
HCT VFR BLD CALC: 42.6 % (ref 37–47)
HDLC SERPL-MCNC: 95 MG/DL (ref 65–121)
HEMOGLOBIN: 13.9 G/DL (ref 12–16)
LDL CHOLESTEROL CALCULATED: 143 MG/DL
MCH RBC QN AUTO: 30.3 PG (ref 27–31)
MCHC RBC AUTO-ENTMCNC: 32.6 G/DL (ref 33–37)
MCV RBC AUTO: 92.8 FL (ref 81–99)
PDW BLD-RTO: 13.1 % (ref 11.5–14.5)
PLATELET # BLD: 253 K/UL (ref 130–400)
PMV BLD AUTO: 11.2 FL (ref 9.4–12.3)
POTASSIUM SERPL-SCNC: 4 MMOL/L (ref 3.5–5)
RBC # BLD: 4.59 M/UL (ref 4.2–5.4)
SODIUM BLD-SCNC: 142 MMOL/L (ref 136–145)
TOTAL PROTEIN: 7 G/DL (ref 6.6–8.7)
TRIGL SERPL-MCNC: 64 MG/DL (ref 0–149)
TSH SERPL DL<=0.05 MIU/L-ACNC: 2.77 UIU/ML (ref 0.27–4.2)
VITAMIN D 25-HYDROXY: 43.1 NG/ML
WBC # BLD: 6.3 K/UL (ref 4.8–10.8)

## 2018-10-16 ENCOUNTER — OFFICE VISIT (OUTPATIENT)
Dept: INTERNAL MEDICINE | Age: 73
End: 2018-10-16
Payer: MEDICARE

## 2018-10-16 VITALS
BODY MASS INDEX: 18.54 KG/M2 | HEART RATE: 80 BPM | WEIGHT: 108 LBS | DIASTOLIC BLOOD PRESSURE: 60 MMHG | SYSTOLIC BLOOD PRESSURE: 100 MMHG

## 2018-10-16 DIAGNOSIS — Z23 NEEDS FLU SHOT: ICD-10-CM

## 2018-10-16 DIAGNOSIS — R41.3 MEMORY LOSS: Primary | ICD-10-CM

## 2018-10-16 PROCEDURE — G8400 PT W/DXA NO RESULTS DOC: HCPCS | Performed by: INTERNAL MEDICINE

## 2018-10-16 PROCEDURE — 4040F PNEUMOC VAC/ADMIN/RCVD: CPT | Performed by: INTERNAL MEDICINE

## 2018-10-16 PROCEDURE — 3017F COLORECTAL CA SCREEN DOC REV: CPT | Performed by: INTERNAL MEDICINE

## 2018-10-16 PROCEDURE — 1036F TOBACCO NON-USER: CPT | Performed by: INTERNAL MEDICINE

## 2018-10-16 PROCEDURE — 90662 IIV NO PRSV INCREASED AG IM: CPT | Performed by: INTERNAL MEDICINE

## 2018-10-16 PROCEDURE — G8420 CALC BMI NORM PARAMETERS: HCPCS | Performed by: INTERNAL MEDICINE

## 2018-10-16 PROCEDURE — G0008 ADMIN INFLUENZA VIRUS VAC: HCPCS | Performed by: INTERNAL MEDICINE

## 2018-10-16 PROCEDURE — 1123F ACP DISCUSS/DSCN MKR DOCD: CPT | Performed by: INTERNAL MEDICINE

## 2018-10-16 PROCEDURE — G8427 DOCREV CUR MEDS BY ELIG CLIN: HCPCS | Performed by: INTERNAL MEDICINE

## 2018-10-16 PROCEDURE — 1101F PT FALLS ASSESS-DOCD LE1/YR: CPT | Performed by: INTERNAL MEDICINE

## 2018-10-16 PROCEDURE — 99213 OFFICE O/P EST LOW 20 MIN: CPT | Performed by: INTERNAL MEDICINE

## 2018-10-16 PROCEDURE — 1090F PRES/ABSN URINE INCON ASSESS: CPT | Performed by: INTERNAL MEDICINE

## 2018-10-16 PROCEDURE — G8482 FLU IMMUNIZE ORDER/ADMIN: HCPCS | Performed by: INTERNAL MEDICINE

## 2018-10-19 ENCOUNTER — PATIENT MESSAGE (OUTPATIENT)
Dept: INTERNAL MEDICINE | Age: 73
End: 2018-10-19

## 2018-10-26 ENCOUNTER — PATIENT MESSAGE (OUTPATIENT)
Dept: INTERNAL MEDICINE | Age: 73
End: 2018-10-26

## 2018-10-30 DIAGNOSIS — R41.3 MEMORY LOSS: Primary | ICD-10-CM

## 2018-11-30 ENCOUNTER — OFFICE VISIT (OUTPATIENT)
Dept: INTERNAL MEDICINE | Age: 73
End: 2018-11-30
Payer: MEDICARE

## 2018-11-30 VITALS
SYSTOLIC BLOOD PRESSURE: 110 MMHG | OXYGEN SATURATION: 98 % | HEIGHT: 64 IN | HEART RATE: 82 BPM | BODY MASS INDEX: 19.12 KG/M2 | WEIGHT: 112 LBS | DIASTOLIC BLOOD PRESSURE: 60 MMHG

## 2018-11-30 DIAGNOSIS — R41.3 MEMORY LOSS: Primary | ICD-10-CM

## 2018-11-30 DIAGNOSIS — M80.00XD OSTEOPOROSIS WITH PATHOLOGICAL FRACTURE WITH ROUTINE HEALING: ICD-10-CM

## 2018-11-30 PROCEDURE — G8420 CALC BMI NORM PARAMETERS: HCPCS | Performed by: INTERNAL MEDICINE

## 2018-11-30 PROCEDURE — 99213 OFFICE O/P EST LOW 20 MIN: CPT | Performed by: INTERNAL MEDICINE

## 2018-11-30 PROCEDURE — 1101F PT FALLS ASSESS-DOCD LE1/YR: CPT | Performed by: INTERNAL MEDICINE

## 2018-11-30 PROCEDURE — G8400 PT W/DXA NO RESULTS DOC: HCPCS | Performed by: INTERNAL MEDICINE

## 2018-11-30 PROCEDURE — 1090F PRES/ABSN URINE INCON ASSESS: CPT | Performed by: INTERNAL MEDICINE

## 2018-11-30 PROCEDURE — 3017F COLORECTAL CA SCREEN DOC REV: CPT | Performed by: INTERNAL MEDICINE

## 2018-11-30 PROCEDURE — 1123F ACP DISCUSS/DSCN MKR DOCD: CPT | Performed by: INTERNAL MEDICINE

## 2018-11-30 PROCEDURE — G8482 FLU IMMUNIZE ORDER/ADMIN: HCPCS | Performed by: INTERNAL MEDICINE

## 2018-11-30 PROCEDURE — 1036F TOBACCO NON-USER: CPT | Performed by: INTERNAL MEDICINE

## 2018-11-30 PROCEDURE — 4040F PNEUMOC VAC/ADMIN/RCVD: CPT | Performed by: INTERNAL MEDICINE

## 2018-11-30 PROCEDURE — G8427 DOCREV CUR MEDS BY ELIG CLIN: HCPCS | Performed by: INTERNAL MEDICINE

## 2018-11-30 NOTE — PROGRESS NOTES
(OSCAL) 250-125 MG-UNIT per tablet Take 1 tablet by mouth daily. No current facility-administered medications for this visit. Review of Systems chest pain no dyspnea no abdominal pain. No fevers or chills no rashes no cough or congestion. /60 (Site: Left Upper Arm)   Pulse 82   Ht 5' 4\" (1.626 m)   Wt 112 lb (50.8 kg)   SpO2 98%   BMI 19.22 kg/m²     Physical Exam neck is supple sclera anicteric no supra clavicular cervical lymphadenopathy heart S1-S2 lungs are clear extremities are without clubbing cyanosis edema. Scoliosis of the back. Mood is okay today. Results for orders placed or performed in visit on 10/15/18   Comprehensive Metabolic Panel   Result Value Ref Range    Sodium 142 136 - 145 mmol/L    Potassium 4.0 3.5 - 5.0 mmol/L    Chloride 100 98 - 111 mmol/L    CO2 26 22 - 29 mmol/L    Anion Gap 16 7 - 19 mmol/L    Glucose 96 74 - 109 mg/dL    BUN 15 8 - 23 mg/dL    CREATININE 0.5 0.5 - 0.9 mg/dL    GFR Non-African American >60 >60    Calcium 9.5 8.8 - 10.2 mg/dL    Total Protein 7.0 6.6 - 8.7 g/dL    Alb 4.4 3.5 - 5.2 g/dL    Total Bilirubin 0.4 0.2 - 1.2 mg/dL    Alkaline Phosphatase 44 35 - 104 U/L    ALT 11 5 - 33 U/L    AST 17 5 - 32 U/L   CBC   Result Value Ref Range    WBC 6.3 4.8 - 10.8 K/uL    RBC 4.59 4.20 - 5.40 M/uL    Hemoglobin 13.9 12.0 - 16.0 g/dL    Hematocrit 42.6 37.0 - 47.0 %    MCV 92.8 81.0 - 99.0 fL    MCH 30.3 27.0 - 31.0 pg    MCHC 32.6 (L) 33.0 - 37.0 g/dL    RDW 13.1 11.5 - 14.5 %    Platelets 650 249 - 640 K/uL    MPV 11.2 9.4 - 12.3 fL   Lipid Panel   Result Value Ref Range    Cholesterol, Total 251 (H) 160 - 199 mg/dL    Triglycerides 64 0 - 149 mg/dL    HDL 95 65 - 121 mg/dL    LDL Calculated 143 <100 mg/dL   TSH without Reflex   Result Value Ref Range    TSH 2.770 0.270 - 4.200 uIU/mL   Vitamin D 25 Hydroxy   Result Value Ref Range    Vit D, 25-Hydroxy 43.1 >=30 ng/mL       ASSESSMENT/ PLAN:  1. Memory loss  Stable.  She is working more toward

## 2018-12-04 DIAGNOSIS — M80.00XD AGE-RELATED OSTEOPOROSIS WITH CURRENT PATHOLOGICAL FRACTURE WITH ROUTINE HEALING, SUBSEQUENT ENCOUNTER: ICD-10-CM

## 2018-12-04 PROBLEM — M80.00XA AGE-RELATED OSTEOPOROSIS WITH CURRENT PATHOLOGICAL FRACTURE: Status: ACTIVE | Noted: 2018-12-04

## 2019-01-03 ENCOUNTER — HOSPITAL ENCOUNTER (OUTPATIENT)
Dept: INFUSION THERAPY | Age: 74
Setting detail: INFUSION SERIES
Discharge: HOME | End: 2019-01-03
Payer: MEDICARE

## 2019-01-03 VITALS
SYSTOLIC BLOOD PRESSURE: 120 MMHG | TEMPERATURE: 98.2 F | RESPIRATION RATE: 18 BRPM | HEART RATE: 78 BPM | DIASTOLIC BLOOD PRESSURE: 67 MMHG

## 2019-01-03 DIAGNOSIS — M80.00XD AGE-RELATED OSTEOPOROSIS WITH CURRENT PATHOLOGICAL FRACTURE WITH ROUTINE HEALING, SUBSEQUENT ENCOUNTER: ICD-10-CM

## 2019-01-03 DIAGNOSIS — M80.08XD AGE-RELATED OSTEOPOROSIS WITH CURRENT PATHOLOGICAL FRACTURE OF VERTEBRA WITH ROUTINE HEALING: ICD-10-CM

## 2019-01-03 DIAGNOSIS — M80.00XD OSTEOPOROSIS WITH PATHOLOGICAL FRACTURE WITH ROUTINE HEALING: ICD-10-CM

## 2019-01-03 PROBLEM — M81.0 OSTEOPOROSIS: Status: ACTIVE | Noted: 2017-06-23

## 2019-01-03 PROCEDURE — 6360000002 HC RX W HCPCS: Performed by: INTERNAL MEDICINE

## 2019-01-03 PROCEDURE — 96372 THER/PROPH/DIAG INJ SC/IM: CPT

## 2019-01-03 RX ADMIN — DENOSUMAB 60 MG: 60 INJECTION SUBCUTANEOUS at 13:54

## 2019-01-23 ENCOUNTER — TELEPHONE (OUTPATIENT)
Dept: NEUROSURGERY | Facility: CLINIC | Age: 74
End: 2019-01-23

## 2019-01-23 NOTE — TELEPHONE ENCOUNTER
Per Dr Ferguson:  Ok to do a directed exercise program at the [facility] but no heavy lifting - listen to your back.    Patient notified and agrees.    silas tipton CMA

## 2019-01-23 NOTE — TELEPHONE ENCOUNTER
Patient called and left a voicemail asking if it would be okay for her to work out with a  to exercise.  She knows she shouldn't do extreme lifting, etc but questions a directed program.  The reason she is asking is that she had a kyphoplasty on 11/11/2016 by Dr Ferguson.    I will check with Dr Ferguson and let the patient know.      silas tipton CMA

## 2019-03-08 ENCOUNTER — OFFICE VISIT (OUTPATIENT)
Dept: INTERNAL MEDICINE | Age: 74
End: 2019-03-08
Payer: MEDICARE

## 2019-03-08 VITALS
WEIGHT: 114 LBS | HEIGHT: 64 IN | OXYGEN SATURATION: 98 % | SYSTOLIC BLOOD PRESSURE: 120 MMHG | BODY MASS INDEX: 19.46 KG/M2 | HEART RATE: 74 BPM | DIASTOLIC BLOOD PRESSURE: 70 MMHG

## 2019-03-08 DIAGNOSIS — M80.08XD AGE-RELATED OSTEOPOROSIS WITH CURRENT PATHOLOGICAL FRACTURE OF VERTEBRA WITH ROUTINE HEALING: Primary | ICD-10-CM

## 2019-03-08 DIAGNOSIS — R14.0 ABDOMINAL BLOATING: ICD-10-CM

## 2019-03-08 DIAGNOSIS — R41.3 MEMORY LOSS: ICD-10-CM

## 2019-03-08 PROCEDURE — 1123F ACP DISCUSS/DSCN MKR DOCD: CPT | Performed by: INTERNAL MEDICINE

## 2019-03-08 PROCEDURE — 4040F PNEUMOC VAC/ADMIN/RCVD: CPT | Performed by: INTERNAL MEDICINE

## 2019-03-08 PROCEDURE — G8427 DOCREV CUR MEDS BY ELIG CLIN: HCPCS | Performed by: INTERNAL MEDICINE

## 2019-03-08 PROCEDURE — 1090F PRES/ABSN URINE INCON ASSESS: CPT | Performed by: INTERNAL MEDICINE

## 2019-03-08 PROCEDURE — G8400 PT W/DXA NO RESULTS DOC: HCPCS | Performed by: INTERNAL MEDICINE

## 2019-03-08 PROCEDURE — 3017F COLORECTAL CA SCREEN DOC REV: CPT | Performed by: INTERNAL MEDICINE

## 2019-03-08 PROCEDURE — 1101F PT FALLS ASSESS-DOCD LE1/YR: CPT | Performed by: INTERNAL MEDICINE

## 2019-03-08 PROCEDURE — G8482 FLU IMMUNIZE ORDER/ADMIN: HCPCS | Performed by: INTERNAL MEDICINE

## 2019-03-08 PROCEDURE — 99213 OFFICE O/P EST LOW 20 MIN: CPT | Performed by: INTERNAL MEDICINE

## 2019-03-08 PROCEDURE — G8420 CALC BMI NORM PARAMETERS: HCPCS | Performed by: INTERNAL MEDICINE

## 2019-03-08 PROCEDURE — 1036F TOBACCO NON-USER: CPT | Performed by: INTERNAL MEDICINE

## 2019-03-08 ASSESSMENT — PATIENT HEALTH QUESTIONNAIRE - PHQ9
1. LITTLE INTEREST OR PLEASURE IN DOING THINGS: 0
2. FEELING DOWN, DEPRESSED OR HOPELESS: 0
SUM OF ALL RESPONSES TO PHQ QUESTIONS 1-9: 0
SUM OF ALL RESPONSES TO PHQ QUESTIONS 1-9: 0
SUM OF ALL RESPONSES TO PHQ9 QUESTIONS 1 & 2: 0

## 2019-03-21 ASSESSMENT — ENCOUNTER SYMPTOMS
ABDOMINAL PAIN: 0
ABDOMINAL DISTENTION: 1
EYE DISCHARGE: 0
COUGH: 0
BACK PAIN: 1
EYE REDNESS: 0
SHORTNESS OF BREATH: 0

## 2019-04-02 ENCOUNTER — TELEPHONE (OUTPATIENT)
Dept: NEUROSURGERY | Facility: CLINIC | Age: 74
End: 2019-04-02

## 2019-04-02 NOTE — TELEPHONE ENCOUNTER
I returned patient's call and explained to her that if it hurts her or doesn't feel right she should discontinue that exercise.  She did understand and will call with any other issues.

## 2019-04-02 NOTE — TELEPHONE ENCOUNTER
Shellie has called the office, she has not been seen since September of 2017, she is doing some weight lifting at the [facility], but is calling asking about over head weight lifting and if she should avoid this, she noticed last time she did she felt a little tweek in her back.     Please return her call about over head weight lifting.    Her# [phone]

## 2019-04-18 ENCOUNTER — OFFICE VISIT (OUTPATIENT)
Dept: INTERNAL MEDICINE | Age: 74
End: 2019-04-18
Payer: MEDICARE

## 2019-04-18 VITALS
OXYGEN SATURATION: 98 % | DIASTOLIC BLOOD PRESSURE: 60 MMHG | BODY MASS INDEX: 19.57 KG/M2 | SYSTOLIC BLOOD PRESSURE: 100 MMHG | HEART RATE: 88 BPM | WEIGHT: 114 LBS

## 2019-04-18 DIAGNOSIS — R10.13 EPIGASTRIC PAIN: ICD-10-CM

## 2019-04-18 DIAGNOSIS — R41.3 MEMORY LOSS: ICD-10-CM

## 2019-04-18 DIAGNOSIS — R10.13 EPIGASTRIC PAIN: Primary | ICD-10-CM

## 2019-04-18 DIAGNOSIS — M80.80XS OTHER OSTEOPOROSIS WITH CURRENT PATHOLOGICAL FRACTURE, SEQUELA: ICD-10-CM

## 2019-04-18 LAB
ALBUMIN SERPL-MCNC: 4.1 G/DL (ref 3.5–5.2)
ALP BLD-CCNC: 42 U/L (ref 35–104)
ALT SERPL-CCNC: 14 U/L (ref 5–33)
AMYLASE: 97 U/L (ref 28–100)
ANION GAP SERPL CALCULATED.3IONS-SCNC: 14 MMOL/L (ref 7–19)
AST SERPL-CCNC: 20 U/L (ref 5–32)
BILIRUB SERPL-MCNC: 0.4 MG/DL (ref 0.2–1.2)
BUN BLDV-MCNC: 20 MG/DL (ref 8–23)
CALCIUM SERPL-MCNC: 9.5 MG/DL (ref 8.8–10.2)
CHLORIDE BLD-SCNC: 105 MMOL/L (ref 98–111)
CO2: 26 MMOL/L (ref 22–29)
CREAT SERPL-MCNC: 0.5 MG/DL (ref 0.5–0.9)
GFR NON-AFRICAN AMERICAN: >60
GLUCOSE BLD-MCNC: 78 MG/DL (ref 74–109)
HCT VFR BLD CALC: 40.9 % (ref 37–47)
HEMOGLOBIN: 13 G/DL (ref 12–16)
LIPASE: 75 U/L (ref 13–60)
MCH RBC QN AUTO: 30.2 PG (ref 27–31)
MCHC RBC AUTO-ENTMCNC: 31.8 G/DL (ref 33–37)
MCV RBC AUTO: 94.9 FL (ref 81–99)
PDW BLD-RTO: 13.3 % (ref 11.5–14.5)
PLATELET # BLD: 241 K/UL (ref 130–400)
PMV BLD AUTO: 10.9 FL (ref 9.4–12.3)
POTASSIUM SERPL-SCNC: 4.1 MMOL/L (ref 3.5–5)
RBC # BLD: 4.31 M/UL (ref 4.2–5.4)
SODIUM BLD-SCNC: 145 MMOL/L (ref 136–145)
TOTAL PROTEIN: 6.6 G/DL (ref 6.6–8.7)
WBC # BLD: 5.5 K/UL (ref 4.8–10.8)

## 2019-04-18 PROCEDURE — 1123F ACP DISCUSS/DSCN MKR DOCD: CPT | Performed by: INTERNAL MEDICINE

## 2019-04-18 PROCEDURE — 3017F COLORECTAL CA SCREEN DOC REV: CPT | Performed by: INTERNAL MEDICINE

## 2019-04-18 PROCEDURE — 1036F TOBACCO NON-USER: CPT | Performed by: INTERNAL MEDICINE

## 2019-04-18 PROCEDURE — 99214 OFFICE O/P EST MOD 30 MIN: CPT | Performed by: INTERNAL MEDICINE

## 2019-04-18 PROCEDURE — 4040F PNEUMOC VAC/ADMIN/RCVD: CPT | Performed by: INTERNAL MEDICINE

## 2019-04-18 PROCEDURE — G8420 CALC BMI NORM PARAMETERS: HCPCS | Performed by: INTERNAL MEDICINE

## 2019-04-18 PROCEDURE — 1090F PRES/ABSN URINE INCON ASSESS: CPT | Performed by: INTERNAL MEDICINE

## 2019-04-18 PROCEDURE — G8400 PT W/DXA NO RESULTS DOC: HCPCS | Performed by: INTERNAL MEDICINE

## 2019-04-18 PROCEDURE — G8428 CUR MEDS NOT DOCUMENT: HCPCS | Performed by: INTERNAL MEDICINE

## 2019-04-18 RX ORDER — CHOLECALCIFEROL (VITAMIN D3) 125 MCG
1 CAPSULE ORAL DAILY
COMMUNITY
End: 2019-10-18 | Stop reason: CLARIF

## 2019-04-18 ASSESSMENT — ENCOUNTER SYMPTOMS
SINUS PRESSURE: 0
EYE DISCHARGE: 0
ABDOMINAL DISTENTION: 1
SHORTNESS OF BREATH: 0
ABDOMINAL PAIN: 1
EYE REDNESS: 0
COUGH: 0
BACK PAIN: 1

## 2019-04-18 NOTE — PROGRESS NOTES
Chief Complaint   Patient presents with    Office Visit for Anticoagulation Management    Abdominal Pain    Memory Loss       HPI: Patient is here today for a problem visit she moved up her appointment because of recurrent issues with abdominal pain we've seen her about this in the past and it would resolve generalized vague bloating discomfort fullness feels compressed in her abdomen discomfort in the epigastric and general abdomen gas bloating and early satiety at times. Also ongoing issues with feeling forgetful. No fevers no chills no change in bowel movements no blood in the stool. Past Medical History:   Diagnosis Date    Cancer ([facility] [address]     basil cell skin cancers removed     Low back pain 2/7/2017    Osteoporosis     Osteoporosis with pathological fracture with routine healing 6/23/2017       Past Surgical History:   Procedure Laterality Date    SKIN CANCER EXCISION         Family History   Problem Relation Age of Onset    Kidney Disease Mother     Cancer Father         Prostate        Social History     Socioeconomic History    Marital status: Single     Spouse name: Not on file    Number of children: Not on file    Years of education: Not on file    Highest education level: Not on file   Occupational History    Not on file   Social Needs    Financial resource strain: Not on file    Food insecurity:     Worry: Not on file     Inability: Not on file    Transportation needs:     Medical: Not on file     Non-medical: Not on file   Tobacco Use    Smoking status: Never Smoker    Smokeless tobacco: Never Used   Substance and Sexual Activity    Alcohol use:  Yes     Alcohol/week: 1.8 oz     Types: 3 Glasses of wine per week    Drug use: No    Sexual activity: Not on file   Lifestyle    Physical activity:     Days per week: Not on file     Minutes per session: Not on file    Stress: Not on file   Relationships    Social connections:     Talks on phone: Not on file     Gets together: Not on file     Attends [employer] service: Not on file     Active member of club or organization: Not on file     Attends meetings of clubs or organizations: Not on file     Relationship status: Not on file    Intimate partner violence:     Fear of current or ex partner: Not on file     Emotionally abused: Not on file     Physically abused: Not on file     Forced sexual activity: Not on file   Other Topics Concern    Not on file   Social History Narrative    Not on file       No Known Allergies    Current Outpatient Medications   Medication Sig Dispense Refill    Lactobacillus (PROBIOTIC ACIDOPHILUS) CAPS Take 1 capsule by mouth daily      denosumab (PROLIA) 60 MG/ML SOLN SC injection Inject 1 mL into the skin once for 1 dose 1 mL 0    zoster recombinant adjuvanted vaccine (SHINGRIX) 50 MCG SUSR injection 50 MCG IM then repeat 2-6 months. 0.5 mL 1    Cholecalciferol (VITAMIN D3) 2000 units CAPS Take 1 capsule by mouth daily      Strontium Chloride POWD 1 capsule by Does not apply route daily Indications: to help boost calcium (use with AlgaeCal)      calcium-vitamin D (OSCAL) 250-125 MG-UNIT per tablet Take 1 tablet by mouth daily. No current facility-administered medications for this visit. Review of Systems   Constitutional: Positive for fatigue. Negative for chills and fever. HENT: Negative for congestion and sinus pressure. Eyes: Negative for discharge and redness. Respiratory: Negative for cough and shortness of breath. Cardiovascular: Negative for chest pain, palpitations and leg swelling. Gastrointestinal: Positive for abdominal distention and abdominal pain. Genitourinary: Negative for dysuria, frequency and urgency. Musculoskeletal: Positive for arthralgias and back pain. Skin: Negative for rash and wound. Neurological: Negative for dizziness, light-headedness and headaches. Psychiatric/Behavioral: Positive for decreased concentration.  Negative for dysphoric mood and sleep disturbance. The patient is not nervous/anxious. /60   Pulse 88   Wt 114 lb (51.7 kg)   SpO2 98%   BMI 19.57 kg/m²   BP Readings from Last 7 Encounters:   04/18/19 100/60   03/08/19 120/70   01/03/19 120/67   11/30/18 110/60   10/16/18 100/60   07/10/18 126/74   01/05/18 116/70     Wt Readings from Last 7 Encounters:   04/18/19 114 lb (51.7 kg)   03/08/19 114 lb (51.7 kg)   11/30/18 112 lb (50.8 kg)   10/16/18 108 lb (49 kg)   07/10/18 110 lb (49.9 kg)   01/05/18 112 lb (50.8 kg)   06/23/17 115 lb (52.2 kg)     BMI Readings from Last 7 Encounters:   04/18/19 19.57 kg/m²   03/08/19 19.57 kg/m²   11/30/18 19.22 kg/m²   10/16/18 18.54 kg/m²   07/10/18 18.88 kg/m²   01/05/18 18.64 kg/m²   06/23/17 19.14 kg/m²     Resp Readings from Last 7 Encounters:   01/03/19 18   10/01/16 18       Physical Exam neck is supple sclera anicteric no supra clavicular cervical lymphadenopathy heart S1-S2 lungs are clear extremities without clubbing cyanosis edema abdomen is slightly tympanitic and distended but no masses palpable no hepatosplenomegaly. Mood is okay.     Results for orders placed or performed in visit on 10/15/18   Comprehensive Metabolic Panel   Result Value Ref Range    Sodium 142 136 - 145 mmol/L    Potassium 4.0 3.5 - 5.0 mmol/L    Chloride 100 98 - 111 mmol/L    CO2 26 22 - 29 mmol/L    Anion Gap 16 7 - 19 mmol/L    Glucose 96 74 - 109 mg/dL    BUN 15 8 - 23 mg/dL    CREATININE 0.5 0.5 - 0.9 mg/dL    GFR Non-African American >60 >60    Calcium 9.5 8.8 - 10.2 mg/dL    Total Protein 7.0 6.6 - 8.7 g/dL    Alb 4.4 3.5 - 5.2 g/dL    Total Bilirubin 0.4 0.2 - 1.2 mg/dL    Alkaline Phosphatase 44 35 - 104 U/L    ALT 11 5 - 33 U/L    AST 17 5 - 32 U/L   CBC   Result Value Ref Range    WBC 6.3 4.8 - 10.8 K/uL    RBC 4.59 4.20 - 5.40 M/uL    Hemoglobin 13.9 12.0 - 16.0 g/dL    Hematocrit 42.6 37.0 - 47.0 %    MCV 92.8 81.0 - 99.0 fL    MCH 30.3 27.0 - 31.0 pg    MCHC 32.6 (L) 33.0 - 37.0 g/dL    RDW 13.1 11.5 - 14.5 %    Platelets 575 136 - 306 K/uL    MPV 11.2 9.4 - 12.3 fL   Lipid Panel   Result Value Ref Range    Cholesterol, Total 251 (H) 160 - 199 mg/dL    Triglycerides 64 0 - 149 mg/dL    HDL 95 65 - 121 mg/dL    LDL Calculated 143 <100 mg/dL   TSH without Reflex   Result Value Ref Range    TSH 2.770 0.270 - 4.200 uIU/mL   Vitamin D 25 Hydroxy   Result Value Ref Range    Vit D, 25-Hydroxy 43.1 >=30 ng/mL       ASSESSMENT/ PLAN:  1. Epigastric pain  Epigastric and generalized bloating and discomfort this is gone on off and on for a long period of time or getting get labs were going to get a CT of the chest abdomen pelvis we will proceed from there. Memory  - CBC; Future  - Comprehensive Metabolic Panel; Future  - Amylase; Future  - Lipase; Future  - CT ABDOMEN PELVIS W IV CONTRAST Additional Contrast? Oral; Future    2. Memory loss  Memory is stable we've offered Mini-Mental Status another evaluation she's done fine with the Mini-Mental status. Patient does better when active and busy she's had a lot of lifestyle changes. Seems to do better when traveling active busy. 3. Other osteoporosis with current pathological fracture, sequela  She is currently stopped her Prolia she feels like it might be causing abdominal pain with encouraged her we don't think that's the issue she's taken weightlifting classes with a  and that is very beneficial we encouraged her to continue with that.

## 2019-04-23 ENCOUNTER — TRANSCRIBE ORDERS (OUTPATIENT)
Dept: ADMINISTRATIVE | Facility: HOSPITAL | Age: 74
End: 2019-04-23

## 2019-04-23 DIAGNOSIS — R10.13 EPIGASTRIC PAIN: Primary | ICD-10-CM

## 2019-04-26 ENCOUNTER — HOSPITAL ENCOUNTER (OUTPATIENT)
Dept: CT IMAGING | Facility: HOSPITAL | Age: 74
Discharge: HOME | End: 2019-04-26
Admitting: INTERNAL MEDICINE

## 2019-04-26 DIAGNOSIS — R10.13 EPIGASTRIC PAIN: ICD-10-CM

## 2019-04-26 LAB — CREAT BLDA-MCNC: 0.6 MG/DL (ref 0.6–1.3)

## 2019-04-26 PROCEDURE — 82565 ASSAY OF CREATININE: CPT

## 2019-04-26 PROCEDURE — 74177 CT ABD & PELVIS W/CONTRAST: CPT

## 2019-04-26 PROCEDURE — 0 IOHEXOL 300 MG/ML SOLUTION: Performed by: INTERNAL MEDICINE

## 2019-04-26 PROCEDURE — 25010000002 IOPAMIDOL 61 % SOLUTION: Performed by: INTERNAL MEDICINE

## 2019-04-26 RX ADMIN — IOHEXOL 50 ML: 300 INJECTION, SOLUTION INTRAVENOUS at 09:01

## 2019-04-26 RX ADMIN — IOPAMIDOL 100 ML: 612 INJECTION, SOLUTION INTRAVENOUS at 09:01

## 2019-05-03 DIAGNOSIS — R10.13 EPIGASTRIC PAIN: ICD-10-CM

## 2019-05-31 DIAGNOSIS — R41.3 MEMORY LOSS: ICD-10-CM

## 2019-07-12 ENCOUNTER — OFFICE VISIT (OUTPATIENT)
Dept: INTERNAL MEDICINE | Age: 74
End: 2019-07-12
Payer: MEDICARE

## 2019-07-12 VITALS
HEIGHT: 64 IN | WEIGHT: 111 LBS | BODY MASS INDEX: 18.95 KG/M2 | SYSTOLIC BLOOD PRESSURE: 120 MMHG | OXYGEN SATURATION: 97 % | DIASTOLIC BLOOD PRESSURE: 70 MMHG | HEART RATE: 68 BPM

## 2019-07-12 DIAGNOSIS — E55.9 VITAMIN D DEFICIENCY: ICD-10-CM

## 2019-07-12 DIAGNOSIS — M81.8 OTHER OSTEOPOROSIS WITHOUT CURRENT PATHOLOGICAL FRACTURE: Primary | ICD-10-CM

## 2019-07-12 DIAGNOSIS — R41.3 MEMORY LOSS: ICD-10-CM

## 2019-07-12 DIAGNOSIS — E78.5 HYPERLIPIDEMIA, UNSPECIFIED HYPERLIPIDEMIA TYPE: ICD-10-CM

## 2019-07-12 DIAGNOSIS — R14.0 ABDOMINAL BLOATING: ICD-10-CM

## 2019-07-12 PROCEDURE — G8427 DOCREV CUR MEDS BY ELIG CLIN: HCPCS | Performed by: INTERNAL MEDICINE

## 2019-07-12 PROCEDURE — 1123F ACP DISCUSS/DSCN MKR DOCD: CPT | Performed by: INTERNAL MEDICINE

## 2019-07-12 PROCEDURE — 1036F TOBACCO NON-USER: CPT | Performed by: INTERNAL MEDICINE

## 2019-07-12 PROCEDURE — 99213 OFFICE O/P EST LOW 20 MIN: CPT | Performed by: INTERNAL MEDICINE

## 2019-07-12 PROCEDURE — G8420 CALC BMI NORM PARAMETERS: HCPCS | Performed by: INTERNAL MEDICINE

## 2019-07-12 PROCEDURE — G8400 PT W/DXA NO RESULTS DOC: HCPCS | Performed by: INTERNAL MEDICINE

## 2019-07-12 PROCEDURE — 4040F PNEUMOC VAC/ADMIN/RCVD: CPT | Performed by: INTERNAL MEDICINE

## 2019-07-12 PROCEDURE — 1090F PRES/ABSN URINE INCON ASSESS: CPT | Performed by: INTERNAL MEDICINE

## 2019-07-12 PROCEDURE — 3017F COLORECTAL CA SCREEN DOC REV: CPT | Performed by: INTERNAL MEDICINE

## 2019-07-12 NOTE — PROGRESS NOTES
120/70   04/18/19 100/60   03/08/19 120/70   01/03/19 120/67   11/30/18 110/60   10/16/18 100/60   07/10/18 126/74     Wt Readings from Last 7 Encounters:   07/12/19 111 lb (50.3 kg)   04/18/19 114 lb (51.7 kg)   03/08/19 114 lb (51.7 kg)   11/30/18 112 lb (50.8 kg)   10/16/18 108 lb (49 kg)   07/10/18 110 lb (49.9 kg)   01/05/18 112 lb (50.8 kg)     BMI Readings from Last 7 Encounters:   07/12/19 19.05 kg/m²   04/18/19 19.57 kg/m²   03/08/19 19.57 kg/m²   11/30/18 19.22 kg/m²   10/16/18 18.54 kg/m²   07/10/18 18.88 kg/m²   01/05/18 18.64 kg/m²     Resp Readings from Last 7 Encounters:   01/03/19 18   10/01/16 18       Physical Exam   Constitutional: No distress. Eyes: No scleral icterus. Neck: Neck supple. Cardiovascular: Normal heart sounds. Pulmonary/Chest: Breath sounds normal.   Musculoskeletal: She exhibits no edema. Some kyphoscoliosis on exam   Lymphadenopathy:     She has no cervical adenopathy. Skin: No rash noted. Psychiatric: She has a normal mood and affect.        Results for orders placed or performed in visit on 04/18/19   Lipase   Result Value Ref Range    Lipase 75 (H) 13 - 60 U/L   Amylase   Result Value Ref Range    Amylase 97 28 - 100 U/L   Comprehensive Metabolic Panel   Result Value Ref Range    Sodium 145 136 - 145 mmol/L    Potassium 4.1 3.5 - 5.0 mmol/L    Chloride 105 98 - 111 mmol/L    CO2 26 22 - 29 mmol/L    Anion Gap 14 7 - 19 mmol/L    Glucose 78 74 - 109 mg/dL    BUN 20 8 - 23 mg/dL    CREATININE 0.5 0.5 - 0.9 mg/dL    GFR Non-African American >60 >60    Calcium 9.5 8.8 - 10.2 mg/dL    Total Protein 6.6 6.6 - 8.7 g/dL    Alb 4.1 3.5 - 5.2 g/dL    Total Bilirubin 0.4 0.2 - 1.2 mg/dL    Alkaline Phosphatase 42 35 - 104 U/L    ALT 14 5 - 33 U/L    AST 20 5 - 32 U/L   CBC   Result Value Ref Range    WBC 5.5 4.8 - 10.8 K/uL    RBC 4.31 4.20 - 5.40 M/uL    Hemoglobin 13.0 12.0 - 16.0 g/dL    Hematocrit 40.9 37.0 - 47.0 %    MCV 94.9 81.0 - 99.0 fL    MCH 30.2 27.0 - 31.0 pg

## 2019-07-27 PROBLEM — R14.0 ABDOMINAL BLOATING: Status: ACTIVE | Noted: 2019-07-27

## 2019-07-27 ASSESSMENT — ENCOUNTER SYMPTOMS
ABDOMINAL DISTENTION: 1
EYE DISCHARGE: 0
SHORTNESS OF BREATH: 0
EYE REDNESS: 0
ABDOMINAL PAIN: 1
DIARRHEA: 1
COUGH: 0
SINUS PRESSURE: 0
BACK PAIN: 1
CONSTIPATION: 1

## 2019-10-10 DIAGNOSIS — E55.9 VITAMIN D DEFICIENCY: ICD-10-CM

## 2019-10-10 DIAGNOSIS — E78.5 HYPERLIPIDEMIA, UNSPECIFIED HYPERLIPIDEMIA TYPE: ICD-10-CM

## 2019-10-10 DIAGNOSIS — M81.8 OTHER OSTEOPOROSIS WITHOUT CURRENT PATHOLOGICAL FRACTURE: ICD-10-CM

## 2019-10-10 LAB
ALBUMIN SERPL-MCNC: 4.2 G/DL (ref 3.5–5.2)
ALP BLD-CCNC: 44 U/L (ref 35–104)
ALT SERPL-CCNC: 16 U/L (ref 5–33)
ANION GAP SERPL CALCULATED.3IONS-SCNC: 10 MMOL/L (ref 7–19)
AST SERPL-CCNC: 22 U/L (ref 5–32)
BILIRUB SERPL-MCNC: 0.5 MG/DL (ref 0.2–1.2)
BUN BLDV-MCNC: 13 MG/DL (ref 8–23)
CALCIUM SERPL-MCNC: 9.2 MG/DL (ref 8.8–10.2)
CHLORIDE BLD-SCNC: 107 MMOL/L (ref 98–111)
CHOLESTEROL, TOTAL: 245 MG/DL (ref 160–199)
CO2: 28 MMOL/L (ref 22–29)
CREAT SERPL-MCNC: 0.5 MG/DL (ref 0.5–0.9)
GFR NON-AFRICAN AMERICAN: >60
GLUCOSE BLD-MCNC: 97 MG/DL (ref 74–109)
HCT VFR BLD CALC: 42.3 % (ref 37–47)
HDLC SERPL-MCNC: 95 MG/DL (ref 65–121)
HEMOGLOBIN: 13.7 G/DL (ref 12–16)
LDL CHOLESTEROL CALCULATED: 140 MG/DL
MCH RBC QN AUTO: 30.4 PG (ref 27–31)
MCHC RBC AUTO-ENTMCNC: 32.4 G/DL (ref 33–37)
MCV RBC AUTO: 93.8 FL (ref 81–99)
PDW BLD-RTO: 13.2 % (ref 11.5–14.5)
PLATELET # BLD: 205 K/UL (ref 130–400)
PMV BLD AUTO: 10.7 FL (ref 9.4–12.3)
POTASSIUM SERPL-SCNC: 4.3 MMOL/L (ref 3.5–5)
RBC # BLD: 4.51 M/UL (ref 4.2–5.4)
SODIUM BLD-SCNC: 145 MMOL/L (ref 136–145)
TOTAL PROTEIN: 6.7 G/DL (ref 6.6–8.7)
TRIGL SERPL-MCNC: 49 MG/DL (ref 0–149)
TSH SERPL DL<=0.05 MIU/L-ACNC: 2.77 UIU/ML (ref 0.27–4.2)
VITAMIN D 25-HYDROXY: 43 NG/ML
WBC # BLD: 4.9 K/UL (ref 4.8–10.8)

## 2019-10-18 ENCOUNTER — OFFICE VISIT (OUTPATIENT)
Dept: INTERNAL MEDICINE | Age: 74
End: 2019-10-18
Payer: MEDICARE

## 2019-10-18 VITALS
HEART RATE: 69 BPM | SYSTOLIC BLOOD PRESSURE: 114 MMHG | BODY MASS INDEX: 19.12 KG/M2 | WEIGHT: 112 LBS | HEIGHT: 64 IN | DIASTOLIC BLOOD PRESSURE: 60 MMHG | OXYGEN SATURATION: 98 %

## 2019-10-18 DIAGNOSIS — G89.29 CHRONIC BILATERAL LOW BACK PAIN WITHOUT SCIATICA: ICD-10-CM

## 2019-10-18 DIAGNOSIS — Z00.00 MEDICARE ANNUAL WELLNESS VISIT, SUBSEQUENT: Primary | ICD-10-CM

## 2019-10-18 DIAGNOSIS — M81.8 OTHER OSTEOPOROSIS WITHOUT CURRENT PATHOLOGICAL FRACTURE: ICD-10-CM

## 2019-10-18 DIAGNOSIS — M54.50 CHRONIC BILATERAL LOW BACK PAIN WITHOUT SCIATICA: ICD-10-CM

## 2019-10-18 DIAGNOSIS — Z23 ENCOUNTER FOR IMMUNIZATION: ICD-10-CM

## 2019-10-18 DIAGNOSIS — R41.3 MEMORY LOSS: ICD-10-CM

## 2019-10-18 DIAGNOSIS — R14.0 ABDOMINAL BLOATING: ICD-10-CM

## 2019-10-18 PROCEDURE — 1090F PRES/ABSN URINE INCON ASSESS: CPT | Performed by: INTERNAL MEDICINE

## 2019-10-18 PROCEDURE — 99213 OFFICE O/P EST LOW 20 MIN: CPT | Performed by: INTERNAL MEDICINE

## 2019-10-18 PROCEDURE — G8420 CALC BMI NORM PARAMETERS: HCPCS | Performed by: INTERNAL MEDICINE

## 2019-10-18 PROCEDURE — G0439 PPPS, SUBSEQ VISIT: HCPCS | Performed by: INTERNAL MEDICINE

## 2019-10-18 PROCEDURE — 1123F ACP DISCUSS/DSCN MKR DOCD: CPT | Performed by: INTERNAL MEDICINE

## 2019-10-18 PROCEDURE — 1036F TOBACCO NON-USER: CPT | Performed by: INTERNAL MEDICINE

## 2019-10-18 PROCEDURE — G8427 DOCREV CUR MEDS BY ELIG CLIN: HCPCS | Performed by: INTERNAL MEDICINE

## 2019-10-18 PROCEDURE — 4040F PNEUMOC VAC/ADMIN/RCVD: CPT | Performed by: INTERNAL MEDICINE

## 2019-10-18 PROCEDURE — G0008 ADMIN INFLUENZA VIRUS VAC: HCPCS | Performed by: INTERNAL MEDICINE

## 2019-10-18 PROCEDURE — G8400 PT W/DXA NO RESULTS DOC: HCPCS | Performed by: INTERNAL MEDICINE

## 2019-10-18 PROCEDURE — G8482 FLU IMMUNIZE ORDER/ADMIN: HCPCS | Performed by: INTERNAL MEDICINE

## 2019-10-18 PROCEDURE — 90653 IIV ADJUVANT VACCINE IM: CPT | Performed by: INTERNAL MEDICINE

## 2019-10-18 PROCEDURE — 3017F COLORECTAL CA SCREEN DOC REV: CPT | Performed by: INTERNAL MEDICINE

## 2019-10-18 ASSESSMENT — ENCOUNTER SYMPTOMS
SHORTNESS OF BREATH: 0
BACK PAIN: 1
ABDOMINAL DISTENTION: 1
SINUS PRESSURE: 0
EYE REDNESS: 0
COUGH: 0
EYE DISCHARGE: 0
ABDOMINAL PAIN: 0

## 2019-10-18 ASSESSMENT — PATIENT HEALTH QUESTIONNAIRE - PHQ9
SUM OF ALL RESPONSES TO PHQ QUESTIONS 1-9: 0
SUM OF ALL RESPONSES TO PHQ QUESTIONS 1-9: 0

## 2019-10-18 ASSESSMENT — LIFESTYLE VARIABLES: HOW OFTEN DO YOU HAVE A DRINK CONTAINING ALCOHOL: 0

## 2019-10-28 ENCOUNTER — HOSPITAL ENCOUNTER (OUTPATIENT)
Dept: INFUSION THERAPY | Age: 74
Setting detail: SPECIMEN
Discharge: HOME | End: 2019-10-28
Payer: MEDICARE

## 2019-10-28 VITALS
SYSTOLIC BLOOD PRESSURE: 128 MMHG | OXYGEN SATURATION: 100 % | DIASTOLIC BLOOD PRESSURE: 78 MMHG | HEART RATE: 70 BPM | TEMPERATURE: 97.7 F | RESPIRATION RATE: 17 BRPM

## 2019-10-28 DIAGNOSIS — M80.08XD AGE-RELATED OSTEOPOROSIS WITH CURRENT PATHOLOGICAL FRACTURE OF VERTEBRA WITH ROUTINE HEALING: Primary | ICD-10-CM

## 2019-10-28 DIAGNOSIS — M80.00XD AGE-RELATED OSTEOPOROSIS WITH CURRENT PATHOLOGICAL FRACTURE WITH ROUTINE HEALING, SUBSEQUENT ENCOUNTER: ICD-10-CM

## 2019-10-28 PROCEDURE — 96372 THER/PROPH/DIAG INJ SC/IM: CPT

## 2019-10-28 PROCEDURE — 6360000002 HC RX W HCPCS: Performed by: INTERNAL MEDICINE

## 2019-10-28 RX ADMIN — DENOSUMAB 60 MG: 60 INJECTION SUBCUTANEOUS at 11:26

## 2019-12-05 ENCOUNTER — TELEPHONE (OUTPATIENT)
Dept: INTERNAL MEDICINE | Age: 74
End: 2019-12-05

## 2020-01-28 ENCOUNTER — OFFICE VISIT (OUTPATIENT)
Dept: INTERNAL MEDICINE | Age: 75
End: 2020-01-28
Payer: MEDICARE

## 2020-01-28 VITALS
WEIGHT: 118 LBS | OXYGEN SATURATION: 98 % | DIASTOLIC BLOOD PRESSURE: 70 MMHG | SYSTOLIC BLOOD PRESSURE: 118 MMHG | HEART RATE: 83 BPM | BODY MASS INDEX: 20.25 KG/M2

## 2020-01-28 PROCEDURE — G8482 FLU IMMUNIZE ORDER/ADMIN: HCPCS | Performed by: INTERNAL MEDICINE

## 2020-01-28 PROCEDURE — G8427 DOCREV CUR MEDS BY ELIG CLIN: HCPCS | Performed by: INTERNAL MEDICINE

## 2020-01-28 PROCEDURE — 1036F TOBACCO NON-USER: CPT | Performed by: INTERNAL MEDICINE

## 2020-01-28 PROCEDURE — 1123F ACP DISCUSS/DSCN MKR DOCD: CPT | Performed by: INTERNAL MEDICINE

## 2020-01-28 PROCEDURE — 99214 OFFICE O/P EST MOD 30 MIN: CPT | Performed by: INTERNAL MEDICINE

## 2020-01-28 PROCEDURE — G8420 CALC BMI NORM PARAMETERS: HCPCS | Performed by: INTERNAL MEDICINE

## 2020-01-28 PROCEDURE — 1090F PRES/ABSN URINE INCON ASSESS: CPT | Performed by: INTERNAL MEDICINE

## 2020-01-28 PROCEDURE — G8400 PT W/DXA NO RESULTS DOC: HCPCS | Performed by: INTERNAL MEDICINE

## 2020-01-28 PROCEDURE — 3017F COLORECTAL CA SCREEN DOC REV: CPT | Performed by: INTERNAL MEDICINE

## 2020-01-28 PROCEDURE — 4040F PNEUMOC VAC/ADMIN/RCVD: CPT | Performed by: INTERNAL MEDICINE

## 2020-01-28 ASSESSMENT — ENCOUNTER SYMPTOMS
ABDOMINAL DISTENTION: 1
ABDOMINAL PAIN: 1
SHORTNESS OF BREATH: 0
COUGH: 0
BACK PAIN: 1
EYE REDNESS: 0
SINUS PRESSURE: 0
EYE DISCHARGE: 0

## 2020-01-28 ASSESSMENT — PATIENT HEALTH QUESTIONNAIRE - PHQ9
2. FEELING DOWN, DEPRESSED OR HOPELESS: 0
SUM OF ALL RESPONSES TO PHQ9 QUESTIONS 1 & 2: 0
1. LITTLE INTEREST OR PLEASURE IN DOING THINGS: 0
SUM OF ALL RESPONSES TO PHQ QUESTIONS 1-9: 0
SUM OF ALL RESPONSES TO PHQ QUESTIONS 1-9: 0

## 2020-01-28 NOTE — PROGRESS NOTES
Minutes per session: Not on file    Stress: Not on file   Relationships    Social connections:     Talks on phone: Not on file     Gets together: Not on file     Attends Confucianist service: Not on file     Active member of club or organization: Not on file     Attends meetings of clubs or organizations: Not on file     Relationship status: Not on file    Intimate partner violence:     Fear of current or ex partner: Not on file     Emotionally abused: Not on file     Physically abused: Not on file     Forced sexual activity: Not on file   Other Topics Concern    Not on file   Social History Narrative    Not on file       No Known Allergies    No current outpatient medications on file. No current facility-administered medications for this visit. Review of Systems   Constitutional: Positive for fatigue. Negative for chills and fever. HENT: Negative for congestion and sinus pressure. Eyes: Negative for discharge and redness. Respiratory: Negative for cough and shortness of breath. Cardiovascular: Negative for chest pain, palpitations and leg swelling. Gastrointestinal: Positive for abdominal distention and abdominal pain. Genitourinary: Negative for dysuria, frequency and urgency. Musculoskeletal: Positive for back pain. Negative for arthralgias. Skin: Negative for rash and wound. Hair loss and thinning of skin   Neurological: Negative for dizziness, light-headedness and headaches. Memory loss   Psychiatric/Behavioral: Positive for decreased concentration. Negative for dysphoric mood and sleep disturbance. The patient is not nervous/anxious.          Easily distracted       /70   Pulse 83   Wt 118 lb (53.5 kg)   SpO2 98%   BMI 20.25 kg/m²   BP Readings from Last 7 Encounters:   01/28/20 118/70   10/28/19 128/78   10/18/19 114/60   07/12/19 120/70   04/18/19 100/60   03/08/19 120/70   01/03/19 120/67     Wt Readings from Last 7 Encounters:   01/28/20 118 lb (53.5 kg)   10/18/19 112 lb (50.8 kg)   07/12/19 111 lb (50.3 kg)   04/18/19 114 lb (51.7 kg)   03/08/19 114 lb (51.7 kg)   11/30/18 112 lb (50.8 kg)   10/16/18 108 lb (49 kg)     BMI Readings from Last 7 Encounters:   01/28/20 20.25 kg/m²   10/18/19 19.22 kg/m²   07/12/19 19.05 kg/m²   04/18/19 19.57 kg/m²   03/08/19 19.57 kg/m²   11/30/18 19.22 kg/m²   10/16/18 18.54 kg/m²     Resp Readings from Last 7 Encounters:   10/28/19 17   01/03/19 18   10/01/16 18       Physical Exam  Constitutional:       General: She is not in acute distress. Appearance: Normal appearance. She is well-developed. HENT:      Right Ear: External ear normal. Tympanic membrane is not injected. Left Ear: External ear normal. Tympanic membrane is not injected. Mouth/Throat:      Pharynx: No oropharyngeal exudate. Eyes:      General: No scleral icterus. Conjunctiva/sclera: Conjunctivae normal.   Neck:      Musculoskeletal: Neck supple. Thyroid: No thyroid mass or thyromegaly. Vascular: No carotid bruit. Cardiovascular:      Rate and Rhythm: Normal rate and regular rhythm. Heart sounds: S1 normal and S2 normal. No murmur. No S3 or S4 sounds. Pulmonary:      Effort: Pulmonary effort is normal. No respiratory distress. Breath sounds: Normal breath sounds. No wheezing or rales. Abdominal:      General: Bowel sounds are normal. There is distension. Palpations: Abdomen is soft. There is no mass. Tenderness: There is no abdominal tenderness. Musculoskeletal:      Comments: Kyphosis scoliosis   Lymphadenopathy:      Cervical: No cervical adenopathy. Upper Body:      Right upper body: No supraclavicular adenopathy. Left upper body: No supraclavicular adenopathy. Skin:     Findings: No rash. Neurological:      Mental Status: She is alert and oriented to person, place, and time. Cranial Nerves: No cranial nerve deficit.          Results for orders placed or performed in visit on 10/10/19   Vitamin D 25 Hydroxy   Result Value Ref Range    Vit D, 25-Hydroxy 43.0 >=30 ng/mL   Lipid Panel   Result Value Ref Range    Cholesterol, Total 245 (H) 160 - 199 mg/dL    Triglycerides 49 0 - 149 mg/dL    HDL 95 65 - 121 mg/dL    LDL Calculated 140 <100 mg/dL   TSH without Reflex   Result Value Ref Range    TSH 2.770 0.270 - 4.200 uIU/mL   Comprehensive Metabolic Panel   Result Value Ref Range    Sodium 145 136 - 145 mmol/L    Potassium 4.3 3.5 - 5.0 mmol/L    Chloride 107 98 - 111 mmol/L    CO2 28 22 - 29 mmol/L    Anion Gap 10 7 - 19 mmol/L    Glucose 97 74 - 109 mg/dL    BUN 13 8 - 23 mg/dL    CREATININE 0.5 0.5 - 0.9 mg/dL    GFR Non-African American >60 >60    Calcium 9.2 8.8 - 10.2 mg/dL    Total Protein 6.7 6.6 - 8.7 g/dL    Alb 4.2 3.5 - 5.2 g/dL    Total Bilirubin 0.5 0.2 - 1.2 mg/dL    Alkaline Phosphatase 44 35 - 104 U/L    ALT 16 5 - 33 U/L    AST 22 5 - 32 U/L   CBC   Result Value Ref Range    WBC 4.9 4.8 - 10.8 K/uL    RBC 4.51 4.20 - 5.40 M/uL    Hemoglobin 13.7 12.0 - 16.0 g/dL    Hematocrit 42.3 37.0 - 47.0 %    MCV 93.8 81.0 - 99.0 fL    MCH 30.4 27.0 - 31.0 pg    MCHC 32.4 (L) 33.0 - 37.0 g/dL    RDW 13.2 11.5 - 14.5 %    Platelets 751 124 - 654 K/uL    MPV 10.7 9.4 - 12.3 fL       ASSESSMENT/ PLAN:  1. Abdominal bloating  We reviewed the CAT scan from April 2019. We gave her a FODMAP diet. We talked about a trial of lactose free diet a trial of gluten-free diet or trying the FODMAP diet. We are going to assess her gallbladder if her gallbladder is not better we will refer her to gastroenterology. - US GALLBLADDER RUQ; Future  - NM HEPATOBILIARY SCAN W EJECTION FRACTION; Future    2. Generalized abdominal pain  As above  - US GALLBLADDER RUQ; Future  - NM HEPATOBILIARY SCAN W EJECTION FRACTION; Future    3.  Memory loss  We have wondered in the past if depression was complicating memory we have done Mini-Mental status exams we ordered neuropsych testing in the past but the patient

## 2020-01-28 NOTE — PATIENT INSTRUCTIONS
\"lactose-free. \") You can have small amounts (2 Tbsp) of cottage, cream, or ricotta cheese. Hard cheeses like cheddar, Pierce, ROSS, and Swiss are okay. So are small amounts (1 oz) of aged or ripened cheeses like Brie, blue, and feta. Avoid: Milk, including cow, goat, and sheep. Avoid condensed or evaporated milk, buttermilk, custard, cream, sour cream, yogurt, and ice cream. Avoid soy milk. (Check sauces for dairy ingredients.)  Vegetables  Okay to eat: Bamboo shoots, bell peppers, bok shannon, up to ½ cup of broccoli or cabbage (red or white), and cucumbers. Eggplant, green beans, lettuce, olives, parsnips, and potatoes are okay to eat. So are pumpkin, rutabaga, seaweed, sprouts, Swiss chard, and spinach. You can eat scallions (green part only) and squash (not butternut). You can eat tomatoes, turnips, watercress, yams, and zucchini. You can also have small amounts of artichoke hearts (from can, 1 oz), carrots, corn (½ cob), and sweet potato (½ cup). Avoid: Artichokes, asparagus, [place] sprouts, emily cabbage, cauliflower, and celery. And avoid garlic, leeks, mushrooms, okra, onions, scallions (white part), shallots, and peas. Fruits  Okay to eat: Bananas, blueberries, cantaloupe, coconut, grapes, and honeydew. Kiwi, sophia, limes, oranges, passion fruit, papaya, and pineapple are also okay. You can eat plantain, raspberries, rhubarb, star fruit, strawberries, tangelo, and tangerine. You can also have small amounts of dried banana chips (up to 10 chips), dried cranberries (1 Tbsp), and shredded coconut (up to ¼ cup). Avoid: Apples, applesauce, apricots, avocados, blackberries, boysenberries, and cherries. Also avoid dates, figs, grapefruit, guava, lychee, and mangoes. Don't eat nectarines, peaches, pears, persimmon, plums, prunes, tamarillo, or watermelon. And limit most canned and dried fruits.   Oils, spices, condiments, and sweeteners  Okay to eat: Vegetable oils (including garlic infused), butter, ghee,

## 2020-02-14 ENCOUNTER — TELEPHONE (OUTPATIENT)
Dept: PSYCHIATRY | Age: 75
End: 2020-02-14

## 2020-02-24 ENCOUNTER — OFFICE VISIT (OUTPATIENT)
Dept: PSYCHIATRY | Age: 75
End: 2020-02-24
Payer: MEDICARE

## 2020-02-24 PROCEDURE — 90791 PSYCH DIAGNOSTIC EVALUATION: CPT | Performed by: PSYCHOLOGIST

## 2020-02-24 PROCEDURE — 1036F TOBACCO NON-USER: CPT | Performed by: PSYCHOLOGIST

## 2020-02-24 NOTE — PROGRESS NOTES
basil cell skin cancers removed     Low back pain 2/7/2017    Osteoporosis     Osteoporosis with pathological fracture with routine healing 6/23/2017     no problems      Plan:  Pt interventions:  Gathered background and social history, explained nature and purpose of testing

## 2020-03-16 ENCOUNTER — OFFICE VISIT (OUTPATIENT)
Dept: INTERNAL MEDICINE | Age: 75
End: 2020-03-16
Payer: MEDICARE

## 2020-03-16 VITALS
WEIGHT: 116 LBS | OXYGEN SATURATION: 98 % | DIASTOLIC BLOOD PRESSURE: 64 MMHG | HEART RATE: 80 BPM | HEIGHT: 64 IN | BODY MASS INDEX: 19.81 KG/M2 | SYSTOLIC BLOOD PRESSURE: 120 MMHG

## 2020-03-16 PROCEDURE — 1123F ACP DISCUSS/DSCN MKR DOCD: CPT | Performed by: INTERNAL MEDICINE

## 2020-03-16 PROCEDURE — 3017F COLORECTAL CA SCREEN DOC REV: CPT | Performed by: INTERNAL MEDICINE

## 2020-03-16 PROCEDURE — G8420 CALC BMI NORM PARAMETERS: HCPCS | Performed by: INTERNAL MEDICINE

## 2020-03-16 PROCEDURE — G8427 DOCREV CUR MEDS BY ELIG CLIN: HCPCS | Performed by: INTERNAL MEDICINE

## 2020-03-16 PROCEDURE — G8400 PT W/DXA NO RESULTS DOC: HCPCS | Performed by: INTERNAL MEDICINE

## 2020-03-16 PROCEDURE — 1036F TOBACCO NON-USER: CPT | Performed by: INTERNAL MEDICINE

## 2020-03-16 PROCEDURE — 1090F PRES/ABSN URINE INCON ASSESS: CPT | Performed by: INTERNAL MEDICINE

## 2020-03-16 PROCEDURE — 4040F PNEUMOC VAC/ADMIN/RCVD: CPT | Performed by: INTERNAL MEDICINE

## 2020-03-16 PROCEDURE — 99214 OFFICE O/P EST MOD 30 MIN: CPT | Performed by: INTERNAL MEDICINE

## 2020-03-16 PROCEDURE — G8482 FLU IMMUNIZE ORDER/ADMIN: HCPCS | Performed by: INTERNAL MEDICINE

## 2020-03-16 NOTE — PROGRESS NOTES
19.91 kg/m²   01/28/20 20.25 kg/m²   10/18/19 19.22 kg/m²   07/12/19 19.05 kg/m²   04/18/19 19.57 kg/m²   03/08/19 19.57 kg/m²   11/30/18 19.22 kg/m²     Resp Readings from Last 7 Encounters:   10/28/19 17   01/03/19 18   10/01/16 18       Physical Exam  Constitutional:       General: She is not in acute distress. Eyes:      General: No scleral icterus. Neck:      Musculoskeletal: Neck supple. Cardiovascular:      Heart sounds: Normal heart sounds. Pulmonary:      Breath sounds: Normal breath sounds. Abdominal:      General: Bowel sounds are normal. There is distension. Palpations: Abdomen is soft. Musculoskeletal:      Comments: Scoliosis---    Lymphadenopathy:      Cervical: No cervical adenopathy. Skin:     Findings: No rash.          Results for orders placed or performed in visit on 10/10/19   Vitamin D 25 Hydroxy   Result Value Ref Range    Vit D, 25-Hydroxy 43.0 >=30 ng/mL   Lipid Panel   Result Value Ref Range    Cholesterol, Total 245 (H) 160 - 199 mg/dL    Triglycerides 49 0 - 149 mg/dL    HDL 95 65 - 121 mg/dL    LDL Calculated 140 <100 mg/dL   TSH without Reflex   Result Value Ref Range    TSH 2.770 0.270 - 4.200 uIU/mL   Comprehensive Metabolic Panel   Result Value Ref Range    Sodium 145 136 - 145 mmol/L    Potassium 4.3 3.5 - 5.0 mmol/L    Chloride 107 98 - 111 mmol/L    CO2 28 22 - 29 mmol/L    Anion Gap 10 7 - 19 mmol/L    Glucose 97 74 - 109 mg/dL    BUN 13 8 - 23 mg/dL    CREATININE 0.5 0.5 - 0.9 mg/dL    GFR Non-African American >60 >60    Calcium 9.2 8.8 - 10.2 mg/dL    Total Protein 6.7 6.6 - 8.7 g/dL    Alb 4.2 3.5 - 5.2 g/dL    Total Bilirubin 0.5 0.2 - 1.2 mg/dL    Alkaline Phosphatase 44 35 - 104 U/L    ALT 16 5 - 33 U/L    AST 22 5 - 32 U/L   CBC   Result Value Ref Range    WBC 4.9 4.8 - 10.8 K/uL    RBC 4.51 4.20 - 5.40 M/uL    Hemoglobin 13.7 12.0 - 16.0 g/dL    Hematocrit 42.3 37.0 - 47.0 %    MCV 93.8 81.0 - 99.0 fL    MCH 30.4 27.0 - 31.0 pg    MCHC 32.4 (L) 33.0 - 37.0 g/dL    RDW 13.2 11.5 - 14.5 %    Platelets 538 994 - 772 K/uL    MPV 10.7 9.4 - 12.3 fL       ASSESSMENT/ PLAN:  1. Abdominal bloating  Continue with fodmap diet and follow up closely. Reviewed previous CTS and last colonoscopy. 2. Osteoporosis without current pathological fracture, unspecified osteoporosis type  She really needs to just stay on Prolia or if we start Prolia she needs to go on a bisphosphonate she is hesitant of any medication bone density is due in a few months she agrees to let us reassess at that time  - DEXA BONE DENSITY 2 SITES; Future  - CBC; Future  - Comprehensive Metabolic Panel; Future    3. Memory loss  Reviewed- she has had other neuropsychtesting but records not available    4. Vitamin D deficiency  follow  - Vitamin D 25 Hydroxy; Future    5. Hyperlipidemia, unspecified hyperlipidemia type  Follow   - CBC; Future  - TSH without Reflex; Future  - Lipid Panel;  Future    25' spent with patient

## 2020-03-23 ASSESSMENT — ENCOUNTER SYMPTOMS
SINUS PRESSURE: 0
ABDOMINAL DISTENTION: 1
COUGH: 0
EYE DISCHARGE: 0
SHORTNESS OF BREATH: 0
EYE REDNESS: 0
ABDOMINAL PAIN: 1
BACK PAIN: 1

## 2020-03-30 ENCOUNTER — VIRTUAL VISIT (OUTPATIENT)
Dept: PSYCHOLOGY | Age: 75
End: 2020-03-30
Payer: MEDICARE

## 2020-03-30 PROCEDURE — 98967 PH1 ASSMT&MGMT NQHP 11-20: CPT | Performed by: PSYCHOLOGIST

## 2020-06-30 ENCOUNTER — HOSPITAL ENCOUNTER (OUTPATIENT)
Dept: ULTRASOUND IMAGING | Age: 75
Discharge: HOME | End: 2020-06-30
Payer: MEDICARE

## 2020-06-30 PROCEDURE — 77080 DXA BONE DENSITY AXIAL: CPT

## 2020-07-07 ENCOUNTER — TELEPHONE (OUTPATIENT)
Dept: INTERNAL MEDICINE | Age: 75
End: 2020-07-07

## 2020-07-07 ENCOUNTER — OFFICE VISIT (OUTPATIENT)
Dept: INTERNAL MEDICINE | Age: 75
End: 2020-07-07
Payer: MEDICARE

## 2020-07-07 VITALS
BODY MASS INDEX: 19.81 KG/M2 | OXYGEN SATURATION: 96 % | WEIGHT: 116 LBS | HEIGHT: 64 IN | DIASTOLIC BLOOD PRESSURE: 64 MMHG | HEART RATE: 74 BPM | SYSTOLIC BLOOD PRESSURE: 120 MMHG

## 2020-07-07 PROCEDURE — G8399 PT W/DXA RESULTS DOCUMENT: HCPCS | Performed by: INTERNAL MEDICINE

## 2020-07-07 PROCEDURE — 1036F TOBACCO NON-USER: CPT | Performed by: INTERNAL MEDICINE

## 2020-07-07 PROCEDURE — 1123F ACP DISCUSS/DSCN MKR DOCD: CPT | Performed by: INTERNAL MEDICINE

## 2020-07-07 PROCEDURE — 3017F COLORECTAL CA SCREEN DOC REV: CPT | Performed by: INTERNAL MEDICINE

## 2020-07-07 PROCEDURE — G8420 CALC BMI NORM PARAMETERS: HCPCS | Performed by: INTERNAL MEDICINE

## 2020-07-07 PROCEDURE — 4040F PNEUMOC VAC/ADMIN/RCVD: CPT | Performed by: INTERNAL MEDICINE

## 2020-07-07 PROCEDURE — G8427 DOCREV CUR MEDS BY ELIG CLIN: HCPCS | Performed by: INTERNAL MEDICINE

## 2020-07-07 PROCEDURE — 99214 OFFICE O/P EST MOD 30 MIN: CPT | Performed by: INTERNAL MEDICINE

## 2020-07-07 PROCEDURE — 1090F PRES/ABSN URINE INCON ASSESS: CPT | Performed by: INTERNAL MEDICINE

## 2020-07-07 RX ORDER — TERIPARATIDE 250 UG/ML
20 INJECTION, SOLUTION SUBCUTANEOUS DAILY
Qty: 2.24 ML | Refills: 5 | Status: SHIPPED | OUTPATIENT
Start: 2020-07-07 | End: 2020-07-15

## 2020-07-07 NOTE — PROGRESS NOTES
Chief Complaint   Patient presents with    Follow-up     4 month follow up - abd. bloating    Hyperlipidemia       HPI: Patient is here today to follow-up medical issues osteoporosis abdominal bloating memory loss and other issues we have had extreme concern about her osteoporosis for years she has had multiple compression fractions she did not tolerate Prolia well she is had resistance to medication up to that time today she agrees to try either Forteo if covered we will go with Fosamax if Forteo is not covered she has some chronic abdominal bloating we reviewed all her records and work-up of this chronic symptoms of some memory loss but does better when she has more going on and sometimes this seems more like a focusing problem more possibly related to some dysthymia. Overall patient's mood is okay today she denies headache chest pain dyspnea. Past Medical History:   Diagnosis Date    Cancer ([facility] [address]     basil cell skin cancers removed     Low back pain 2/7/2017    Osteoporosis     Osteoporosis with pathological fracture with routine healing 6/23/2017       Past Surgical History:   Procedure Laterality Date    SKIN CANCER EXCISION         Family History   Problem Relation Age of Onset    Kidney Disease Mother     Cancer Father         Prostate        Social History     Socioeconomic History    Marital status: Single     Spouse name: Not on file    Number of children: Not on file    Years of education: Not on file    Highest education level: Not on file   Occupational History    Not on file   Social Needs    Financial resource strain: Not on file    Food insecurity     Worry: Not on file     Inability: Not on file    Transportation needs     Medical: Not on file     Non-medical: Not on file   Tobacco Use    Smoking status: Never Smoker    Smokeless tobacco: Never Used   Substance and Sexual Activity    Alcohol use:  Yes     Alcohol/week: 3.0 standard drinks     Types: 3 Glasses of wine per week  Drug use: No    Sexual activity: Not on file   Lifestyle    Physical activity     Days per week: Not on file     Minutes per session: Not on file    Stress: Not on file   Relationships    Social connections     Talks on phone: Not on file     Gets together: Not on file     Attends Sikh service: Not on file     Active member of club or organization: Not on file     Attends meetings of clubs or organizations: Not on file     Relationship status: Not on file    Intimate partner violence     Fear of current or ex partner: Not on file     Emotionally abused: Not on file     Physically abused: Not on file     Forced sexual activity: Not on file   Other Topics Concern    Not on file   Social History Narrative    Not on file       No Known Allergies    Current Outpatient Medications   Medication Sig Dispense Refill    alendronate (FOSAMAX) 70 MG tablet Take 1 tablet by mouth every 7 days 12 tablet 3     No current facility-administered medications for this visit. Review of Systems   Constitutional: Positive for fatigue. Negative for chills and fever. HENT: Negative for congestion and sinus pressure. Eyes: Negative for discharge and redness. Respiratory: Negative for cough and shortness of breath. Cardiovascular: Negative for chest pain, palpitations and leg swelling. Gastrointestinal: Positive for abdominal distention. Negative for abdominal pain. Genitourinary: Negative for dysuria, frequency and urgency. Musculoskeletal: Positive for back pain. Negative for arthralgias. Skin: Negative for rash and wound. Hair loss and thinning of skin   Neurological: Negative for dizziness, light-headedness and headaches. Memory loss   Psychiatric/Behavioral: Positive for decreased concentration. Negative for dysphoric mood and sleep disturbance. The patient is not nervous/anxious.          Easily distracted       /64   Pulse 74   Ht 5' 4\" (1.626 m)   Wt 116 lb (52.6 kg) SpO2 96%   BMI 19.91 kg/m²   BP Readings from Last 7 Encounters:   07/07/20 120/64   03/16/20 120/64   01/28/20 118/70   10/28/19 128/78   10/18/19 114/60   07/12/19 120/70   04/18/19 100/60     Wt Readings from Last 7 Encounters:   07/07/20 116 lb (52.6 kg)   03/16/20 116 lb (52.6 kg)   01/28/20 118 lb (53.5 kg)   10/18/19 112 lb (50.8 kg)   07/12/19 111 lb (50.3 kg)   04/18/19 114 lb (51.7 kg)   03/08/19 114 lb (51.7 kg)     BMI Readings from Last 7 Encounters:   07/07/20 19.91 kg/m²   03/16/20 19.91 kg/m²   01/28/20 20.25 kg/m²   10/18/19 19.22 kg/m²   07/12/19 19.05 kg/m²   04/18/19 19.57 kg/m²   03/08/19 19.57 kg/m²     Resp Readings from Last 7 Encounters:   10/28/19 17   01/03/19 18   10/01/16 18       Physical Exam  Constitutional:       General: She is not in acute distress. Appearance: Normal appearance. She is well-developed. HENT:      Right Ear: External ear normal. Tympanic membrane is not injected. Left Ear: External ear normal. Tympanic membrane is not injected. Mouth/Throat:      Pharynx: No oropharyngeal exudate. Eyes:      General: No scleral icterus. Conjunctiva/sclera: Conjunctivae normal.   Neck:      Musculoskeletal: Neck supple. Thyroid: No thyroid mass or thyromegaly. Vascular: No carotid bruit. Cardiovascular:      Rate and Rhythm: Normal rate and regular rhythm. Heart sounds: S1 normal and S2 normal. No murmur. No S3 or S4 sounds. Pulmonary:      Effort: Pulmonary effort is normal. No respiratory distress. Breath sounds: Normal breath sounds. No wheezing or rales. Abdominal:      General: Bowel sounds are normal. There is no distension. Palpations: Abdomen is soft. There is no mass. Tenderness: There is no abdominal tenderness. Lymphadenopathy:      Cervical: No cervical adenopathy. Upper Body:      Right upper body: No supraclavicular adenopathy. Left upper body: No supraclavicular adenopathy.    Skin:     Findings: No rash.   Neurological:      Mental Status: She is alert and oriented to person, place, and time. Cranial Nerves: No cranial nerve deficit. Results for orders placed or performed in visit on 06/30/20   Lipid Panel   Result Value Ref Range    Cholesterol, Total 238 (H) 160 - 199 mg/dL    Triglycerides 57 0 - 149 mg/dL    HDL 90 65 - 121 mg/dL    LDL Calculated 137 <100 mg/dL   TSH without Reflex   Result Value Ref Range    TSH 2.940 0.270 - 4.200 uIU/mL   Vitamin D 25 Hydroxy   Result Value Ref Range    Vit D, 25-Hydroxy 32.5 >=30 ng/mL   Comprehensive Metabolic Panel   Result Value Ref Range    Sodium 145 136 - 145 mmol/L    Potassium 4.6 3.5 - 5.0 mmol/L    Chloride 107 98 - 111 mmol/L    CO2 25 22 - 29 mmol/L    Anion Gap 13 7 - 19 mmol/L    Glucose 93 74 - 109 mg/dL    BUN 12 8 - 23 mg/dL    CREATININE 0.5 0.5 - 0.9 mg/dL    GFR Non-African American >60 >60    Calcium 8.8 8.8 - 10.2 mg/dL    Total Protein 6.4 (L) 6.6 - 8.7 g/dL    Alb 4.3 3.5 - 5.2 g/dL    Total Bilirubin 0.6 0.2 - 1.2 mg/dL    Alkaline Phosphatase 39 35 - 104 U/L    ALT 19 5 - 33 U/L    AST 25 5 - 32 U/L   CBC   Result Value Ref Range    WBC 5.3 4.8 - 10.8 K/uL    RBC 4.37 4.20 - 5.40 M/uL    Hemoglobin 13.3 12.0 - 16.0 g/dL    Hematocrit 41.0 37.0 - 47.0 %    MCV 93.8 81.0 - 99.0 fL    MCH 30.4 27.0 - 31.0 pg    MCHC 32.4 (L) 33.0 - 37.0 g/dL    RDW 13.3 11.5 - 14.5 %    Platelets 805 326 - 224 K/uL    MPV 11.3 9.4 - 12.3 fL       ASSESSMENT/ PLAN:  1. Severe osteopetrosis  Our first course was Forteo but it was very expensive not covered at all so we went with Fosamax patient instructed on how to take this and follow-up closely  - Vitamin D 25 Hydroxy; Future  We sent in forteo script but when not covered changed to script for fosamax - f/u     2. Memory loss  Overall we have assessed this memory is good does better when traveling around more people get somewhat isolated at times follow    3.  Vitamin D deficiency    - Vitamin D 25 Hydroxy; Future    4. Hyperlipidemia, unspecified hyperlipidemia type    - CBC; Future  - Comprehensive Metabolic Panel; Future  - TSH without Reflex; Future  - Lipid Panel;  Future

## 2020-07-08 NOTE — TELEPHONE ENCOUNTER
Forteo was approved and I faxed [facility] and asked them to let her know what the cost will be now.

## 2020-07-18 ASSESSMENT — ENCOUNTER SYMPTOMS
SINUS PRESSURE: 0
ABDOMINAL PAIN: 0
COUGH: 0
EYE DISCHARGE: 0
SHORTNESS OF BREATH: 0
EYE REDNESS: 0
ABDOMINAL DISTENTION: 1
BACK PAIN: 1

## 2020-10-06 DIAGNOSIS — E55.9 VITAMIN D DEFICIENCY: ICD-10-CM

## 2020-10-06 DIAGNOSIS — Q78.2 SEVERE OSTEOPETROSIS: ICD-10-CM

## 2020-10-06 DIAGNOSIS — E78.5 HYPERLIPIDEMIA, UNSPECIFIED HYPERLIPIDEMIA TYPE: ICD-10-CM

## 2020-10-06 LAB
ALBUMIN SERPL-MCNC: 4.3 G/DL (ref 3.5–5.2)
ALP BLD-CCNC: 49 U/L (ref 35–104)
ALT SERPL-CCNC: 14 U/L (ref 5–33)
ANION GAP SERPL CALCULATED.3IONS-SCNC: 14 MMOL/L (ref 7–19)
AST SERPL-CCNC: 22 U/L (ref 5–32)
BILIRUB SERPL-MCNC: 0.6 MG/DL (ref 0.2–1.2)
BUN BLDV-MCNC: 15 MG/DL (ref 8–23)
CALCIUM SERPL-MCNC: 9.1 MG/DL (ref 8.8–10.2)
CHLORIDE BLD-SCNC: 105 MMOL/L (ref 98–111)
CHOLESTEROL, TOTAL: 243 MG/DL (ref 160–199)
CO2: 25 MMOL/L (ref 22–29)
CREAT SERPL-MCNC: 0.6 MG/DL (ref 0.5–0.9)
GFR AFRICAN AMERICAN: >59
GFR NON-AFRICAN AMERICAN: >60
GLUCOSE BLD-MCNC: 96 MG/DL (ref 74–109)
HCT VFR BLD CALC: 41.7 % (ref 37–47)
HDLC SERPL-MCNC: 95 MG/DL (ref 65–121)
HEMOGLOBIN: 13.3 G/DL (ref 12–16)
LDL CHOLESTEROL CALCULATED: 137 MG/DL
MCH RBC QN AUTO: 30.3 PG (ref 27–31)
MCHC RBC AUTO-ENTMCNC: 31.9 G/DL (ref 33–37)
MCV RBC AUTO: 95 FL (ref 81–99)
PDW BLD-RTO: 13.2 % (ref 11.5–14.5)
PLATELET # BLD: 199 K/UL (ref 130–400)
PMV BLD AUTO: 10.9 FL (ref 9.4–12.3)
POTASSIUM SERPL-SCNC: 3.9 MMOL/L (ref 3.5–5)
RBC # BLD: 4.39 M/UL (ref 4.2–5.4)
SODIUM BLD-SCNC: 144 MMOL/L (ref 136–145)
TOTAL PROTEIN: 6.8 G/DL (ref 6.6–8.7)
TRIGL SERPL-MCNC: 55 MG/DL (ref 0–149)
TSH SERPL DL<=0.05 MIU/L-ACNC: 3.28 UIU/ML (ref 0.27–4.2)
VITAMIN D 25-HYDROXY: 29.8 NG/ML
WBC # BLD: 5.4 K/UL (ref 4.8–10.8)

## 2020-10-16 ENCOUNTER — OFFICE VISIT (OUTPATIENT)
Dept: INTERNAL MEDICINE | Age: 75
End: 2020-10-16
Payer: MEDICARE

## 2020-10-16 VITALS
HEIGHT: 64 IN | WEIGHT: 117 LBS | BODY MASS INDEX: 19.97 KG/M2 | SYSTOLIC BLOOD PRESSURE: 104 MMHG | HEART RATE: 77 BPM | OXYGEN SATURATION: 98 % | DIASTOLIC BLOOD PRESSURE: 68 MMHG

## 2020-10-16 PROCEDURE — 3017F COLORECTAL CA SCREEN DOC REV: CPT | Performed by: INTERNAL MEDICINE

## 2020-10-16 PROCEDURE — G8399 PT W/DXA RESULTS DOCUMENT: HCPCS | Performed by: INTERNAL MEDICINE

## 2020-10-16 PROCEDURE — 1123F ACP DISCUSS/DSCN MKR DOCD: CPT | Performed by: INTERNAL MEDICINE

## 2020-10-16 PROCEDURE — G8427 DOCREV CUR MEDS BY ELIG CLIN: HCPCS | Performed by: INTERNAL MEDICINE

## 2020-10-16 PROCEDURE — 1090F PRES/ABSN URINE INCON ASSESS: CPT | Performed by: INTERNAL MEDICINE

## 2020-10-16 PROCEDURE — G8482 FLU IMMUNIZE ORDER/ADMIN: HCPCS | Performed by: INTERNAL MEDICINE

## 2020-10-16 PROCEDURE — 1036F TOBACCO NON-USER: CPT | Performed by: INTERNAL MEDICINE

## 2020-10-16 PROCEDURE — 4040F PNEUMOC VAC/ADMIN/RCVD: CPT | Performed by: INTERNAL MEDICINE

## 2020-10-16 PROCEDURE — G8420 CALC BMI NORM PARAMETERS: HCPCS | Performed by: INTERNAL MEDICINE

## 2020-10-16 PROCEDURE — 99214 OFFICE O/P EST MOD 30 MIN: CPT | Performed by: INTERNAL MEDICINE

## 2020-10-16 RX ORDER — CHOLECALCIFEROL (VITAMIN D3) 125 MCG
1 CAPSULE ORAL DAILY
Qty: 90 TABLET | Refills: 3 | Status: SHIPPED | OUTPATIENT
Start: 2020-10-16

## 2020-10-16 RX ORDER — GUARN/MA-HUANG/P.GIN/S.GINSENG
1 TABLET ORAL 2 TIMES DAILY
Qty: 180 TABLET | Refills: 3 | Status: SHIPPED | OUTPATIENT
Start: 2020-10-16

## 2020-10-16 RX ORDER — MULTIVIT WITH MINERALS/LUTEIN
2000 TABLET ORAL DAILY
Qty: 180 CAPSULE | Refills: 3 | Status: SHIPPED | OUTPATIENT
Start: 2020-10-16 | End: 2021-10-25

## 2020-10-16 NOTE — PROGRESS NOTES
Not on file     Physically abused: Not on file     Forced sexual activity: Not on file   Other Topics Concern    Not on file   Social History Narrative    Not on file       No Known Allergies    Current Outpatient Medications   Medication Sig Dispense Refill    vitamin E 1000 units capsule Take 2 capsules by mouth daily 180 capsule 3    Cholecalciferol (VITAMIN D3) 50 MCG (2000 UT) TABS Take 1 tablet by mouth daily 90 tablet 3    Calcium 600-200 MG-UNIT TABS Take 1 tablet by mouth 2 times daily 180 tablet 3    alendronate (FOSAMAX) 70 MG tablet Take 1 tablet by mouth every 7 days 12 tablet 3     No current facility-administered medications for this visit. Review of Systems   Constitutional: Positive for fatigue. Negative for chills and fever. HENT: Negative for congestion and sinus pressure. Eyes: Negative for discharge and redness. Respiratory: Negative for cough and shortness of breath. Cardiovascular: Negative for chest pain, palpitations and leg swelling. Gastrointestinal: Positive for abdominal distention. Negative for abdominal pain. Genitourinary: Negative for dysuria, frequency and urgency. Musculoskeletal: Positive for back pain. Negative for arthralgias. Skin: Negative for rash and wound. Hair loss and thinning of skin   Neurological: Negative for dizziness, light-headedness and headaches. Memory loss   Psychiatric/Behavioral: Positive for decreased concentration. Negative for dysphoric mood and sleep disturbance. The patient is not nervous/anxious.          Easily distracted       /68 (Site: Left Upper Arm)   Pulse 77   Ht 5' 4\" (1.626 m)   Wt 117 lb (53.1 kg)   SpO2 98%   BMI 20.08 kg/m²   BP Readings from Last 7 Encounters:   10/16/20 104/68   07/07/20 120/64   03/16/20 120/64   01/28/20 118/70   10/28/19 128/78   10/18/19 114/60   07/12/19 120/70     Wt Readings from Last 7 Encounters:   10/16/20 117 lb (53.1 kg)   07/07/20 116 lb (52.6 kg) 03/16/20 116 lb (52.6 kg)   01/28/20 118 lb (53.5 kg)   10/18/19 112 lb (50.8 kg)   07/12/19 111 lb (50.3 kg)   04/18/19 114 lb (51.7 kg)     BMI Readings from Last 7 Encounters:   10/16/20 20.08 kg/m²   07/07/20 19.91 kg/m²   03/16/20 19.91 kg/m²   01/28/20 20.25 kg/m²   10/18/19 19.22 kg/m²   07/12/19 19.05 kg/m²   04/18/19 19.57 kg/m²     Resp Readings from Last 7 Encounters:   10/28/19 17   01/03/19 18   10/01/16 18       Physical Exam  Constitutional:       General: She is not in acute distress. Appearance: Normal appearance. She is well-developed. HENT:      Right Ear: External ear normal. Tympanic membrane is not injected. Left Ear: External ear normal. Tympanic membrane is not injected. Mouth/Throat:      Pharynx: No oropharyngeal exudate. Eyes:      General: No scleral icterus. Conjunctiva/sclera: Conjunctivae normal.   Neck:      Musculoskeletal: Neck supple. Thyroid: No thyroid mass or thyromegaly. Vascular: No carotid bruit. Cardiovascular:      Rate and Rhythm: Normal rate and regular rhythm. Heart sounds: S1 normal and S2 normal. No murmur. No S3 or S4 sounds. Pulmonary:      Effort: Pulmonary effort is normal. No respiratory distress. Breath sounds: Normal breath sounds. No wheezing or rales. Abdominal:      General: Bowel sounds are normal. There is distension. Palpations: Abdomen is soft. There is no mass. Tenderness: There is no abdominal tenderness. Lymphadenopathy:      Cervical: No cervical adenopathy. Upper Body:      Right upper body: No supraclavicular adenopathy. Left upper body: No supraclavicular adenopathy. Skin:     Findings: No rash. Neurological:      Mental Status: She is alert and oriented to person, place, and time. Cranial Nerves: No cranial nerve deficit.          Results for orders placed or performed in visit on 10/06/20   Vitamin D 25 Hydroxy   Result Value Ref Range    Vit D, 25-Hydroxy 29.8 (L) >=30 ng/mL   Lipid Panel   Result Value Ref Range    Cholesterol, Total 243 (H) 160 - 199 mg/dL    Triglycerides 55 0 - 149 mg/dL    HDL 95 65 - 121 mg/dL    LDL Calculated 137 <100 mg/dL   TSH without Reflex   Result Value Ref Range    TSH 3.280 0.270 - 4.200 uIU/mL   Comprehensive Metabolic Panel   Result Value Ref Range    Sodium 144 136 - 145 mmol/L    Potassium 3.9 3.5 - 5.0 mmol/L    Chloride 105 98 - 111 mmol/L    CO2 25 22 - 29 mmol/L    Anion Gap 14 7 - 19 mmol/L    Glucose 96 74 - 109 mg/dL    BUN 15 8 - 23 mg/dL    CREATININE 0.6 0.5 - 0.9 mg/dL    GFR Non-African American >60 >60    GFR African American >59 >59    Calcium 9.1 8.8 - 10.2 mg/dL    Total Protein 6.8 6.6 - 8.7 g/dL    Alb 4.3 3.5 - 5.2 g/dL    Total Bilirubin 0.6 0.2 - 1.2 mg/dL    Alkaline Phosphatase 49 35 - 104 U/L    ALT 14 5 - 33 U/L    AST 22 5 - 32 U/L   CBC   Result Value Ref Range    WBC 5.4 4.8 - 10.8 K/uL    RBC 4.39 4.20 - 5.40 M/uL    Hemoglobin 13.3 12.0 - 16.0 g/dL    Hematocrit 41.7 37.0 - 47.0 %    MCV 95.0 81.0 - 99.0 fL    MCH 30.3 27.0 - 31.0 pg    MCHC 31.9 (L) 33.0 - 37.0 g/dL    RDW 13.2 11.5 - 14.5 %    Platelets 053 819 - 513 K/uL    MPV 10.9 9.4 - 12.3 fL       ASSESSMENT/ PLAN:  1. Abdominal bloating  Very bloated in the lower abdomen pelvis very low pelvis I would worry about her ovaries were going to get a transvaginal ultrasound of the ovaries previous pelvic ultrasound was not done transvaginally and it is been sometime now. Some of this may be related to her osteoporosis changing her anatomy some    2. Other osteoporosis without current pathological fracture    - Cholecalciferol (VITAMIN D3) 50 MCG (2000 UT) TABS; Take 1 tablet by mouth daily  Dispense: 90 tablet; Refill: 3  - Calcium 600-200 MG-UNIT TABS; Take 1 tablet by mouth 2 times daily  Dispense: 180 tablet; Refill: 3    3. Cognitive dysfunction    - vitamin E 1000 units capsule; Take 2 capsules by mouth daily  Dispense: 180 capsule;  Refill: 3    25

## 2020-10-27 ENCOUNTER — HOSPITAL ENCOUNTER (OUTPATIENT)
Dept: ULTRASOUND IMAGING | Age: 75
Discharge: HOME | End: 2020-10-27
Payer: MEDICARE

## 2020-10-27 PROCEDURE — 76856 US EXAM PELVIC COMPLETE: CPT

## 2020-10-28 ASSESSMENT — ENCOUNTER SYMPTOMS
BACK PAIN: 1
ABDOMINAL PAIN: 0
SINUS PRESSURE: 0
EYE DISCHARGE: 0
COUGH: 0
SHORTNESS OF BREATH: 0
ABDOMINAL DISTENTION: 1
EYE REDNESS: 0

## 2020-12-17 ENCOUNTER — OFFICE VISIT (OUTPATIENT)
Dept: INTERNAL MEDICINE | Age: 75
End: 2020-12-17
Payer: MEDICARE

## 2020-12-17 VITALS
OXYGEN SATURATION: 97 % | HEIGHT: 64 IN | HEART RATE: 50 BPM | BODY MASS INDEX: 20.14 KG/M2 | WEIGHT: 118 LBS | SYSTOLIC BLOOD PRESSURE: 106 MMHG | DIASTOLIC BLOOD PRESSURE: 70 MMHG

## 2020-12-17 PROCEDURE — 3017F COLORECTAL CA SCREEN DOC REV: CPT | Performed by: INTERNAL MEDICINE

## 2020-12-17 PROCEDURE — 1123F ACP DISCUSS/DSCN MKR DOCD: CPT | Performed by: INTERNAL MEDICINE

## 2020-12-17 PROCEDURE — G8420 CALC BMI NORM PARAMETERS: HCPCS | Performed by: INTERNAL MEDICINE

## 2020-12-17 PROCEDURE — G8482 FLU IMMUNIZE ORDER/ADMIN: HCPCS | Performed by: INTERNAL MEDICINE

## 2020-12-17 PROCEDURE — G8399 PT W/DXA RESULTS DOCUMENT: HCPCS | Performed by: INTERNAL MEDICINE

## 2020-12-17 PROCEDURE — 4040F PNEUMOC VAC/ADMIN/RCVD: CPT | Performed by: INTERNAL MEDICINE

## 2020-12-17 PROCEDURE — 1090F PRES/ABSN URINE INCON ASSESS: CPT | Performed by: INTERNAL MEDICINE

## 2020-12-17 PROCEDURE — 99214 OFFICE O/P EST MOD 30 MIN: CPT | Performed by: INTERNAL MEDICINE

## 2020-12-17 PROCEDURE — 1036F TOBACCO NON-USER: CPT | Performed by: INTERNAL MEDICINE

## 2020-12-17 PROCEDURE — G8427 DOCREV CUR MEDS BY ELIG CLIN: HCPCS | Performed by: INTERNAL MEDICINE

## 2020-12-17 SDOH — ECONOMIC STABILITY: FOOD INSECURITY: WITHIN THE PAST 12 MONTHS, YOU WORRIED THAT YOUR FOOD WOULD RUN OUT BEFORE YOU GOT MONEY TO BUY MORE.: NEVER TRUE

## 2020-12-17 SDOH — ECONOMIC STABILITY: INCOME INSECURITY: HOW HARD IS IT FOR YOU TO PAY FOR THE VERY BASICS LIKE FOOD, HOUSING, MEDICAL CARE, AND HEATING?: NOT HARD AT ALL

## 2020-12-17 SDOH — ECONOMIC STABILITY: TRANSPORTATION INSECURITY
IN THE PAST 12 MONTHS, HAS THE LACK OF TRANSPORTATION KEPT YOU FROM MEDICAL APPOINTMENTS OR FROM GETTING MEDICATIONS?: NOT ASKED

## 2020-12-17 SDOH — ECONOMIC STABILITY: FOOD INSECURITY: WITHIN THE PAST 12 MONTHS, THE FOOD YOU BOUGHT JUST DIDN'T LAST AND YOU DIDN'T HAVE MONEY TO GET MORE.: NEVER TRUE

## 2020-12-17 SDOH — ECONOMIC STABILITY: TRANSPORTATION INSECURITY
IN THE PAST 12 MONTHS, HAS LACK OF TRANSPORTATION KEPT YOU FROM MEETINGS, WORK, OR FROM GETTING THINGS NEEDED FOR DAILY LIVING?: NOT ASKED

## 2020-12-17 NOTE — PROGRESS NOTES
Chief Complaint   Patient presents with    3 Month Follow-Up    Osteoporosis       HPI: Pt felt a pop in left anterior ribs when walking- very painful. This happened on 12/10 and is better. Much better. Not in pain at very moment. No cough and no pleurisy. Pt has stopped going to the gym. Hair is thinning. Taking several vitamins. Short term memory issues. Left knee feels weak at times. Some stomach distention/ discomfort at times (chronic)--- better if anything. She is very anxious about transvaginal ultrasound or endometrial biopsy or D&C. Past Medical History:   Diagnosis Date    Cancer ([facility] [address]     basil cell skin cancers removed     Low back pain 2/7/2017    Osteoporosis     Osteoporosis with pathological fracture with routine healing 6/23/2017       Past Surgical History:   Procedure Laterality Date    SKIN CANCER EXCISION         Family History   Problem Relation Age of Onset    Kidney Disease Mother     Cancer Father         Prostate        Social History     Socioeconomic History    Marital status: Single     Spouse name: Not on file    Number of children: Not on file    Years of education: Not on file    Highest education level: Not on file   Occupational History    Not on file   Social Needs    Financial resource strain: Not hard at all   [place] insecurity     Worry: Never true     Inability: Never true   [employer] needs     Medical: Not on file     Non-medical: Not on file   Tobacco Use    Smoking status: Never Smoker    Smokeless tobacco: Never Used   Substance and Sexual Activity    Alcohol use:  Yes     Alcohol/week: 3.0 standard drinks     Types: 3 Glasses of wine per week    Drug use: No    Sexual activity: Not on file   Lifestyle    Physical activity     Days per week: Not on file     Minutes per session: Not on file    Stress: Not on file   Relationships    Social connections     Talks on phone: Not on file     Gets together: Not on file     Attends Worship service: Not on file     Active member of club or organization: Not on file     Attends meetings of clubs or organizations: Not on file     Relationship status: Not on file    Intimate partner violence     Fear of current or ex partner: Not on file     Emotionally abused: Not on file     Physically abused: Not on file     Forced sexual activity: Not on file   Other Topics Concern    Not on file   Social History Narrative    Not on file       No Known Allergies    Current Outpatient Medications   Medication Sig Dispense Refill    vitamin E 1000 units capsule Take 2 capsules by mouth daily 180 capsule 3    Cholecalciferol (VITAMIN D3) 50 MCG (2000 UT) TABS Take 1 tablet by mouth daily 90 tablet 3    Calcium 600-200 MG-UNIT TABS Take 1 tablet by mouth 2 times daily 180 tablet 3    alendronate (FOSAMAX) 70 MG tablet Take 1 tablet by mouth every 7 days 12 tablet 3     No current facility-administered medications for this visit. Review of Systems   Constitutional: Positive for fatigue. Negative for chills and fever. HENT: Negative for congestion and sinus pressure. Eyes: Negative for discharge and redness. Respiratory: Negative for cough and shortness of breath. Cardiovascular: Negative for chest pain, palpitations and leg swelling. Gastrointestinal: Positive for abdominal distention. Negative for abdominal pain. Genitourinary: Negative for dysuria, frequency and urgency. Musculoskeletal: Positive for arthralgias and back pain. Skin: Negative for rash and wound. Hair loss and thinning of skin   Neurological: Negative for dizziness, light-headedness and headaches. Memory loss   Psychiatric/Behavioral: Positive for decreased concentration. Negative for dysphoric mood and sleep disturbance. The patient is not nervous/anxious.          Easily distracted       /70   Pulse 50   Ht 5' 4\" (1.626 m)   Wt 118 lb (53.5 kg)   SpO2 97%   BMI 20.25 kg/m²   BP Readings No rash. Neurological:      Mental Status: She is alert and oriented to person, place, and time. Cranial Nerves: No cranial nerve deficit. Psychiatric:      Comments: Very anxious about any possible procedure         Results for orders placed or performed in visit on 10/06/20   Vitamin D 25 Hydroxy   Result Value Ref Range    Vit D, 25-Hydroxy 29.8 (L) >=30 ng/mL   Lipid Panel   Result Value Ref Range    Cholesterol, Total 243 (H) 160 - 199 mg/dL    Triglycerides 55 0 - 149 mg/dL    HDL 95 65 - 121 mg/dL    LDL Calculated 137 <100 mg/dL   TSH without Reflex   Result Value Ref Range    TSH 3.280 0.270 - 4.200 uIU/mL   Comprehensive Metabolic Panel   Result Value Ref Range    Sodium 144 136 - 145 mmol/L    Potassium 3.9 3.5 - 5.0 mmol/L    Chloride 105 98 - 111 mmol/L    CO2 25 22 - 29 mmol/L    Anion Gap 14 7 - 19 mmol/L    Glucose 96 74 - 109 mg/dL    BUN 15 8 - 23 mg/dL    CREATININE 0.6 0.5 - 0.9 mg/dL    GFR Non-African American >60 >60    GFR African American >59 >59    Calcium 9.1 8.8 - 10.2 mg/dL    Total Protein 6.8 6.6 - 8.7 g/dL    Alb 4.3 3.5 - 5.2 g/dL    Total Bilirubin 0.6 0.2 - 1.2 mg/dL    Alkaline Phosphatase 49 35 - 104 U/L    ALT 14 5 - 33 U/L    AST 22 5 - 32 U/L   CBC   Result Value Ref Range    WBC 5.4 4.8 - 10.8 K/uL    RBC 4.39 4.20 - 5.40 M/uL    Hemoglobin 13.3 12.0 - 16.0 g/dL    Hematocrit 41.7 37.0 - 47.0 %    MCV 95.0 81.0 - 99.0 fL    MCH 30.3 27.0 - 31.0 pg    MCHC 31.9 (L) 33.0 - 37.0 g/dL    RDW 13.2 11.5 - 14.5 %    Platelets 024 953 - 268 K/uL    MPV 10.9 9.4 - 12.3 fL       ASSESSMENT/ PLAN:  1. Endometrial thickening on ultrasound  I spent a very long time explaining to the patient that she has endometrial thickening but because she could not tolerate transvaginal us the test she had was not as accurate. Typically with a thickened endometrial lining you worry about possible endometrial cancer. We referred her to GYN for their recommendations.   Patient is extremely anxious about this I explained to her they will either recommend trying to do the transvaginal ultrasound again to be sure that it is truly thickened versus endometrial biopsy versus D&C versus follow-up we spent an extended period of time discussing this    2. Other osteoporosis, unspecified pathological fracture presence  Continue Fosamax has not tolerated other therapies    3. Memory loss  Continue to follow has done some neuropsych testing in the past and did okay just continue to monitor    4. Abdominal bloating  Better right now    5. Screening mammogram, encounter for    - [facility] DIGITAL SCREEN W OR WO CAD BILATERAL;  Future    Greater than 25 minutes spent

## 2020-12-18 PROBLEM — R93.89 ENDOMETRIAL THICKENING ON ULTRASOUND: Status: ACTIVE | Noted: 2020-12-18

## 2020-12-18 ASSESSMENT — ENCOUNTER SYMPTOMS
SINUS PRESSURE: 0
EYE REDNESS: 0
EYE DISCHARGE: 0
ABDOMINAL DISTENTION: 1
ABDOMINAL PAIN: 0
SHORTNESS OF BREATH: 0
BACK PAIN: 1
COUGH: 0

## 2021-01-11 ENCOUNTER — OFFICE VISIT (OUTPATIENT)
Dept: OBGYN CLINIC | Age: 76
End: 2021-01-11
Payer: MEDICARE

## 2021-01-11 VITALS
BODY MASS INDEX: 20.14 KG/M2 | SYSTOLIC BLOOD PRESSURE: 136 MMHG | HEIGHT: 64 IN | WEIGHT: 118 LBS | DIASTOLIC BLOOD PRESSURE: 80 MMHG

## 2021-01-11 DIAGNOSIS — R93.89 THICKENED ENDOMETRIUM: Primary | ICD-10-CM

## 2021-01-11 PROCEDURE — 1123F ACP DISCUSS/DSCN MKR DOCD: CPT | Performed by: OBSTETRICS & GYNECOLOGY

## 2021-01-11 PROCEDURE — 4040F PNEUMOC VAC/ADMIN/RCVD: CPT | Performed by: OBSTETRICS & GYNECOLOGY

## 2021-01-11 PROCEDURE — G8427 DOCREV CUR MEDS BY ELIG CLIN: HCPCS | Performed by: OBSTETRICS & GYNECOLOGY

## 2021-01-11 PROCEDURE — G8420 CALC BMI NORM PARAMETERS: HCPCS | Performed by: OBSTETRICS & GYNECOLOGY

## 2021-01-11 PROCEDURE — 3017F COLORECTAL CA SCREEN DOC REV: CPT | Performed by: OBSTETRICS & GYNECOLOGY

## 2021-01-11 PROCEDURE — 1090F PRES/ABSN URINE INCON ASSESS: CPT | Performed by: OBSTETRICS & GYNECOLOGY

## 2021-01-11 PROCEDURE — 99204 OFFICE O/P NEW MOD 45 MIN: CPT | Performed by: OBSTETRICS & GYNECOLOGY

## 2021-01-11 PROCEDURE — 1036F TOBACCO NON-USER: CPT | Performed by: OBSTETRICS & GYNECOLOGY

## 2021-01-11 PROCEDURE — G8399 PT W/DXA RESULTS DOCUMENT: HCPCS | Performed by: OBSTETRICS & GYNECOLOGY

## 2021-01-11 PROCEDURE — G8482 FLU IMMUNIZE ORDER/ADMIN: HCPCS | Performed by: OBSTETRICS & GYNECOLOGY

## 2021-01-11 ASSESSMENT — ENCOUNTER SYMPTOMS
GASTROINTESTINAL NEGATIVE: 1
EYES NEGATIVE: 1
RESPIRATORY NEGATIVE: 1
ALLERGIC/IMMUNOLOGIC NEGATIVE: 1

## 2021-01-11 NOTE — PROGRESS NOTES
Juancarlos Louis is a 70 y.o. who presents today as a referral for a thickened endometrium on ultrasound. Pt states she went to Dr. Cynthia Shipman for complaints of abdominal bloating and discomfort. Pt denies cramping or vaginal bleeding. She reports about a 2-5 lb increase in weight. No change in appetite. Review of Systems   Constitutional: Negative. HENT: Negative. Eyes: Negative. Respiratory: Negative. Cardiovascular: Negative. Gastrointestinal: Negative. Bloating   Endocrine: Negative. Genitourinary: Negative. Negative for dysuria, frequency, menstrual problem, pelvic pain, urgency and vaginal discharge. Musculoskeletal: Negative. Skin: Negative. Allergic/Immunologic: Negative. Neurological: Negative. Hematological: Negative. Psychiatric/Behavioral: Negative. Past Medical History:   Diagnosis Date    Cancer ([facility] [place] [facility]     basil cell skin cancers removed     Low back pain 2/7/2017    Osteoporosis     Osteoporosis with pathological fracture with routine healing 6/23/2017       Past Surgical History:   Procedure Laterality Date    SKIN CANCER EXCISION         Family History   Problem Relation Age of Onset    Kidney Disease Mother     Cancer Father         Prostate        Social History     Socioeconomic History    Marital status: Single     Spouse name: Not on file    Number of children: Not on file    Years of education: Not on file    Highest education level: Not on file   Occupational History    Not on file   Social Needs    Financial resource strain: Not hard at all   [place] insecurity     Worry: Never true     Inability: Never true   [employer] needs     Medical: Not on file     Non-medical: Not on file   Tobacco Use    Smoking status: Never Smoker    Smokeless tobacco: Never Used   Substance and Sexual Activity    Alcohol use:  Yes     Alcohol/week: 3.0 standard drinks     Types: 3 Glasses of wine per week    Drug use: No    Sexual Palpations: Abdomen is soft. There is no mass. Tenderness: There is no abdominal tenderness. There is no guarding or rebound. Genitourinary:     Labia:         Right: No rash, tenderness or lesion. Left: No rash, tenderness or lesion. Urethra: No prolapse or urethral lesion. Vagina: Normal.      Cervix: No cervical motion tenderness, discharge or friability. Uterus: Normal.       Adnexa: Right adnexa normal and left adnexa normal.        Right: No mass, tenderness or fullness. Left: No mass, tenderness or fullness. Rectum: Normal.      Comments: Uterus 5 wk size  Narrowed introitus and vaginal canal requiring pediatric speculum. Patient very uncomfortable during exam  Musculoskeletal: Normal range of motion. General: No tenderness or deformity. Skin:     General: Skin is warm and dry. Coloration: Skin is not pale. Findings: No erythema or rash. Neurological:      Mental Status: She is alert and oriented to person, place, and time. Cranial Nerves: No cranial nerve deficit. Psychiatric:         Attention and Perception: Attention and perception normal.         Mood and Affect: Affect normal. Mood is anxious. Speech: Speech normal.         Behavior: Behavior normal. Behavior is cooperative. Thought Content: Thought content normal.         Cognition and Memory: Cognition and memory normal.         Judgment: Judgment normal.       Narrative   EXAMINATION:  US PELVIS COMPLETE  10/27/2020 2:12 PM   HISTORY: Abdominal bloating. TECHNIQUE: Multiple transabdominal sonographic images were obtained. Transvaginal exam was attempted but was too painful for the patient to   tolerate. UTERUS: The uterus measures 5.3 x 1.9 x 3.7 cm. The endometrium   measures 7 mm in double wall thickness. No definite uterine mass is   visualized. There are some echogenic areas near the endometrium that   are not very well seen.  The technologist thought that they may   represent calcifications. However, there is no shadowing. The   endometrium would be better evaluated with transvaginal vaginal   imaging. OVARIES: The ovaries could not be seen with confidence. They cannot be   commented on. They are likely atrophic. OTHER: No free fluid or other abnormality.       Impression   1. The endometrium measures up to 7 mm in double wall thickness which   is considered abnormal in a patient of this age. Echogenic areas near   the endometrium not well seen on the transabdominal images. It could   represent calcifications but there is no shadowing. Transvaginal   examination would be better for evaluation of the uterus. However, the   patient could not tolerate transvaginal exam.   2. The ovaries cannot be seen. There are likely severely atrophic. Signed by Dr Wayne Wilder on 10/27/2020 2:16 PM         Assessment   Diagnosis Orders   1. Thickened endometrium  US PELVIS COMPLETE       Plan   1. Gyn exam done, pt anxious. 2. Unable to do EMB in office. 3. Treatment options discussed: Hysteroscopy, Myosure, or repeat ultrasound in 6 months. Given patient's lack of risk factors, asymptomatic and lining <11 mm on incidental finding of thickening,  patient is a good candidate for expectant management with close f/u.  4. Told to call if any bleeding, will repeat before 6 months. 5. Pt wishes to repeat ultrasound in 6 months. Melissa Garcia, am scribing for and in the presence of Dr. Bia Chilel. I, Dr. Bia Chilel, personally performed the services described in this documentation as scribed by Siria Reed in my presence, and it is both accurate and complete.

## 2021-01-11 NOTE — PROGRESS NOTES
Pt is here today sent from Dr. Alessio Goode. She had an US on 10/27/2020. Her uterine lining is more than 5 mm thick.

## 2021-02-20 ENCOUNTER — IMMUNIZATION (OUTPATIENT)
Age: 76
End: 2021-02-20
Payer: MEDICARE

## 2021-02-20 PROCEDURE — 91300 COVID-19, PFIZER VACCINE 30MCG/0.3ML DOSE: CPT | Performed by: FAMILY MEDICINE

## 2021-02-20 PROCEDURE — 0001A COVID-19, PFIZER VACCINE 30MCG/0.3ML DOSE: CPT | Performed by: FAMILY MEDICINE

## 2021-03-13 ENCOUNTER — IMMUNIZATION (OUTPATIENT)
Age: 76
End: 2021-03-13
Payer: MEDICARE

## 2021-03-13 PROCEDURE — 91300 COVID-19, PFIZER VACCINE 30MCG/0.3ML DOSE: CPT | Performed by: FAMILY MEDICINE

## 2021-03-13 PROCEDURE — 0002A COVID-19, PFIZER VACCINE 30MCG/0.3ML DOSE: CPT | Performed by: FAMILY MEDICINE

## 2021-04-05 ASSESSMENT — LIFESTYLE VARIABLES
HOW OFTEN DURING THE LAST YEAR HAVE YOU FAILED TO DO WHAT WAS NORMALLY EXPECTED FROM YOU BECAUSE OF DRINKING: NEVER
HAS A RELATIVE, FRIEND, DOCTOR, OR ANOTHER HEALTH PROFESSIONAL EXPRESSED CONCERN ABOUT YOUR DRINKING OR SUGGESTED YOU CUT DOWN: NO
HOW OFTEN DURING THE LAST YEAR HAVE YOU FOUND THAT YOU WERE NOT ABLE TO STOP DRINKING ONCE YOU HAD STARTED: 0
HOW MANY STANDARD DRINKS CONTAINING ALCOHOL DO YOU HAVE ON A TYPICAL DAY: ONE OR TWO
AUDIT-C TOTAL SCORE: 0
HOW OFTEN DURING THE LAST YEAR HAVE YOU FOUND THAT YOU WERE NOT ABLE TO STOP DRINKING ONCE YOU HAD STARTED: NEVER
AUDIT-C TOTAL SCORE: 4
AUDIT TOTAL SCORE: 0
HAVE YOU OR SOMEONE ELSE BEEN INJURED AS A RESULT OF YOUR DRINKING: NO
HAS A RELATIVE, FRIEND, DOCTOR, OR ANOTHER HEALTH PROFESSIONAL EXPRESSED CONCERN ABOUT YOUR DRINKING OR SUGGESTED YOU CUT DOWN: 0
HOW OFTEN DURING THE LAST YEAR HAVE YOU NEEDED AN ALCOHOLIC DRINK FIRST THING IN THE MORNING TO GET YOURSELF GOING AFTER A NIGHT OF HEAVY DRINKING: 0
HOW OFTEN DURING THE LAST YEAR HAVE YOU NEEDED AN ALCOHOLIC DRINK FIRST THING IN THE MORNING TO GET YOURSELF GOING AFTER A NIGHT OF HEAVY DRINKING: NEVER
HOW OFTEN DO YOU HAVE SIX OR MORE DRINKS ON ONE OCCASION: NEVER
HOW MANY STANDARD DRINKS CONTAINING ALCOHOL DO YOU HAVE ON A TYPICAL DAY: 0
HOW OFTEN DURING THE LAST YEAR HAVE YOU FAILED TO DO WHAT WAS NORMALLY EXPECTED FROM YOU BECAUSE OF DRINKING: 0
HOW OFTEN DURING THE LAST YEAR HAVE YOU HAD A FEELING OF GUILT OR REMORSE AFTER DRINKING: NEVER
AUDIT TOTAL SCORE: 4
HOW OFTEN DO YOU HAVE A DRINK CONTAINING ALCOHOL: FOUR OR MORE TIMES A WEEK
HOW OFTEN DURING THE LAST YEAR HAVE YOU BEEN UNABLE TO REMEMBER WHAT HAPPENED THE NIGHT BEFORE BECAUSE YOU HAD BEEN DRINKING: NEVER
HOW OFTEN DURING THE LAST YEAR HAVE YOU HAD A FEELING OF GUILT OR REMORSE AFTER DRINKING: 0
HAVE YOU OR SOMEONE ELSE BEEN INJURED AS A RESULT OF YOUR DRINKING: 0

## 2021-04-05 ASSESSMENT — PATIENT HEALTH QUESTIONNAIRE - PHQ9: 2. FEELING DOWN, DEPRESSED OR HOPELESS: 0

## 2021-04-12 ENCOUNTER — OFFICE VISIT (OUTPATIENT)
Dept: INTERNAL MEDICINE | Age: 76
End: 2021-04-12
Payer: MEDICARE

## 2021-04-12 VITALS
DIASTOLIC BLOOD PRESSURE: 70 MMHG | SYSTOLIC BLOOD PRESSURE: 126 MMHG | WEIGHT: 117 LBS | OXYGEN SATURATION: 98 % | HEART RATE: 74 BPM | BODY MASS INDEX: 19.97 KG/M2 | HEIGHT: 64 IN

## 2021-04-12 DIAGNOSIS — N85.00 ENDOMETRIAL HYPERPLASIA: ICD-10-CM

## 2021-04-12 DIAGNOSIS — E55.9 VITAMIN D DEFICIENCY: ICD-10-CM

## 2021-04-12 DIAGNOSIS — Z12.31 SCREENING MAMMOGRAM, ENCOUNTER FOR: ICD-10-CM

## 2021-04-12 DIAGNOSIS — R14.0 ABDOMINAL BLOATING: ICD-10-CM

## 2021-04-12 DIAGNOSIS — Z00.00 ROUTINE GENERAL MEDICAL EXAMINATION AT A HEALTH CARE FACILITY: ICD-10-CM

## 2021-04-12 DIAGNOSIS — M81.0 AGE-RELATED OSTEOPOROSIS WITHOUT CURRENT PATHOLOGICAL FRACTURE: ICD-10-CM

## 2021-04-12 DIAGNOSIS — Z11.59 NEED FOR HEPATITIS C SCREENING TEST: ICD-10-CM

## 2021-04-12 DIAGNOSIS — R19.09 OTHER INTRA-ABDOMINAL AND PELVIC SWELLING, MASS AND LUMP: ICD-10-CM

## 2021-04-12 DIAGNOSIS — Z00.00 MEDICARE ANNUAL WELLNESS VISIT, SUBSEQUENT: Primary | ICD-10-CM

## 2021-04-12 DIAGNOSIS — F41.9 ANXIETY: ICD-10-CM

## 2021-04-12 DIAGNOSIS — Z00.00 ANNUAL PHYSICAL EXAM: ICD-10-CM

## 2021-04-12 DIAGNOSIS — F09 COGNITIVE DYSFUNCTION: ICD-10-CM

## 2021-04-12 LAB
ALBUMIN SERPL-MCNC: 4.5 G/DL (ref 3.5–5.2)
ALP BLD-CCNC: 52 U/L (ref 35–104)
ALT SERPL-CCNC: 10 U/L (ref 5–33)
ANION GAP SERPL CALCULATED.3IONS-SCNC: 13 MMOL/L (ref 7–19)
AST SERPL-CCNC: 23 U/L (ref 5–32)
BILIRUB SERPL-MCNC: 0.4 MG/DL (ref 0.2–1.2)
BUN BLDV-MCNC: 14 MG/DL (ref 8–23)
CALCIUM SERPL-MCNC: 9.5 MG/DL (ref 8.8–10.2)
CHLORIDE BLD-SCNC: 102 MMOL/L (ref 98–111)
CHOLESTEROL, TOTAL: 273 MG/DL (ref 160–199)
CO2: 26 MMOL/L (ref 22–29)
CREAT SERPL-MCNC: 0.5 MG/DL (ref 0.5–0.9)
GFR AFRICAN AMERICAN: >59
GFR NON-AFRICAN AMERICAN: >60
GLUCOSE BLD-MCNC: 92 MG/DL (ref 74–109)
HCT VFR BLD CALC: 43.3 % (ref 37–47)
HDLC SERPL-MCNC: 101 MG/DL (ref 65–121)
HEMOGLOBIN: 13.6 G/DL (ref 12–16)
HEPATITIS C ANTIBODY INTERPRETATION: NORMAL
LDL CHOLESTEROL CALCULATED: 158 MG/DL
MCH RBC QN AUTO: 30 PG (ref 27–31)
MCHC RBC AUTO-ENTMCNC: 31.4 G/DL (ref 33–37)
MCV RBC AUTO: 95.4 FL (ref 81–99)
PDW BLD-RTO: 13.3 % (ref 11.5–14.5)
PLATELET # BLD: 195 K/UL (ref 130–400)
PMV BLD AUTO: 11 FL (ref 9.4–12.3)
POTASSIUM SERPL-SCNC: 4.3 MMOL/L (ref 3.5–5)
RBC # BLD: 4.54 M/UL (ref 4.2–5.4)
SODIUM BLD-SCNC: 141 MMOL/L (ref 136–145)
TOTAL PROTEIN: 7.4 G/DL (ref 6.6–8.7)
TRIGL SERPL-MCNC: 68 MG/DL (ref 0–149)
TSH SERPL DL<=0.05 MIU/L-ACNC: 2.82 UIU/ML (ref 0.27–4.2)
VITAMIN D 25-HYDROXY: 38.9 NG/ML
WBC # BLD: 6.2 K/UL (ref 4.8–10.8)

## 2021-04-12 PROCEDURE — 3017F COLORECTAL CA SCREEN DOC REV: CPT | Performed by: INTERNAL MEDICINE

## 2021-04-12 PROCEDURE — 1036F TOBACCO NON-USER: CPT | Performed by: INTERNAL MEDICINE

## 2021-04-12 PROCEDURE — G8399 PT W/DXA RESULTS DOCUMENT: HCPCS | Performed by: INTERNAL MEDICINE

## 2021-04-12 PROCEDURE — 4040F PNEUMOC VAC/ADMIN/RCVD: CPT | Performed by: INTERNAL MEDICINE

## 2021-04-12 PROCEDURE — 1123F ACP DISCUSS/DSCN MKR DOCD: CPT | Performed by: INTERNAL MEDICINE

## 2021-04-12 PROCEDURE — G8427 DOCREV CUR MEDS BY ELIG CLIN: HCPCS | Performed by: INTERNAL MEDICINE

## 2021-04-12 PROCEDURE — G8420 CALC BMI NORM PARAMETERS: HCPCS | Performed by: INTERNAL MEDICINE

## 2021-04-12 PROCEDURE — G0439 PPPS, SUBSEQ VISIT: HCPCS | Performed by: INTERNAL MEDICINE

## 2021-04-12 PROCEDURE — 1090F PRES/ABSN URINE INCON ASSESS: CPT | Performed by: INTERNAL MEDICINE

## 2021-04-12 PROCEDURE — 99214 OFFICE O/P EST MOD 30 MIN: CPT | Performed by: INTERNAL MEDICINE

## 2021-04-12 RX ORDER — LORAZEPAM 0.5 MG/1
TABLET ORAL
Qty: 2 TABLET | Refills: 0 | Status: SHIPPED | OUTPATIENT
Start: 2021-04-12 | End: 2021-04-12

## 2021-04-12 NOTE — PROGRESS NOTES
Chief Complaint   Patient presents with    Medicare AW     Memory issues    Osteoporosis       HPI: Patient is here today for Medicare annual wellness visit and to follow-up medical problems she complains of memory loss complains of this each time and when I discuss referrals Mini-Mental status etc. 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 Also it was felt that her social isolation was contributing. We have discussed these things with the patient we recommended counseling we recommended close follow-up we have talked about Aricept she is declined that. She complains of the memory loss she complains of abdominal bloating and now she is stop Fosamax which she did take it is a long time to get her on. She has severe osteoporosis has had multiple compression fractures her abdominal bloating is all lower it is not of high it does not seem consistent with the Fosamax and it preceded the Fosamax.     Past Medical History:   Diagnosis Date    Cancer ([facility] [address]     basil cell skin cancers removed     Low back pain 2/7/2017    Osteoporosis     Osteoporosis with pathological fracture with routine healing 6/23/2017       Past Surgical History:   Procedure Laterality Date    SKIN CANCER EXCISION         Family History   Problem Relation Age of Onset    Kidney Disease Mother     Cancer Father         Prostate        Social History     Socioeconomic History    Marital status: Single     Spouse name: Not on file    Number of children: Not on file    Years of education: Not on file    Highest education level: Not on file   Occupational History    Not on file   Social Needs    Financial resource strain: Not hard at all   [place] insecurity     Worry: Never true     Inability: Never true   [employer] needs     Medical: Not on file     Non-medical: Not on file   Tobacco Use    Smoking status: Never Smoker    Smokeless tobacco: Never Used   Substance and Sexual Activity    Alcohol use: Yes     Alcohol/week: 3.0 standard drinks     Types: 3 Glasses of wine per week    Drug use: No    Sexual activity: Not on file   Lifestyle    Physical activity     Days per week: Not on file     Minutes per session: Not on file    Stress: Not on file   Relationships    Social connections     Talks on phone: Not on file     Gets together: Not on file     Attends Voodoo service: Not on file     Active member of club or organization: Not on file     Attends meetings of clubs or organizations: Not on file     Relationship status: Not on file    Intimate partner violence     Fear of current or ex partner: Not on file     Emotionally abused: Not on file     Physically abused: Not on file     Forced sexual activity: Not on file   Other Topics Concern    Not on file   Social History Narrative    Not on file       No Known Allergies    Current Outpatient Medications   Medication Sig Dispense Refill    vitamin E 1000 units capsule Take 2 capsules by mouth daily 180 capsule 3    Cholecalciferol (VITAMIN D3) 50 MCG (2000 UT) TABS Take 1 tablet by mouth daily 90 tablet 3    Calcium 600-200 MG-UNIT TABS Take 1 tablet by mouth 2 times daily 180 tablet 3    alendronate (FOSAMAX) 70 MG tablet Take 1 tablet by mouth every 7 days 12 tablet 3     No current facility-administered medications for this visit. Review of Systems   Constitutional: Positive for fatigue. Negative for chills and fever. HENT: Negative for congestion and sinus pressure. Eyes: Negative for discharge and redness. Respiratory: Negative for cough and shortness of breath. Cardiovascular: Negative for chest pain, palpitations and leg swelling. Gastrointestinal: Positive for abdominal distention. Negative for abdominal pain. Genitourinary: Negative for dysuria, frequency and urgency. Musculoskeletal: Positive for arthralgias and back pain.    Skin: Negative for rash and wound. Hair loss and thinning of skin   Neurological: Negative for dizziness, light-headedness and headaches. Memory loss   Psychiatric/Behavioral: Positive for decreased concentration. Negative for dysphoric mood and sleep disturbance. The patient is not nervous/anxious. Easily distracted       /70   Pulse 74   Ht 5' 4\" (1.626 m)   Wt 117 lb (53.1 kg)   SpO2 98%   BMI 20.08 kg/m²   BP Readings from Last 7 Encounters:   04/15/21 139/77   04/12/21 126/70   01/11/21 136/80   12/17/20 106/70   10/16/20 104/68   07/07/20 120/64   03/16/20 120/64     Wt Readings from Last 7 Encounters:   04/15/21 117 lb (53.1 kg)   04/12/21 117 lb (53.1 kg)   01/11/21 118 lb (53.5 kg)   12/17/20 118 lb (53.5 kg)   10/16/20 117 lb (53.1 kg)   07/07/20 116 lb (52.6 kg)   03/16/20 116 lb (52.6 kg)     BMI Readings from Last 7 Encounters:   04/15/21 20.08 kg/m²   04/12/21 20.08 kg/m²   01/11/21 20.25 kg/m²   12/17/20 20.25 kg/m²   10/16/20 20.08 kg/m²   07/07/20 19.91 kg/m²   03/16/20 19.91 kg/m²     Resp Readings from Last 7 Encounters:   10/28/19 17   01/03/19 18   10/01/16 18       Physical Exam  Constitutional:       General: She is not in acute distress. Appearance: Normal appearance. She is well-developed. HENT:      Right Ear: External ear normal. Tympanic membrane is not injected. Left Ear: External ear normal. Tympanic membrane is not injected. Mouth/Throat:      Pharynx: No oropharyngeal exudate. Eyes:      General: No scleral icterus. Conjunctiva/sclera: Conjunctivae normal.   Neck:      Musculoskeletal: Neck supple. Thyroid: No thyroid mass or thyromegaly. Vascular: No carotid bruit. Cardiovascular:      Rate and Rhythm: Normal rate and regular rhythm. Heart sounds: S1 normal and S2 normal. No murmur. No S3 or S4 sounds. Pulmonary:      Effort: Pulmonary effort is normal. No respiratory distress.       Breath sounds: Normal breath sounds. No wheezing or rales. Abdominal:      General: Bowel sounds are normal. There is distension. Palpations: Abdomen is soft. There is no mass. Tenderness: There is no abdominal tenderness. Musculoskeletal:      Right lower leg: No edema. Left lower leg: No edema. Lymphadenopathy:      Cervical: No cervical adenopathy. Upper Body:      Right upper body: No supraclavicular adenopathy. Left upper body: No supraclavicular adenopathy. Skin:     General: Skin is warm and dry. Findings: No rash. Neurological:      Mental Status: She is alert and oriented to person, place, and time. Cranial Nerves: No cranial nerve deficit.    Psychiatric:      Comments: Anxious worried seems a little down         Results for orders placed or performed in visit on 10/06/20   Vitamin D 25 Hydroxy   Result Value Ref Range    Vit D, 25-Hydroxy 29.8 (L) >=30 ng/mL   Lipid Panel   Result Value Ref Range    Cholesterol, Total 243 (H) 160 - 199 mg/dL    Triglycerides 55 0 - 149 mg/dL    HDL 95 65 - 121 mg/dL    LDL Calculated 137 <100 mg/dL   TSH without Reflex   Result Value Ref Range    TSH 3.280 0.270 - 4.200 uIU/mL   Comprehensive Metabolic Panel   Result Value Ref Range    Sodium 144 136 - 145 mmol/L    Potassium 3.9 3.5 - 5.0 mmol/L    Chloride 105 98 - 111 mmol/L    CO2 25 22 - 29 mmol/L    Anion Gap 14 7 - 19 mmol/L    Glucose 96 74 - 109 mg/dL    BUN 15 8 - 23 mg/dL    CREATININE 0.6 0.5 - 0.9 mg/dL    GFR Non-African American >60 >60    GFR African American >59 >59    Calcium 9.1 8.8 - 10.2 mg/dL    Total Protein 6.8 6.6 - 8.7 g/dL    Albumin 4.3 3.5 - 5.2 g/dL    Total Bilirubin 0.6 0.2 - 1.2 mg/dL    Alkaline Phosphatase 49 35 - 104 U/L    ALT 14 5 - 33 U/L    AST 22 5 - 32 U/L   CBC   Result Value Ref Range    WBC 5.4 4.8 - 10.8 K/uL    RBC 4.39 4.20 - 5.40 M/uL    Hemoglobin 13.3 12.0 - 16.0 g/dL    Hematocrit 41.7 37.0 - 47.0 %    MCV 95.0 81.0 - 99.0 fL    MCH 30.3 27.0 - 31.0 pg MCHC 31.9 (L) 33.0 - 37.0 g/dL    RDW 13.2 11.5 - 14.5 %    Platelets 192 138 - 736 K/uL    MPV 10.9 9.4 - 12.3 fL       ASSESSMENT/ PLAN:  1. Medicare annual wellness visit, subsequent  Chart medications labs vaccines reviewed keep up-to-date with routine medical care and screening call with any problems or complaints    2. Abdominal bloating  As below we need to evaluate the endometrial hyperplasia and abdominal bloating  - Cancer Antigen 125; Future    3. Anxiety  A lot of anxiety around anything medical we did write for Ativan for a procedure  - LORazepam (ATIVAN) 0.5 MG tablet; Take one tablet 30' before procedure  Dispense: 2 tablet; Refill: 0    4. Cognitive dysfunction  We finally found documentation and note of her neuropsych exam consistent with early onset mild cognitive dysfunction probably Alzheimer's type based on her family history but she is really doing well this is dysfunction is minimal and it is exacerbated by social isolation  - Joycelyn Barrios MD, Neurology, [place]    5. Endometrial hyperplasia  Explained to her endometrial hyperplasia can be a sign of underlying endometrial cancer I really would like this better evaluated going to try to repeat a transvaginal ultrasound as she would be due for that and we reviewed and discussed Dr. Yara Mondragon note with her we wrote for Ativan to take for the ultrasound if she cannot do it transvaginally then they will do transpelvic but we just need to reassess  - US NON OB TRANSVAGINAL; Future    6. Vitamin D deficiency  Vitamin D level to be checked vitamin D supplementation  - Vitamin D 25 Hydroxy; Future    7. Need for hepatitis C screening test  We explained that recommendations for hepatitis C screening  - Hepatitis C Antibody; Future    8. Screening mammogram, encounter for  Keep up-to-date with routine screening mammogram  - GHULAM DIGITAL SCREEN W OR WO CAD BILATERAL; Future    9.  Routine general medical examination at a [facility] facility  Keep up-to-date with routine care and screening    10. Other intra-abdominal and pelvic swelling, mass and lump   Abdominal bloating endometrial thickening we encouraged her that we need a transvaginal ultrasound we need to follow-up on this. She has a lot of anxiety about evaluation of this we did write for Ativan to take for her transvaginal ultrasound patient with some misconceptions about the procedure we spent a long time reviewing Dr. Poly Wade note her ultrasound and record. - Cancer Antigen 125; Future    11. Age-related osteoporosis without current pathological fracture I do not think Fosamax is causing the lower abdominal bloating however she has stopped it she has declined every other recommended therapy for osteoporosis and she has very severe osteoporosis with history of fractures none currently. I told her we really will likely need to remain on Fosamax it can cause GI upset but not usually this low pelvic discomfort she is talking about. Medicare Annual Wellness Visit  Name: Colleen Faye Date: 2021   MRN: [id number] Sex: Female   Age: 70 y.o. Ethnicity: Non-/Non    : 1945 Race: Radha Thompson is here for Medicare AWV (Memory issues) and Osteoporosis    Screenings for behavioral, psychosocial and functional/safety risks, and cognitive dysfunction are all negative except as indicated below. These results, as well as other patient data from the [address] form, are documented in Flowsheets linked to this Encounter. No Known Allergies      Prior to Visit Medications    Medication Sig Taking?  Authorizing Provider   vitamin E 1000 units capsule Take 2 capsules by mouth daily  Pamela Marina MD   Cholecalciferol (VITAMIN D3) 50 MCG (2000) TABS Take 1 tablet by mouth daily  Pamela Marina MD   Calcium 600-200 MG-UNIT TABS Take 1 tablet by mouth 2 times daily  Pamela Marina MD   alendronate (FOSAMAX) 70 MG tablet Take 1 tablet by mouth every 7 days  Chris Hobbs MD   Teriparatide, Recombinant, (FORTEO) 600 MCG/2.4ML SOPN injection Inject 0.08 mLs into the skin daily  Chris Hobbs MD   denosumab (PROLIA) 60 MG/ML SOSY SC injection Inject 1 mL into the skin once for 1 dose  Chris Hobbs MD         Past Medical History:   Diagnosis Date    Cancer ([address]     basil cell skin cancers removed     Low back pain 2/7/2017    Osteoporosis     Osteoporosis with pathological fracture with routine healing 6/23/2017       Past Surgical History:   Procedure Laterality Date    SKIN CANCER EXCISION           Family History   Problem Relation Age of Onset    Kidney Disease Mother     Cancer Father         Prostate        CareTeam (Including outside providers/suppliers regularly involved in providing care):   Patient Care Team:  Chris Hobbs MD as PCP - General (Internal Medicine)  Chris Hobbs MD as PCP - [facility]    Wt Readings from Last 3 Encounters:   04/15/21 117 lb (53.1 kg)   04/12/21 117 lb (53.1 kg)   01/11/21 118 lb (53.5 kg)     Vitals:    04/12/21 1032   BP: 126/70   Pulse: 74   SpO2: 98%   Weight: 117 lb (53.1 kg)   Height: 5' 4\" (1.626 m)     Body mass index is 20.08 kg/m². Based upon direct observation of the patient, evaluation of cognition reveals not obvious impairment but she has anxiety also. Keep up to date with routine care and f/u    Patient's complete Health Risk Assessment and screening values have been reviewed and are found in Flowsheets. The following problems were reviewed today and where indicated follow up appointments were made and/or referrals ordered. Positive Risk Factor Screenings with Interventions:            General Health and ACP:  General  In general, how would you say your health is?: Excellent  In the past 7 days, have you experienced any of the following?  New or Increased Pain, New or Increased Fatigue, Loneliness, Social Isolation, Stress or Anger?: (!) Social Isolation  Do you get the social and emotional support that you need?: Yes  Do you have a Living Will?: (!) No  Advance Directives     Power of  Living Will ACP-Advance Directive ACP-Power of     Not on File Not on File Not on File Not on File      General Health Risk Interventions:  · follow closely      Hearing/Vision:  No exam data present  Hearing/Vision  Do you or your family notice any trouble with your hearing that hasn't been managed with hearing aids?: No  Do you have difficulty driving, watching TV, or doing any of your daily activities because of your eyesight?: No  Have you had an eye exam within the past year?: (!) No  Hearing/Vision Interventions:  · no issues       Personalized Preventive Plan   Current Health Maintenance Status  Immunization History   Administered Date(s) Administered    COVID-19, Hagan Peter, PF, 30mcg/0.3mL 02/20/2021, 03/13/2021    Influenza, High Dose (Fluzone 65 yrs and older) 11/03/2017, 10/16/2018, 10/05/2020    Influenza, Triv, inactivated, subunit, adjuvanted, IM (Fluad 65 yrs and older) 10/18/2019        Health Maintenance   Topic Date Due    DTaP/Tdap/Td vaccine (1 - Tdap) Never done    Shingles Vaccine (1 of 2) Never done    Pneumococcal 65+ years Vaccine (1 of 1 - PPSV23) Never done   [employer] Visit (AWV)  Never done    Breast cancer screen  07/30/2020    Lipid screen  04/12/2026    Colon cancer screen colonoscopy  03/09/2027    DEXA (modify frequency per FRAX score)  Completed    Flu vaccine  Completed    COVID-19 Vaccine  Completed    Hepatitis C screen  Completed    Hepatitis A vaccine  Aged Out    Hepatitis B vaccine  Aged Out    Hib vaccine  Aged Out    Meningococcal (ACWY) vaccine  Aged Out     Recommendations for [employer] Due: see orders and patient instructions/AVS.  .   Recommended screening schedule for the next 5-10 years is provided to the patient in written form: see Patient Instructions/AVS.    Jose Simpson was seen today for medicare awv and osteoporosis. Diagnoses and all orders for this visit:    Endometrial hyperplasia  -     US NON OB TRANSVAGINAL; Future    Medicare annual wellness visit, subsequent    Abdominal bloating  -     Cancer Antigen 125; Future    Anxiety  -     LORazepam (ATIVAN) 0.5 MG tablet; Take one tablet 30' before procedure    Cognitive dysfunction  -     Heide Rene MD, Neurology, [place]    Vitamin D deficiency  -     Vitamin D 25 Hydroxy; Future    Need for hepatitis C screening test  -     Hepatitis C Antibody; Future    Screening mammogram, encounter for  -     GHULAM DIGITAL SCREEN W OR WO CAD BILATERAL; Future    Routine general medical examination at a health care facility    Other intra-abdominal and pelvic swelling, mass and lump   -     Cancer Antigen 125; Future    Other orders  -     CBC; Future  -     Comprehensive Metabolic Panel; Future  -     TSH without Reflex; Future  -     Lipid Panel;  Future             30 minutes spent discussing reviewing reviewing other physicians notes explaining encouraging and going over plan of care

## 2021-04-14 LAB — CA 125: 16 U/ML (ref 0–35)

## 2021-04-15 ENCOUNTER — OFFICE VISIT (OUTPATIENT)
Dept: NEUROLOGY | Age: 76
End: 2021-04-15
Payer: MEDICARE

## 2021-04-15 VITALS
WEIGHT: 117 LBS | HEIGHT: 64 IN | SYSTOLIC BLOOD PRESSURE: 139 MMHG | BODY MASS INDEX: 19.97 KG/M2 | HEART RATE: 82 BPM | DIASTOLIC BLOOD PRESSURE: 77 MMHG

## 2021-04-15 DIAGNOSIS — R41.3 MEMORY LOSS: Primary | ICD-10-CM

## 2021-04-15 PROCEDURE — 3017F COLORECTAL CA SCREEN DOC REV: CPT | Performed by: PSYCHIATRY & NEUROLOGY

## 2021-04-15 PROCEDURE — 4040F PNEUMOC VAC/ADMIN/RCVD: CPT | Performed by: PSYCHIATRY & NEUROLOGY

## 2021-04-15 PROCEDURE — 1090F PRES/ABSN URINE INCON ASSESS: CPT | Performed by: PSYCHIATRY & NEUROLOGY

## 2021-04-15 PROCEDURE — 99204 OFFICE O/P NEW MOD 45 MIN: CPT | Performed by: PSYCHIATRY & NEUROLOGY

## 2021-04-15 PROCEDURE — 1036F TOBACCO NON-USER: CPT | Performed by: PSYCHIATRY & NEUROLOGY

## 2021-04-15 PROCEDURE — G8399 PT W/DXA RESULTS DOCUMENT: HCPCS | Performed by: PSYCHIATRY & NEUROLOGY

## 2021-04-15 PROCEDURE — G8427 DOCREV CUR MEDS BY ELIG CLIN: HCPCS | Performed by: PSYCHIATRY & NEUROLOGY

## 2021-04-15 PROCEDURE — 1123F ACP DISCUSS/DSCN MKR DOCD: CPT | Performed by: PSYCHIATRY & NEUROLOGY

## 2021-04-15 PROCEDURE — G8420 CALC BMI NORM PARAMETERS: HCPCS | Performed by: PSYCHIATRY & NEUROLOGY

## 2021-04-15 NOTE — PROGRESS NOTES
Chief Complaint   Patient presents with    New Patient     Referred by Ryan Camacho for cognitive dysfunction        Emery Fajardo is a 70y.o. year old female who is seen for evaluation of memory loss. She has notices some short term memory issues over the last 5 years or so. They are mild such as forgetting a short grocery list or maybe occasional word finding issues. She does all of her activities of daily living and drives without difficulty. Her sleep and mood are good. She denies any gait issues or tremor. She does have a family history of dementia.     Active Ambulatory Problems     Diagnosis Date Noted    Thoracic compression fracture ([address] 09/15/2011    Osteoporosis 06/23/2017    Low back pain 02/07/2017    Closed wedge compression fracture of third lumbar vertebra (HCC) 10/28/2016    Closed wedge fracture of lumbar vertebra (HCC) 05/03/2017    Memory loss 12/28/2017    Benign paroxysmal positional vertigo due to bilateral vestibular disorder 01/05/2018    Age-related osteoporosis with current pathological fracture 12/04/2018    Age-related osteoporosis with current pathological fracture of vertebra with routine healing      Abdominal bloating 07/27/2019    Endometrial thickening on ultrasound 12/18/2020     Resolved Ambulatory Problems     Diagnosis Date Noted    No Resolved Ambulatory Problems     Past Medical History:   Diagnosis Date    Cancer [facility])     Osteoporosis with pathological fracture with routine healing 6/23/2017       Past Surgical History:   Procedure Laterality Date    SKIN CANCER EXCISION         Family History   Problem Relation Age of Onset    Kidney Disease Mother     Cancer Father         Prostate        No Known Allergies    Social History     Socioeconomic History    Marital status: Single     Spouse name: Not on file    Number of children: Not on file    Years of education: Not on file    Highest education level: Not on file   Occupational History    Not on file Social Needs    Financial resource strain: Not hard at all   [employer] insecurity     Worry: Never true     Inability: Never true   [employer] needs     Medical: Not on file     Non-medical: Not on file   Tobacco Use    Smoking status: Never Smoker    Smokeless tobacco: Never Used   Substance and Sexual Activity    Alcohol use: Yes     Alcohol/week: 3.0 standard drinks     Types: 3 Glasses of wine per week    Drug use: No    Sexual activity: Not on file   Lifestyle    Physical activity     Days per week: Not on file     Minutes per session: Not on file    Stress: Not on file   Relationships    Social connections     Talks on phone: Not on file     Gets together: Not on file     Attends Bahai service: Not on file     Active member of club or organization: Not on file     Attends meetings of clubs or organizations: Not on file     Relationship status: Not on file    Intimate partner violence     Fear of current or ex partner: Not on file     Emotionally abused: Not on file     Physically abused: Not on file     Forced sexual activity: Not on file   Other Topics Concern    Not on file   Social History Narrative    Not on file       Review of Systems     Constitutional - No fever or chills. No diaphoresis or significant fatigue. HENT -  No tinnitus or significant hearing loss. Eyes - no sudden vision change or eye pain  Respiratory - no significant shortness of breath or cough  Cardiovascular - no chest pain No palpitations or significant leg swelling  Gastrointestinal - no abdominal swelling or pain. Genitourinary - No difficulty urinating, dysuria  Musculoskeletal - no back pain or myalgia. Skin - no color change or rash  Neurologic - No seizures. No lateralizing weakness. Hematologic - no easy bruising or excessive bleeding. Psychiatric - no severe anxiety or nervousness.    All other review of systems are negative.         Current Outpatient Medications   Medication Sig Dispense Refill extremities bilaterally    Reflexes   2+ biceps bilaterally  2+ brachioradialis  2+patella  2+ ankle jerks  No clonus ankles  No Sofia's sign bilateral hands    Tremors- no tremors in hands or head noted    Gait  Normal base and speed  No ataxia    Coordination  Finger to nose-unremarkable    Lab Results   Component Value Date    RZOXHPPN57 474 12/29/2017     Lab Results   Component Value Date    WBC 6.2 04/12/2021    HGB 13.6 04/12/2021    HCT 43.3 04/12/2021    MCV 95.4 04/12/2021     04/12/2021     Lab Results   Component Value Date     04/12/2021    K 4.3 04/12/2021     04/12/2021    CO2 26 04/12/2021    BUN 14 04/12/2021    CREATININE 0.5 04/12/2021    GLUCOSE 92 04/12/2021    CALCIUM 9.5 04/12/2021    PROT 7.4 04/12/2021    LABALBU 4.5 04/12/2021    BILITOT 0.4 04/12/2021    ALKPHOS 52 04/12/2021    AST 23 04/12/2021    ALT 10 04/12/2021    LABGLOM >60 04/12/2021    GFRAA >59 04/12/2021           Assessment    ICD-10-CM    1. Memory loss  R41.3        Her neurological examination today including bedside cognitive testing was unremarkable. She scored a 29/30 on her mini mental status exam losing only one point for orientation. She had no difficulties with delayed recall. She was appropriate and pleasant. Based up her history and examination I see no clear evidence of dementia. I suspect she most likely has some age related changes of cognition. No further recommendations were provided. She is to follow up with as needed and call with any issues. Plan    No orders of the defined types were placed in this encounter. No orders of the defined types were placed in this encounter. Return if symptoms worsen or fail to improve. EMR Dragon/transcription disclaimer:Significant part of this  encounter note is electronic transcription/translation of spoken language to printed text.  The electronic translation of spoken language may be erroneous, or at times, nonsensical words or

## 2021-04-25 PROBLEM — M81.0 AGE-RELATED OSTEOPOROSIS WITHOUT CURRENT PATHOLOGICAL FRACTURE: Status: ACTIVE | Noted: 2018-12-04

## 2021-04-25 ASSESSMENT — ENCOUNTER SYMPTOMS
EYE REDNESS: 0
SINUS PRESSURE: 0
SHORTNESS OF BREATH: 0
ABDOMINAL DISTENTION: 1
EYE DISCHARGE: 0
BACK PAIN: 1
COUGH: 0
ABDOMINAL PAIN: 0

## 2021-05-12 ENCOUNTER — HOSPITAL ENCOUNTER (OUTPATIENT)
Dept: WOMENS IMAGING | Age: 76
Discharge: HOME | End: 2021-05-12
Payer: MEDICARE

## 2021-05-12 ENCOUNTER — HOSPITAL ENCOUNTER (OUTPATIENT)
Dept: ULTRASOUND IMAGING | Age: 76
Discharge: HOME | End: 2021-05-12
Payer: MEDICARE

## 2021-05-12 DIAGNOSIS — N85.00 ENDOMETRIAL HYPERPLASIA: ICD-10-CM

## 2021-05-12 DIAGNOSIS — Z12.31 SCREENING MAMMOGRAM, ENCOUNTER FOR: ICD-10-CM

## 2021-05-12 PROCEDURE — 76830 TRANSVAGINAL US NON-OB: CPT

## 2021-05-12 PROCEDURE — 77063 BREAST TOMOSYNTHESIS BI: CPT

## 2021-05-18 PROBLEM — L65.9 HAIR LOSS: Status: ACTIVE | Noted: 2021-05-18

## 2021-05-18 PROBLEM — E78.2 MIXED HYPERLIPIDEMIA: Status: ACTIVE | Noted: 2021-05-18

## 2021-06-15 NOTE — ANESTHESIA POSTPROCEDURE EVALUATION
Patient: Shellie Villeda    Procedure Summary     Date Anesthesia Start Anesthesia Stop Room / Location    03/09/17 0855 0916  [facility] / [facility]       Procedure Diagnosis Surgeon Provider    COLONOSCOPY WITH ANESTHESIA (N/A ) Abdominal bloating  (Abdominal bloating [R14.0]) MD Jeffrey Islas CRNA          Anesthesia Type: general  Last vitals  BP      Temp      Pulse     Resp      SpO2        Post Anesthesia Care and Evaluation    Patient location during evaluation: PACU  Patient participation: complete - patient participated  Level of consciousness: awake and alert  Pain score: 0  Pain management: adequate  Airway patency: patent  Anesthetic complications: No anesthetic complications    Cardiovascular status: acceptable, hemodynamically stable and stable  Respiratory status: acceptable  Hydration status: acceptable       bed rails

## 2021-07-12 ENCOUNTER — OFFICE VISIT (OUTPATIENT)
Dept: INTERNAL MEDICINE | Age: 76
End: 2021-07-12
Payer: MEDICARE

## 2021-07-12 VITALS
HEART RATE: 88 BPM | BODY MASS INDEX: 20.32 KG/M2 | OXYGEN SATURATION: 98 % | WEIGHT: 119 LBS | DIASTOLIC BLOOD PRESSURE: 68 MMHG | SYSTOLIC BLOOD PRESSURE: 120 MMHG | HEIGHT: 64 IN

## 2021-07-12 DIAGNOSIS — M81.8 OTHER OSTEOPOROSIS, UNSPECIFIED PATHOLOGICAL FRACTURE PRESENCE: ICD-10-CM

## 2021-07-12 DIAGNOSIS — M81.0 AGE-RELATED OSTEOPOROSIS WITHOUT CURRENT PATHOLOGICAL FRACTURE: Primary | ICD-10-CM

## 2021-07-12 DIAGNOSIS — M54.50 CHRONIC BILATERAL LOW BACK PAIN WITHOUT SCIATICA: ICD-10-CM

## 2021-07-12 DIAGNOSIS — R41.3 MEMORY LOSS: ICD-10-CM

## 2021-07-12 DIAGNOSIS — G89.29 CHRONIC BILATERAL LOW BACK PAIN WITHOUT SCIATICA: ICD-10-CM

## 2021-07-12 DIAGNOSIS — R14.0 ABDOMINAL BLOATING: ICD-10-CM

## 2021-07-12 PROCEDURE — G8420 CALC BMI NORM PARAMETERS: HCPCS | Performed by: INTERNAL MEDICINE

## 2021-07-12 PROCEDURE — 1036F TOBACCO NON-USER: CPT | Performed by: INTERNAL MEDICINE

## 2021-07-12 PROCEDURE — 1090F PRES/ABSN URINE INCON ASSESS: CPT | Performed by: INTERNAL MEDICINE

## 2021-07-12 PROCEDURE — 4040F PNEUMOC VAC/ADMIN/RCVD: CPT | Performed by: INTERNAL MEDICINE

## 2021-07-12 PROCEDURE — 99214 OFFICE O/P EST MOD 30 MIN: CPT | Performed by: INTERNAL MEDICINE

## 2021-07-12 PROCEDURE — G8427 DOCREV CUR MEDS BY ELIG CLIN: HCPCS | Performed by: INTERNAL MEDICINE

## 2021-07-12 PROCEDURE — G8399 PT W/DXA RESULTS DOCUMENT: HCPCS | Performed by: INTERNAL MEDICINE

## 2021-07-12 PROCEDURE — 3017F COLORECTAL CA SCREEN DOC REV: CPT | Performed by: INTERNAL MEDICINE

## 2021-07-12 PROCEDURE — 1123F ACP DISCUSS/DSCN MKR DOCD: CPT | Performed by: INTERNAL MEDICINE

## 2021-07-12 RX ORDER — ZOLEDRONIC ACID 5 MG/100ML
5 INJECTION, SOLUTION INTRAVENOUS ONCE
Qty: 100 ML | Refills: 0 | Status: SHIPPED | OUTPATIENT
Start: 2021-07-12 | End: 2021-08-04

## 2021-07-12 NOTE — PROGRESS NOTES
Chief Complaint   Patient presents with    3 Month Follow-Up    Memory Loss       HPI: Patient is here today to follow-up several medical issues osteoporosis history of compression fractures some abdominal bloating and discomfort and memory loss issues. She is feeling better with her abdominal bloating she feels like it was related to Fosamax. No dysphagia or GERD. Her memory issues remain other things are stable back pain is stable    Past Medical History:   Diagnosis Date    Cancer ([facility] [address]     basil cell skin cancers removed     Low back pain 2/7/2017    Mixed hyperlipidemia 5/18/2021    Osteoporosis     Osteoporosis with pathological fracture with routine healing 6/23/2017       Past Surgical History:   Procedure Laterality Date    SKIN CANCER EXCISION         Family History   Problem Relation Age of Onset    Kidney Disease Mother     Cancer Father         Prostate        Social History     Socioeconomic History    Marital status: Single     Spouse name: Not on file    Number of children: Not on file    Years of education: Not on file    Highest education level: Not on file   Occupational History    Not on file   Tobacco Use    Smoking status: Never Smoker    Smokeless tobacco: Never Used   Vaping Use    Vaping Use: Never assessed   Substance and Sexual Activity    Alcohol use: Yes     Alcohol/week: 3.0 standard drinks     Types: 3 Glasses of wine per week    Drug use: No    Sexual activity: Not on file   Other Topics Concern    Not on file   Social History Narrative    Not on file     Social Determinants of Health     Financial Resource Strain: Low Risk     Difficulty of Paying Living Expenses: Not hard at all   Food Insecurity: No Food Insecurity    Worried About Running Out of Food in the Last Year: Never true    [address] in the Last Year: Never true   Transportation Needs:     Lack of Transportation (Medical):      Lack of Transportation (Non-Medical):    Physical Activity:  Days of Exercise per Week:     Minutes of Exercise per Session:    Stress:     Feeling of Stress :    Social Connections:     Frequency of Communication with Friends and Family:     Frequency of Social Gatherings with Friends and Family:     Attends Latter day Services:     Active Member of Clubs or Organizations:     Attends Club or Organization Meetings:     Marital Status:    Intimate Partner Violence:     Fear of Current or Ex-Partner:     Emotionally Abused:     Physically Abused:     Sexually Abused:        No Known Allergies    Current Outpatient Medications   Medication Sig Dispense Refill    zoledronic acid (RECLAST) 5 MG/100ML SOLN Infuse 100 mLs intravenously once for 1 dose 100 mL 0    vitamin E 1000 units capsule Take 2 capsules by mouth daily 180 capsule 3    Cholecalciferol (VITAMIN D3) 50 MCG (2000 UT) TABS Take 1 tablet by mouth daily 90 tablet 3    Calcium 600-200 MG-UNIT TABS Take 1 tablet by mouth 2 times daily 180 tablet 3     No current facility-administered medications for this visit. Review of Systems    /68   Pulse 88   Ht 5' 4\" (1.626 m)   Wt 119 lb (54 kg)   SpO2 98%   BMI 20.43 kg/m²   BP Readings from Last 7 Encounters:   07/12/21 120/68   04/15/21 139/77   04/12/21 126/70   01/11/21 136/80   12/17/20 106/70   10/16/20 104/68   07/07/20 120/64     Wt Readings from Last 7 Encounters:   07/12/21 119 lb (54 kg)   04/15/21 117 lb (53.1 kg)   04/12/21 117 lb (53.1 kg)   01/11/21 118 lb (53.5 kg)   12/17/20 118 lb (53.5 kg)   10/16/20 117 lb (53.1 kg)   07/07/20 116 lb (52.6 kg)     BMI Readings from Last 7 Encounters:   07/12/21 20.43 kg/m²   04/15/21 20.08 kg/m²   04/12/21 20.08 kg/m²   01/11/21 20.25 kg/m²   12/17/20 20.25 kg/m²   10/16/20 20.08 kg/m²   07/07/20 19.91 kg/m²     Resp Readings from Last 7 Encounters:   10/28/19 17   01/03/19 18   10/01/16 18       Physical Exam  Constitutional:       General: She is not in acute distress.   Eyes: General: No scleral icterus. Cardiovascular:      Heart sounds: Normal heart sounds. Pulmonary:      Breath sounds: Normal breath sounds. Abdominal:      General: There is no distension. Palpations: Abdomen is soft. There is no mass. Tenderness: There is no abdominal tenderness. Musculoskeletal:      Cervical back: Neck supple. Comments: Kyphosis scoliosis   Lymphadenopathy:      Cervical: No cervical adenopathy. Skin:     Findings: No rash.          Results for orders placed or performed in visit on 04/12/21   Cancer Antigen 125   Result Value Ref Range     16 0 - 35 U/mL   Hepatitis C Antibody   Result Value Ref Range    Hep C Ab Interp Non-Reactive    Vitamin D 25 Hydroxy   Result Value Ref Range    Vit D, 25-Hydroxy 38.9 >=30 ng/mL   Lipid Panel   Result Value Ref Range    Cholesterol, Total 273 (H) 160 - 199 mg/dL    Triglycerides 68 0 - 149 mg/dL     65 - 121 mg/dL    LDL Calculated 158 <100 mg/dL   TSH without Reflex   Result Value Ref Range    TSH 2.820 0.270 - 4.200 uIU/mL   Comprehensive Metabolic Panel   Result Value Ref Range    Sodium 141 136 - 145 mmol/L    Potassium 4.3 3.5 - 5.0 mmol/L    Chloride 102 98 - 111 mmol/L    CO2 26 22 - 29 mmol/L    Anion Gap 13 7 - 19 mmol/L    Glucose 92 74 - 109 mg/dL    BUN 14 8 - 23 mg/dL    CREATININE 0.5 0.5 - 0.9 mg/dL    GFR Non-African American >60 >60    GFR African American >59 >59    Calcium 9.5 8.8 - 10.2 mg/dL    Total Protein 7.4 6.6 - 8.7 g/dL    Albumin 4.5 3.5 - 5.2 g/dL    Total Bilirubin 0.4 0.2 - 1.2 mg/dL    Alkaline Phosphatase 52 35 - 104 U/L    ALT 10 5 - 33 U/L    AST 23 5 - 32 U/L   CBC   Result Value Ref Range    WBC 6.2 4.8 - 10.8 K/uL    RBC 4.54 4.20 - 5.40 M/uL    Hemoglobin 13.6 12.0 - 16.0 g/dL    Hematocrit 43.3 37.0 - 47.0 %    MCV 95.4 81.0 - 99.0 fL    MCH 30.0 27.0 - 31.0 pg    MCHC 31.4 (L) 33.0 - 37.0 g/dL    RDW 13.3 11.5 - 14.5 %    Platelets 277 258 - 558 K/uL    MPV 11.0 9.4 - 12.3 fL ASSESSMENT/ PLAN:  1. Age-related osteoporosis without current pathological fracture  This patient has very severe osteoporosis multiple compression fractures we have talked her about other therapies does not want Prolia we mentioned Evenity might consider referral to rheumatology she rather avoid these medications she was tolerating Fosamax until recently she thought maybe it was the cause of her bloating but she actually had the symptoms prior to Fosamax when I went through her record and per her recollection. We will try reclast to avoid her GI tract. - zoledronic acid (RECLAST) 5 MG/100ML SOLN; Infuse 100 mLs intravenously once for 1 dose  Dispense: 100 mL; Refill: 0  - External Referral To Physical Therapy    2. Other osteoporosis, unspecified pathological fracture presence  As above we discussed risk-benefit of different classes of medications efficacy of different classes she seemed to be doing okay with Fosamax but when she stopped it she felt like her bloating was better again I looked through her record and there was bloating prior to Fosamax but to be sure we suggested changing to Reclast IV because we will bypass the GI tract we discussed this risk-benefit wrote an order continue calcium vitamin D exercise follow    3. Chronic bilateral low back pain without sciatica  We really emphasized importance of treating her osteoporosis to avoid further chronic pain issues avoid further fractures    4. Abdominal bloating  I reviewed her record. She has had transvaginal ultrasound she has had CT abdomen pelvis she has had colonoscopy all of these during this time. .  I thought she had had gb eval but dont see recently- that and EGD would be next step    5.  Memory loss-reviewed neurology's note they thought she could have very early dementia but did not recommend medication family history Alzheimer's follow I think there is also a component of pseudodementia depression does much better when she is active and busy and doing things with others than when she is more isolated. 30' spent. Wrote new script and discussed risk benefit. Reviewed previous imaging tests and colonoscopy.

## 2021-07-13 RX ORDER — SODIUM CHLORIDE 9 MG/ML
INJECTION, SOLUTION INTRAVENOUS CONTINUOUS
Status: CANCELLED | OUTPATIENT
Start: 2021-07-13

## 2021-07-13 RX ORDER — EPINEPHRINE 1 MG/ML
0.3 INJECTION, SOLUTION, CONCENTRATE INTRAVENOUS PRN
Status: CANCELLED | OUTPATIENT
Start: 2021-07-13

## 2021-07-13 RX ORDER — DIPHENHYDRAMINE HYDROCHLORIDE 50 MG/ML
50 INJECTION INTRAMUSCULAR; INTRAVENOUS ONCE
Status: CANCELLED | OUTPATIENT
Start: 2021-07-13 | End: 2021-07-13

## 2021-07-13 RX ORDER — METHYLPREDNISOLONE SODIUM SUCCINATE 125 MG/2ML
125 INJECTION, POWDER, LYOPHILIZED, FOR SOLUTION INTRAMUSCULAR; INTRAVENOUS ONCE
Status: CANCELLED | OUTPATIENT
Start: 2021-07-13 | End: 2021-07-13

## 2021-07-19 RX ORDER — METHYLPREDNISOLONE SODIUM SUCCINATE 125 MG/2ML
125 INJECTION, POWDER, LYOPHILIZED, FOR SOLUTION INTRAMUSCULAR; INTRAVENOUS ONCE
Status: CANCELLED | OUTPATIENT
Start: 2021-07-19 | End: 2021-07-19

## 2021-07-19 RX ORDER — DIPHENHYDRAMINE HYDROCHLORIDE 50 MG/ML
50 INJECTION INTRAMUSCULAR; INTRAVENOUS ONCE
Status: CANCELLED | OUTPATIENT
Start: 2021-07-19 | End: 2021-07-19

## 2021-07-19 RX ORDER — SODIUM CHLORIDE 9 MG/ML
25 INJECTION, SOLUTION INTRAVENOUS PRN
Status: CANCELLED | OUTPATIENT
Start: 2021-07-19

## 2021-07-19 RX ORDER — HEPARIN SODIUM (PORCINE) LOCK FLUSH IV SOLN 100 UNIT/ML 100 UNIT/ML
500 SOLUTION INTRAVENOUS PRN
Status: CANCELLED | OUTPATIENT
Start: 2021-07-19

## 2021-07-19 RX ORDER — SODIUM CHLORIDE 0.9 % (FLUSH) 0.9 %
5-40 SYRINGE (ML) INJECTION PRN
Status: CANCELLED | OUTPATIENT
Start: 2021-07-19

## 2021-07-19 RX ORDER — EPINEPHRINE 1 MG/ML
0.3 INJECTION, SOLUTION, CONCENTRATE INTRAVENOUS PRN
Status: CANCELLED | OUTPATIENT
Start: 2021-07-19

## 2021-07-19 RX ORDER — ZOLEDRONIC ACID 5 MG/100ML
5 INJECTION, SOLUTION INTRAVENOUS ONCE
Status: CANCELLED | OUTPATIENT
Start: 2021-07-19 | End: 2021-07-19

## 2021-07-19 RX ORDER — 0.9 % SODIUM CHLORIDE 0.9 %
250 INTRAVENOUS SOLUTION INTRAVENOUS ONCE
Status: CANCELLED | OUTPATIENT
Start: 2021-07-19 | End: 2021-07-19

## 2021-07-19 RX ORDER — SODIUM CHLORIDE 9 MG/ML
INJECTION, SOLUTION INTRAVENOUS CONTINUOUS
Status: CANCELLED | OUTPATIENT
Start: 2021-07-19

## 2021-10-25 ENCOUNTER — OFFICE VISIT (OUTPATIENT)
Dept: INTERNAL MEDICINE | Age: 76
End: 2021-10-25
Payer: MEDICARE

## 2021-10-25 VITALS
DIASTOLIC BLOOD PRESSURE: 64 MMHG | WEIGHT: 123 LBS | BODY MASS INDEX: 21.11 KG/M2 | OXYGEN SATURATION: 96 % | HEART RATE: 86 BPM | SYSTOLIC BLOOD PRESSURE: 110 MMHG

## 2021-10-25 DIAGNOSIS — R43.2 LOSS OF TASTE: ICD-10-CM

## 2021-10-25 DIAGNOSIS — R41.3 MEMORY LOSS: ICD-10-CM

## 2021-10-25 DIAGNOSIS — Z23 NEED FOR PROPHYLACTIC VACCINATION AND INOCULATION AGAINST VARICELLA: ICD-10-CM

## 2021-10-25 DIAGNOSIS — R14.0 ABDOMINAL BLOATING: Primary | ICD-10-CM

## 2021-10-25 DIAGNOSIS — M81.0 AGE-RELATED OSTEOPOROSIS WITHOUT CURRENT PATHOLOGICAL FRACTURE: ICD-10-CM

## 2021-10-25 LAB — SARS-COV-2, PCR: NOT DETECTED

## 2021-10-25 PROCEDURE — 1036F TOBACCO NON-USER: CPT | Performed by: INTERNAL MEDICINE

## 2021-10-25 PROCEDURE — G8420 CALC BMI NORM PARAMETERS: HCPCS | Performed by: INTERNAL MEDICINE

## 2021-10-25 PROCEDURE — 3017F COLORECTAL CA SCREEN DOC REV: CPT | Performed by: INTERNAL MEDICINE

## 2021-10-25 PROCEDURE — G8482 FLU IMMUNIZE ORDER/ADMIN: HCPCS | Performed by: INTERNAL MEDICINE

## 2021-10-25 PROCEDURE — G8427 DOCREV CUR MEDS BY ELIG CLIN: HCPCS | Performed by: INTERNAL MEDICINE

## 2021-10-25 PROCEDURE — 4040F PNEUMOC VAC/ADMIN/RCVD: CPT | Performed by: INTERNAL MEDICINE

## 2021-10-25 PROCEDURE — 1123F ACP DISCUSS/DSCN MKR DOCD: CPT | Performed by: INTERNAL MEDICINE

## 2021-10-25 PROCEDURE — 99213 OFFICE O/P EST LOW 20 MIN: CPT | Performed by: INTERNAL MEDICINE

## 2021-10-25 PROCEDURE — 1090F PRES/ABSN URINE INCON ASSESS: CPT | Performed by: INTERNAL MEDICINE

## 2021-10-25 PROCEDURE — G8399 PT W/DXA RESULTS DOCUMENT: HCPCS | Performed by: INTERNAL MEDICINE

## 2021-10-25 RX ORDER — MULTIVIT WITH MINERALS/LUTEIN
1000 TABLET ORAL 2 TIMES DAILY
COMMUNITY

## 2021-10-25 NOTE — PROGRESS NOTES
Chief Complaint   Patient presents with    Follow-up    Back Pain       HPI: Patient is here today for office visit to follow-up issues with abdominal pain and back pain and memory loss she has had the similar symptoms for a long time we have done several CAT scans ultrasounds of the ovaries and uterus and other tests she has made dietary changes that seem to help. Symptoms started coming back and she feels like it is Fosamax so she stopped the Fosamax just recently. Patient also notes loss of taste but no other sy covid 19. No fever or chills or cough or congestion. Pt mainly c/o abd bloating. Past Medical History:   Diagnosis Date    Cancer ([facility] [address]     basil cell skin cancers removed     Low back pain 2/7/2017    Mixed hyperlipidemia 5/18/2021    Osteoporosis     Osteoporosis with pathological fracture with routine healing 6/23/2017       Past Surgical History:   Procedure Laterality Date    SKIN CANCER EXCISION         Family History   Problem Relation Age of Onset    Kidney Disease Mother     Cancer Father         Prostate        Social History     Socioeconomic History    Marital status: Single     Spouse name: Not on file    Number of children: Not on file    Years of education: Not on file    Highest education level: Not on file   Occupational History    Not on file   Tobacco Use    Smoking status: Never Smoker    Smokeless tobacco: Never Used   Vaping Use    Vaping Use: Not on file   Substance and Sexual Activity    Alcohol use:  Yes     Alcohol/week: 3.0 standard drinks     Types: 3 Glasses of wine per week    Drug use: No    Sexual activity: Not on file   Other Topics Concern    Not on file   Social History Narrative    Not on file     Social Determinants of Health     Financial Resource Strain: Low Risk     Difficulty of Paying Living Expenses: Not hard at all   Food Insecurity: No Food Insecurity    Worried About [address] in the Last Year: Never true   [employer] of Food in the Last Year: Never true   Transportation Needs:     Lack of Transportation (Medical): Not on file    Lack of Transportation (Non-Medical): Not on file   Physical Activity:     Days of Exercise per Week: Not on file    Minutes of Exercise per Session: Not on file   Stress:     Feeling of Stress : Not on file   Social Connections:     Frequency of Communication with Friends and Family: Not on file    Frequency of Social Gatherings with Friends and Family: Not on file    Attends [employer] Services: Not on file    Active Member of [address] [place] [address] or Organizations: Not on file    Attends Club or Organization Meetings: Not on file    Marital Status: Not on file   Intimate Partner Violence:     Fear of Current or Ex-Partner: Not on file    Emotionally Abused: Not on file    Physically Abused: Not on file    Sexually Abused: Not on file   Housing Stability:     Unable to Pay for Housing in the Last Year: Not on file    Number of [place] in the Last Year: Not on file    Unstable Housing in the Last Year: Not on file       No Known Allergies    Current Outpatient Medications   Medication Sig Dispense Refill    vitamin E (GNP VITAMIN E) 450 MG (1000 UT) CAPS capsule Take 1,000 Units by mouth 2 times daily      zoster recombinant adjuvanted vaccine (SHINGRIX) 50 MCG/0.5ML SUSR injection 50 MCG IM then repeat 2-6 months. 0.5 mL 1    Cholecalciferol (VITAMIN D3) 50 MCG (2000 UT) TABS Take 1 tablet by mouth daily 90 tablet 3    Calcium 600-200 MG-UNIT TABS Take 1 tablet by mouth 2 times daily 180 tablet 3     No current facility-administered medications for this visit.        Review of Systems    /64   Pulse 86   Wt 123 lb (55.8 kg)   SpO2 96%   BMI 21.11 kg/m²   BP Readings from Last 7 Encounters:   10/25/21 110/64   07/12/21 120/68   04/15/21 139/77   04/12/21 126/70   01/11/21 136/80   12/17/20 106/70   10/16/20 104/68     Wt Readings from Last 7 Encounters:   10/25/21 123 lb (55.8 kg) 07/12/21 119 lb (54 kg)   04/15/21 117 lb (53.1 kg)   04/12/21 117 lb (53.1 kg)   01/11/21 118 lb (53.5 kg)   12/17/20 118 lb (53.5 kg)   10/16/20 117 lb (53.1 kg)     BMI Readings from Last 7 Encounters:   10/25/21 21.11 kg/m²   07/12/21 20.43 kg/m²   04/15/21 20.08 kg/m²   04/12/21 20.08 kg/m²   01/11/21 20.25 kg/m²   12/17/20 20.25 kg/m²   10/16/20 20.08 kg/m²     Resp Readings from Last 7 Encounters:   10/28/19 17   01/03/19 18   10/01/16 18       Physical Exam  Constitutional:       General: She is not in acute distress. Eyes:      General: No scleral icterus. Cardiovascular:      Heart sounds: Normal heart sounds. Pulmonary:      Breath sounds: Normal breath sounds. Abdominal:      General: There is distension. Palpations: There is no mass. Tenderness: There is no abdominal tenderness. Musculoskeletal:      Cervical back: Neck supple. Comments: Kyphoscloliosis. Lymphadenopathy:      Cervical: No cervical adenopathy. Skin:     Findings: No rash. Results for orders placed or performed in visit on 10/25/21   COVID-19   Result Value Ref Range    SARS-CoV-2, PCR Not Detected Not Detected       ASSESSMENT/ PLAN:  1. Abdominal bloating  We reviewed her record she has had several CTs transvaginal ultrasound. This recent ultrasound 5/12/2021 most recent CT was actually 2-1/2 years ago and another 1 prior to that. Last colonoscopy 2017. I talked to the patient about repeating some of these tests however the symptoms are very chronic coming and going for a long time and seem dietary related she is continuing to watch that. After talking with her I will let her stop the Fosamax for a while and see what impact that makes. 2. Age-related osteoporosis without current pathological fracture  Unfortunately either declines or will not tolerate almost every osteoporosis medication we talked about the new medication Evenity.   Right now she just wants to work with her calcium vitamin D

## 2021-11-19 RX ORDER — ALENDRONATE SODIUM 70 MG/1
TABLET ORAL
Qty: 12 TABLET | Refills: 2 | Status: SHIPPED | OUTPATIENT
Start: 2021-11-19

## 2022-01-31 ENCOUNTER — OFFICE VISIT (OUTPATIENT)
Dept: INTERNAL MEDICINE | Age: 77
End: 2022-01-31
Payer: MEDICARE

## 2022-01-31 VITALS
DIASTOLIC BLOOD PRESSURE: 70 MMHG | WEIGHT: 124 LBS | BODY MASS INDEX: 21.28 KG/M2 | SYSTOLIC BLOOD PRESSURE: 104 MMHG | HEART RATE: 84 BPM | OXYGEN SATURATION: 98 %

## 2022-01-31 DIAGNOSIS — E78.2 MIXED HYPERLIPIDEMIA: ICD-10-CM

## 2022-01-31 DIAGNOSIS — R14.0 ABDOMINAL BLOATING: ICD-10-CM

## 2022-01-31 DIAGNOSIS — R41.3 MEMORY LOSS: Primary | ICD-10-CM

## 2022-01-31 DIAGNOSIS — M81.8 OTHER OSTEOPOROSIS, UNSPECIFIED PATHOLOGICAL FRACTURE PRESENCE: ICD-10-CM

## 2022-01-31 PROCEDURE — 99214 OFFICE O/P EST MOD 30 MIN: CPT | Performed by: INTERNAL MEDICINE

## 2022-01-31 PROCEDURE — 1123F ACP DISCUSS/DSCN MKR DOCD: CPT | Performed by: INTERNAL MEDICINE

## 2022-01-31 PROCEDURE — G8399 PT W/DXA RESULTS DOCUMENT: HCPCS | Performed by: INTERNAL MEDICINE

## 2022-01-31 PROCEDURE — G8482 FLU IMMUNIZE ORDER/ADMIN: HCPCS | Performed by: INTERNAL MEDICINE

## 2022-01-31 PROCEDURE — 4040F PNEUMOC VAC/ADMIN/RCVD: CPT | Performed by: INTERNAL MEDICINE

## 2022-01-31 PROCEDURE — G8420 CALC BMI NORM PARAMETERS: HCPCS | Performed by: INTERNAL MEDICINE

## 2022-01-31 PROCEDURE — 1036F TOBACCO NON-USER: CPT | Performed by: INTERNAL MEDICINE

## 2022-01-31 PROCEDURE — G8427 DOCREV CUR MEDS BY ELIG CLIN: HCPCS | Performed by: INTERNAL MEDICINE

## 2022-01-31 PROCEDURE — 1090F PRES/ABSN URINE INCON ASSESS: CPT | Performed by: INTERNAL MEDICINE

## 2022-01-31 PROCEDURE — 3017F COLORECTAL CA SCREEN DOC REV: CPT | Performed by: INTERNAL MEDICINE

## 2022-01-31 NOTE — PROGRESS NOTES
Chief Complaint   Patient presents with    3 Month Follow-Up       HPI: Pt is here today for follow up memory loss and other medical problems. Pt c/o memory loss and \"foggyness\". She is taking her fosamax every other week and abd bloating is less. She seems to fill better overall except ongoing memory loss and social isolation. Some right hip pain also. Past Medical History:   Diagnosis Date    Cancer ([address]     basil cell skin cancers removed     Low back pain 2/7/2017    Mixed hyperlipidemia 5/18/2021    Osteoporosis     Osteoporosis with pathological fracture with routine healing 6/23/2017       Past Surgical History:   Procedure Laterality Date    SKIN CANCER EXCISION         Family History   Problem Relation Age of Onset    Kidney Disease Mother     Cancer Father         Prostate        Social History     Socioeconomic History    Marital status: Single     Spouse name: Not on file    Number of children: Not on file    Years of education: Not on file    Highest education level: Not on file   Occupational History    Not on file   Tobacco Use    Smoking status: Never Smoker    Smokeless tobacco: Never Used   Vaping Use    Vaping Use: Not on file   Substance and Sexual Activity    Alcohol use: Yes     Alcohol/week: 3.0 standard drinks     Types: 3 Glasses of wine per week    Drug use: No    Sexual activity: Not on file   Other Topics Concern    Not on file   Social History Narrative    Not on file     Social Determinants of Health     Financial Resource Strain:     Difficulty of Paying Living Expenses: Not on file   Food Insecurity:     Worried About Running Out of Food in the Last Year: Not on file    Sherrill of Food in the Last Year: Not on file   Transportation Needs:     Lack of Transportation (Medical): Not on file    Lack of Transportation (Non-Medical):  Not on file   Physical Activity:     Days of Exercise per Week: Not on file    Minutes of Exercise per Session: Not on file   Stress:     Feeling of Stress : Not on file   Social Connections:     Frequency of Communication with Friends and Family: Not on file    Frequency of Social Gatherings with Friends and Family: Not on file    Attends Church Services: Not on file    Active Member of [address] [place] [address] or Organizations: Not on file    Attends Club or Organization Meetings: Not on file    Marital Status: Not on file   Intimate Partner Violence:     Fear of Current or Ex-Partner: Not on file    Emotionally Abused: Not on file    Physically Abused: Not on file    Sexually Abused: Not on file   Housing Stability:     Unable to Pay for Housing in the Last Year: Not on file    Number of [place] in the Last Year: Not on file    Unstable Housing in the Last Year: Not on file       No Known Allergies    Current Outpatient Medications   Medication Sig Dispense Refill    alendronate (FOSAMAX) 70 MG tablet TAKE 1 TABLET BY MOUTH EVERY 7 DAYS. 12 tablet 2    vitamin E (GNP VITAMIN E) 450 MG (1000 UT) CAPS capsule Take 1,000 Units by mouth 2 times daily      Cholecalciferol (VITAMIN D3) 50 MCG (2000 UT) TABS Take 1 tablet by mouth daily 90 tablet 3    Calcium 600-200 MG-UNIT TABS Take 1 tablet by mouth 2 times daily 180 tablet 3     No current facility-administered medications for this visit.        Review of Systems    /70   Pulse 84   Wt 124 lb (56.2 kg)   SpO2 98%   BMI 21.28 kg/m²   BP Readings from Last 7 Encounters:   01/31/22 104/70   10/25/21 110/64   07/12/21 120/68   04/15/21 139/77   04/12/21 126/70   01/11/21 136/80   12/17/20 106/70     Wt Readings from Last 7 Encounters:   01/31/22 124 lb (56.2 kg)   10/25/21 123 lb (55.8 kg)   07/12/21 119 lb (54 kg)   04/15/21 117 lb (53.1 kg)   04/12/21 117 lb (53.1 kg)   01/11/21 118 lb (53.5 kg)   12/17/20 118 lb (53.5 kg)     BMI Readings from Last 7 Encounters:   01/31/22 21.28 kg/m²   10/25/21 21.11 kg/m²   07/12/21 20.43 kg/m²   04/15/21 20.08 kg/m²   04/12/21 20.08 kg/m²   01/11/21 20.25 kg/m²   12/17/20 20.25 kg/m²     Resp Readings from Last 7 Encounters:   10/28/19 17   01/03/19 18   10/01/16 18       Physical Exam  Constitutional:       General: She is not in acute distress. Eyes:      General: No scleral icterus. Cardiovascular:      Heart sounds: Normal heart sounds. Pulmonary:      Breath sounds: Normal breath sounds. Musculoskeletal:      Cervical back: Neck supple. Lymphadenopathy:      Cervical: No cervical adenopathy. Skin:     Findings: No rash. Results for orders placed or performed in visit on 10/25/21   COVID-19   Result Value Ref Range    SARS-CoV-2, PCR Not Detected Not Detected       ASSESSMENT/ PLAN:  1. Memory loss  Patient had neuropsych testing before it is very difficult to find those results she also saw neurology Mini-Mental status 29 out of 30 she does have a family history of Alzheimer's it sounds like some of her friends have commented on memory loss I suggested since this is been one of her main complaints ongoing that we just go ahead and add Aricept 5 mg daily she wrote down the name and is going to think about it. We also talked about keeping her mind active walking with a friend reading working puzzles try to be as active as possible. 2. Abdominal bloating  Long standing issue with w/u  - continue to follow - better--has had GI evaluation and ovarian evaluation    3. Other osteoporosis, unspecified pathological fracture presence  We have had a struggle getting her to take medication she is finally taking Fosamax but only takes it every other week I think that is better than nothing she also takes calcium and vitamin D. We also encourage walking    4. Mixed hyperlipidemia  This has not been an issue we will follow  - CBC; Future  - Comprehensive Metabolic Panel; Future  - TSH without Reflex; Future  - Lipid Panel; Future    Pt has had her full set of covid vaccines.    We ordered labs and Medicare wellness in 3

## 2022-05-02 DIAGNOSIS — E78.2 MIXED HYPERLIPIDEMIA: ICD-10-CM

## 2022-05-02 LAB
ALBUMIN SERPL-MCNC: 4.5 G/DL (ref 3.5–5.2)
ALP BLD-CCNC: 54 U/L (ref 35–104)
ALT SERPL-CCNC: 12 U/L (ref 5–33)
ANION GAP SERPL CALCULATED.3IONS-SCNC: 12 MMOL/L (ref 7–19)
AST SERPL-CCNC: 20 U/L (ref 5–32)
BILIRUB SERPL-MCNC: 0.7 MG/DL (ref 0.2–1.2)
BUN BLDV-MCNC: 13 MG/DL (ref 8–23)
CALCIUM SERPL-MCNC: 9.5 MG/DL (ref 8.8–10.2)
CHLORIDE BLD-SCNC: 106 MMOL/L (ref 98–111)
CHOLESTEROL, TOTAL: 269 MG/DL (ref 160–199)
CO2: 26 MMOL/L (ref 22–29)
CREAT SERPL-MCNC: 0.7 MG/DL (ref 0.5–0.9)
GFR AFRICAN AMERICAN: >59
GFR NON-AFRICAN AMERICAN: >60
GLUCOSE BLD-MCNC: 95 MG/DL (ref 74–109)
HCT VFR BLD CALC: 44 % (ref 37–47)
HDLC SERPL-MCNC: 97 MG/DL (ref 65–121)
HEMOGLOBIN: 13.9 G/DL (ref 12–16)
LDL CHOLESTEROL CALCULATED: 157 MG/DL
MCH RBC QN AUTO: 29.6 PG (ref 27–31)
MCHC RBC AUTO-ENTMCNC: 31.6 G/DL (ref 33–37)
MCV RBC AUTO: 93.6 FL (ref 81–99)
PDW BLD-RTO: 13.2 % (ref 11.5–14.5)
PLATELET # BLD: 196 K/UL (ref 130–400)
PMV BLD AUTO: 10.6 FL (ref 9.4–12.3)
POTASSIUM SERPL-SCNC: 4.7 MMOL/L (ref 3.5–5)
RBC # BLD: 4.7 M/UL (ref 4.2–5.4)
SODIUM BLD-SCNC: 144 MMOL/L (ref 136–145)
TOTAL PROTEIN: 6.4 G/DL (ref 6.6–8.7)
TRIGL SERPL-MCNC: 73 MG/DL (ref 0–149)
TSH SERPL DL<=0.05 MIU/L-ACNC: 3.92 UIU/ML (ref 0.27–4.2)
WBC # BLD: 5.6 K/UL (ref 4.8–10.8)

## 2022-05-09 ENCOUNTER — OFFICE VISIT (OUTPATIENT)
Dept: INTERNAL MEDICINE | Age: 77
End: 2022-05-09
Payer: MEDICARE

## 2022-05-09 VITALS
SYSTOLIC BLOOD PRESSURE: 110 MMHG | HEART RATE: 94 BPM | WEIGHT: 123 LBS | OXYGEN SATURATION: 97 % | DIASTOLIC BLOOD PRESSURE: 72 MMHG | BODY MASS INDEX: 21 KG/M2 | HEIGHT: 64 IN

## 2022-05-09 DIAGNOSIS — R14.0 ABDOMINAL BLOATING: ICD-10-CM

## 2022-05-09 DIAGNOSIS — M81.0 OSTEOPOROSIS WITHOUT CURRENT PATHOLOGICAL FRACTURE, UNSPECIFIED OSTEOPOROSIS TYPE: ICD-10-CM

## 2022-05-09 DIAGNOSIS — E78.2 MIXED HYPERLIPIDEMIA: ICD-10-CM

## 2022-05-09 DIAGNOSIS — R41.3 MEMORY LOSS: ICD-10-CM

## 2022-05-09 DIAGNOSIS — Z23 NEED FOR PROPHYLACTIC VACCINATION AGAINST STREPTOCOCCUS PNEUMONIAE (PNEUMOCOCCUS): ICD-10-CM

## 2022-05-09 DIAGNOSIS — Z00.00 MEDICARE ANNUAL WELLNESS VISIT, SUBSEQUENT: Primary | ICD-10-CM

## 2022-05-09 PROCEDURE — 4040F PNEUMOC VAC/ADMIN/RCVD: CPT | Performed by: INTERNAL MEDICINE

## 2022-05-09 PROCEDURE — 1123F ACP DISCUSS/DSCN MKR DOCD: CPT | Performed by: INTERNAL MEDICINE

## 2022-05-09 PROCEDURE — 1036F TOBACCO NON-USER: CPT | Performed by: INTERNAL MEDICINE

## 2022-05-09 PROCEDURE — 1090F PRES/ABSN URINE INCON ASSESS: CPT | Performed by: INTERNAL MEDICINE

## 2022-05-09 PROCEDURE — G8427 DOCREV CUR MEDS BY ELIG CLIN: HCPCS | Performed by: INTERNAL MEDICINE

## 2022-05-09 PROCEDURE — G8399 PT W/DXA RESULTS DOCUMENT: HCPCS | Performed by: INTERNAL MEDICINE

## 2022-05-09 PROCEDURE — G0009 ADMIN PNEUMOCOCCAL VACCINE: HCPCS | Performed by: INTERNAL MEDICINE

## 2022-05-09 PROCEDURE — 99213 OFFICE O/P EST LOW 20 MIN: CPT | Performed by: INTERNAL MEDICINE

## 2022-05-09 PROCEDURE — G8420 CALC BMI NORM PARAMETERS: HCPCS | Performed by: INTERNAL MEDICINE

## 2022-05-09 PROCEDURE — G0439 PPPS, SUBSEQ VISIT: HCPCS | Performed by: INTERNAL MEDICINE

## 2022-05-09 PROCEDURE — 3017F COLORECTAL CA SCREEN DOC REV: CPT | Performed by: INTERNAL MEDICINE

## 2022-05-09 PROCEDURE — 90732 PPSV23 VACC 2 YRS+ SUBQ/IM: CPT | Performed by: INTERNAL MEDICINE

## 2022-05-09 SDOH — ECONOMIC STABILITY: FOOD INSECURITY: WITHIN THE PAST 12 MONTHS, THE FOOD YOU BOUGHT JUST DIDN'T LAST AND YOU DIDN'T HAVE MONEY TO GET MORE.: NEVER TRUE

## 2022-05-09 SDOH — ECONOMIC STABILITY: FOOD INSECURITY: WITHIN THE PAST 12 MONTHS, YOU WORRIED THAT YOUR FOOD WOULD RUN OUT BEFORE YOU GOT MONEY TO BUY MORE.: NEVER TRUE

## 2022-05-09 ASSESSMENT — PATIENT HEALTH QUESTIONNAIRE - PHQ9
SUM OF ALL RESPONSES TO PHQ QUESTIONS 1-9: 2
SUM OF ALL RESPONSES TO PHQ QUESTIONS 1-9: 2
1. LITTLE INTEREST OR PLEASURE IN DOING THINGS: 1
2. FEELING DOWN, DEPRESSED OR HOPELESS: 1
SUM OF ALL RESPONSES TO PHQ9 QUESTIONS 1 & 2: 2
SUM OF ALL RESPONSES TO PHQ QUESTIONS 1-9: 2
SUM OF ALL RESPONSES TO PHQ QUESTIONS 1-9: 2

## 2022-05-09 ASSESSMENT — LIFESTYLE VARIABLES
HOW OFTEN DURING THE LAST YEAR HAVE YOU FOUND THAT YOU WERE NOT ABLE TO STOP DRINKING ONCE YOU HAD STARTED: 0
HOW OFTEN DURING THE LAST YEAR HAVE YOU HAD A FEELING OF GUILT OR REMORSE AFTER DRINKING: 0
HOW OFTEN DURING THE LAST YEAR HAVE YOU FAILED TO DO WHAT WAS NORMALLY EXPECTED FROM YOU BECAUSE OF DRINKING: 0
HAVE YOU OR SOMEONE ELSE BEEN INJURED AS A RESULT OF YOUR DRINKING: 0
HOW OFTEN DURING THE LAST YEAR HAVE YOU BEEN UNABLE TO REMEMBER WHAT HAPPENED THE NIGHT BEFORE BECAUSE YOU HAD BEEN DRINKING: 0
HAS A RELATIVE, FRIEND, DOCTOR, OR ANOTHER HEALTH PROFESSIONAL EXPRESSED CONCERN ABOUT YOUR DRINKING OR SUGGESTED YOU CUT DOWN: 0
HOW OFTEN DO YOU HAVE A DRINK CONTAINING ALCOHOL: 4 OR MORE TIMES A WEEK
HOW OFTEN DURING THE LAST YEAR HAVE YOU NEEDED AN ALCOHOLIC DRINK FIRST THING IN THE MORNING TO GET YOURSELF GOING AFTER A NIGHT OF HEAVY DRINKING: 0
HOW MANY STANDARD DRINKS CONTAINING ALCOHOL DO YOU HAVE ON A TYPICAL DAY: 1 OR 2

## 2022-05-09 ASSESSMENT — SOCIAL DETERMINANTS OF HEALTH (SDOH): HOW HARD IS IT FOR YOU TO PAY FOR THE VERY BASICS LIKE FOOD, HOUSING, MEDICAL CARE, AND HEATING?: NOT VERY HARD

## 2022-05-09 NOTE — PROGRESS NOTES
Chief Complaint   Patient presents with    Medicare AW       HPI: Patient is here today for Medicare annual wellness visit and to follow-up medical issues which include memory loss some intermittent abdominal bloating severe osteoporosis with a history of compression fractures. Today her main issue is memory loss she is focused on this but only mention starting any medication she does not want to do that. She denies headache chest pain dyspnea abdominal pain. Much better right now with the abdominal bloating    Past Medical History:   Diagnosis Date    Cancer ([address]     basil cell skin cancers removed     Low back pain 2/7/2017    Mixed hyperlipidemia 5/18/2021    Osteoporosis     Osteoporosis with pathological fracture with routine healing 6/23/2017       Past Surgical History:   Procedure Laterality Date    SKIN CANCER EXCISION         Family History   Problem Relation Age of Onset    Kidney Disease Mother     Cancer Father         Prostate        Social History     Socioeconomic History    Marital status: Single     Spouse name: Not on file    Number of children: Not on file    Years of education: Not on file    Highest education level: Not on file   Occupational History    Not on file   Tobacco Use    Smoking status: Never Smoker    Smokeless tobacco: Never Used   Vaping Use    Vaping Use: Not on file   Substance and Sexual Activity    Alcohol use:  Yes     Alcohol/week: 3.0 standard drinks     Types: 3 Glasses of wine per week    Drug use: No    Sexual activity: Not on file   Other Topics Concern    Not on file   Social History Narrative    Not on file     Social Determinants of Health     Financial Resource Strain: Low Risk     Difficulty of Paying Living Expenses: Not very hard   Food Insecurity: No Food Insecurity    Worried About Running Out of Food in the Last Year: Never true    Sherrill of Food in the Last Year: Never true   Transportation Needs:     Lack of Transportation (Medical): Not on file    Lack of Transportation (Non-Medical): Not on file   Physical Activity: Insufficiently Active    Days of Exercise per Week: 2 days    Minutes of Exercise per Session: 30 min   Stress:     Feeling of Stress : Not on file   Social Connections:     Frequency of Communication with Friends and Family: Not on file    Frequency of Social Gatherings with Friends and Family: Not on file    Attends [employer]ical Services: Not on file    Active Member of Clubs or Organizations: Not on file    Attends Club or Organization Meetings: Not on file    Marital Status: Not on file   Intimate Partner Violence:     Fear of Current or Ex-Partner: Not on file    Emotionally Abused: Not on file    Physically Abused: Not on file    Sexually Abused: Not on file   Housing Stability:     Unable to Pay for Housing in the Last Year: Not on file    Number of [place] in the Last Year: Not on file    Unstable Housing in the Last Year: Not on file       No Known Allergies    Current Outpatient Medications   Medication Sig Dispense Refill    alendronate (FOSAMAX) 70 MG tablet TAKE 1 TABLET BY MOUTH EVERY 7 DAYS. 12 tablet 2    vitamin E (GNP VITAMIN E) 450 MG (1000 UT) CAPS capsule Take 1,000 Units by mouth 2 times daily      Cholecalciferol (VITAMIN D3) 50 MCG (2000 UT) TABS Take 1 tablet by mouth daily 90 tablet 3    Calcium 600-200 MG-UNIT TABS Take 1 tablet by mouth 2 times daily 180 tablet 3     No current facility-administered medications for this visit. Review of Systems   Constitutional: Positive for fatigue. Negative for chills and fever. HENT: Negative for congestion and sinus pressure. Eyes: Negative for discharge and redness. Respiratory: Negative for cough and shortness of breath. Cardiovascular: Negative for chest pain, palpitations and leg swelling. Gastrointestinal: Positive for abdominal distention. Negative for abdominal pain.    Genitourinary: Negative for dysuria, frequency and urgency. Musculoskeletal: Positive for back pain. Negative for arthralgias. Skin: Negative for rash and wound. Neurological: Negative for dizziness, light-headedness and headaches. Memory loss   Psychiatric/Behavioral: Negative for dysphoric mood and sleep disturbance. The patient is not nervous/anxious. /72   Pulse 94   Ht 5' 4\" (1.626 m)   Wt 123 lb (55.8 kg)   SpO2 97%   BMI 21.11 kg/m²   BP Readings from Last 7 Encounters:   05/09/22 110/72   01/31/22 104/70   10/25/21 110/64   07/12/21 120/68   04/15/21 139/77   04/12/21 126/70   01/11/21 136/80     Wt Readings from Last 7 Encounters:   05/09/22 123 lb (55.8 kg)   01/31/22 124 lb (56.2 kg)   10/25/21 123 lb (55.8 kg)   07/12/21 119 lb (54 kg)   04/15/21 117 lb (53.1 kg)   04/12/21 117 lb (53.1 kg)   01/11/21 118 lb (53.5 kg)     BMI Readings from Last 7 Encounters:   05/09/22 21.11 kg/m²   01/31/22 21.28 kg/m²   10/25/21 21.11 kg/m²   07/12/21 20.43 kg/m²   04/15/21 20.08 kg/m²   04/12/21 20.08 kg/m²   01/11/21 20.25 kg/m²     Resp Readings from Last 7 Encounters:   10/28/19 17   01/03/19 18   10/01/16 18       Physical Exam  Constitutional:       General: She is not in acute distress. Appearance: Normal appearance. She is well-developed. HENT:      Right Ear: External ear normal. Tympanic membrane is not injected. Left Ear: External ear normal. Tympanic membrane is not injected. Mouth/Throat:      Pharynx: No oropharyngeal exudate. Eyes:      General: No scleral icterus. Conjunctiva/sclera: Conjunctivae normal.   Neck:      Thyroid: No thyroid mass or thyromegaly. Vascular: No carotid bruit. Cardiovascular:      Rate and Rhythm: Normal rate and regular rhythm. Heart sounds: S1 normal and S2 normal. No murmur heard. No S3 or S4 sounds. Pulmonary:      Effort: Pulmonary effort is normal. No respiratory distress. Breath sounds: Normal breath sounds. No wheezing or rales. Chest:   Breasts:      Right: No supraclavicular adenopathy. Left: No supraclavicular adenopathy. Abdominal:      General: Bowel sounds are normal. There is distension. Palpations: Abdomen is soft. There is no mass. Tenderness: There is no abdominal tenderness. Musculoskeletal:      Cervical back: Neck supple. Lymphadenopathy:      Cervical: No cervical adenopathy. Upper Body:      Right upper body: No supraclavicular adenopathy. Left upper body: No supraclavicular adenopathy. Skin:     Findings: No rash. Neurological:      Mental Status: She is alert and oriented to person, place, and time. Cranial Nerves: No cranial nerve deficit.    Psychiatric:      Comments: Mood is normal but she always seems a little worried and is isolated         Results for orders placed or performed in visit on 05/02/22   Lipid Panel   Result Value Ref Range    Cholesterol, Total 269 (H) 160 - 199 mg/dL    Triglycerides 73 0 - 149 mg/dL    HDL 97 65 - 121 mg/dL    LDL Calculated 157 <100 mg/dL   TSH without Reflex   Result Value Ref Range    TSH 3.920 0.270 - 4.200 uIU/mL   Comprehensive Metabolic Panel   Result Value Ref Range    Sodium 144 136 - 145 mmol/L    Potassium 4.7 3.5 - 5.0 mmol/L    Chloride 106 98 - 111 mmol/L    CO2 26 22 - 29 mmol/L    Anion Gap 12 7 - 19 mmol/L    Glucose 95 74 - 109 mg/dL    BUN 13 8 - 23 mg/dL    CREATININE 0.7 0.5 - 0.9 mg/dL    GFR Non-African American >60 >60    GFR African American >59 >59    Calcium 9.5 8.8 - 10.2 mg/dL    Total Protein 6.4 (L) 6.6 - 8.7 g/dL    Albumin 4.5 3.5 - 5.2 g/dL    Total Bilirubin 0.7 0.2 - 1.2 mg/dL    Alkaline Phosphatase 54 35 - 104 U/L    ALT 12 5 - 33 U/L    AST 20 5 - 32 U/L   CBC   Result Value Ref Range    WBC 5.6 4.8 - 10.8 K/uL    RBC 4.70 4.20 - 5.40 M/uL    Hemoglobin 13.9 12.0 - 16.0 g/dL    Hematocrit 44.0 37.0 - 47.0 %    MCV 93.6 81.0 - 99.0 fL    MCH 29.6 27.0 - 31.0 pg    MCHC 31.6 (L) 33.0 - 37.0 g/dL    RDW 13.2 11.5 - 14.5 %    Platelets 012 453 - 286 K/uL    MPV 10.6 9.4 - 12.3 fL       ASSESSMENT/ PLAN:  1. Medicare annual wellness visit, subsequent  Chart, medications, labs, vaccines reviewed. Keep up to date with routine care and follow up. Call with any problems or complaints. Keep up to date with routine screening recomendations and vaccines. 2. Osteoporosis without current pathological fracture, unspecified osteoporosis type  Of course would recommend medical therapy for this she has very severe osteoporosis has had pathological fractures with fractures of everything mowing the lawn etc. she has finally started taking medication we have had a lot of trouble getting her to take meds. He is now agreeable to Fosamax remain on that vitamin D and calcium caution against falling but stay active exercise  - DEXA BONE DENSITY 2 SITES; Future    3. Need for prophylactic vaccination against Streptococcus pneumoniae (pneumococcus)  We reviewed her vaccines she needs tetanus and shingles vaccines which she will get at the pharmacy Pneumovax given today she is up-to-date with COVID vaccination  - Pneumococcal polysaccharide vaccine 23-valent PPSV23    4. Memory loss  I reviewed and discussed the work-up today and the office visit and mental status exam with neurology they talk to her about Aricept we have talked her about Aricept she does not want to do this. But at the same time she wants something for her dementia. We continue to encourage her to be active we do puzzles if she changes her mind about medicine let us know    5. Abdominal bloating  Probably some irritable bowel we reviewed her work-up there is also some endometrial lining thickening we had evaluated that in the past.  Overall her bloating is better abdominal discomfort better follow-up    6.  Mixed hyperlipidemia  Mixed hyperlipidemia has not wanted medication okay high HDL follow                      Medicare Annual Wellness Visit    Pipo Solo Pao Slaughter is here for Medicare AW    Assessment & Plan   Medicare annual wellness visit, subsequent  Osteoporosis without current pathological fracture, unspecified osteoporosis type  -     DEXA BONE DENSITY 2 SITES; Future  Need for prophylactic vaccination against Streptococcus pneumoniae (pneumococcus)  -     Pneumococcal polysaccharide vaccine 23-valent PPSV23  Memory loss  Abdominal bloating  Mixed hyperlipidemia      Recommendations for Preventive Services Due: see orders and patient instructions/AVS.  Recommended screening schedule for the next 5-10 years is provided to the patient in written form: see Patient Instructions/AVS.     Return in 3 months (on 8/9/2022) for ov. Subjective   The following acute and/or chronic problems were also addressed today:  See attached note    Patient's complete Health Risk Assessment and screening values have been reviewed and are found in Flowsheets. The following problems were reviewed today and where indicated follow up appointments were made and/or referrals ordered. Positive Risk Factor Screenings with Interventions:               General Health and ACP:  General  In general, how would you say your health is?: Very Good  In the past 7 days, have you experienced any of the following: New or Increased Pain, New or Increased Fatigue, Loneliness, Social Isolation, Stress or Anger?: (!) Yes  Select all that apply: (!) Loneliness  Do you get the social and emotional support that you need?: Yes  Do you have a Living Will?: Yes    Advance Directives     Power of  Living Will ACP-Advance Directive ACP-Power of     Not on File Not on File Not on File Not on File      General Health Risk Interventions:  · isolated               Objective   Vitals:    05/09/22 0827   BP: 110/72   Pulse: 94   SpO2: 97%   Weight: 123 lb (55.8 kg)   Height: 5' 4\" (1.626 m)      Body mass index is 21.11 kg/m².                No Known Allergies  Prior to Visit Medications Medication Sig Taking? Authorizing Provider   alendronate (FOSAMAX) 70 MG tablet TAKE 1 TABLET BY MOUTH EVERY 7 DAYS.   Brandie Longo MD   vitamin E (GNP VITAMIN E) 450 MG (1000 UT) CAPS capsule Take 1,000 Units by mouth 2 times daily  Historical Provider, MD   zoledronic acid (RECLAST) 5 MG/100ML SOLN Infuse 100 mLs intravenously once for 1 dose  Brandie Longo MD   Cholecalciferol (VITAMIN D3) 50 MCG (2000 UT) TABS Take 1 tablet by mouth daily  Brandie Longo MD   Calcium 600-200 MG-UNIT TABS Take 1 tablet by mouth 2 times daily  Brandie Longo MD   Teriparatide, Recombinant, (FORTEO) 600 MCG/2.4ML SOPN injection Inject 0.08 mLs into the skin daily  Brandie Longo MD   denosumab (PROLIA) 60 MG/ML SOSY SC injection Inject 1 mL into the skin once for 1 dose  Brandie Longo MD       [facility]Te (Including outside providers/suppliers regularly involved in providing care):   Patient Care Team:  Brandie Longo MD as PCP - General (Internal Medicine)  Brandie Longo MD as PCP - [facility] Empaneled Provider     Reviewed and updated this visit:  Allergies  Meds  Problems

## 2022-05-09 NOTE — PATIENT INSTRUCTIONS
Personalized Preventive Plan for Veronica Garcia - 5/9/2022  Medicare offers a range of preventive health benefits. Some of the tests and screenings are paid in full while other may be subject to a deductible, co-insurance, and/or copay. Some of these benefits include a comprehensive review of your medical history including lifestyle, illnesses that may run in your family, and various assessments and screenings as appropriate. After reviewing your medical record and screening and assessments performed today your provider may have ordered immunizations, labs, imaging, and/or referrals for you. A list of these orders (if applicable) as well as your Preventive Care list are included within your After Visit Summary for your review. Other Preventive Recommendations:    · A preventive eye exam performed by an eye specialist is recommended every 1-2 years to screen for glaucoma; cataracts, macular degeneration, and other eye disorders. · A preventive dental visit is recommended every 6 months. · Try to get at least 150 minutes of exercise per week or 10,000 steps per day on a pedometer . · Order or download the FREE \"Exercise & Physical Activity: Your Everyday Guide\" from [employer]. Call 9-827.556.6266 or search [employer] online. · You need 6710-5677 mg of calcium and 7964-9006 IU of vitamin D per day. It is possible to meet your calcium requirement with diet alone, but a vitamin D supplement is usually necessary to meet this goal.  · When exposed to the sun, use a sunscreen that protects against both UVA and UVB radiation with an SPF of 30 or greater. Reapply every 2 to 3 hours or after sweating, drying off with a towel, or swimming. · Always wear a seat belt when traveling in a car. Always wear a helmet when riding a bicycle or motorcycle.

## 2022-05-19 ASSESSMENT — ENCOUNTER SYMPTOMS
ABDOMINAL DISTENTION: 1
ABDOMINAL PAIN: 0
EYE DISCHARGE: 0
COUGH: 0
SINUS PRESSURE: 0
SHORTNESS OF BREATH: 0
EYE REDNESS: 0
BACK PAIN: 1

## 2022-07-20 ENCOUNTER — HOSPITAL ENCOUNTER (OUTPATIENT)
Dept: ULTRASOUND IMAGING | Age: 77
Discharge: HOME | End: 2022-07-20
Payer: MEDICARE

## 2022-07-20 DIAGNOSIS — M81.0 OSTEOPOROSIS WITHOUT CURRENT PATHOLOGICAL FRACTURE, UNSPECIFIED OSTEOPOROSIS TYPE: ICD-10-CM

## 2022-07-20 PROCEDURE — 77080 DXA BONE DENSITY AXIAL: CPT | Performed by: RADIOLOGY

## 2022-07-20 PROCEDURE — 77080 DXA BONE DENSITY AXIAL: CPT

## 2022-08-09 ENCOUNTER — OFFICE VISIT (OUTPATIENT)
Dept: INTERNAL MEDICINE | Age: 77
End: 2022-08-09
Payer: MEDICARE

## 2022-08-09 VITALS
SYSTOLIC BLOOD PRESSURE: 120 MMHG | BODY MASS INDEX: 19.57 KG/M2 | HEART RATE: 90 BPM | OXYGEN SATURATION: 98 % | DIASTOLIC BLOOD PRESSURE: 70 MMHG | WEIGHT: 114 LBS

## 2022-08-09 DIAGNOSIS — R14.0 ABDOMINAL BLOATING: ICD-10-CM

## 2022-08-09 DIAGNOSIS — E55.9 VITAMIN D DEFICIENCY: ICD-10-CM

## 2022-08-09 DIAGNOSIS — M81.8 OTHER OSTEOPOROSIS, UNSPECIFIED PATHOLOGICAL FRACTURE PRESENCE: Primary | ICD-10-CM

## 2022-08-09 DIAGNOSIS — R41.3 MEMORY LOSS: ICD-10-CM

## 2022-08-09 DIAGNOSIS — E78.2 MIXED HYPERLIPIDEMIA: ICD-10-CM

## 2022-08-09 PROCEDURE — 1123F ACP DISCUSS/DSCN MKR DOCD: CPT | Performed by: INTERNAL MEDICINE

## 2022-08-09 PROCEDURE — 1036F TOBACCO NON-USER: CPT | Performed by: INTERNAL MEDICINE

## 2022-08-09 PROCEDURE — 99213 OFFICE O/P EST LOW 20 MIN: CPT | Performed by: INTERNAL MEDICINE

## 2022-08-09 PROCEDURE — 3017F COLORECTAL CA SCREEN DOC REV: CPT | Performed by: INTERNAL MEDICINE

## 2022-08-09 PROCEDURE — G8427 DOCREV CUR MEDS BY ELIG CLIN: HCPCS | Performed by: INTERNAL MEDICINE

## 2022-08-09 PROCEDURE — G8399 PT W/DXA RESULTS DOCUMENT: HCPCS | Performed by: INTERNAL MEDICINE

## 2022-08-09 PROCEDURE — G8420 CALC BMI NORM PARAMETERS: HCPCS | Performed by: INTERNAL MEDICINE

## 2022-08-09 PROCEDURE — 1090F PRES/ABSN URINE INCON ASSESS: CPT | Performed by: INTERNAL MEDICINE

## 2022-08-09 NOTE — PROGRESS NOTES
Insufficiently Active    Days of Exercise per Week: 2 days    Minutes of Exercise per Session: 30 min   Stress: Not on file   Social Connections: Not on file   Intimate Partner Violence: Not on file   Housing Stability: Not on file       No Known Allergies    Current Outpatient Medications   Medication Sig Dispense Refill    apixaban starter pack (ELIQUIS DVT/PE STARTER PACK) 5 MG TBPK tablet Take 1 tablet by mouth See Admin Instructions 74 tablet 0    thiamine mononitrate (THIAMINE) 100 MG tablet Take 1 tablet by mouth daily 30 tablet 0    alendronate (FOSAMAX) 70 MG tablet TAKE 1 TABLET BY MOUTH EVERY 7 DAYS. 12 tablet 2    vitamin E 450 MG (1000 UT) CAPS capsule Take 1,000 Units by mouth 2 times daily      Cholecalciferol (VITAMIN D3) 50 MCG (2000 UT) TABS Take 1 tablet by mouth daily 90 tablet 3    Calcium 600-200 MG-UNIT TABS Take 1 tablet by mouth 2 times daily 180 tablet 3     No current facility-administered medications for this visit. Review of Systems    /70   Pulse 90   Wt 114 lb (51.7 kg)   SpO2 98%   BMI 19.57 kg/m²   BP Readings from Last 7 Encounters:   08/24/22 138/64   08/19/22 137/72   08/09/22 120/70   05/09/22 110/72   01/31/22 104/70   10/25/21 110/64   07/12/21 120/68     Wt Readings from Last 7 Encounters:   08/24/22 115 lb (52.2 kg)   08/18/22 115 lb (52.2 kg)   08/09/22 114 lb (51.7 kg)   05/09/22 123 lb (55.8 kg)   01/31/22 124 lb (56.2 kg)   10/25/21 123 lb (55.8 kg)   07/12/21 119 lb (54 kg)     BMI Readings from Last 7 Encounters:   08/24/22 19.74 kg/m²   08/18/22 19.74 kg/m²   08/09/22 19.57 kg/m²   05/09/22 21.11 kg/m²   01/31/22 21.28 kg/m²   10/25/21 21.11 kg/m²   07/12/21 20.43 kg/m²     Resp Readings from Last 7 Encounters:   08/19/22 16   10/28/19 17   01/03/19 18   10/01/16 18       Physical Exam  Constitutional:       General: She is not in acute distress. Eyes:      General: No scleral icterus. Cardiovascular:      Heart sounds: Normal heart sounds. Pulmonary:      Breath sounds: Normal breath sounds. Musculoskeletal:      Cervical back: Neck supple. Lymphadenopathy:      Cervical: No cervical adenopathy. Skin:     Findings: No rash. Results for orders placed or performed in visit on 05/02/22   Lipid Panel   Result Value Ref Range    Cholesterol, Total 269 (H) 160 - 199 mg/dL    Triglycerides 73 0 - 149 mg/dL    HDL 97 65 - 121 mg/dL    LDL Calculated 157 <100 mg/dL   TSH without Reflex   Result Value Ref Range    TSH 3.920 0.270 - 4.200 uIU/mL   Comprehensive Metabolic Panel   Result Value Ref Range    Sodium 144 136 - 145 mmol/L    Potassium 4.7 3.5 - 5.0 mmol/L    Chloride 106 98 - 111 mmol/L    CO2 26 22 - 29 mmol/L    Anion Gap 12 7 - 19 mmol/L    Glucose 95 74 - 109 mg/dL    BUN 13 8 - 23 mg/dL    Creatinine 0.7 0.5 - 0.9 mg/dL    GFR Non-African American >60 >60    GFR African American >59 >59    Calcium 9.5 8.8 - 10.2 mg/dL    Total Protein 6.4 (L) 6.6 - 8.7 g/dL    Albumin 4.5 3.5 - 5.2 g/dL    Total Bilirubin 0.7 0.2 - 1.2 mg/dL    Alkaline Phosphatase 54 35 - 104 U/L    ALT 12 5 - 33 U/L    AST 20 5 - 32 U/L   CBC   Result Value Ref Range    WBC 5.6 4.8 - 10.8 K/uL    RBC 4.70 4.20 - 5.40 M/uL    Hemoglobin 13.9 12.0 - 16.0 g/dL    Hematocrit 44.0 37.0 - 47.0 %    MCV 93.6 81.0 - 99.0 fL    MCH 29.6 27.0 - 31.0 pg    MCHC 31.6 (L) 33.0 - 37.0 g/dL    RDW 13.2 11.5 - 14.5 %    Platelets 466 791 - 483 K/uL    MPV 10.6 9.4 - 12.3 fL       ASSESSMENT/ PLAN:  1. Other osteoporosis, unspecified pathological fracture presence  Continue with Fosamax calcium vitamin D we reviewed her records reviewed her chart    2.  Memory loss  Again we talked her and suggested Aricept which neurology had suggested she does not want to add any medication we talked about ways to keep her mind busy and active and focused we talked about getting out her volunteer activity [employer] activity other things to keep her mind engaged and interactive and we talked about

## 2022-08-18 ENCOUNTER — TELEPHONE (OUTPATIENT)
Dept: HEMATOLOGY | Age: 77
End: 2022-08-18

## 2022-08-18 ENCOUNTER — HOSPITAL ENCOUNTER (INPATIENT)
Age: 77
LOS: 1 days | Discharge: HOME | DRG: 176 | End: 2022-08-19
Attending: INTERNAL MEDICINE | Admitting: INTERNAL MEDICINE
Payer: MEDICARE

## 2022-08-18 ENCOUNTER — APPOINTMENT (OUTPATIENT)
Dept: CT IMAGING | Age: 77
DRG: 176 | End: 2022-08-18
Payer: MEDICARE

## 2022-08-18 ENCOUNTER — APPOINTMENT (OUTPATIENT)
Dept: GENERAL RADIOLOGY | Age: 77
DRG: 176 | End: 2022-08-18
Payer: MEDICARE

## 2022-08-18 ENCOUNTER — TELEPHONE (OUTPATIENT)
Dept: INTERNAL MEDICINE | Age: 77
End: 2022-08-18

## 2022-08-18 DIAGNOSIS — I26.99 BILATERAL PULMONARY EMBOLISM (HCC): Primary | ICD-10-CM

## 2022-08-18 LAB
ALBUMIN SERPL-MCNC: 4.2 G/DL (ref 3.5–5.2)
ALP BLD-CCNC: 64 U/L (ref 35–104)
ALT SERPL-CCNC: 10 U/L (ref 5–33)
ANION GAP SERPL CALCULATED.3IONS-SCNC: 10 MMOL/L (ref 7–19)
AST SERPL-CCNC: 24 U/L (ref 5–32)
BASOPHILS ABSOLUTE: 0 K/UL (ref 0–0.2)
BASOPHILS RELATIVE PERCENT: 0.4 % (ref 0–1)
BILIRUB SERPL-MCNC: 0.4 MG/DL (ref 0.2–1.2)
BUN BLDV-MCNC: 12 MG/DL (ref 8–23)
CALCIUM SERPL-MCNC: 9.7 MG/DL (ref 8.8–10.2)
CHLORIDE BLD-SCNC: 100 MMOL/L (ref 98–111)
CO2: 28 MMOL/L (ref 22–29)
CREAT SERPL-MCNC: 0.6 MG/DL (ref 0.5–0.9)
D DIMER: 1.8 UG/ML FEU (ref 0–0.48)
EOSINOPHILS ABSOLUTE: 0.1 K/UL (ref 0–0.6)
EOSINOPHILS RELATIVE PERCENT: 1.6 % (ref 0–5)
GFR AFRICAN AMERICAN: >59
GFR NON-AFRICAN AMERICAN: >60
GLUCOSE BLD-MCNC: 107 MG/DL (ref 74–109)
HCT VFR BLD CALC: 42 % (ref 37–47)
HEMOGLOBIN: 13.3 G/DL (ref 12–16)
IMMATURE GRANULOCYTES #: 0 K/UL
LIPASE: 58 U/L (ref 13–60)
LV EF: 58 %
LVEF MODALITY: NORMAL
LYMPHOCYTES ABSOLUTE: 2.1 K/UL (ref 1.1–4.5)
LYMPHOCYTES RELATIVE PERCENT: 27.4 % (ref 20–40)
MCH RBC QN AUTO: 29.6 PG (ref 27–31)
MCHC RBC AUTO-ENTMCNC: 31.7 G/DL (ref 33–37)
MCV RBC AUTO: 93.5 FL (ref 81–99)
MONOCYTES ABSOLUTE: 1 K/UL (ref 0–0.9)
MONOCYTES RELATIVE PERCENT: 13.3 % (ref 0–10)
NEUTROPHILS ABSOLUTE: 4.4 K/UL (ref 1.5–7.5)
NEUTROPHILS RELATIVE PERCENT: 56.9 % (ref 50–65)
PDW BLD-RTO: 13.6 % (ref 11.5–14.5)
PLATELET # BLD: 202 K/UL (ref 130–400)
PMV BLD AUTO: 10.3 FL (ref 9.4–12.3)
POTASSIUM REFLEX MAGNESIUM: 4 MMOL/L (ref 3.5–5)
PRO-BNP: 31 PG/ML (ref 0–900)
RBC # BLD: 4.49 M/UL (ref 4.2–5.4)
SARS-COV-2, NAAT: NOT DETECTED
SODIUM BLD-SCNC: 138 MMOL/L (ref 136–145)
TOTAL PROTEIN: 6.9 G/DL (ref 6.6–8.7)
TROPONIN: <0.01 NG/ML (ref 0–0.03)
TSH REFLEX FT4: 3.83 UIU/ML (ref 0.35–5.5)
VITAMIN B-12: 511 PG/ML (ref 211–946)
WBC # BLD: 7.7 K/UL (ref 4.8–10.8)

## 2022-08-18 PROCEDURE — 6370000000 HC RX 637 (ALT 250 FOR IP): Performed by: NURSE PRACTITIONER

## 2022-08-18 PROCEDURE — 71045 X-RAY EXAM CHEST 1 VIEW: CPT | Performed by: RADIOLOGY

## 2022-08-18 PROCEDURE — 82607 VITAMIN B-12: CPT

## 2022-08-18 PROCEDURE — 71045 X-RAY EXAM CHEST 1 VIEW: CPT

## 2022-08-18 PROCEDURE — 1210000000 HC MED SURG R&B

## 2022-08-18 PROCEDURE — 6370000000 HC RX 637 (ALT 250 FOR IP): Performed by: PSYCHIATRY & NEUROLOGY

## 2022-08-18 PROCEDURE — 71275 CT ANGIOGRAPHY CHEST: CPT

## 2022-08-18 PROCEDURE — 87635 SARS-COV-2 COVID-19 AMP PRB: CPT

## 2022-08-18 PROCEDURE — 85025 COMPLETE CBC W/AUTO DIFF WBC: CPT

## 2022-08-18 PROCEDURE — 93306 TTE W/DOPPLER COMPLETE: CPT

## 2022-08-18 PROCEDURE — 93970 EXTREMITY STUDY: CPT

## 2022-08-18 PROCEDURE — 6360000004 HC RX CONTRAST MEDICATION: Performed by: NURSE PRACTITIONER

## 2022-08-18 PROCEDURE — 83690 ASSAY OF LIPASE: CPT

## 2022-08-18 PROCEDURE — 71275 CT ANGIOGRAPHY CHEST: CPT | Performed by: RADIOLOGY

## 2022-08-18 PROCEDURE — 84443 ASSAY THYROID STIM HORMONE: CPT

## 2022-08-18 PROCEDURE — 99223 1ST HOSP IP/OBS HIGH 75: CPT | Performed by: PSYCHIATRY & NEUROLOGY

## 2022-08-18 PROCEDURE — 83880 ASSAY OF NATRIURETIC PEPTIDE: CPT

## 2022-08-18 PROCEDURE — 80053 COMPREHEN METABOLIC PANEL: CPT

## 2022-08-18 PROCEDURE — 93005 ELECTROCARDIOGRAM TRACING: CPT | Performed by: NURSE PRACTITIONER

## 2022-08-18 PROCEDURE — 36415 COLL VENOUS BLD VENIPUNCTURE: CPT

## 2022-08-18 PROCEDURE — 99285 EMERGENCY DEPT VISIT HI MDM: CPT

## 2022-08-18 PROCEDURE — 85379 FIBRIN DEGRADATION QUANT: CPT

## 2022-08-18 PROCEDURE — 6360000002 HC RX W HCPCS: Performed by: NURSE PRACTITIONER

## 2022-08-18 PROCEDURE — 84484 ASSAY OF TROPONIN QUANT: CPT

## 2022-08-18 RX ORDER — ONDANSETRON 4 MG/1
4 TABLET, ORALLY DISINTEGRATING ORAL EVERY 8 HOURS PRN
Status: DISCONTINUED | OUTPATIENT
Start: 2022-08-18 | End: 2022-08-19 | Stop reason: HOSPADM

## 2022-08-18 RX ORDER — ENOXAPARIN SODIUM 100 MG/ML
1 INJECTION SUBCUTANEOUS 2 TIMES DAILY
Status: DISCONTINUED | OUTPATIENT
Start: 2022-08-19 | End: 2022-08-19

## 2022-08-18 RX ORDER — ACETAMINOPHEN 325 MG/1
650 TABLET ORAL EVERY 6 HOURS PRN
Status: DISCONTINUED | OUTPATIENT
Start: 2022-08-18 | End: 2022-08-19 | Stop reason: HOSPADM

## 2022-08-18 RX ORDER — MAGNESIUM HYDROXIDE/ALUMINUM HYDROXICE/SIMETHICONE 120; 1200; 1200 MG/30ML; MG/30ML; MG/30ML
30 SUSPENSION ORAL EVERY 6 HOURS PRN
Status: DISCONTINUED | OUTPATIENT
Start: 2022-08-18 | End: 2022-08-19 | Stop reason: HOSPADM

## 2022-08-18 RX ORDER — ASPIRIN 81 MG/1
324 TABLET, CHEWABLE ORAL ONCE
Status: COMPLETED | OUTPATIENT
Start: 2022-08-18 | End: 2022-08-18

## 2022-08-18 RX ORDER — POLYETHYLENE GLYCOL 3350 17 G/17G
17 POWDER, FOR SOLUTION ORAL DAILY PRN
Status: DISCONTINUED | OUTPATIENT
Start: 2022-08-18 | End: 2022-08-19 | Stop reason: HOSPADM

## 2022-08-18 RX ORDER — GAUZE BANDAGE 2" X 2"
100 BANDAGE TOPICAL DAILY
Status: DISCONTINUED | OUTPATIENT
Start: 2022-08-18 | End: 2022-08-19 | Stop reason: HOSPADM

## 2022-08-18 RX ORDER — SODIUM CHLORIDE 9 MG/ML
INJECTION, SOLUTION INTRAVENOUS PRN
Status: DISCONTINUED | OUTPATIENT
Start: 2022-08-18 | End: 2022-08-19 | Stop reason: HOSPADM

## 2022-08-18 RX ORDER — ONDANSETRON 2 MG/ML
4 INJECTION INTRAMUSCULAR; INTRAVENOUS EVERY 6 HOURS PRN
Status: DISCONTINUED | OUTPATIENT
Start: 2022-08-18 | End: 2022-08-19 | Stop reason: HOSPADM

## 2022-08-18 RX ORDER — ENOXAPARIN SODIUM 100 MG/ML
1 INJECTION SUBCUTANEOUS ONCE
Status: COMPLETED | OUTPATIENT
Start: 2022-08-18 | End: 2022-08-18

## 2022-08-18 RX ORDER — SODIUM CHLORIDE 0.9 % (FLUSH) 0.9 %
5-40 SYRINGE (ML) INJECTION PRN
Status: DISCONTINUED | OUTPATIENT
Start: 2022-08-18 | End: 2022-08-19 | Stop reason: HOSPADM

## 2022-08-18 RX ORDER — ACETAMINOPHEN 650 MG/1
650 SUPPOSITORY RECTAL EVERY 6 HOURS PRN
Status: DISCONTINUED | OUTPATIENT
Start: 2022-08-18 | End: 2022-08-19 | Stop reason: HOSPADM

## 2022-08-18 RX ORDER — VITAMIN B COMPLEX
2000 TABLET ORAL DAILY
Status: DISCONTINUED | OUTPATIENT
Start: 2022-08-18 | End: 2022-08-19 | Stop reason: HOSPADM

## 2022-08-18 RX ORDER — SODIUM CHLORIDE 0.9 % (FLUSH) 0.9 %
5-40 SYRINGE (ML) INJECTION EVERY 12 HOURS SCHEDULED
Status: DISCONTINUED | OUTPATIENT
Start: 2022-08-18 | End: 2022-08-19 | Stop reason: HOSPADM

## 2022-08-18 RX ADMIN — Medication 100 MG: at 18:09

## 2022-08-18 RX ADMIN — ASPIRIN 81 MG 324 MG: 81 TABLET ORAL at 10:36

## 2022-08-18 RX ADMIN — ENOXAPARIN SODIUM 50 MG: 100 INJECTION SUBCUTANEOUS at 12:54

## 2022-08-18 RX ADMIN — IOPAMIDOL 70 ML: 755 INJECTION, SOLUTION INTRAVENOUS at 11:23

## 2022-08-18 ASSESSMENT — LIFESTYLE VARIABLES: HOW OFTEN DO YOU HAVE A DRINK CONTAINING ALCOHOL: 2-4 TIMES A MONTH

## 2022-08-18 ASSESSMENT — ENCOUNTER SYMPTOMS
CHEST TIGHTNESS: 0
SHORTNESS OF BREATH: 0
CONSTIPATION: 0
DIARRHEA: 0
ABDOMINAL PAIN: 0
BACK PAIN: 0
COUGH: 0
ABDOMINAL DISTENTION: 0
VOMITING: 0
COLOR CHANGE: 1
NAUSEA: 0

## 2022-08-18 ASSESSMENT — PAIN - FUNCTIONAL ASSESSMENT: PAIN_FUNCTIONAL_ASSESSMENT: 0-10

## 2022-08-18 ASSESSMENT — HEART SCORE: ECG: 1

## 2022-08-18 ASSESSMENT — PAIN SCALES - GENERAL: PAINLEVEL_OUTOF10: 0

## 2022-08-18 NOTE — CONSULTS
MEDICAL ONCOLOGY CONSULTATION    Pt Name: Joann Bedoya  MRN: [id number]  YOB: 1945  Date of evaluation: 8/18/2022    REASON FOR CONSULTATION: DVT/pulmonary embolism  REQUESTING PHYSICIAN: Hospitalist    History Obtained From:    patient, electronic medical record    HISTORY OF PRESENT ILLNESS:  Diagnosed  Unprovoked DVT/PE    Treatment summary  Lovenox 1 mg/kg twice daily    Gib Raciels was first seen by me on 8/18/2022. I was consulted during patient was positioned [facility] for findings of pulmonary embolism and DVT of the lower extremity. The patient presents to the ER department at [facility] on 8/18/2022 with complaints of chest pain. This was an acute onset chest pain. The patient has a history osteoporosis. She is on Fosamax. She has no significant cardiac history. O2 saturation 98% on room air. Heart rate 95.  8/18/2022-CTA pulmonary with contrast showed pulmonary emboli to the bilateral lower lobes. Patchy groundglass and streaky markings in the posterior lungs likely dependent atelectasis. The lungs are otherwise grossly clear. 8/18/2022-proBNP 31, troponin x2 normal  8/18/2022-patient started on Lovenox 1 mg/kg twice daily. Normal renal function. 8/18/2022-2D echo showed LVEF 55-60%. Mild concentric LVH. Normal right-sided chambers with preserved RV systolic function. 8/18/2022-VL BILATERAL LE small area of nonocclusive chronic appearing DVT in a left Gastroc vein in the calf. No svt, reflux noted at this time.         Past Medical History:    Past Medical History:   Diagnosis Date    Cancer ([address]     basil cell skin cancers removed     Low back pain 2/7/2017    Mixed hyperlipidemia 5/18/2021    Osteoporosis     Osteoporosis with pathological fracture with routine healing 6/23/2017       Past Surgical History:    Past Surgical History:   Procedure Laterality Date    SKIN CANCER EXCISION         Social History:    Marital status: Single  Smoking status: Never smoker  ETOH status: 3 standard drinks per week  Resides: [place], [place]    Family History:   Family History   Problem Relation Age of Onset    Kidney Disease Mother     Cancer Father         Prostate        Current Hospital Medications:    Current Facility-Administered Medications   Medication Dose Route Frequency Provider Last Rate Last Admin    calcium-cholecalciferol 500-200 MG-UNIT per tablet 1 tablet  1 tablet Oral BID Alesia INDERJIT Hartmann - CNP        Vitamin D (CHOLECALCIFEROL) tablet 2,000 Units  2,000 Units Oral Daily Alesia ANDREW HartmannN - CNP        sodium chloride flush 0.9 % injection 5-40 mL  5-40 mL IntraVENous 2 times per day Alesia Mary Kate, APRN - CNP        sodium chloride flush 0.9 % injection 5-40 mL  5-40 mL IntraVENous PRN Alesia Mary Kate, APRN - CNP        0.9 % sodium chloride infusion   IntraVENous PRN Alesia ANDREW HartmannN - GIDEON        ondansetron (ZOFRAN-ODT) disintegrating tablet 4 mg  4 mg Oral Q8H PRN Alesia Hartmann APRN - GIDEON        Or    ondansetron (ZOFRAN) injection 4 mg  4 mg IntraVENous Q6H PRN Alesia Hartmann APRN - CNP        polyethylene glycol (GLYCOLAX) packet 17 g  17 g Oral Daily PRN Alesia Mary Kate APRN - CNP        acetaminophen (TYLENOL) tablet 650 mg  650 mg Oral Q6H PRN Alesia Hartmann APRN - CNP        Or    acetaminophen (TYLENOL) suppository 650 mg  650 mg Rectal Q6H PRN Alesia Mary Kate APRN - CNP        aluminum & magnesium hydroxide-simethicone (MAALOX) 200-200-20 MG/5ML suspension 30 mL  30 mL Oral Q6H PRN INDERJIT Cunningham - CNP        [START ON 8/19/2022] enoxaparin (LOVENOX) injection 50 mg  1 mg/kg SubCUTAneous BID INDERJIT Valentine - GIDEON        thiamine mononitrate tablet 100 mg  100 mg Oral Daily Gene Burkitt, DO           Allergies: No Known Allergies      Subjective   REVIEW OF SYSTEMS:   CONSTITUTIONAL: no fever, no night sweats, fatigue;  HEENT: no blurring of vision, no double vision, no hearing difficulty, no tinnitus, no ulceration, no epistaxis;  LUNGS: no cough, no hemoptysis, no wheeze,   shortness of breath;  CARDIOVASCULAR: no palpitation,  chest pain,  shortness of breath;  GI: no abdominal pain, no nausea, no vomiting, no diarrhea, no constipation;  ANNA: no dysuria, no hematuria, no frequency or urgency, no nephrolithiasis;  MUSCULOSKELETAL: no joint pain, no swelling, no stiffness;  ENDOCRINE: no polyuria, no polydipsia, no cold or heat intolerance;  HEMATOLOGY: no easy bruising or bleeding, no history of clotting disorder;  DERMATOLOGY: no skin rash, no eczema, no pruritus;  PSYCHIATRY: no depression, no anxiety  NEUROLOGY: Cognitive change, no syncope, no seizures, no numbness or tingling of hands, no numbness or tingling of feet, no paresis;    Objective   /86   Pulse 82   Temp 98.4 °F (36.9 °C) (Oral)   Resp 18   Ht 5' 4\" (1.626 m)   Wt 115 lb (52.2 kg)   SpO2 94%   BMI 19.74 kg/m²     PHYSICAL EXAM:  CONSTITUTIONAL: Alert, appropriate, no acute distress  EYES: Non icteric, EOM intact, pupils equal round   ENT: Mucus membranes moist,external inspection of ears and nose are normal  NECK: Supple, no masses. No palpable thyroid mass  CHEST/LUNGS: CTA bilaterally, normal respiratory effort   CARDIOVASCULAR: RRR, no murmurs. No lower extremity edema  ABDOMEN: soft non-tender, active bowel sounds, no HSM. No palpable masses  EXTREMITIES: warm, full ROM in all 4 extremities, no focal weakness. SKIN: warm, dry with no rashes or lesions  LYMPH: No cervical, clavicular, axillary, or inguinal lymphadenopathy  NEUROLOGIC: follows commands, non focal   PSYCH: mood and affect appropriate.  Alert and oriented to time, place, person        LABORATORY RESULTS REVIEWED/ANALYZED BY ME:  Recent Labs     08/18/22  1034   WBC 7.7   HGB 13.3   HCT 42.0   MCV 93.5          Lab Results   Component Value Date     08/18/2022    K 4.0 08/18/2022     08/18/2022    CO2 28 08/18/2022 BUN 12 08/18/2022    CREATININE 0.6 08/18/2022    GLUCOSE 107 08/18/2022    CALCIUM 9.7 08/18/2022    PROT 6.9 08/18/2022    LABALBU 4.2 08/18/2022    BILITOT 0.4 08/18/2022    ALKPHOS 64 08/18/2022    AST 24 08/18/2022    ALT 10 08/18/2022    LABGLOM >60 08/18/2022    GFRAA >59 08/18/2022       RADIOLOGY STUDIES REPORT/REVIEWED AND INTERPRETED BY ME:  DEXA BONE DENSITY 2 SITES    Result Date: 7/21/2022  Osteoporosis. The bone mineral density is more than 2.5 standard deviations below the mean for a young adult female. Fracture risk is high. Signed by Dr Vaishnavi Milian    XR CHEST PORTABLE    Result Date: 8/18/2022  No acute pathology. Recommendation: Follow up as clinically indicated. Electronically Signed by Antonia Vázquez MD at 18-Aug-2022 12:38:15 PM             CTA PULMONARY W CONTRAST    Result Date: 8/18/2022  1. Pulmonary emboli to the bilateral lower lobes. 2. Patchy ground-glass and streaky markings in the posterior lungs, likely dependent atelectasis, with a few scattered blebs bilaterally. 3. The lungs are otherwise grossly clear. Recommendation: Clinical findings; follow-up is recommended. All CT scans at this facility utilize dose modulation, iterative reconstruction, and/or weight based dosing when appropriate to reduce radiation dose to as low as reasonably achievable. Amended by Mera Dave MD at 18-Aug-2022 02:00:46 PM Electronically Signed by Mera Dave MD at 18-Aug-2022 12:55:46 PM                ASSESSMENT:  Unprovoked pulmonary embolism/left lower extremity chronic DVT, August 2022  -No clear risk factors  -Bilateral pulmonary embolism  -Small chronic DVT below knee on the left  -Normal troponin, normal proBNP  -PESI score: Class II, low risk      Cognitive change that neurology assessment. Brain MRI tomorrow. PLAN:  Okay to change to Eliquis or Xarelto  Likely okay to be discharged tomorrow if the patient is a stable.     I have seen, examined and reviewed this patient medication list, appropriate labs and imaging studies. I reviewed relevant medical records and others physicians notes. I discussed the plans of care with the patient. I answered all the questions to the patients satisfaction. I have also reviewed the chief complaint (CC) and part of the history (History of Present Illness (HPI), Past Family Social History [facility]), or Review of Systems (ROS) and made changes when appropriated.        (Please note that portions of this note were completed with a voice recognition program. Efforts were made to edit the dictations but occasionally words are mis-transcribed.)        Katy Saul MD    08/18/22  4:38 PM

## 2022-08-18 NOTE — TELEPHONE ENCOUNTER
[facility]  Bed: [address] [facility]  1945    INDERJIT Chang  [facility]    [phone]    Dx: bilateral pulmonary embolism

## 2022-08-18 NOTE — CONSULTS
[facility] Neurology Consult      Patient:   Rey Kussmaul  MR#:    [id number]  Account Number:                   [id number]      Room:    [address] [place] [address]   YOB: 1945  Date of Progress Note: 8/18/2022  Time of Note                           4:12 PM  Attending Physician:  Brooks Sampson MD  Consulting Physician:  Shanda Sarmiento DO       CHIEF COMPLAINT:  Confusion     HISTORY OF PRESENT ILLNESS:   This is a 78 y.o. female who was admitted with bilateral pulmonary embolisms and DVT. She presented to the emergency department earlier with chest pain. Subsequently found to have bilateral PEs. I have been principally consulted to evaluate the patient's cognitive decline. She states that she is noted more memory difficulty which has been gradually worsening over the past few years. She endorses short-term memory loss. She denies hallucinations. No overt behavioral changes. She has no prior stroke history. She does have a family history of dementia. She denies tremor. She denies significant gait changes though she did suffer a fall recently. She does live at home alone. She is still able to do the majority of her activities of daily living but is noting more difficulty. REVIEW OF SYSTEMS:  Constitutional - No fever or chills. HENT -  No Scalp tenderness. No tinnitus or significant hearing loss. No nose bleeding, no sore throat. Eyes - No sudden vision change or eye pain  Respiratory - No significant shortness of breath   Cardiovascular - Chest pain. Gastrointestinal - No abdominal swelling or pain. Genitourinary - No difficulty urinating, dysuria  Musculoskeletal - No back pain or myalgia. Skin - No color change or rash  Neurologic - No seizures. No lateralizing weakness. No numbness. Hematologic - No easy bruising or spontaneous bleeding. Psychiatric - No anxiety.      PAST MEDICAL HISTORY:      Diagnosis Date    Cancer ([facility] [address]     basil cell skin cancers removed     Low back pain 2/7/2017    Mixed hyperlipidemia 5/18/2021    Osteoporosis     Osteoporosis with pathological fracture with routine healing 6/23/2017       PAST SURGICAL HISTORY:      Procedure Laterality Date    SKIN CANCER EXCISION         SOCIAL HISTORY:   TOBACCO:   reports that she has never smoked. She has never used smokeless tobacco.  ETOH:   reports current alcohol use of about 3.0 standard drinks per week.   DRUG:    Social History     Substance and Sexual Activity   Drug Use No       FAMILY HISTORY:       Problem Relation Age of Onset    Kidney Disease Mother     Cancer Father         Prostate        MEDICATIONS:      Current Facility-Administered Medications:     calcium-cholecalciferol 500-200 MG-UNIT per tablet 1 tablet, 1 tablet, Oral, BID, INDERJIT Valentine - CNP    Vitamin D (CHOLECALCIFEROL) tablet 2,000 Units, 2,000 Units, Oral, Daily, INDERJIT Valentine - CNP    sodium chloride flush 0.9 % injection 5-40 mL, 5-40 mL, IntraVENous, 2 times per day, INDERJIT Reynolds - CNP    sodium chloride flush 0.9 % injection 5-40 mL, 5-40 mL, IntraVENous, PRN, Yara Saunders APRN - CNP    0.9 % sodium chloride infusion, , IntraVENous, PRN, INDERJIT Reynolds - CNP    ondansetron (ZOFRAN-ODT) disintegrating tablet 4 mg, 4 mg, Oral, Q8H PRN **OR** ondansetron (ZOFRAN) injection 4 mg, 4 mg, IntraVENous, Q6H PRN, Maritza Earl APRN - CNP    polyethylene glycol (GLYCOLAX) packet 17 g, 17 g, Oral, Daily PRN, INDERJIT Reynolds - CNP    acetaminophen (TYLENOL) tablet 650 mg, 650 mg, Oral, Q6H PRN **OR** acetaminophen (TYLENOL) suppository 650 mg, 650 mg, Rectal, Q6H PRN, INDERJIT Reynolds - CNP    aluminum & magnesium hydroxide-simethicone (MAALOX) 200-200-20 MG/5ML suspension 30 mL, 30 mL, Oral, Q6H PRN, INDERJIT Reynolds - CNP    [START ON 8/19/2022] enoxaparin (LOVENOX) injection 50 mg, 1 mg/kg, SubCUTAneous, BID, INDERJIT Reynolds - CNP    ALLERGIES:    Patient has no known allergies. PHYSICAL EXAM:    Constitutional -   /86   Pulse 82   Temp 98.4 °F (36.9 °C) (Oral)   Resp 18   Ht 5' 4\" (1.626 m)   Wt 115 lb (52.2 kg)   SpO2 94%   BMI 19.74 kg/m²   General appearance: No acute distress   EYES -   Conjunctiva normal  Pupillary exam as below, see CN exam in the neurologic exam  ENT-    No scars, masses, or lesions over external nose or ears  Oropharynx without erythema, palate midline  Cardiovascular -   No clubbing, cyanosis, or edema   Pulmonary-   Good expansion, normal effort without use of accessory muscles  Musculoskeletal -   No significant wasting of muscles noted  Gait as below, see gait exam in the neurologic exam  Muscle strength, tone, stability as below see the motor exam in the neurologic exam.   No bony deformities  Skin -   Warm, dry, and intact to inspection and palpation. No rash, erythema, or pallor  Psychiatric -   Mood, affect, and behavior appear normal    Memory as below see mental status examination in the neurologic exam      NEUROLOGICAL EXAM    Mental status   [x] Awake, alert, oriented   [x] Affect attention and concentration appear appropriate  [] Recent and remote memory appears unremarkable  [x] Speech normal without dysarthria or aphasia, comprehension and repetition intact.    COMMENTS: Cognitive loss    Cranial Nerves [x] No VF deficit to confrontation  [x] PERRLA, EOMI, no nystagmus, conjugate eye movements, no ptosis  [x] Face symmetric  [x] Facial sensation intact  [x] Tongue midline no atrophy or fasciculations present  [x] Palate midline, hearing to finger rub normal  [x] Shoulder shrug and SCM testing normal  COMMENTS:   Motor   [x] 5/5 strength x 4 extremities  [x] Normal bulk and tone  [x] No tremor present  [x] No rigidity or bradykinesia noted  COMMENTS:   Sensory  [x] Sensation intact to light touch, pin prick, vibration, and proprioception BLE  [] Sensation intact to light touch, pin prick, vibration, and proprioception BUE  COMMENTS:   Coordination [x] FTN normal bilaterally   [] HTS normal bilaterally  [] POONAM normal.   COMMENTS:   Reflexes  [x] Symmetric and non-pathological  [x] Toes downgoing bilaterally  [x] No clonus present  COMMENTS:   Gait                  [x] Normal steady gait    [] Ataxic    [] Spastic     [] Magnetic     [] Shuffling  [] Not assessed  COMMENTS:       LABS/IMAGING:    As below and per HPI    XR CHEST PORTABLE    Result Date: 8/18/2022  NO PRIOR REPORT AVAILABLE Exam: X-RAY OF [facility] Clinical data:Chest pain. Technique:Single view of the chest. Prior studies: No prior studies submitted. Findings: The lungs are grossly clear; noevidence of acute infiltrate or pleural effusion. Cardiac silhouette is within normal limits. No acute osseous abnormality is detected. No acute pathology. Recommendation: Follow up as clinically indicated. Electronically Signed by Danny Chambers MD at 18-Aug-2022 12:38:15 PM             CTA PULMONARY W CONTRAST    Result Date: 8/18/2022  <Addendum Signed by Marlena Tamayo MD at 18-Aug-2022 02:00:46 PM [id number] YE ROSE>>Findings were communicated to Kenroy Guaman  at 1:57pm on 8/18/22 who confirms having received the report and the referring clinician  is aware of the critical findings. No further action is required by the reading radiologist. If the referring clinician  as any further questions, please call [employer] service [phone], option 1. Critical Documentation Completed. RM Addendum End> Exam: CTA OF THE CHEST WITH CONTRAST Clinical data: Sharp chest pain with elevated D-dimer. Technique: Axial CT angiography images through the lungs were acquired with contrast and imaged using soft tissue and lung algorithms. Coronal, sagittal, and 3D volume reconstructions were performed. Reformatted/3D-MPR images ed. Radiation dose: CTDIvol =28.65 mGy, DLP =370 mGy x cm. Prior studies: Radiographs of the chest dated 08/18/2022.  Findings: Lungs: Patchy ground-glass and minor streaky markings in the posterior lungs, likely dependent atelectasis. A few scattered blebs are also present. The lungs are otherwise clear with no focal pulmonary consolidation or pleural effusion. No evidence of pneumothorax. Soft Tissues: No mediastinal, axillary or hilar adenopathy. Vascular: Small filling defects in pulmonary arteries to the lower lobes bilaterally, with bilateral pulmonary emboli. Unremarkable aorta. Grossly unremarkable sized heart. No additional abnormality seen on 3D reformatted images. Bony structures: Anterior wedging in the T12 and L1 vertebral bodies with 80% loss of height, likely remote. Mild degenerative disc disease. Sclerotic focus in the right T3 vertebrae, likely a bone island. Upper Abdomen: Limited visualization of the solid upper abdominal organs is grossly unremarkable. 1. Pulmonary emboli to the bilateral lower lobes. 2. Patchy ground-glass and streaky markings in the posterior lungs, likely dependent atelectasis, with a few scattered blebs bilaterally. 3. The lungs are otherwise grossly clear. Recommendation: Clinical findings; follow-up is recommended. All CT scans at this facility utilize dose modulation, iterative reconstruction, and/or weight based dosing when appropriate to reduce radiation dose to as low as reasonably achievable.  Amended by Mera Dave MD at 18-Aug-2022 02:00:46 PM Electronically Signed by Mera Dave MD at 18-Aug-2022 12:55:46 PM             ECHO 2D WO COLOR DOPPLER COMPLETE    Result Date: 8/18/2022  Transthoracic Echocardiography Report (TTE)  Demographics   Patient Name Esvin Chino  Date of Study        08/18/2022               E   MRN          [id number]          Gender               Female   Date of      1945      Room Number          [place] [address]  Birth   Age          78 year(s)   Height:      64 inches       Referring Physician  Jordan JANSEN   Weight:      115 pounds      Sonographer Mahsa Barrera, [facility]   BSA:         1.55 m^2        Interpreting         Michel Booth MD                               Physician   BMI:         19.74 kg/m^2  Procedure Type of Study   TTE procedure:ECHOCARDIOGRAM COMPLETE 2D WO COLOR DOPPLER. Study Location: Echo Lab Technical Quality: Adequate visualization Patient Status: Inpatient HR: 81 bpm BP: 133/86 mmHg Indications:Pulmonary embolus and Chest pain. 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  Trace to mild tricuspid regurgitation with RVSP estimate 21 mmHg  IVC dimension and inspiratory motion are normal consistent with normal  right atrial filling pressures  Aortic root, ascending segment, and large demonstrated no significant  abnormalities or enlargement  No significant pericardial effusion   Signature   ----------------------------------------------------------------  Electronically signed by Michel Booth MD(Interpreting physician)  on 08/18/2022 03:54 PM  ----------------------------------------------------------------  2D Measurements and Calculations(cm)   LVIDd: 4.11 cm                      LVIDs: 3.58 cm  IVSd: 1.05 cm  LVPWd: 1.01 cm                      AO Root Dimension: 1.8 cm  Rt. Vent.  Dimension: 2.29 cm        LA Dimension: 2.6 cm                                      LA Area: 8.64 cm^2  LA Volume: 19.6 ml                  LV Systolic dimension: 3.38KP  LA Volume Index: 13 ml/m^2          LV PW diastolic: 1.01cm  LV dimension: 4.11 cm               LVOT diameter: 2 cm                                      RV Diastolic dimension: 2.73VS  LVEDV: 89.2 ml                      LVEDVI: 58 ml/m^2  LVESV:44 ml                         LVESVI: 28 ml/m^2  Cardic Output (CO): 4.02l/min  Ascending Aorta: 2.7 cm  Doppler Measurements and Calculations                                            MV Peak E-Wave: 70.7 cm/s                                           MV Peak A-Wave: 69.4 cm/s  TR Velocity:214 cm/s                     MV E/A Ratio: 1.02 %  TR Gradient:18.32 mmHg                   MV Mean velocity: NaNcm/s  Estimated RAP:3 mmHg                     MV Peak Gradient: 2 mmHg  RVSP:21 mmHg   MV E' septal velocity: 4.03cm/s  MV E' lateral velocity:6.42 cm/s        Recent Labs     08/18/22  1034   WBC 7.7   HGB 13.3        Recent Labs     08/18/22  1034      K 4.0      CO2 28   BUN 12   CREATININE 0.6   GLUCOSE 107     Recent Labs     08/18/22  1034   AST 24   ALT 10   BILITOT 0.4   ALKPHOS 64       ASSESSMENT:  78 y.o. admitted with bilateral PEs and chest pain, fall, cognitive loss. Given her clinical history and gradual progression of her cognitive loss I do suspect underlying dementia. Plan further workup. PLAN:  MRI Brain  Additional labs  Will plan formal cognitive /  neuropsych testing as outpatient  Add memory medication as outpatient once current more acute medical issues resolved. Continue addressing medical issues, PE/DVT on Lovenox   Limit sedating medications   Supplement thiamine     Please feel free to call with any questions. [phone] (cell phone).     Rohit Warner DO  Board Certified Neurology

## 2022-08-18 NOTE — CARE COORDINATION
08/18/22 1306   Service Assessment   Patient Orientation Alert and Oriented   Cognition Alert   History Provided By Patient   Primary Caregiver Self   Support Systems Friends/Neighbors  Laurel Love)   [facility] [facility] is: Legal Next of Cori Turcios   PCP Verified by CM Yes   Last Visit to PCP Within last 6 months   Prior Functional Level Independent in ADLs/IADLs   Current Functional Level Independent in ADLs/IADLs   Can patient return to prior living arrangement Yes   Ability to make needs known: Good   Family able to assist with home care needs: Other (comment)  (Friend)   Would you like for me to discuss the discharge plan with any other family members/significant others, and if so, who? No   Financial Resources   (denied needs)   [employer]   (denied needs)   CM/SW Referral   (denied needs)   Social/Functional History   Lives With Friend(s); Alone  Laurel Corona   Type of [address] [facility] One level   Home Access   (Home is accessible)   Bathroom Shower/Tub None   Bathroom Toilet Standard   Bathroom Equipment   (denied needs)   [address]   (denied needs)   Receives Help From Friend(s)  Laurel Corona   [address] Responsibilities Yes   Ambulation Assistance Independent   Transfer Assistance Independent   Active  Yes   Mode of Transportation Car   Occupation Retired   Discharge Planning   Type of [address] [place] [address] Prior To Admission None  (denied needs)   Potential Assistance Needed N/A  (denied needs)   DME Ordered?  No   Potential Assistance Purchasing Medications No   Type of Home Care Services None   Patient expects to be discharged to: House   One/Two Story Residence One story   Services At/After Discharge   Transition of Care Consult (CM Consult)   (denied needs)   Services At/After Discharge   (denied needs)

## 2022-08-18 NOTE — CARE COORDINATION
Patient Contact Information:    Abdoul [facility] [id number] (home)   Telephone Information:   Mobile [phone]     Above information verified? [x]   Yes  []   No      Emergency Contacts:    Extended Emergency Contact Information  Primary Emergency Contact: Asia Roberts [address] Phone: [phone]  Mobile Phone: [phone]  Relation: Other  Secondary Emergency Contact: Emma Mac  Home Phone: [phone]  Mobile Phone: [phone]  Relation: Other      Have you been vaccinated for COVID-19 (SARS-CoV-2)? [x]   Yes  []   No                   If so, when?     Which :         []   Pfizer-BioNTech  []   Moderna  []   Aneta Reusing  []   Other:         Pharmacy:    [facility], [address] [facility], [address]  [address]  Phone: [phone] Fax: [phone]    [address] [place], [place] [phone] [facility] [phone]  [address] [place] [address]  Phone: [phone] Fax: [phone]          Patient Deficits:    []   Yes   [x]   No    If yes:    []   Confusion/Memory  []   Visual  []   Motor/Sensory         []   Right arm         []   Right leg         []   Left arm         []   Left leg  []   Language/Speech         []   Aphasia         []   Dysarthria         []   Swallow         [place] Coma Scale  Eye Opening: Spontaneous  Best Verbal Response: Oriented  Best Motor Response: Obeys commands  [place] Coma Scale Score: 15    Patient Deficit Notes:

## 2022-08-18 NOTE — H&P
[facility] Hospitalists      Hospitalist - History & Physical      PCP: Toby Biggs MD    Date of Admission: 8/18/2022    Date of Service: 8/18/2022    Chief Complaint:  chest pain    History Of Present Illness: The patient is a 78 y.o. female with past medical history of basal cell carcinoma, hyperlipidemia, severe osteoporosis, and memory loss who presented to [facility] ED complaining of chest pain. Patient is accompanied by her friend Latasha Contrerasford. When asked why the patient come to the hospital today she could not tell me. The patient tells me she has been having significant issues with her memory and cannot remember why she come to the hospital today but cannot tell me she drove herself here today. Her friend at bedside, Latasha Azevedo, indicates patient was having chest pain and come in for ration. Patient does not remember episode of chest pain. Patient denies any shortness of breath or cough. Patient reports bruised her left thigh/hip area but does not remember falling. Patient does report recent travel to [facility]. ED work-up indicated an elevated D-dimer of 1.80 and patient underwent CTA of chest.  CTA of chest indicated pulmonary emboli to the bilateral lower lobes, patchy groundglass and streaky markings in the posterior lungs likely dependent atelectasis. Patient was started on Lovenox 1 mg/kg and admitted to the hospitalist service for bilateral PE. Bilateral venous duplex ordered which indicated a small area of nonocclusive chronic appearing DVT in the left gastroc vein. Hematology consulted for recommendations on anticoagulation. CT head as patient has had an assumed unwitnessed fall and experiencing memory issues. Neurology consult for memory loss as well.     Past Medical History:        Diagnosis Date    Cancer ([facility] [address]     basil cell skin cancers removed     Low back pain 2/7/2017    Mixed hyperlipidemia 5/18/2021    Osteoporosis     Osteoporosis with pathological fracture with routine healing 6/23/2017 Past Surgical History:        Procedure Laterality Date    SKIN CANCER EXCISION         Home Medications:  Prior to Admission medications    Medication Sig Start Date End Date Taking? Authorizing Provider   alendronate (FOSAMAX) 70 MG tablet TAKE 1 TABLET BY MOUTH EVERY 7 DAYS. 11/19/21   Megan Rodríguez MD   vitamin E (GNP VITAMIN E) 450 MG (1000 UT) CAPS capsule Take 1,000 Units by mouth 2 times daily    Historical Provider, MD   zoledronic acid (RECLAST) 5 MG/100ML SOLN Infuse 100 mLs intravenously once for 1 dose 7/12/21 8/4/21  Megan Rodríguez MD   Cholecalciferol (VITAMIN D3) 50 MCG (2000 UT) TABS Take 1 tablet by mouth daily 10/16/20   Megan Rodríguez MD   Calcium 600-200 MG-UNIT TABS Take 1 tablet by mouth 2 times daily 10/16/20   Megan Rodríguez MD   Teriparatide, Recombinant, (FORTEO) 600 MCG/2.4ML SOPN injection Inject 0.08 mLs into the skin daily 7/7/20 7/15/20  Megan Rodríguez MD   denosumab (PROLIA) 60 MG/ML SOSY SC injection Inject 1 mL into the skin once for 1 dose 10/18/19 1/28/20  Megan Rodríguez MD       Allergies:    Patient has no known allergies. Social History:    The patient currently lives at home. Patient reports she has never been  and does not have any children. Patient reports she has excellent support system with her friends. Patient reports despite memory issues she still drives. Tobacco:   reports that she has never smoked. She has never used smokeless tobacco.  Alcohol:   reports current alcohol use of about 3.0 standard drinks per week. Illicit Drugs: Never    Family History:      Problem Relation Age of Onset    Kidney Disease Mother     Cancer Father         Prostate        Review of Systems:   Review of Systems   Constitutional:  Negative for chills and fever. Respiratory:  Negative for chest tightness and shortness of breath. Cardiovascular:  Positive for chest pain. Negative for palpitations and leg swelling.         Patient's main complaint upon arrival to the ED was chest pain, does not complain of chest pain at this time   Gastrointestinal:  Negative for abdominal distention, abdominal pain, constipation, diarrhea, nausea and vomiting. Genitourinary:  Negative for difficulty urinating, frequency and urgency. Musculoskeletal:  Negative for arthralgias and myalgias. Skin:  Positive for color change (bruising on left leg). Neurological:  Negative for dizziness, weakness and headaches. Psychiatric/Behavioral:  Positive for confusion. 14 point review of systems is negative except as specifically addressed above. Physical Examination:  BP (!) 163/87   Pulse 77   Temp 98.3 °F (36.8 °C)   Resp 16   Ht 5' 4\" (1.626 m)   Wt 115 lb (52.2 kg)   SpO2 94%   BMI 19.74 kg/m²   Physical Exam  Vitals reviewed. Constitutional:       Appearance: She is not ill-appearing. Eyes:      Pupils: Pupils are equal, round, and reactive to light. Cardiovascular:      Rate and Rhythm: Normal rate and regular rhythm. Pulses: Normal pulses. Heart sounds: Normal heart sounds. Pulmonary:      Effort: Pulmonary effort is normal.      Breath sounds: Normal breath sounds. Abdominal:      General: Abdomen is flat. Bowel sounds are normal. There is no distension. Palpations: Abdomen is soft. Tenderness: There is no abdominal tenderness. There is no guarding. Musculoskeletal:         General: Normal range of motion. Right lower leg: No edema. Left lower leg: No edema. Skin:     General: Skin is warm and dry. Capillary Refill: Capillary refill takes less than 2 seconds. Findings: Bruising present. Neurological:      Mental Status: She is alert and oriented to person, place, and time. Comments: Patient is oriented to person, place, and time patient is a disoriented to situation at current. Patient indicates she has had ongoing memory issues and does not even remember why she come to the ED today.         Diagnostic Data:  CBC:  Recent Labs     08/18/22  1034   WBC 7.7   HGB 13.3   HCT 42.0        BMP:  Recent Labs     08/18/22  1034      K 4.0      CO2 28   BUN 12   CREATININE 0.6   CALCIUM 9.7     Recent Labs     08/18/22  1034   AST 24   ALT 10   BILITOT 0.4   ALKPHOS 64     Coag Panel: No results for input(s): INR, PROTIME, APTT in the last 72 hours. Cardiac Enzymes:   Recent Labs     08/18/22  1034   TROPONINI <0.01     ABGs:No results found for: PHART, PO2ART, AYH6OHV  Urinalysis:  Lab Results   Component Value Date/Time    BLOODU TRACE 10/01/2016 08:18 AM    SPECGRAV 1.030 10/01/2016 08:18 AM    GLUCOSEU NEG 10/01/2016 08:18 AM     A1C: No results for input(s): LABA1C in the last 72 hours. ABG:No results for input(s): PHART, WZF4VOM, PO2ART, NVQ9XRC, BEART, HGBAE, L3USNNQF, CARBOXHGBART in the last 72 hours. XR CHEST PORTABLE    Result Date: 8/18/2022  NO PRIOR REPORT AVAILABLE Exam: X-RAY OF [facility] Clinical data:Chest pain. Technique:Single view of the chest. Prior studies: No prior studies submitted. Findings: The lungs are grossly clear; noevidence of acute infiltrate or pleural effusion. Cardiac silhouette is within normal limits. No acute osseous abnormality is detected. No acute pathology. Recommendation: Follow up as clinically indicated. Electronically Signed by Wander Terry MD at 18-Aug-2022 12:38:15 PM             CTA PULMONARY W CONTRAST    Result Date: 8/18/2022  Exam: CTA OF THE CHEST WITH CONTRAST Clinical data: Sharp chest pain with elevated D-dimer. Technique: Axial CT angiography images through the lungs were acquired with contrast and imaged using soft tissue and lung algorithms. Coronal, sagittal, and 3D volume reconstructions were performed. Reformatted/3D-MPR images ed. Radiation dose: CTDIvol =28.65 mGy, DLP =370 mGy x cm. Prior studies: Radiographs of the chest dated 08/18/2022.  Findings: Lungs: Patchy ground-glass and minor streaky markings in the posterior lungs, likely dependent atelectasis. A few scattered blebs are also present. The lungs are otherwise clear with no focal pulmonary consolidation or pleural effusion. No evidence of pneumothorax. Soft Tissues: No mediastinal, axillary or hilar adenopathy. Vascular: Small filling defects in pulmonary arteries to the lower lobes bilaterally, with bilateral pulmonary emboli. Unremarkable aorta. Grossly unremarkable sized heart. No additional abnormality seen on 3D reformatted images. Bony structures: Anterior wedging in the T12 and L1 vertebral bodies with 80% loss of height, likely remote. Mild degenerative disc disease. Sclerotic focus in the right T3 vertebrae, likely a bone island. Upper Abdomen: Limited visualization of the solid upper abdominal organs is grossly unremarkable. 1. Pulmonary emboli to the bilateral lower lobes. 2. Patchy ground-glass and streaky markings in the posterior lungs, likely dependent atelectasis, with a few scattered blebs bilaterally. 3. The lungs are otherwise grossly clear. Recommendation: Clinical findings; follow-up is recommended. All CT scans at this facility utilize dose modulation, iterative reconstruction, and/or weight based dosing when appropriate to reduce radiation dose to as low as reasonably achievable. Electronically Signed by Christal Richter MD at 18-Aug-2022 12:55:46 PM               Assessment/Plan:  Principal Problem:    Pulmonary embolism, bilateral ([address]   -admit   -Lovenox 1mg/kg   -Bilateral lower extremity venous Doppler   -Hematology consult for recommendation on anticoagulation   -Echo    Active Problems:    Osteoporosis   -fall precautions   -bed alarm    Memory loss / unwitnessed fall   -SLP cognitive eval   -Neurology consult   -Neuro checks   -CT head    Resolved Problems:    * No resolved hospital problems.  *       Signed:  INDERJIT Jarquin - CNP, 8/18/2022 12:27 PM

## 2022-08-18 NOTE — PROGRESS NOTES
Vascular Lab Prelim  Duplex venous scan of B/L LE shows a small area of nonocclusive chronic appearing DVT in a left Gastroc vein in the calf. No svt, reflux noted at this time. Final report pending.

## 2022-08-18 NOTE — ED PROVIDER NOTES
[facility] EMERGENCY DEPT  EMERGENCY DEPARTMENT ENCOUNTER      Pt Name: Jonathan Bassett  MRN: [id number]  [place] 1945  Date of evaluation: 8/18/2022  Provider: Mendel Moynahan, APRN - [address] [id number]       Chief Complaint   Patient presents with    Chest Pain     Onset 15 min - sharp right sided CP that migrated to the left chest         HISTORY OF PRESENT ILLNESS   (Location/Symptom, Timing/Onset, Context/Setting, Quality, Duration, Modifying Factors, Severity)  Note limiting factors. Jonathan Bassett is a 78 y.o. female who presents to the emergency department with chest pain. She states about 15 minutes prior to arrival noticing some right sided chest pain. She was at home when it started and was not exerting herself in any way. It quickly moved to left chest where it stayed. It feels kind of sharp/stabbing. It is reproducible with deep breath occasionally. She is not nauseated. No shortness of breath. No similar previous. She denies HTN, DM. She does have HLP that her PCP follows and only home medication is fosamax. Denies recent stress or echo. HPI    Nursing Notes were reviewed. REVIEW OF SYSTEMS    (2-9 systems for level 4, 10 or more for level 5)     Review of Systems   Constitutional:  Negative for chills and fever. HENT:  Negative for congestion. Respiratory:  Negative for cough and shortness of breath. Cardiovascular:  Positive for chest pain. Negative for palpitations and leg swelling. Gastrointestinal:  Negative for abdominal pain, diarrhea, nausea and vomiting. Genitourinary:  Negative for dysuria, flank pain, frequency and urgency. Musculoskeletal:  Negative for back pain and neck pain. Neurological:  Negative for dizziness, syncope, weakness, light-headedness and headaches. Except as noted above the remainder of the review of systems was reviewed and negative.        PAST MEDICAL HISTORY     Past Medical History:   Diagnosis Date    Cancer ([facility] [address]     basil cell skin cancers removed     Low back pain 2/7/2017    Mixed hyperlipidemia 5/18/2021    Osteoporosis     Osteoporosis with pathological fracture with routine healing 6/23/2017         SURGICAL HISTORY       Past Surgical History:   Procedure Laterality Date    SKIN CANCER EXCISION           CURRENT MEDICATIONS       Previous Medications    ALENDRONATE (FOSAMAX) 70 MG TABLET    TAKE 1 TABLET BY MOUTH EVERY 7 DAYS. CALCIUM 600-200 MG-UNIT TABS    Take 1 tablet by mouth 2 times daily    CHOLECALCIFEROL (VITAMIN D3) 50 MCG (2000 UT) TABS    Take 1 tablet by mouth daily    VITAMIN E (GNP VITAMIN E) 450 MG (1000 UT) CAPS CAPSULE    Take 1,000 Units by mouth 2 times daily       ALLERGIES     Patient has no known allergies. FAMILY HISTORY       Family History   Problem Relation Age of Onset    Kidney Disease Mother     Cancer Father         Prostate           SOCIAL HISTORY       Social History     Socioeconomic History    Marital status: Single     Spouse name: None    Number of children: None    Years of education: None    Highest education level: None   Tobacco Use    Smoking status: Never    Smokeless tobacco: Never   Substance and Sexual Activity    Alcohol use:  Yes     Alcohol/week: 3.0 standard drinks     Types: 3 Glasses of wine per week    Drug use: No     Social Determinants of Health     Financial Resource Strain: Low Risk     Difficulty of Paying Living Expenses: Not very hard   Food Insecurity: No Food Insecurity    Worried About Running Out of Food in the Last Year: Never true    Ran Out of Food in the Last Year: Never true   Physical Activity: Insufficiently Active    Days of Exercise per Week: 2 days    Minutes of Exercise per Session: 30 min       SCREENINGS         Kendall Coma Scale  Eye Opening: Spontaneous  Best Verbal Response: Oriented  Best Motor Response: Obeys commands  Kendall Coma Scale Score: 15     Heart Score for chest pain patients  History: Slightly Suspicious  ECG: Non-Specifc repolarization disturbance/LBTB/PM  Patient Age: > 72 years  *Risk factors for Atherosclerotic disease: Hypercholesterolemia  Risk Factors: 1 or 2 risk factors  Troponin: < 1X normal limit  Heart Score Total: 4               CIWA Assessment  BP: (!) 163/87  Heart Rate: 77                 PHYSICAL EXAM    (up to 7 for level 4, 8 or more for level 5)     ED Triage Vitals [08/18/22 1012]   BP Temp Temp src Heart Rate Resp SpO2 Height Weight   (!) 173/80 98.3 °F (36.8 °C) -- 95 16 98 % 5' 4\" (1.626 m) 115 lb (52.2 kg)       Physical Exam  Vitals reviewed. Constitutional:       General: She is not in acute distress. Appearance: Normal appearance. She is not ill-appearing, toxic-appearing or diaphoretic. HENT:      Head: Normocephalic and atraumatic. Right Ear: Tympanic membrane, ear canal and external ear normal.      Left Ear: Tympanic membrane, ear canal and external ear normal.      Nose: Nose normal.      Mouth/Throat:      Mouth: Mucous membranes are moist.      Pharynx: Oropharynx is clear. Eyes:      Extraocular Movements: Extraocular movements intact. Conjunctiva/sclera: Conjunctivae normal.      Pupils: Pupils are equal, round, and reactive to light. Cardiovascular:      Rate and Rhythm: Normal rate and regular rhythm. Pulses: Normal pulses. Heart sounds: Normal heart sounds. Pulmonary:      Effort: Pulmonary effort is normal.      Breath sounds: Normal breath sounds. Abdominal:      General: Bowel sounds are normal. There is no distension. Palpations: Abdomen is soft. Tenderness: There is no abdominal tenderness. There is no right CVA tenderness, left CVA tenderness or guarding. Musculoskeletal:         General: Normal range of motion. Cervical back: Neck supple. No tenderness. Right lower leg: No edema. Left lower leg: No edema. Skin:     General: Skin is warm and dry. Capillary Refill: Capillary refill takes less than 2 seconds.    Neurological: General: No focal deficit present. Mental Status: She is alert and oriented to person, place, and time. DIAGNOSTIC RESULTS     EKG: All EKG's are interpreted by the Emergency Department Physician who either signs or Co-signs this chart in the absence of a cardiologist.    Sinus rhythm, no stemi, rate 90, reviewed by Dr Brown Art:   Non-plain film images such as CT, Ultrasound and MRI are read by the radiologist. Plain radiographic images are visualized and preliminarily interpreted by the emergency physician with the below findings:        Interpretation per the Radiologist below, if available at the time of this note:    CTA PULMONARY W CONTRAST   Final Result   1. Pulmonary emboli to the bilateral lower lobes. 2. Patchy ground-glass and streaky markings in the posterior lungs, likely dependent atelectasis, with a few scattered blebs bilaterally. 3. The lungs are otherwise grossly clear. Recommendation: Clinical findings; follow-up is recommended. All CT scans at this facility utilize dose modulation, iterative reconstruction, and/or weight based dosing when appropriate to reduce radiation dose to as low as reasonably achievable. Electronically Signed by Aylin Gar MD at 18-Aug-2022 12:55:46 PM               XR CHEST PORTABLE   Final Result   No acute pathology. Recommendation: Follow up as clinically indicated.    Electronically Signed by Omar Floyd MD at 18-Aug-2022 12:38:15 PM                     ED BEDSIDE ULTRASOUND:   Performed by ED Physician - none    LABS:  Labs Reviewed   CBC WITH AUTO DIFFERENTIAL - Abnormal; Notable for the following components:       Result Value    MCHC 31.7 (*)     Monocytes % 13.3 (*)     Monocytes Absolute 1.00 (*)     All other components within normal limits   D-DIMER, QUANTITATIVE - Abnormal; Notable for the following components:    D-Dimer, Quant 1.80 (*)     All other components within normal limits   COVID-19, RAPID   COMPREHENSIVE

## 2022-08-19 ENCOUNTER — APPOINTMENT (OUTPATIENT)
Dept: MRI IMAGING | Age: 77
DRG: 176 | End: 2022-08-19
Payer: MEDICARE

## 2022-08-19 VITALS
DIASTOLIC BLOOD PRESSURE: 72 MMHG | BODY MASS INDEX: 19.63 KG/M2 | RESPIRATION RATE: 16 BRPM | SYSTOLIC BLOOD PRESSURE: 137 MMHG | HEART RATE: 88 BPM | HEIGHT: 64 IN | WEIGHT: 115 LBS | OXYGEN SATURATION: 95 % | TEMPERATURE: 97.2 F

## 2022-08-19 LAB
ANION GAP SERPL CALCULATED.3IONS-SCNC: 10 MMOL/L (ref 7–19)
BASOPHILS ABSOLUTE: 0 K/UL (ref 0–0.2)
BASOPHILS RELATIVE PERCENT: 0.5 % (ref 0–1)
BUN BLDV-MCNC: 9 MG/DL (ref 8–23)
CALCIUM SERPL-MCNC: 9 MG/DL (ref 8.8–10.2)
CHLORIDE BLD-SCNC: 103 MMOL/L (ref 98–111)
CO2: 25 MMOL/L (ref 22–29)
CREAT SERPL-MCNC: 0.4 MG/DL (ref 0.5–0.9)
EOSINOPHILS ABSOLUTE: 0.1 K/UL (ref 0–0.6)
EOSINOPHILS RELATIVE PERCENT: 1.8 % (ref 0–5)
GFR AFRICAN AMERICAN: >59
GFR NON-AFRICAN AMERICAN: >60
GLUCOSE BLD-MCNC: 104 MG/DL (ref 74–109)
HCT VFR BLD CALC: 41 % (ref 37–47)
HEMOGLOBIN: 13.2 G/DL (ref 12–16)
IMMATURE GRANULOCYTES #: 0 K/UL
LYMPHOCYTES ABSOLUTE: 1.9 K/UL (ref 1.1–4.5)
LYMPHOCYTES RELATIVE PERCENT: 28.2 % (ref 20–40)
MCH RBC QN AUTO: 29.7 PG (ref 27–31)
MCHC RBC AUTO-ENTMCNC: 32.2 G/DL (ref 33–37)
MCV RBC AUTO: 92.1 FL (ref 81–99)
MONOCYTES ABSOLUTE: 0.9 K/UL (ref 0–0.9)
MONOCYTES RELATIVE PERCENT: 12.8 % (ref 0–10)
NEUTROPHILS ABSOLUTE: 3.7 K/UL (ref 1.5–7.5)
NEUTROPHILS RELATIVE PERCENT: 56.2 % (ref 50–65)
PDW BLD-RTO: 13.5 % (ref 11.5–14.5)
PLATELET # BLD: 217 K/UL (ref 130–400)
PMV BLD AUTO: 10.2 FL (ref 9.4–12.3)
POTASSIUM REFLEX MAGNESIUM: 3.9 MMOL/L (ref 3.5–5)
RBC # BLD: 4.45 M/UL (ref 4.2–5.4)
SODIUM BLD-SCNC: 138 MMOL/L (ref 136–145)
WBC # BLD: 6.6 K/UL (ref 4.8–10.8)

## 2022-08-19 PROCEDURE — A9577 INJ MULTIHANCE: HCPCS | Performed by: INTERNAL MEDICINE

## 2022-08-19 PROCEDURE — 85025 COMPLETE CBC W/AUTO DIFF WBC: CPT

## 2022-08-19 PROCEDURE — 2580000003 HC RX 258: Performed by: NURSE PRACTITIONER

## 2022-08-19 PROCEDURE — 70553 MRI BRAIN STEM W/O & W/DYE: CPT

## 2022-08-19 PROCEDURE — 80048 BASIC METABOLIC PNL TOTAL CA: CPT

## 2022-08-19 PROCEDURE — 99232 SBSQ HOSP IP/OBS MODERATE 35: CPT | Performed by: PSYCHIATRY & NEUROLOGY

## 2022-08-19 PROCEDURE — 86147 CARDIOLIPIN ANTIBODY EA IG: CPT

## 2022-08-19 PROCEDURE — 85613 RUSSELL VIPER VENOM DILUTED: CPT

## 2022-08-19 PROCEDURE — 36415 COLL VENOUS BLD VENIPUNCTURE: CPT

## 2022-08-19 PROCEDURE — 6360000004 HC RX CONTRAST MEDICATION: Performed by: INTERNAL MEDICINE

## 2022-08-19 PROCEDURE — 99223 1ST HOSP IP/OBS HIGH 75: CPT | Performed by: INTERNAL MEDICINE

## 2022-08-19 PROCEDURE — 92523 SPEECH SOUND LANG COMPREHEN: CPT

## 2022-08-19 PROCEDURE — 70553 MRI BRAIN STEM W/O & W/DYE: CPT | Performed by: RADIOLOGY

## 2022-08-19 PROCEDURE — 86146 BETA-2 GLYCOPROTEIN ANTIBODY: CPT

## 2022-08-19 PROCEDURE — 6370000000 HC RX 637 (ALT 250 FOR IP): Performed by: PSYCHIATRY & NEUROLOGY

## 2022-08-19 PROCEDURE — 6370000000 HC RX 637 (ALT 250 FOR IP): Performed by: NURSE PRACTITIONER

## 2022-08-19 PROCEDURE — 85610 PROTHROMBIN TIME: CPT

## 2022-08-19 PROCEDURE — 85730 THROMBOPLASTIN TIME PARTIAL: CPT

## 2022-08-19 RX ORDER — THIAMINE MONONITRATE (VIT B1) 100 MG
100 TABLET ORAL DAILY
Qty: 30 TABLET | Refills: 0 | Status: SHIPPED | OUTPATIENT
Start: 2022-08-20 | End: 2022-09-19

## 2022-08-19 RX ADMIN — GADOBENATE DIMEGLUMINE 10 ML: 529 INJECTION, SOLUTION INTRAVENOUS at 11:42

## 2022-08-19 RX ADMIN — Medication 2000 UNITS: at 08:50

## 2022-08-19 RX ADMIN — Medication 1 TABLET: at 08:49

## 2022-08-19 RX ADMIN — APIXABAN 10 MG: 5 TABLET, FILM COATED ORAL at 08:49

## 2022-08-19 RX ADMIN — Medication 100 MG: at 08:49

## 2022-08-19 RX ADMIN — SODIUM CHLORIDE, PRESERVATIVE FREE 10 ML: 5 INJECTION INTRAVENOUS at 08:50

## 2022-08-19 NOTE — PROGRESS NOTES
Speech Language Pathology  Facility/Department: [facility] 4 ONCOLOGY UNIT  Initial Speech/Language/Cognitive Assessment    NAME: Tuan Boyle  :    MRN: [id number]  ADMISSION DATE: 2022  ADMITTING DIAGNOSIS: has Thoracic compression fracture ([address] Osteoporosis; Low back pain; Closed wedge compression fracture of third lumbar vertebra ([address] Closed wedge fracture of lumbar vertebra ([address] Memory loss; Benign paroxysmal positional vertigo due to bilateral vestibular disorder; Age-related osteoporosis without current pathological fracture; Age-related osteoporosis with current pathological fracture of vertebra with routine healing ; Abdominal bloating; Endometrial thickening on ultrasound; Mixed hyperlipidemia; Hair loss; and Bilateral pulmonary embolism ([address] on their problem list.    Date of Eval: 2022   Evaluating Therapist: Jean Claude Soto SLP    Assessment:  Completed Ward Proc. [place] 1 and patient obtained 25 out of 30 possible points, indicative of borderline mild cognitive-linguistic impairment. Subsequently transitioned to full assessment and patient exhibited decreased select and sustained attention, delayed auditory comprehension (particularly as length and complexity of information increases), and decreased short-term memory (patient was 0/3 on MINI with less than 2 minute time delay+distractions present). It is noted that during evaluation, patient demonstrated ability to verbalize current PCP and pharmacy at independent level. Patient demonstrated ability to verbalize appropriate simple solutions to situations that could occur during activities of daily living independently. Patient would benefit from environmental safety measures such as life alert, automatic bill pay, medication organizer with audio alerts/alarms, daily well checks from family and friends via phone or in person and visual schedule/check list used as reminders.  Thank you for this pharmacy at independent level. Patient demonstrated ability to verbalize appropriate simple solutions to situations that could occur during activities of daily living independently.  Patient would benefit from environmental safety measures such as life alert, automatic bill pay, medication organizer with audio alerts/alarms, daily well checks from family and friends via phone or in person and visual schedule/check list used as reminders.)    Electronically signed by CITLALI Cronin on 8/19/2022 at 10:50 AM

## 2022-08-19 NOTE — CARE COORDINATION
Per [facility], patient has not met her deductible. Initial cost of Eliquis will be $527, which includes $480 that will go towards her deductible. Following months cost will be $47 a month. Met with patient to discuss cost of Eliquis. Patient's friend, Michaela Shore, present. Patient reports that cost is affordable. Also, placed Eliquis Free 30-day coupon in chart.   Electronically signed by Clearence Barthel, RN on 8/19/2022 at 8:54 AM

## 2022-08-19 NOTE — DISCHARGE INSTRUCTIONS
No driving. Keep follow up appointments. Unprovoked pulmonary emboli-transition to Eliquis today 10 mg p.o. twice daily x7 days to be followed by 5 mg p.o. twice daily. Likely lifetime treatment. We will bring her back in clinic on 9/2/2022 with Orquidea De Los Santos for further work-up for thrombophilia. Okay to discharge from oncology standpoint. PLAN:  Follow up with me in 2-4 weeks. No driving.

## 2022-08-19 NOTE — DISCHARGE SUMMARY
Bhavesh Wilkes  :  1945  MRN:  [id number]    Admit date:  2022  Discharge date:  2022    Discharging Physician:  Dr. Navya Sullivan Directive: Full Code    Consults: Bryce Munoz TO CASE MANAGEMENT     Primary Care Physician:  Neisha Shi MD    Discharge Diagnoses:  Principal Problem:    Bilateral pulmonary embolism ([address]  Active Problems:    Osteoporosis  Resolved Problems:    * No resolved hospital problems. *      Portions of this note have been copied forward, however, changed to reflect the most current clinical status of this patient. Hospital Course: The patient is a 78 y.o. female with past medical history of basal cell carcinoma, hyperlipidemia, severe osteoporosis, and memory loss who presented to [address] [place] [address] ED complaining of chest pain. Patient is accompanied by her friend Kaleb Shahid. When asked why the patient come to the hospital she could not tell me. The patient told me she has been having significant issues with her memory and cannot remember why she come to the hospital today but could tell me she drove herself. Her friend at bedside, Kaleb Shahid, indicates patient was having chest pain and come in for evaluation. Patient does not remember episode of chest pain. Patient denied any shortness of breath or cough. Patient reported bruised her left thigh/hip area but does not remember falling. Patient does report recent travel to [facility]. ED work-up indicated an elevated D-dimer of 1.80 and patient underwent CTA of chest.  CTA of chest indicated pulmonary emboli to the bilateral lower lobes, patchy groundglass and streaky markings in the posterior lungs likely dependent atelectasis. Patient was started on Lovenox 1 mg/kg and admitted to the hospitalist service for bilateral PE. Bilateral venous duplex ordered which indicated a small area of nonocclusive chronic appearing DVT in the left gastroc vein.   Hematology consulted for recommendations on anticoagulation and recommends Eliquis for unprovoked DVT/PE. CT head as patient has had an assumed unwitnessed fall and experiencing memory issues. Neurology consult for memory loss as well. Neurology recommended MRI which was negative for acute stroke. Neurology indicates patient to follow-up outpatient for formal dementia work-up. Advised patient she is not safe to drive at this time. Discussed with social work. Social work discussed discharge planning with patient at this time patient is medically stable to be discharged home with the support of close friend Kerline Coburn. Significant Diagnostic Studies:   XR CHEST PORTABLE    Result Date: 8/18/2022  NO PRIOR REPORT AVAILABLE Exam: X-RAY OF [facility] Clinical data:Chest pain. Technique:Single view of the chest. Prior studies: No prior studies submitted. Findings: The lungs are grossly clear; noevidence of acute infiltrate or pleural effusion. Cardiac silhouette is within normal limits. No acute osseous abnormality is detected. No acute pathology. Recommendation: Follow up as clinically indicated. Electronically Signed by Elysia Herbert MD at 18-Aug-2022 12:38:15 PM             CTA PULMONARY W CONTRAST    Result Date: 8/18/2022  <Addendum Signed by Geovany Sagastume MD at 18-Aug-2022 02:00:46 PM [id number] YE ROSE>>Findings were communicated to Kenroy Guaman  at 1:57pm on 8/18/22 who confirms having received the report and the referring clinician  is aware of the critical findings. No further action is required by the reading radiologist. If the referring clinician  as any further questions, please call customer service [phone], option 1. Critical Documentation Completed. RM Addendum End> Exam: CTA OF THE CHEST WITH CONTRAST Clinical data: Sharp chest pain with elevated D-dimer.  Technique: Axial CT angiography images through the lungs were acquired with contrast and imaged using soft tissue and lung algorithms. Coronal, sagittal, and 3D volume reconstructions were performed. Reformatted/3D-MPR images ed. Radiation dose: CTDIvol =28.65 mGy, DLP =370 mGy x cm. Prior studies: Radiographs of the chest dated 08/18/2022. Findings: Lungs: Patchy ground-glass and minor streaky markings in the posterior lungs, likely dependent atelectasis. A few scattered blebs are also present. The lungs are otherwise clear with no focal pulmonary consolidation or pleural effusion. No evidence of pneumothorax. Soft Tissues: No mediastinal, axillary or hilar adenopathy. Vascular: Small filling defects in pulmonary arteries to the lower lobes bilaterally, with bilateral pulmonary emboli. Unremarkable aorta. Grossly unremarkable sized heart. No additional abnormality seen on 3D reformatted images. Bony structures: Anterior wedging in the T12 and L1 vertebral bodies with 80% loss of height, likely remote. Mild degenerative disc disease. Sclerotic focus in the right T3 vertebrae, likely a bone island. Upper Abdomen: Limited visualization of the solid upper abdominal organs is grossly unremarkable. 1. Pulmonary emboli to the bilateral lower lobes. 2. Patchy ground-glass and streaky markings in the posterior lungs, likely dependent atelectasis, with a few scattered blebs bilaterally. 3. The lungs are otherwise grossly clear. Recommendation: Clinical findings; follow-up is recommended. All CT scans at this facility utilize dose modulation, iterative reconstruction, and/or weight based dosing when appropriate to reduce radiation dose to as low as reasonably achievable. Amended by Kathy Rueda MD at 18-Aug-2022 02:00:46 PM Electronically Signed by Kathy Rueda MD at 18-Aug-2022 12:55:46 PM             MRI BRAIN W WO CONTRAST    Result Date: 8/19/2022  NO PRIOR REPORT AVAILABLE Exam: MRI OF THE BRAIN WITHOUT AND WITHINTRAVENOUS CONTRAST Clinical data:Confusion.  Technique: Multiplanar multi-sequence MRI of the brain without and with intravenous gadolinium. Diffusion-weighted imaging is submitted. Contrast used:Multihance. Amount: 10 mL. Prior studies: No prior studies submitted. Findings: Scattered small and confluent T2 and flair hyperintensity in the periventricular white matter is nonspecific but likely on a microvascular basis. No acute intracranial abnormality, specifically, cortical infarction, hemorrhage, mass or mass effect is seen. Mild atrophic changes. No abnormal extra-axial fluid collections are present. The marrow signal within the skull base and calvarium is intact. The paranasal sinuses and mastoid air cells are clear. Larger intracranial flow voids are intact. Small right frontal and left pontine venous angiomas versus developmental venous anomalies. No other abnormal leptomeningeal, parenchymal or ependymal enhancement. DWI/ADC: Negative. 1.  Mild nonspecific but presumed microvascular changes in the periventricular white matter. 2.  Mild atrophic changes. 3.  Small right frontal and left pontine venous angiomas versus developmental venous anomalies. Recommendation: Follow up as clinically indicated. Electronically Signed by Vicente Sy MD at 19-Aug-2022 02:23:03 PM             ECHO 2D WO COLOR DOPPLER COMPLETE    Result Date: 8/18/2022  Transthoracic Echocardiography Report (TTE)  Demographics   Patient Name Vivian Schrader  Date of Study        08/18/2022               E   MRN          [id number]          Gender               Female   Date of      1945      Room Number          [facility]-0412  Birth   Age          78 year(s)   Height:      64 inches       Referring Physician  Yelena JANSEN   Weight:      115 pounds      Sonographer          Mahsa Barrera RDCS   BSA:         1.55 m^2        Interpreting         Samantha Tipton MD                               Physician   BMI:         19.74 kg/m^2  Procedure Type of Study   TTE procedure:ECHOCARDIOGRAM COMPLETE 2D WO COLOR DOPPLER.   Study Location: Echo Lab Technical Quality: Adequate visualization Patient Status: Inpatient HR: 81 bpm BP: 133/86 mmHg Indications:Pulmonary embolus and Chest pain. 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  Trace to mild tricuspid regurgitation with RVSP estimate 21 mmHg  IVC dimension and inspiratory motion are normal consistent with normal  right atrial filling pressures  Aortic root, ascending segment, and large demonstrated no significant  abnormalities or enlargement  No significant pericardial effusion   Signature   ----------------------------------------------------------------  Electronically signed by Cabrera Jay MD(Interpreting physician)  on 08/18/2022 03:54 PM  ----------------------------------------------------------------  2D Measurements and Calculations(cm)   LVIDd: 4.11 cm                      LVIDs: 3.58 cm  IVSd: 1.05 cm  LVPWd: 1.01 cm                      AO Root Dimension: 1.8 cm  Rt. Vent.  Dimension: 2.29 cm        LA Dimension: 2.6 cm                                      LA Area: 8.64 cm^2  LA Volume: 19.6 ml                  LV Systolic dimension: 2.16GY  LA Volume Index: 13 ml/m^2          LV PW diastolic: 9.49NM  LV dimension: 4.11 cm               LVOT diameter: 2 cm                                      RV Diastolic dimension: 3.89WK  LVEDV: 89.2 ml                      LVEDVI: 58 ml/m^2  LVESV:44 ml                         LVESVI: 28 ml/m^2  Cardic Output (CO): 4.02l/min Ascending Aorta: 2.7 cm  Doppler Measurements and Calculations                                            MV Peak E-Wave: 70.7 cm/s                                           MV Peak A-Wave: 69.4 cm/s  TR Velocity:214 cm/s                     MV E/A Ratio: 1.02 %  TR Gradient:18.32 mmHg                   MV Mean velocity: NaNcm/s  Estimated RAP:3 mmHg                     MV Peak Gradient: 2 mmHg  RVSP:21 mmHg   MV E' septal velocity: 4.03cm/s  MV E' lateral velocity:6.42 cm/s        Pertinent Labs:   CBC:   Recent Labs     08/18/22  1034 08/19/22  0320   WBC 7.7 6.6   HGB 13.3 13.2    217     BMP:    Recent Labs     08/18/22  1034 08/19/22  0320    138   K 4.0 3.9    103   CO2 28 25   BUN 12 9   CREATININE 0.6 0.4*   GLUCOSE 107 104     INR: No results for input(s): INR in the last 72 hours. Physical Exam:  Vital Signs: /72   Pulse 88   Temp 97.2 °F (36.2 °C) (Temporal)   Resp 16   Ht 5' 4\" (1.626 m)   Wt 115 lb (52.2 kg)   SpO2 95%   BMI 19.74 kg/m²   Physical Exam  Vitals reviewed. Constitutional:       Appearance: She is not ill-appearing. Cardiovascular:      Rate and Rhythm: Normal rate and regular rhythm. Pulses: Normal pulses. Heart sounds: Normal heart sounds. Pulmonary:      Effort: Pulmonary effort is normal.      Breath sounds: Normal breath sounds. Abdominal:      General: Abdomen is flat. Bowel sounds are normal.      Palpations: Abdomen is soft. Musculoskeletal:         General: Normal range of motion. Right lower leg: No edema. Left lower leg: No edema. Skin:     General: Skin is warm and dry. Capillary Refill: Capillary refill takes less than 2 seconds. Neurological:      Mental Status: She is alert and oriented to person, place, and time. Psychiatric:         Cognition and Memory: Memory is impaired. She exhibits impaired recent memory. Judgment: Judgment is impulsive.        Discharge Medications:         Medication List        START taking these medications      apixaban starter pack 5 MG Tbpk tablet  Commonly known as: Eliquis DVT/PE Starter Pack  Take 1 tablet by mouth See Admin Instructions     vitamin B-1 100 MG tablet  Commonly known as: THIAMINE  Take 1 tablet by mouth daily  Start taking on: August 20, 2022            CONTINUE taking these medications      alendronate 70 MG tablet  Commonly known as: FOSAMAX  TAKE 1 TABLET BY MOUTH EVERY 7 DAYS. Calcium 600-200 MG-UNIT Tabs  Take 1 tablet by mouth 2 times daily     Vitamin D3 50 MCG (2000 UT) Tabs  Take 1 tablet by mouth daily     vitamin E 450 MG (1000 UT) Caps capsule            STOP taking these medications      denosumab 60 MG/ML Sosy SC injection  Commonly known as: Prolia     Forteo 600 MCG/2.4ML Sopn injection  Generic drug: Teriparatide (Recombinant)     zoledronic acid 5 MG/100ML Soln  Commonly known as: RECLAST               Where to Get Your Medications        These medications were sent to [address] [place] [address], [facility] [facility] [address]  [address] [place] [address]      Phone: [phone]   apixaban starter pack 5 MG Tbpk tablet  vitamin B-1 100 MG tablet         Discharge Instructions: Follow up with Dennys Hernandez MD in 5-7 days. Take medications as directed. Resume activity as tolerated. Diet: ADULT DIET; Regular     Disposition: Patient is Stableand will be discharged to Home. Time spent on discharge 31 minutes spent in assessing patient, reviewing medications, discussion with nursing, confirming safe discharge plan and preparation of discharge summary.     Signed:  INDERJIT Martinez CNP, 8/19/2022 1:50 PM

## 2022-08-19 NOTE — PLAN OF CARE
Problem: Safety - Adult  Goal: Free from fall injury  8/19/2022 1418 by Jerie Osgood, RN  Outcome: Completed  8/19/2022 1028 by Jerie Osgood, RN  Outcome: Progressing     Problem: Pain  Goal: Verbalizes/displays adequate comfort level or baseline comfort level  8/19/2022 1418 by Jerie Osgood, RN  Outcome: Completed  8/19/2022 1028 by Jerie Osgood, RN  Outcome: Progressing     Problem: ABCDS Injury Assessment  Goal: Absence of physical injury  8/19/2022 1418 by Jerie Osgood, RN  Outcome: Completed  8/19/2022 1028 by Jerie Osgood, RN  Outcome: Progressing

## 2022-08-19 NOTE — PROGRESS NOTES
Patient instructed of plan of care, medication and discharge instructions including but not limited to patient being instructed to not drive. All questions and concerns voiced by patient were addressed at this time.      Electronically signed by Mark Cannon RN on 8/19/2022 at 2:22 PM

## 2022-08-19 NOTE — PROGRESS NOTES
[id number] [facility] Neurology Progress Note      Patient:   Ajit Charles  MR#:    [id number]   Room:    [address] [place] [address]   YOB: 1945  Date of Progress Note: 8/19/2022  Time of Note                           9:21 AM  Consulting Physician:  Oscar Barr DO  Attending Physician:  Winnie Redd MD      INTERVAL HISTORY:  Doing well, sitting at bedside, no new focal complaints. REVIEW OF SYSTEMS:  Constitutional: No fevers   Neck:No stiffness  Respiratory: No shortness of breath  Cardiovascular: No chest pain   Gastrointestinal: No abdominal pain    Genitourinary: No Dysuria  Neurological: No headache    PHYSICAL EXAM:    Constitutional -   /72   Pulse 88   Temp 97.2 °F (36.2 °C) (Temporal)   Resp 16   Ht 5' 4\" (1.626 m)   Wt 115 lb (52.2 kg)   SpO2 95%   BMI 19.74 kg/m²   General appearance: No acute distress   EYES -   Conjunctiva normal  Pupillary exam as below, see CN exam in the neurologic exam  ENT-    No scars, masses, or lesions over external nose or ears  Hearing normal bilaterally to finger rub  Neck-   No neck masses noted  Thyroid normal   No jugular vein distension  Cardiovascular -   No clubbing, cyanosis, or edema   Pulmonary-   Good expansion, normal effort without use of accessory muscles  Musculoskeletal -   No significant wasting of muscles noted  Gait as below, see gait exam in the neurologic exam  Muscle strength, tone, stability as below see the motor exam in the neurologic exam.   No bony deformities  Skin -   Warm, dry, and intact to inspection and palpation.     No rash, erythema, or pallor  Psychiatric -   Mood, affect, and behavior appear normal    Memory as below see mental status examination in the neurologic exam      NEUROLOGICAL EXAM    Mental status   [x] Awake, alert, oriented   [x] Affect attention and concentration appear appropriate  [] Recent and remote memory appears unremarkable  [x] Speech normal without dysarthria or aphasia, comprehension and repetition intact. COMMENTS: Cognitive loss noted    Cranial Nerves [x] No VF deficit to confrontation  [x] PERRLA, EOMI, no nystagmus, conjugate eye movements, no ptosis  [x] Face symmetric  [x] Facial sensation intact  [x] Tongue midline no atrophy or fasciculations present  [x] Palate midline, hearing to finger rub normal  [x] Shoulder shrug and SCM testing normal  COMMENTS:   Motor   [x] 5/5 strength x 4 extremities  [x] Normal bulk and tone  [x] No tremor present  [x] No rigidity or bradykinesia noted  COMMENTS:   Sensory  [x] Sensation intact to light touch, pin prick, vibration, and proprioception BLE  [] Sensation intact to light touch, pin prick, vibration, and proprioception BUE  COMMENTS:   Coordination [x] FTN normal bilaterally   [] HTS normal bilaterally  [] POONAM normal.   COMMENTS:   Reflexes  [x] Symmetric and non-pathological  [x] Toes downgoing bilaterally  [x] No clonus present  COMMENTS:   Gait                  [] Normal steady gait    [] Ataxic    [] Spastic     [] Magnetic     [] Shuffling  [x] Not assessed  COMMENTS:       LABS/IMAGING:    XR CHEST PORTABLE    Result Date: 8/18/2022  NO PRIOR REPORT AVAILABLE Exam: X-RAY OF [facility] Clinical data:Chest pain. Technique:Single view of the chest. Prior studies: No prior studies submitted. Findings: The lungs are grossly clear; noevidence of acute infiltrate or pleural effusion. Cardiac silhouette is within normal limits. No acute osseous abnormality is detected. No acute pathology. Recommendation: Follow up as clinically indicated. Electronically Signed by Dawit Shi MD at 18-Aug-2022 12:38:15 PM             CTA PULMONARY W CONTRAST    Result Date: 8/18/2022  <Addendum Signed by Kiki Mejia MD at 18-Aug-2022 02:00:46 PM [id number] - YE GEORGE>>Findings were communicated to Kenroy Guaman  at 1:57pm on 8/18/22 who confirms having received the report and the referring clinician  is aware of the critical findings.  No further action is required by the reading radiologist. If the referring clinician  as any further questions, please call customer service [phone], option 1. Critical Documentation Completed. RM Addendum End> Exam: CTA OF THE CHEST WITH CONTRAST Clinical data: Sharp chest pain with elevated D-dimer. Technique: Axial CT angiography images through the lungs were acquired with contrast and imaged using soft tissue and lung algorithms. Coronal, sagittal, and 3D volume reconstructions were performed. Reformatted/3D-MPR images ed. Radiation dose: CTDIvol =28.65 mGy, DLP =370 mGy x cm. Prior studies: Radiographs of the chest dated 08/18/2022. Findings: Lungs: Patchy ground-glass and minor streaky markings in the posterior lungs, likely dependent atelectasis. A few scattered blebs are also present. The lungs are otherwise clear with no focal pulmonary consolidation or pleural effusion. No evidence of pneumothorax. Soft Tissues: No mediastinal, axillary or hilar adenopathy. Vascular: Small filling defects in pulmonary arteries to the lower lobes bilaterally, with bilateral pulmonary emboli. Unremarkable aorta. Grossly unremarkable sized heart. No additional abnormality seen on 3D reformatted images. Bony structures: Anterior wedging in the T12 and L1 vertebral bodies with 80% loss of height, likely remote. Mild degenerative disc disease. Sclerotic focus in the right T3 vertebrae, likely a bone island. Upper Abdomen: Limited visualization of the solid upper abdominal organs is grossly unremarkable. 1. Pulmonary emboli to the bilateral lower lobes. 2. Patchy ground-glass and streaky markings in the posterior lungs, likely dependent atelectasis, with a few scattered blebs bilaterally. 3. The lungs are otherwise grossly clear. Recommendation: Clinical findings; follow-up is recommended.  All CT scans at this facility utilize dose modulation, iterative reconstruction, and/or weight based dosing when appropriate to reduce radiation dose to as low as reasonably achievable. Amended by Ralph Barrios MD at 18-Aug-2022 02:00:46 PM Electronically Signed by Ralph Barrios MD at 18-Aug-2022 12:55:46 PM             ECHO 2D WO COLOR DOPPLER COMPLETE    Result Date: 8/18/2022  Transthoracic Echocardiography Report (TTE)  Demographics   Patient Name Michaela Patricio  Date of Study        08/18/2022               E   MRN          [id number]          Gender               Female   Date of      1945      Room Number          L-0412  Birth   Age          78 year(s)   Height:      64 inches       Referring Physician  Elias JANSEN   Weight:      115 pounds      Sonographer          Mahsa Barrera RDCS   BSA:         1.55 m^2        Interpreting         Candelario Clarke MD                               Physician   BMI:         19.74 kg/m^2  Procedure Type of Study   TTE procedure:ECHOCARDIOGRAM COMPLETE 2D WO COLOR DOPPLER. Study Location: Echo Lab Technical Quality: Adequate visualization Patient Status: Inpatient HR: 81 bpm BP: 133/86 mmHg Indications:Pulmonary embolus and Chest pain.   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  Trace to mild tricuspid regurgitation with RVSP estimate 21 mmHg  IVC dimension and inspiratory motion are normal consistent with normal  right atrial filling pressures  Aortic root, ascending segment, and large demonstrated no significant  abnormalities or enlargement  No significant pericardial effusion   Signature   ----------------------------------------------------------------  Electronically signed by Leighann Borges MD(Interpreting physician)  on 08/18/2022 03:54 PM  ----------------------------------------------------------------  2D Measurements and Calculations(cm)   LVIDd: 4.11 cm                      LVIDs: 3.58 cm  IVSd: 1.05 cm  LVPWd: 1.01 cm                      AO Root Dimension: 1.8 cm  Rt. Vent.  Dimension: 2.29 cm        LA Dimension: 2.6 cm                                      LA Area: 8.64 cm^2  LA Volume: 19.6 ml                  LV Systolic dimension: 7.29OQ  LA Volume Index: 13 ml/m^2          LV PW diastolic: 4.89DR  LV dimension: 4.11 cm               LVOT diameter: 2 cm                                      RV Diastolic dimension: 7.12MY  LVEDV: 89.2 ml                      LVEDVI: 58 ml/m^2  LVESV:44 ml                         LVESVI: 28 ml/m^2  Cardic Output (CO): 4.02l/min  Ascending Aorta: 2.7 cm  Doppler Measurements and Calculations                                            MV Peak E-Wave: 70.7 cm/s                                           MV Peak A-Wave: 69.4 cm/s  TR Velocity:214 cm/s                     MV E/A Ratio: 1.02 %  TR Gradient:18.32 mmHg                   MV Mean velocity: NaNcm/s  Estimated RAP:3 mmHg                     MV Peak Gradient: 2 mmHg  RVSP:21 mmHg   MV E' septal velocity: 4.03cm/s  MV E' lateral velocity:6.42 cm/s        Lab Results   Component Value Date    WBC 6.6 08/19/2022    HGB 13.2 08/19/2022    HCT 41.0 08/19/2022    MCV 92.1 08/19/2022     08/19/2022     Lab Results   Component Value Date     08/19/2022    K 3.9 08/19/2022     08/19/2022    CO2 25 08/19/2022    BUN 9 08/19/2022    CREATININE 0.4 (L) 08/19/2022    GLUCOSE 104 08/19/2022    CALCIUM 9.0 08/19/2022    PROT 6.9 08/18/2022    LABALBU 4.2 08/18/2022    BILITOT 0.4 08/18/2022    ALKPHOS 64 08/18/2022    AST 24 08/18/2022    ALT 10 08/18/2022    LABGLOM >60 08/19/2022    GFRAA >59 08/19/2022   No results found for: INR, PROTIME    RECORD REVIEW:   Previous medical records, medications were reviewed at today's visit. Nursing/physician notes, imaging, labs and vitals reviewed. PT,OT and/or speech notes reviewed      ASSESSMENT:  78 y.o. admitted with bilateral PEs and chest pain, fall, cognitive loss. Given her clinical history and gradual progression of her cognitive loss I do suspect underlying dementia. Exam stable overnight. PLAN:  Follow up MRI Brain  Follow up remaining labs  Will plan formal cognitive /  neuropsych testing as outpatient  Add memory medication as outpatient once current more acute medical issues resolved. Continue addressing medical issues, PE/DVT on Lovenox  Limit sedating medications  Supplement thiamine   Ok for discharge from my standpoint if nothing acute on MRI. Follow up with me in 2-4 weeks. Please feel free to call with any questions. [phone] (cell phone).       Miller Barron,   Board Certified Neurology

## 2022-08-19 NOTE — PROGRESS NOTES
CARDIOVASCULAR: RRR, no murmurs. No lower extremity edema   ABDOMEN: soft non-tender, active bowel sounds, no hepatosplenomegaly. No palpable masses. EXTREMITIES: warm, Full ROM of all fours extremities. No focal weakness. SKIN: warm, dry with no rashes or lesions  LYMPH: No cervical, clavicular, axillary, or inguinal lymphadenopathy  NEUROLOGIC: follows commands, non focal.   PSYCH: mood and affect appropriate. Alert and oriented to time and place and person. Vital Signs  /72   Pulse 81   Temp 97.2 °F (36.2 °C) (Temporal)   Resp 16   Ht 5' 4\" (1.626 m)   Wt 115 lb (52.2 kg)   SpO2 95%   BMI 19.74 kg/m²     Intake/Output Summary (Last 24 hours) at 8/19/2022 0616  Last data filed at 8/18/2022 0835  Gross per 24 hour   Intake 120 ml   Output --   Net 120 ml     Labs:  CBC:   Recent Labs     08/18/22  1034 08/19/22  0320   WBC 7.7 6.6   HGB 13.3 13.2    217     CMP:   Recent Labs     08/18/22  1034 08/19/22  0320   GLUCOSE 107 104   BUN 12 9   CREATININE 0.6 0.4*   CO2 28 25   CALCIUM 9.7 9.0   ALKPHOS 64  --    AST 24  --    ALT 10  --      Hepatic:   Recent Labs     08/18/22  1034   AST 24   ALT 10   BILITOT 0.4   ALKPHOS 64     Troponin:   Recent Labs     08/18/22  1034 08/18/22  1351 08/18/22  1904   TROPONINI <0.01 <0.01 <0.01     BNP: No results for input(s): BNP in the last 72 hours. INR: No results for input(s): INR in the last 72 hours. ABG: No results for input(s): PHART, HDL9XIP, PO2ART, YEQ8VVQ, P7UEZBZD, BEART in the last 72 hours. XR CHEST PORTABLE    Result Date: 8/18/2022  NO PRIOR REPORT AVAILABLE [facility] Clinical data:Chest pain. Technique:Single view of the chest. Prior studies: No prior studies submitted. Findings: The lungs are grossly clear; noevidence of acute infiltrate or pleural effusion. Cardiac silhouette is within normal limits. No acute osseous abnormality is detected. No acute pathology. Recommendation: Follow up as clinically indicated. Electronically Signed by Goldie Alejandro MD at 18-Aug-2022 12:38:15 PM             CTA PULMONARY W CONTRAST    Result Date: 8/18/2022  <Addendum Signed by Mancil Siemens MD at 18-Aug-2022 02:00:46 PM [id number] - YE GEORGE>>Findings were communicated to Kenroy Guaman  at 1:57pm on 8/18/22 who confirms having received the report and the referring clinician  is aware of the critical findings. No further action is required by the reading radiologist. If the referring clinician  as any further questions, please call customer service [phone], option 1. Critical Documentation Completed. RM Addendum End> Exam: CTA OF THE CHEST WITH CONTRAST Clinical data: Sharp chest pain with elevated D-dimer. Technique: Axial CT angiography images through the lungs were acquired with contrast and imaged using soft tissue and lung algorithms. Coronal, sagittal, and 3D volume reconstructions were performed. Reformatted/3D-MPR images ed. Radiation dose: CTDIvol =28.65 mGy, DLP =370 mGy x cm. Prior studies: Radiographs of the chest dated 08/18/2022. Findings: Lungs: Patchy ground-glass and minor streaky markings in the posterior lungs, likely dependent atelectasis. A few scattered blebs are also present. The lungs are otherwise clear with no focal pulmonary consolidation or pleural effusion. No evidence of pneumothorax. Soft Tissues: No mediastinal, axillary or hilar adenopathy. Vascular: Small filling defects in pulmonary arteries to the lower lobes bilaterally, with bilateral pulmonary emboli. Unremarkable aorta. Grossly unremarkable sized heart. No additional abnormality seen on 3D reformatted images. Bony structures: Anterior wedging in the T12 and L1 vertebral bodies with 80% loss of height, likely remote. Mild degenerative disc disease. Sclerotic focus in the right T3 vertebrae, likely a bone island. Upper Abdomen: Limited visualization of the solid upper abdominal organs is grossly unremarkable.     1. Pulmonary emboli to the bilateral lower lobes. 2. Patchy ground-glass and streaky markings in the posterior lungs, likely dependent atelectasis, with a few scattered blebs bilaterally. 3. The lungs are otherwise grossly clear. Recommendation: Clinical findings; follow-up is recommended. All CT scans at this facility utilize dose modulation, iterative reconstruction, and/or weight based dosing when appropriate to reduce radiation dose to as low as reasonably achievable. Amended by Richard Sommers MD at 18-Aug-2022 02:00:46 PM Electronically Signed by Richard Sommers MD at 18-Aug-2022 12:55:46 PM             ECHO 2D WO COLOR DOPPLER COMPLETE    Result Date: 8/18/2022  Transthoracic Echocardiography Report (TTE)  Demographics   Patient Name Mariia Vargas  Date of Study        08/18/2022               E   MRN          [id number]          Gender               Female   Date of      1945      Room Number          MHL-0412  Birth   Age          78 year(s)   Height:      64 inches       Referring Physician  Christine JANSEN   Weight:      115 pounds      Sonographer          Mahsa Barrera RDCS   BSA:         1.55 m^2        Interpreting         Micah Howell MD                               Physician   BMI:         19.74 kg/m^2  Procedure Type of Study   TTE procedure:ECHOCARDIOGRAM COMPLETE 2D WO COLOR DOPPLER. Study Location: Echo Lab Technical Quality: Adequate visualization Patient Status: Inpatient HR: 81 bpm BP: 133/86 mmHg Indications:Pulmonary embolus and Chest pain.   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  Trace to mild tricuspid regurgitation with RVSP estimate 21 mmHg  IVC dimension and inspiratory motion are normal consistent with normal  right atrial filling pressures  Aortic root, ascending segment, and large demonstrated no significant  abnormalities or enlargement  No significant pericardial effusion   Signature   ----------------------------------------------------------------  Electronically signed by Leighann Borges MD(Interpreting physician)  on 08/18/2022 03:54 PM  ----------------------------------------------------------------  2D Measurements and Calculations(cm)   LVIDd: 4.11 cm                      LVIDs: 3.58 cm  IVSd: 1.05 cm  LVPWd: 1.01 cm                      AO Root Dimension: 1.8 cm  Rt. Vent.  Dimension: 2.29 cm        LA Dimension: 2.6 cm                                      LA Area: 8.64 cm^2  LA Volume: 19.6 ml                  LV Systolic dimension: 1.56UZ  LA Volume Index: 13 ml/m^2          LV PW diastolic: 9.01EG  LV dimension: 4.11 cm               LVOT diameter: 2 cm                                      RV Diastolic dimension: 0.98BU  LVEDV: 89.2 ml                      LVEDVI: 58 ml/m^2  LVESV:44 ml                         LVESVI: 28 ml/m^2  Cardic Output (CO): 4.02l/min  Ascending Aorta: 2.7 cm  Doppler Measurements and Calculations                                            MV Peak E-Wave: 70.7 cm/s                                           MV Peak A-Wave: 69.4 cm/s  TR Velocity:214 cm/s                     MV E/A Ratio: 1.02 %  TR Gradient:18.32 mmHg                   MV Mean velocity: NaNcm/s  Estimated RAP:3 mmHg                     MV Peak Gradient: 2 mmHg  RVSP:21 mmHg   MV E' septal velocity: 4.03cm/s  MV E' lateral velocity:6.42 cm/s        ASSESSMENT:  Unprovoked pulmonary embolism/left lower extremity chronic DVT, August 2022  -No clear risk factors  -Bilateral pulmonary embolism  -Small chronic DVT below knee on the left  -Normal troponin, normal proBNP  -PESI score: Class II, low risk       Cognitive change neurology following. Brain MRI today. Discontinue Lovenox  Start Eliquis 10 mg po BID x7 days, to be followed by 5 mg po BID thereafter. Start today, 8/19/2022  Consult case management to ensure Eliquis at discharge  okay to be discharged from a hematology standpoint  APS labs drawn 8/19/2022, results pending  Complete thrombophilia work-up as outpatient  Outpatient follow-up with INDERJIT Mendoza in 2 weeks     (Please note that portions of this note were completed with a voice recognition program. Efforts were made to edit the dictations but occasionally words are mis-transcribed.)    INDERJIT Hagen  08/19/22  6:16 AM  Physician's attestation/substantial contribution:  I, Dr Lilly Kurtz, independently performed an evaluation on Caridad Quails. I have reviewed relevant medical information/data to include but not limited to medication list, relevant appropriate labs and imaging when applicable. I reviewed other physician's notes, ancillary services and nurses assessment. I have reviewed the above documentation completed by the Nurse Practitioner or Physician Assistant. Please see my additional contributions to the history of present illness, physical examination, and assessment/medical decision-making that reflect my findings and impressions. I have seen and examined the patient and the key elements of all parts of the encounter have been performed by me. I agree with the assessment and plan as outlined by the ARNP/PA. Subjective-she denies any new complaints this morning. Denies any short of breath. Denies any chest pain. Denies any bleeding. Objective-as above, lungs are clear. Assessment/plan:  Unprovoked pulmonary emboli-transition to Eliquis today 10 mg p.o. twice daily x7 days to be followed by 5 mg p.o. twice daily. Likely lifetime treatment. We will bring her back in clinic on 9/2/2022 with Regina Pollard for further work-up for thrombophilia. Okay to discharge from oncology standpoint.     Appointment clinic with Regina Pollard 9/2/2022 11:45 AM

## 2022-08-20 LAB
EKG P AXIS: 66 DEGREES
EKG P-R INTERVAL: 134 MS
EKG Q-T INTERVAL: 350 MS
EKG QRS DURATION: 92 MS
EKG QTC CALCULATION (BAZETT): 402 MS
EKG T AXIS: 51 DEGREES

## 2022-08-20 PROCEDURE — 93010 ELECTROCARDIOGRAM REPORT: CPT | Performed by: INTERNAL MEDICINE

## 2022-08-21 LAB
ANTICARDIOLIPIN IGG ANTIBODY: <10 GPL
BETA-2 GLYCOPROTEIN 1 IGG ANTIBODY: <10 SGU
BETA-2 GLYCOPROTEIN 1 IGM ANTIBODY: <10 SMU
CARDIOLIPIN AB IGM: <10 MPL

## 2022-08-22 ENCOUNTER — TELEPHONE (OUTPATIENT)
Dept: INTERNAL MEDICINE | Age: 77
End: 2022-08-22

## 2022-08-22 NOTE — TELEPHONE ENCOUNTER
[facility] Transitions Initial Follow Up Call    Outreach made within 2 business days of discharge: Yes    Patient: Willie Feliciano   Patient : 1945 MRN: [id number]    Reason for Admission: Chest pain    Discharge Date: 22      Discharge Diagnoses:  Principal Problem:    Bilateral pulmonary embolism [facility])  Active Problems:    Osteoporosis  Resolved Problems:    * No resolved hospital problems. *      Spoke with: Bubba Davis    Discharge department/facility: [facility] Interactive Patient Contact:  Was patient able to fill all prescriptions: Yes  Was patient instructed to bring all medications to the follow-up visit: Yes  Is patient taking all medications as directed in the discharge summary? Yes  Does patient understand their discharge instructions: Yes  Does patient have questions or concerns that need addressed prior to 7-14 day follow up office visit: no    Spoke to the patient she is doing well. She is having what she she thinks is deep muscle soreness. She said that she was not given any new medications. She does not think she needs anything she has a follow up on 22.     Scheduled appointment with PCP within 7-14 days    Follow Up  Future Appointments   Date Time Provider Deangelo Hemphill   2022  9:30 [facility]   2022 11:45 AM INDERJIT Nicole [facility]   2022 12:00 PM SCHEDULE, [facility] MED ONC MA [facility] MED ONC [place]   2023  9:00 AM Lydia Abad MD [facility]       [place], [place]

## 2022-08-24 ENCOUNTER — OFFICE VISIT (OUTPATIENT)
Dept: INTERNAL MEDICINE | Age: 77
End: 2022-08-24
Payer: MEDICARE

## 2022-08-24 VITALS
BODY MASS INDEX: 19.74 KG/M2 | SYSTOLIC BLOOD PRESSURE: 138 MMHG | OXYGEN SATURATION: 96 % | WEIGHT: 115 LBS | DIASTOLIC BLOOD PRESSURE: 64 MMHG | HEART RATE: 68 BPM

## 2022-08-24 DIAGNOSIS — I26.99 BILATERAL PULMONARY EMBOLISM (HCC): ICD-10-CM

## 2022-08-24 DIAGNOSIS — Z09 HOSPITAL DISCHARGE FOLLOW-UP: Primary | ICD-10-CM

## 2022-08-24 DIAGNOSIS — R41.3 MEMORY LOSS: ICD-10-CM

## 2022-08-24 PROCEDURE — 1111F DSCHRG MED/CURRENT MED MERGE: CPT | Performed by: NURSE PRACTITIONER

## 2022-08-24 PROCEDURE — 99495 TRANSJ CARE MGMT MOD F2F 14D: CPT | Performed by: NURSE PRACTITIONER

## 2022-08-24 ASSESSMENT — ENCOUNTER SYMPTOMS
TROUBLE SWALLOWING: 0
ABDOMINAL DISTENTION: 0
COUGH: 0
CHOKING: 0
CONSTIPATION: 0
BLOOD IN STOOL: 0
EYE ITCHING: 0
DIARRHEA: 0
SORE THROAT: 0
WHEEZING: 0
STRIDOR: 0
NAUSEA: 0
ABDOMINAL PAIN: 0
COLOR CHANGE: 0
EYE DISCHARGE: 0
VOMITING: 0
SHORTNESS OF BREATH: 0

## 2022-08-24 NOTE — PATIENT INSTRUCTIONS
Bilateral PE and DVT  on eliquis;  start today   Keep appt with Dr Terrie Holman for the memory loss;     Osteoporosis;  keep taking the alendronate for your bones weekly   Fu with Dr Harley Gomez the end of the month

## 2022-08-24 NOTE — PROGRESS NOTES
Post-Discharge Transitional Care Follow Up    Nanci Gant   YOB: 1945    Date of Office Visit:  8/24/2022  Date of Hospital Admission: 8/18/2022  Date of Hospital Discharge: 8/19/2022    Care management risk score Rising risk (score 2-5) and Complex Care (Scores >=6): No Risk Score On File     Non face to face  following discharge, date last encounter closed (first attempt may have been earlier): 08/22/2022     Call initiated 2 business days of discharge: Yes    ASSESSMENT/PLAN:   Hospital discharge follow-up  -     AK DISCHARGE MEDS RECONCILED W/ CURRENT OUTPATIENT MED LIST  Bilateral pulmonary embolism ([address]  Assessment & Plan:  She is stabilized with Eliquis 5 mg   Memory loss  Assessment & Plan: We will discuss possible work-up. Medical Decision Making: moderate complexity  Return for she needs to see Dr Adilson Sanchez the last week of SEPT  dx  DVT and PE  . Subjective:   HPI   Bilateral PE;  with chest pain after fact finding bruising from a fall on her hip ; doesn't remember falling; had recent travel [place] [facility] [place]  had elevated D dimer and CTA showed bilateral PE;   she was started on loveno and bilateral venous duplex showed a small area of nonocclusive chronic looking DVT no epigastric vein; she was discharged with Eliquis 5 mg all of her ostia medications were stopped except Fosamax 79;  kyle had to order the eliquis;  so they didn't get script until Monday and she thought she was told to not take it;  so she has the meds but none has been taken ; she was confused about her fosamax and has not been taking that either;    Dementia she did have an MRI while there due to a unwitnessed fall in the MRI was negative for CVA. Neuro was consulted recommend follow-up work-up for dementia; Inpatient course: Discharge summary reviewed- see chart.     Interval history/Current status:   #1 bilateral PE stable with Eliquis no complications of bleeding etc.  2.  Dementia within normal acute study of her brain neurology suggested outpatient work-up for dementia. Patient Active Problem List   Diagnosis    Thoracic compression fracture (HCC)    Osteoporosis    Low back pain    Closed wedge compression fracture of third lumbar vertebra (HCC)    Closed wedge fracture of lumbar vertebra (HCC)    Memory loss    Benign paroxysmal positional vertigo due to bilateral vestibular disorder    Age-related osteoporosis without current pathological fracture    Age-related osteoporosis with current pathological fracture of vertebra with routine healing     Abdominal bloating    Endometrial thickening on ultrasound    Mixed hyperlipidemia    Hair loss    Bilateral pulmonary embolism ([address]       Medication list at time of discharge reviewed: No    Medications marked \"taking\" at this time  No outpatient medications have been marked as taking for the 8/24/22 encounter (Office Visit) with INDERJIT Lino. Medications patient taking as of now reconciled against medications ordered at time of hospital discharge: No    Review of Systems   Constitutional:  Negative for fatigue, fever and unexpected weight change. HENT:  Negative for ear discharge, ear pain, mouth sores, sore throat and trouble swallowing. Eyes:  Negative for discharge, itching and visual disturbance. Respiratory:  Negative for cough, choking, shortness of breath, wheezing and stridor. Cardiovascular:  Negative for chest pain, palpitations and leg swelling. Gastrointestinal:  Negative for abdominal distention, abdominal pain, blood in stool, constipation, diarrhea, nausea and vomiting. Endocrine: Negative for cold intolerance, polydipsia and polyuria. Genitourinary:  Negative for difficulty urinating, dysuria, frequency and urgency. Musculoskeletal:  Negative for arthralgias and gait problem. Skin:  Negative for color change and rash. Allergic/Immunologic: Negative for food allergies and immunocompromised state.    Neurological: Negative for dizziness, tremors, syncope, speech difficulty, weakness and headaches. Hematological:  Negative for adenopathy. Does not bruise/bleed easily. Psychiatric/Behavioral:  Negative for confusion and hallucinations. Memory concerns     Objective:    /64   Pulse 68   Wt 115 lb (52.2 kg)   SpO2 96%   BMI 19.74 kg/m²   Physical Exam  Constitutional:       General: She is not in acute distress. Appearance: She is well-developed. HENT:      Head: Normocephalic and atraumatic. Eyes:      General: No scleral icterus. Right eye: No discharge. Left eye: No discharge. Pupils: Pupils are equal, round, and reactive to light. Neck:      Thyroid: No thyromegaly. Vascular: No JVD. Cardiovascular:      Rate and Rhythm: Normal rate and regular rhythm. Heart sounds: Normal heart sounds. No murmur heard. Pulmonary:      Effort: Pulmonary effort is normal. No respiratory distress. Breath sounds: Normal breath sounds. No wheezing or rales. Abdominal:      General: Bowel sounds are normal. There is no distension. Palpations: Abdomen is soft. There is no mass. Tenderness: There is no abdominal tenderness. There is no guarding or rebound. Musculoskeletal:         General: No tenderness. Normal range of motion. Cervical back: Normal range of motion and neck supple. Skin:     General: Skin is warm and dry. Findings: No erythema or rash. Neurological:      Mental Status: She is alert and oriented to person, place, and time. Cranial Nerves: No cranial nerve deficit. Coordination: Coordination normal.      Deep Tendon Reflexes: Reflexes are normal and symmetric. Reflexes normal.      Comments: Memory impairment is obvious   Psychiatric:         Mood and Affect: Mood is not depressed. Behavior: Behavior normal.         Thought Content:  Thought content normal.         Judgment: Judgment normal.         An electronic signature was used to authenticate this note. --INDERJIT Castro    Plan  Bilateral PE continue with Eliquis make an appointment with Dr. Dahlia Perez in about 6 weeks. At that time she can discuss any further testing for the follow-up on PE and will better able to restart about continuation of the Eliquis.

## 2022-08-25 LAB
DRVVT CONFIRMATION TEST: NORMAL RATIO
DRVVT SCREEN: 37 SEC (ref 33–44)
DRVVT,DIL: NORMAL SEC (ref 33–44)
HEXAGONAL PHOSPHOLIPID NEUTRALIZAT TEST: NORMAL
LUPUS ANTICOAG INTERP: NORMAL
PLT NEUTA: NORMAL
PT D: 13.4 SEC (ref 12–15.5)
PTT D: 39 SEC (ref 32–48)
PTT-D CORR REFLEX: NORMAL SEC (ref 32–48)
PTT-HEPARIN NEUTRALIZED: NORMAL SEC (ref 32–48)
REPTILASE TIME: NORMAL SEC
THROMBIN TIME: NORMAL SEC (ref 14.7–19.5)

## 2022-09-02 ENCOUNTER — OFFICE VISIT (OUTPATIENT)
Dept: HEMATOLOGY | Age: 77
End: 2022-09-02
Payer: MEDICARE

## 2022-09-02 ENCOUNTER — HOSPITAL ENCOUNTER (OUTPATIENT)
Dept: INFUSION THERAPY | Age: 77
Discharge: HOME | End: 2022-09-02
Payer: MEDICARE

## 2022-09-02 VITALS
DIASTOLIC BLOOD PRESSURE: 86 MMHG | BODY MASS INDEX: 19.43 KG/M2 | WEIGHT: 113.2 LBS | HEART RATE: 103 BPM | OXYGEN SATURATION: 99 % | SYSTOLIC BLOOD PRESSURE: 142 MMHG

## 2022-09-02 DIAGNOSIS — Z79.01 ANTICOAGULATED: ICD-10-CM

## 2022-09-02 DIAGNOSIS — Z71.89 CARE PLAN DISCUSSED WITH PATIENT: ICD-10-CM

## 2022-09-02 DIAGNOSIS — I26.99 BILATERAL PULMONARY EMBOLISM (HCC): Primary | ICD-10-CM

## 2022-09-02 DIAGNOSIS — I82.5Z2 LOWER LEG DVT (DEEP VENOUS THROMBOEMBOLISM), CHRONIC, LEFT (HCC): ICD-10-CM

## 2022-09-02 DIAGNOSIS — I26.99 BILATERAL PULMONARY EMBOLISM (HCC): ICD-10-CM

## 2022-09-02 DIAGNOSIS — R41.89 COGNITIVE CHANGE: ICD-10-CM

## 2022-09-02 LAB
AT-III ACTIVITY: 114 % ACTIVITY (ref 83–121)
BASOPHILS ABSOLUTE: 0.03 K/UL (ref 0.01–0.08)
BASOPHILS RELATIVE PERCENT: 0.5 % (ref 0.1–1.2)
EOSINOPHILS ABSOLUTE: 0.06 K/UL (ref 0.04–0.54)
EOSINOPHILS RELATIVE PERCENT: 0.9 % (ref 0.7–7)
HCT VFR BLD CALC: 42.9 % (ref 34.1–44.9)
HEMOGLOBIN: 13.5 G/DL (ref 11.2–15.7)
LYMPHOCYTES ABSOLUTE: 1.46 K/UL (ref 1.18–3.74)
LYMPHOCYTES RELATIVE PERCENT: 22.8 % (ref 19.3–53.1)
MCH RBC QN AUTO: 29.7 PG (ref 25.6–32.2)
MCHC RBC AUTO-ENTMCNC: 31.5 G/DL (ref 32.3–35.5)
MCV RBC AUTO: 94.3 FL (ref 79.4–94.8)
MONOCYTES ABSOLUTE: 0.91 K/UL (ref 0.24–0.82)
MONOCYTES RELATIVE PERCENT: 14.2 % (ref 4.7–12.5)
NEUTROPHILS ABSOLUTE: 3.93 K/UL (ref 1.56–6.13)
NEUTROPHILS RELATIVE PERCENT: 61.4 % (ref 34–71.1)
PDW BLD-RTO: 14.1 % (ref 11.7–14.4)
PLATELET # BLD: 215 K/UL (ref 182–369)
PMV BLD AUTO: 10.2 FL (ref 7.4–10.4)
RBC # BLD: 4.55 M/UL (ref 3.93–5.22)
WBC # BLD: 6.4 K/UL (ref 3.98–10.04)

## 2022-09-02 PROCEDURE — G8420 CALC BMI NORM PARAMETERS: HCPCS | Performed by: NURSE PRACTITIONER

## 2022-09-02 PROCEDURE — 3017F COLORECTAL CA SCREEN DOC REV: CPT | Performed by: NURSE PRACTITIONER

## 2022-09-02 PROCEDURE — 99212 OFFICE O/P EST SF 10 MIN: CPT

## 2022-09-02 PROCEDURE — 85025 COMPLETE CBC W/AUTO DIFF WBC: CPT

## 2022-09-02 PROCEDURE — G8399 PT W/DXA RESULTS DOCUMENT: HCPCS | Performed by: NURSE PRACTITIONER

## 2022-09-02 PROCEDURE — 99214 OFFICE O/P EST MOD 30 MIN: CPT | Performed by: NURSE PRACTITIONER

## 2022-09-02 PROCEDURE — 1111F DSCHRG MED/CURRENT MED MERGE: CPT | Performed by: NURSE PRACTITIONER

## 2022-09-02 PROCEDURE — 1123F ACP DISCUSS/DSCN MKR DOCD: CPT | Performed by: NURSE PRACTITIONER

## 2022-09-02 PROCEDURE — G8427 DOCREV CUR MEDS BY ELIG CLIN: HCPCS | Performed by: NURSE PRACTITIONER

## 2022-09-02 PROCEDURE — 1036F TOBACCO NON-USER: CPT | Performed by: NURSE PRACTITIONER

## 2022-09-02 PROCEDURE — 1090F PRES/ABSN URINE INCON ASSESS: CPT | Performed by: NURSE PRACTITIONER

## 2022-09-02 ASSESSMENT — PROMIS GLOBAL HEALTH SCALE
IN GENERAL, HOW WOULD YOU RATE YOUR MENTAL HEALTH, INCLUDING YOUR MOOD AND YOUR ABILITY TO THINK [ON A SCALE OF 1 (POOR) TO 5 (EXCELLENT)]?: 3
IN GENERAL, PLEASE RATE HOW WELL YOU CARRY OUT YOUR USUAL SOCIAL ACTIVITIES (INCLUDES ACTIVITIES AT HOME, AT WORK, AND IN YOUR COMMUNITY, AND RESPONSIBILITIES AS A PARENT, CHILD, SPOUSE, EMPLOYEE, FRIEND, ETC) [ON A SCALE OF 1 (POOR) TO 5 (EXCELLENT)]?: 5
IN THE PAST 7 DAYS, HOW WOULD YOU RATE YOUR PAIN ON AVERAGE [ON A SCALE FROM 0 (NO PAIN) TO 10 (WORST IMAGINABLE PAIN)]?: 0
SUM OF RESPONSES TO QUESTIONS 2, 4, 5, & 10: 15
IN GENERAL, WOULD YOU SAY YOUR HEALTH IS...[ON A SCALE OF 1 (POOR) TO 5 (EXCELLENT)]: 5
IN GENERAL, WOULD YOU SAY YOUR QUALITY OF LIFE IS...[ON A SCALE OF 1 (POOR) TO 5 (EXCELLENT)]: 5
SUM OF RESPONSES TO QUESTIONS 3, 6, 7, & 8: 15
TO WHAT EXTENT ARE YOU ABLE TO CARRY OUT YOUR EVERYDAY PHYSICAL ACTIVITIES SUCH AS WALKING, CLIMBING STAIRS, CARRYING GROCERIES, OR MOVING A CHAIR [ON A SCALE OF 1 (NOT AT ALL) TO 5 (COMPLETELY)]?: 5
IN GENERAL, HOW WOULD YOU RATE YOUR SATISFACTION WITH YOUR SOCIAL ACTIVITIES AND RELATIONSHIPS [ON A SCALE OF 1 (POOR) TO 5 (EXCELLENT)]?: 5
IN THE PAST 7 DAYS, HOW OFTEN HAVE YOU BEEN BOTHERED BY EMOTIONAL PROBLEMS, SUCH AS FEELING ANXIOUS, DEPRESSED, OR IRRITABLE [ON A SCALE FROM 1 (NEVER) TO 5 (ALWAYS)]?: 2
IN GENERAL, HOW WOULD YOU RATE YOUR PHYSICAL HEALTH [ON A SCALE OF 1 (POOR) TO 5 (EXCELLENT)]?: 5
IN THE PAST 7 DAYS, HOW WOULD YOU RATE YOUR FATIGUE ON AVERAGE [ON A SCALE FROM 1 (NONE) TO 5 (VERY SEVERE)]?: 5

## 2022-09-02 ASSESSMENT — ENCOUNTER SYMPTOMS
EYE DISCHARGE: 0
EYE ITCHING: 0
COUGH: 0
CONSTIPATION: 0
DIARRHEA: 0
SHORTNESS OF BREATH: 0
VOMITING: 0
ABDOMINAL PAIN: 0
TROUBLE SWALLOWING: 0
SORE THROAT: 0
WHEEZING: 0
NAUSEA: 0

## 2022-09-02 NOTE — PROGRESS NOTES
antiphospholipid syndrome. Leny Jones was initially treated with Lovenox, changed to Eliquis. She completed 10 mg po BID x1 week and has continued 5 mg po BID since that time. She denies missed doses. No bleeding issues. Leny Jones denies chest pain, shortness of breath, palpitations, claudication or lower extremity edema. She is a bit forgetful at times and is accompanied by her [facility], [facility]. Past Medical History:   Diagnosis Date    Cancer [address] [address]     basil cell skin cancers removed     Low back pain 2/7/2017    Mixed hyperlipidemia 5/18/2021    Osteoporosis     Osteoporosis with pathological fracture with routine healing 6/23/2017     Past Surgical History:   Procedure Laterality Date    SKIN CANCER EXCISION       Current Outpatient Medications   Medication Sig Dispense Refill    apixaban starter pack (ELIQUIS DVT/PE STARTER PACK) 5 MG TBPK tablet Take 1 tablet by mouth See Admin Instructions 74 tablet 0    thiamine mononitrate (THIAMINE) 100 MG tablet Take 1 tablet by mouth daily 30 tablet 0    alendronate (FOSAMAX) 70 MG tablet TAKE 1 TABLET BY MOUTH EVERY 7 DAYS. 12 tablet 2    vitamin E 450 MG (1000 UT) CAPS capsule Take 1,000 Units by mouth 2 times daily      Cholecalciferol (VITAMIN D3) 50 MCG (2000 UT) TABS Take 1 tablet by mouth daily 90 tablet 3    Calcium 600-200 MG-UNIT TABS Take 1 tablet by mouth 2 times daily 180 tablet 3     No current facility-administered medications for this visit. No Known Allergies  Social History     Tobacco Use    Smoking status: Never    Smokeless tobacco: Never   Substance Use Topics    Alcohol use: Yes     Alcohol/week: 3.0 standard drinks     Types: 3 Glasses of wine per week    Drug use: No     Family History   Problem Relation Age of Onset    Kidney Disease Mother     Cancer Father         Prostate        Review of Systems   Constitutional:  Negative for fatigue and fever.    HENT:  Negative for dental problem, hearing loss, mouth sores, nosebleeds, sore throat and trouble swallowing. Eyes:  Negative for discharge and itching. Respiratory:  Negative for cough, shortness of breath and wheezing. Cardiovascular:  Negative for chest pain, palpitations and leg swelling. Gastrointestinal:  Negative for abdominal pain, constipation, diarrhea, nausea and vomiting. Endocrine: Negative for cold intolerance and heat intolerance. Genitourinary:  Negative for dysuria, frequency, hematuria and urgency. Musculoskeletal:  Negative for arthralgias, joint swelling and myalgias. Skin:  Negative for pallor and rash. Allergic/Immunologic: Negative for environmental allergies and immunocompromised state. Neurological:  Negative for seizures, syncope and numbness. Hematological:  Negative for adenopathy. Does not bruise/bleed easily. Psychiatric/Behavioral:  Negative for agitation, behavioral problems and confusion. The patient is not nervous/anxious. Forgetful   Physical Exam  Vitals reviewed. Constitutional:       General: She is not in acute distress. Appearance: She is well-developed. She is not toxic-appearing or diaphoretic. Comments: Wearing a facial mask. thin   HENT:      Head: Normocephalic and atraumatic. Right Ear: External ear normal.      Left Ear: External ear normal.      Nose: Nose normal.      Mouth/Throat:      Mouth: Mucous membranes are moist.   Eyes:      General: No scleral icterus. Right eye: No discharge. Left eye: No discharge. Conjunctiva/sclera: Conjunctivae normal.   Neck:      Trachea: No tracheal deviation. Cardiovascular:      Rate and Rhythm: Normal rate and regular rhythm. Pulmonary:      Effort: Pulmonary effort is normal. No respiratory distress. Breath sounds: Normal breath sounds. No wheezing or rales. Chest:   Breasts:     Right: No supraclavicular adenopathy. Left: No supraclavicular adenopathy. Abdominal:      General: Bowel sounds are normal. There is no distension. Palpations: Abdomen is soft. Tenderness: There is no abdominal tenderness. There is no guarding. Genitourinary:     Comments: Exam deferred  Musculoskeletal:         General: No tenderness or deformity. Cervical back: Neck supple. No muscular tenderness. Comments: Normal ROM all four extremities   Lymphadenopathy:      Cervical:      Right cervical: No superficial or deep cervical adenopathy. Left cervical: No superficial or deep cervical adenopathy. Upper Body:      Right upper body: No supraclavicular adenopathy. Left upper body: No supraclavicular adenopathy. Comments:      Skin:     General: Skin is warm and dry. Findings: No rash. Neurological:      Mental Status: She is alert and oriented to person, place, and time. Comments: follows commands, non-focal   Psychiatric:         Behavior: Behavior normal. Behavior is cooperative. Thought Content: Thought content normal.         Judgment: Judgment normal.      Comments: Alert and oriented to person, place and time. Vitals:    09/02/22 1126   BP: (!) 142/86   Pulse: (!) 103   SpO2: 99%   Weight: 113 lb 3.2 oz (51.3 kg)      Wt Readings from Last 3 Encounters:   09/02/22 113 lb 3.2 oz (51.3 kg)   08/24/22 115 lb (52.2 kg)   08/18/22 115 lb (52.2 kg)     Result Date: 8/18/2022  No acute pathology. Recommendation: Follow up as clinically indicated. Electronically Signed by Anupam Iverson MD at 18-Aug-2022 12:38:15 PM             CTA PULMONARY W CONTRAST    Result Date: 8/18/2022  1. Pulmonary emboli to the bilateral lower lobes. 2. Patchy ground-glass and streaky markings in the posterior lungs, likely dependent atelectasis, with a few scattered blebs bilaterally. 3. The lungs are otherwise grossly clear. Recommendation: Clinical findings; follow-up is recommended.  All CT scans at this facility utilize dose modulation, iterative reconstruction, and/or weight based dosing when appropriate to reduce radiation dose to as low as reasonably achievable. Amended by Richard Sommers MD at 18-Aug-2022 02:00:46 PM Electronically Signed by Richard Sommers MD at 18-Aug-2022 12:55:46 PM             MRI BRAIN W WO CONTRAST    Result Date: 8/19/2022  1. Mild nonspecific but presumed microvascular changes in the periventricular white matter. 2.  Mild atrophic changes. 3.  Small right frontal and left pontine venous angiomas versus developmental venous anomalies. Recommendation: Follow up as clinically indicated. Electronically Signed by Celi Millard MD at 19-Aug-2022 02:23:03 PM                CBC:   Lab Results   Component Value Date    WBC 6.40 09/02/2022    HGB 13.5 09/02/2022    HCT 42.9 09/02/2022    MCV 94.3 09/02/2022     09/02/2022     ASSESSMENT/PLAN:  1. Bilateral pulmonary embolism ([address]    2. Lower leg DVT (deep venous thromboembolism), chronic, left ([address]    3. Anticoagulated    4. Cognitive change      Provoked pulmonary embolism/left lower extremity chronic DVT, August 2022  Anticoagulated  -h/o recent travel/COVID-19  -Bilateral pulmonary embolism  -Small chronic DVT below knee on the left  -Normal troponin, normal proBNP  -PESI score: Class II, low risk       Continue Eliquis 5 mg po BID (may switch to Eliquis if cheaper - patient will check with pharmacy)   Encourage strict compliance with Eliquis. Encourage frequent ambulation (q1-2 hours) with prolonged travel. Recommend routine, age-appropriate tumor screenings.   Recommend minimum 6 months anticoagulation     Labs 8/19/22:   Beta 2 Glycoprotein antibodies: IgG: <10, IgM: <10  Cardiolipin antibodies IgG: <10, IgM: <10  Lupus anticoagulant: not detected    Negative for APS  Completion of prothrombotic work-up requested, as noted below    Cognitive change forgetful, outpatient follow-up with INDERJIT Mark with neurology 9/12/2022    Care plan discussed with patient and Radha Bob  All questions answered    Orders Placed This Encounter Procedures    CBC with Auto Differential    Antithrombin 3 Activity    Factor 5 Leiden    Protein S Antigen, Free    Protein C Functional    JAK2 V617F Mutation Analysis, Qual rflx CALR/Exon 12-15/MPL     Return in about 3 months (around 12/2/2022) for follow up with INDERJIT Carson. I have seen, examined and reviewed this patient medication list, appropriate labs and imaging studies. I reviewed relevant medical records and others physicians notes. I discussed the plan of care with the patient. I answered all questions to the patients satisfaction. I have also reviewed the chief complaint (CC) and part of the history (History of Present Illness (HPI), Past Family Social History [facility]), or Review of Systems (ROS) and made changes when appropriated. Records, including labs from recent hospitalization 8/2022. Dictated utilizing Dragon transcription software.         INDERJIT Forte  12:56 PM  9/2/2022

## 2022-09-06 LAB — PROTEIN C FUNCTIONAL: 170 % (ref 83–168)

## 2022-09-07 LAB — PROTEIN S ANTIGEN, FREE: 141 % (ref 55–123)

## 2022-09-09 LAB
FACTOR V LEIDEN: NEGATIVE
PROTHROMBIN G20210A MUTATION: NEGATIVE
PT PCR SPECIMEN: NORMAL
SPECIMEN: NORMAL

## 2022-09-12 ENCOUNTER — OFFICE VISIT (OUTPATIENT)
Dept: NEUROSURGERY | Age: 77
End: 2022-09-12
Payer: MEDICARE

## 2022-09-12 VITALS
WEIGHT: 113 LBS | SYSTOLIC BLOOD PRESSURE: 126 MMHG | OXYGEN SATURATION: 97 % | HEART RATE: 102 BPM | HEIGHT: 64 IN | BODY MASS INDEX: 19.29 KG/M2 | DIASTOLIC BLOOD PRESSURE: 71 MMHG

## 2022-09-12 DIAGNOSIS — R41.3 MEMORY LOSS: ICD-10-CM

## 2022-09-12 DIAGNOSIS — R53.83 OTHER FATIGUE: ICD-10-CM

## 2022-09-12 DIAGNOSIS — R41.3 MEMORY LOSS: Primary | ICD-10-CM

## 2022-09-12 DIAGNOSIS — E55.9 VITAMIN D DEFICIENCY, UNSPECIFIED: ICD-10-CM

## 2022-09-12 LAB
C-REACTIVE PROTEIN: <0.3 MG/DL (ref 0–0.5)
HIV-1 P24 AG: NORMAL
RAPID HIV 1&2: NORMAL
SEDIMENTATION RATE, ERYTHROCYTE: 5 MM/HR (ref 0–25)
VITAMIN D 25-HYDROXY: 36.4 NG/ML

## 2022-09-12 PROCEDURE — G8427 DOCREV CUR MEDS BY ELIG CLIN: HCPCS | Performed by: NURSE PRACTITIONER

## 2022-09-12 PROCEDURE — G8399 PT W/DXA RESULTS DOCUMENT: HCPCS | Performed by: NURSE PRACTITIONER

## 2022-09-12 PROCEDURE — 1123F ACP DISCUSS/DSCN MKR DOCD: CPT | Performed by: NURSE PRACTITIONER

## 2022-09-12 PROCEDURE — 1111F DSCHRG MED/CURRENT MED MERGE: CPT | Performed by: NURSE PRACTITIONER

## 2022-09-12 PROCEDURE — 1090F PRES/ABSN URINE INCON ASSESS: CPT | Performed by: NURSE PRACTITIONER

## 2022-09-12 PROCEDURE — 99214 OFFICE O/P EST MOD 30 MIN: CPT | Performed by: NURSE PRACTITIONER

## 2022-09-12 PROCEDURE — 3017F COLORECTAL CA SCREEN DOC REV: CPT | Performed by: NURSE PRACTITIONER

## 2022-09-12 PROCEDURE — G8420 CALC BMI NORM PARAMETERS: HCPCS | Performed by: NURSE PRACTITIONER

## 2022-09-12 PROCEDURE — 1036F TOBACCO NON-USER: CPT | Performed by: NURSE PRACTITIONER

## 2022-09-12 NOTE — PROGRESS NOTES
[facility] Neurology Office Note      Patient:   Lesvia Hernandez  MR#:    [id number]  Account Number:                         YOB: 1945  Date of Evaluation:  9/12/2022  Time of Note:                          3:05 PM  Primary/Referring Physician:  Sonia Cha MD   Consulting Physician:  Kathy Baker DNP, APRN    NEW PATIENT CONSULTATION    Chief Complaint   Patient presents with    Follow-Up from [address] [place] [address] memory loss     Memory Loss       HISTORY OF PRESENT ILLNESS    Lesvia Hernandez is a 78y.o. year old female here for evaluation of memory loss. She was recently admitted with chest pain and found to have PEs as well. Occurred about 2 weeks after flying home from [facility]. Dr. Jairon Hendrickson was consulted while in the hospital in regards to her memory. She is accompanied by her friend today. She lives on her own. She has noted memory changes for about a year now, progressive. Primarily short term memory loss. There is repetition of stories and questions. No clear word recall difficulty. Memory loss is not interfering with ADLs. She is handling her own medications but using a chart to help keep track of this. Her friend is monitoring these as well. She is driving, no tickets, accidents or getting lost. She is cooking without difficulty. No issues with handling finances. Able to write a check. No tremor. No urinary incontinence. No gait changes. No depression. Notes some anxiety but situational in nature. No personality changes. No prior memory medications. No stroke history. Mother had memory loss.      Past Medical History:   Diagnosis Date    Cancer ([address]     basil cell skin cancers removed     Low back pain 2/7/2017    Mixed hyperlipidemia 5/18/2021    Osteoporosis     Osteoporosis with pathological fracture with routine healing 6/23/2017       Past Surgical History:   Procedure Laterality Date    SKIN CANCER EXCISION         Family History   Problem Relation Age of Onset    Kidney Disease Mother Cancer Father         Prostate        Social History     Socioeconomic History    Marital status: Single     Spouse name: Not on file    Number of children: Not on file    Years of education: Not on file    Highest education level: Not on file   Occupational History    Not on file   Tobacco Use    Smoking status: Never    Smokeless tobacco: Never   Vaping Use    Vaping Use: Not on file   Substance and Sexual Activity    Alcohol use: Yes     Alcohol/week: 3.0 standard drinks     Types: 3 Glasses of wine per week    Drug use: No    Sexual activity: Not on file   Other Topics Concern    Not on file   Social History Narrative    Not on file     Social Determinants of Health     Financial Resource Strain: Low Risk     Difficulty of Paying Living Expenses: Not very hard   Food Insecurity: No Food Insecurity    Worried About Running Out of Food in the Last Year: Never true    Ran Out of Food in the Last Year: Never true   Transportation Needs: Not on file   Physical Activity: Insufficiently Active    Days of Exercise per Week: 2 days    Minutes of Exercise per Session: 30 min   Stress: Not on file   Social Connections: Not on file   Intimate Partner Violence: Not on file   Housing Stability: Not on file       Current Outpatient Medications   Medication Sig Dispense Refill    apixaban starter pack (ELIQUIS DVT/PE STARTER PACK) 5 MG TBPK tablet Take 1 tablet by mouth See Admin Instructions 74 tablet 0    thiamine mononitrate (THIAMINE) 100 MG tablet Take 1 tablet by mouth daily 30 tablet 0    alendronate (FOSAMAX) 70 MG tablet TAKE 1 TABLET BY MOUTH EVERY 7 DAYS. 12 tablet 2    vitamin E 450 MG (1000 UT) CAPS capsule Take 1,000 Units by mouth 2 times daily      Cholecalciferol (VITAMIN D3) 50 MCG (2000 UT) TABS Take 1 tablet by mouth daily 90 tablet 3    Calcium 600-200 MG-UNIT TABS Take 1 tablet by mouth 2 times daily 180 tablet 3     No current facility-administered medications for this visit.        No Known Allergies    REVIEW OF SYSTEMS  Constitutional: []Fever []Sweats []Chills [] Recent Injury [x] Denies all unless marked  HEENT:[]Headache  [] Head Injury [] Hearing Loss  [] Sore Throat  [] Ear Ache [x] Denies all unless marked  Spine:  [] Neck pain  [] Back pain  [] Sciaticia  [x] Denies all unless marked  Cardiovascular:[]Heart Disease []Palpitations [] Chest Pain   [x] Denies all unless marked  Pulmonary: []Shortness of Breath []Cough   [x] Denies all unless marked  Psychiatric/Behavioral:[] Depression [] Anxiety [x] Denies all unless marked  Gastrointestinal: []Nausea  []Vomiting  []Abdominal Pain  []Constipation  []Diarrhea  [x] Denies all unless marked  Genitourinary:   [] Frequency  [] Urgency  [] Dysuria [] Incontinence  [x] Denies all unless marked  Extremities: []Pain  []Swelling  [x] Denies all unless marked  Musculoskeletal: [] Myalgias  [] Joint Pain  [] Arthritis [] Muscle Cramps [] Muscle Twitches  [x] Denies all unless marked  Sleep: []Insomnia[]Snoring []Restless Legs  []Sleep Apnea  []Daytime Sleepiness  [x] Denies all unless marked  Skin:[] Rash [] Color Change [x] Denies all unless marked   Neurological:[]Visual Disturbance [] Memory Loss []Loss of Balance []Slurred Speech []Weakness []Seizures  [] Dizziness [x] Denies all unless marked    The MA has completed the ROS with the patient. I have reviewed it in its' entirety with the patient and agree with the documentation.      PHYSICAL EXAM  /71   Pulse (!) 102   Ht 5' 4\" (1.626 m)   Wt 113 lb (51.3 kg)   SpO2 97%   BMI 19.40 kg/m²       Constitutional - No acute distress    HEENT- Conjunctiva normal.  No scars, masses, or lesions over external nose or ears, no neck masses noted, no jugular vein distension, no bruit  Cardiac- Regular rate and rhythm  Pulmonary- Good expansion, normal effort without use of accessory muscles  Musculoskeletal - No significant wasting of muscles noted, no bony deformities  Extremities - No clubbing, cyanosis or edema  Skin - Warm, dry, and intact. No rash, erythema, or pallor  Psychiatric - Mood, affect, and behavior appear normal      NEUROLOGICAL EXAM     Mental status   [x] Awake, alert, oriented   [x]Affect attention and concentration appear appropriate  []Recent and remote memory appears unremarkable  [x]Speech normal without dysarthria or aphasia, comprehension and repetition intact. COMMENTS: MoCA 22/30    Cranial Nerves [x]No VF deficit to confrontation,  no papilledema on fundoscopic exam.  [x]PERRLA, EOMI, no nystagmus, conjugate eye movements, no ptosis  [x]Face symmetric  [x]Facial sensation intact  [x]Tongue midline no atrophy or fasciculations present  [x]Palate midline, hearing to finger rub normal bilaterally  [x]Shoulder shrug and SCM testing normal bilaterally  COMMENTS:   Motor   [x]5/5 strength x 4 extremities  [x]Normal bulk and tone  [x]No tremor present  [x]No rigidity or bradykinesia noted  COMMENTS:   Sensory  [x]Sensation intact to light touch, pin prick, vibration, and proprioception BLE  [x]Sensation intact to light touch, pin prick, vibration, and proprioception BUE  COMMENTS:   Coordination [x]FTN normal bilaterally   [x]HTS normal bilaterally  [x]POONAM normal bilaterally.    COMMENTS:   Reflexes  [x]Symmetric and non-pathological  [x]Toes down going bilaterally  [x]No clonus present  COMMENTS:   Gait                  [x]Normal steady gait    []Ataxic    []Spastic     []Magnetic     []Shuffling  COMMENTS:       LABS RECORD AND IMAGING REVIEW (As below and per HPI)    Lab Results   Component Value Date    EBUITJYB36 511 08/18/2022     Lab Results   Component Value Date    WBC 6.40 09/02/2022    HGB 13.5 09/02/2022    HCT 42.9 09/02/2022    MCV 94.3 09/02/2022     09/02/2022     Lab Results   Component Value Date     08/19/2022    K 3.9 08/19/2022     08/19/2022    CO2 25 08/19/2022    BUN 9 08/19/2022    CREATININE 0.4 (L) 08/19/2022    GLUCOSE 104 08/19/2022    CALCIUM 9.0 2022    PROT 6.9 2022    LABALBU 4.2 2022    BILITOT 0.4 2022    ALKPHOS 64 2022    AST 24 2022    ALT 10 2022    LABGLOM >60 2022    GFRAA >59 2022     Lab Results   Component Value Date    CHOL 269 (H) 2022    TRIG 73 2022    HDL 97 2022    LDLCALC 157 2022     Lab Results   Component Value Date    TSH 3.920 2022    T4FREE 1.1 2017     Lab Results   Component Value Date    CRP 0.07 2017    SEDRATE 4 2017      MRI brain (2022)-   Impression   1. Mild nonspecific but presumed microvascular changes in the periventricular white matter. 2.  Mild atrophic changes. 3.  Small right frontal and left pontine venous angiomas versus developmental venous anomalies. Recommendation:    Follow up as clinically indicated. Electronically Signed by Antonia Vázquez MD at 19-Aug-2022 02:23:03 PM      Reviewed referral records     ASSESSMENT:    Jonathan Bassett is a 78y.o. year old female here for evaluation of memory loss. She was recently admitted for PE, now on Eliquis. She saw Dr. Michelle Madrid while in the hospital for memory loss. MoCA today is 22/30 with difficulty in the recall portion primarily. MRI brain with  and mild atrophy. Will plan for additional labs today to exclude secondary sources. Question underlying dementia given history and MoCA. Discussed Namenda but patient wants to hold off on this for now, felt reasonable. ICD-10-CM    1. Memory loss  R41.3 HIV Screen     Vitamin B1, Whole Blood     Sedimentation Rate     RPR     C-Reactive Protein     Vitamin D 25 Hydroxy      2. Other fatigue   R53.83 HIV Screen      3. Vitamin D deficiency, unspecified   E55.9 Vitamin D 25 Hydroxy        PLAN:  Additional labs as listed above   Discussed safety concerns, increase supervision, ok to drive at this time from neuro perspective- just local, medication monitoring/management.  Recommend 30 minutes daily exercise, daily mental stimulation, and healthy diet with fish, fruits, vegetables, fiber and water   Continue Eliquis for PE, continue f/u with hematology as previously scheduled   4. Return in about 2 months (around 11/12/2022) for follow up, sooner if worsening.     Farhana Azar DNP, APRN

## 2022-09-13 ENCOUNTER — TELEPHONE (OUTPATIENT)
Dept: INTERNAL MEDICINE | Age: 77
End: 2022-09-13

## 2022-09-13 NOTE — TELEPHONE ENCOUNTER
----- Message from Shwetha Tanmayns sent at 9/12/2022 11:41 AM CDT -----  Subject: Message to Provider    QUESTIONS  Information for Provider?  Patient called would like to know if labs are   needed for appointment [email or web address], patient stated she's also having   labs done for Physician, would like if office could call with questions   and concerns she have  ---------------------------------------------------------------------------  --------------  [address]  [phone]; OK to leave message on voicemail  ---------------------------------------------------------------------------  --------------  SCRIPT ANSWERS  undefined

## 2022-09-14 LAB
BACKGROUND, 480093: NORMAL
BACKGROUND, 489163: NORMAL
CALR MUTATION DETECTION RESULT, 489451: NORMAL
DIRECTOR REVIEW: NORMAL
EXTRACTION: NORMAL
JAK2 EXONS 12-15 MUT DET PCR: NORMAL
JAK2 V617F MUTATION DETECTION 489201: NORMAL
Lab: NORMAL
METHOD: NORMAL
MPL MUTATION ANALYSIS RESULT: NORMAL
REFERENCES: NORMAL
REFERENCES: NORMAL
REFLEX: NORMAL

## 2022-09-15 LAB — RPR: NORMAL

## 2022-09-16 LAB — VITAMIN B1 WHOLE BLOOD: 164 NMOL/L (ref 70–180)

## 2022-10-04 ENCOUNTER — OFFICE VISIT (OUTPATIENT)
Dept: INTERNAL MEDICINE | Age: 77
End: 2022-10-04
Payer: MEDICARE

## 2022-10-04 ENCOUNTER — TELEPHONE (OUTPATIENT)
Dept: INTERNAL MEDICINE | Age: 77
End: 2022-10-04

## 2022-10-04 VITALS
WEIGHT: 115.6 LBS | OXYGEN SATURATION: 97 % | SYSTOLIC BLOOD PRESSURE: 134 MMHG | RESPIRATION RATE: 20 BRPM | HEIGHT: 64 IN | BODY MASS INDEX: 19.74 KG/M2 | DIASTOLIC BLOOD PRESSURE: 78 MMHG | HEART RATE: 90 BPM

## 2022-10-04 DIAGNOSIS — F02.A0 MILD EARLY ONSET ALZHEIMER'S DEMENTIA WITHOUT BEHAVIORAL DISTURBANCE, PSYCHOTIC DISTURBANCE, MOOD DISTURBANCE, OR ANXIETY (HCC): ICD-10-CM

## 2022-10-04 DIAGNOSIS — I26.99 BILATERAL PULMONARY EMBOLISM (HCC): Primary | ICD-10-CM

## 2022-10-04 DIAGNOSIS — G30.0 MILD EARLY ONSET ALZHEIMER'S DEMENTIA WITHOUT BEHAVIORAL DISTURBANCE, PSYCHOTIC DISTURBANCE, MOOD DISTURBANCE, OR ANXIETY (HCC): ICD-10-CM

## 2022-10-04 DIAGNOSIS — M81.8 OTHER OSTEOPOROSIS, UNSPECIFIED PATHOLOGICAL FRACTURE PRESENCE: ICD-10-CM

## 2022-10-04 DIAGNOSIS — Z23 FLU VACCINE NEED: ICD-10-CM

## 2022-10-04 PROCEDURE — 99214 OFFICE O/P EST MOD 30 MIN: CPT | Performed by: INTERNAL MEDICINE

## 2022-10-04 PROCEDURE — G8427 DOCREV CUR MEDS BY ELIG CLIN: HCPCS | Performed by: INTERNAL MEDICINE

## 2022-10-04 PROCEDURE — 1036F TOBACCO NON-USER: CPT | Performed by: INTERNAL MEDICINE

## 2022-10-04 PROCEDURE — 90694 VACC AIIV4 NO PRSRV 0.5ML IM: CPT | Performed by: INTERNAL MEDICINE

## 2022-10-04 PROCEDURE — G0008 ADMIN INFLUENZA VIRUS VAC: HCPCS | Performed by: INTERNAL MEDICINE

## 2022-10-04 PROCEDURE — G8420 CALC BMI NORM PARAMETERS: HCPCS | Performed by: INTERNAL MEDICINE

## 2022-10-04 PROCEDURE — G8399 PT W/DXA RESULTS DOCUMENT: HCPCS | Performed by: INTERNAL MEDICINE

## 2022-10-04 PROCEDURE — 1123F ACP DISCUSS/DSCN MKR DOCD: CPT | Performed by: INTERNAL MEDICINE

## 2022-10-04 PROCEDURE — 3017F COLORECTAL CA SCREEN DOC REV: CPT | Performed by: INTERNAL MEDICINE

## 2022-10-04 PROCEDURE — G8484 FLU IMMUNIZE NO ADMIN: HCPCS | Performed by: INTERNAL MEDICINE

## 2022-10-04 PROCEDURE — 1090F PRES/ABSN URINE INCON ASSESS: CPT | Performed by: INTERNAL MEDICINE

## 2022-10-04 RX ORDER — ZOSTER VACCINE RECOMBINANT, ADJUVANTED 50 MCG/0.5
0.5 KIT INTRAMUSCULAR SEE ADMIN INSTRUCTIONS
Qty: 0.5 ML | Refills: 1 | Status: SHIPPED | OUTPATIENT
Start: 2022-10-04 | End: 2023-04-02

## 2022-10-04 RX ORDER — MEMANTINE HYDROCHLORIDE 7-14-21-28
KIT ORAL
Qty: 28 CAPSULE | Refills: 0 | Status: SHIPPED | OUTPATIENT
Start: 2022-10-04

## 2022-10-04 RX ORDER — APIXABAN 5 MG/1
5 TABLET, FILM COATED ORAL 2 TIMES DAILY
COMMUNITY
Start: 2022-09-24

## 2022-10-04 NOTE — PROGRESS NOTES
After obtaining consent, and per orders of Dr. Elaine Wilkinson, injection of HD flu given in Left deltoid by Crystal Ramon. Patient instructed to remain in clinic for 20 minutes afterwards, and to report any adverse reaction to me immediately. [address] #[id number] LOT #[id number] EXP 6/14/23.

## 2022-10-04 NOTE — TELEPHONE ENCOUNTER
[facility] left message saying Memantine tritation pack is unavailable thru [facility] is on back order until 11/4. Attempted to call pt to see if she wanted to try another pharmacy but no answer and unable to leave a message.

## 2022-10-04 NOTE — PROGRESS NOTES
Chief Complaint   Patient presents with    Follow-up    Memory Loss     Patient complains of worsening memory loss over the past year. HPI: Patient is here today to follow-up recent hospitalization where she had a bilateral PE probably a provoked PE she had been on a trip to [facility] happened shortly after that. She is well compensated doing okay she has follow-up with hematology apparently they were consulted during the hospitalization. She comes in today with a friend she has had ongoing memory loss a lot of times it is seem more like a preoccupation better when she is busy traveling etc.  But 1 we have suggested medication she is always declined today she is here with a friend she is tearful because she realizes she cannot remember well and even cannot remember everything about her hospital stay tearful talking about this. Past Medical History:   Diagnosis Date    Cancer ([facility] [address]     basil cell skin cancers removed     Low back pain 2/7/2017    Mixed hyperlipidemia 5/18/2021    Osteoporosis     Osteoporosis with pathological fracture with routine healing 6/23/2017       Past Surgical History:   Procedure Laterality Date    SKIN CANCER EXCISION         Family History   Problem Relation Age of Onset    Kidney Disease Mother     Cancer Father         Prostate        Social History     Socioeconomic History    Marital status: Single     Spouse name: Not on file    Number of children: Not on file    Years of education: Not on file    Highest education level: Not on file   Occupational History    Not on file   Tobacco Use    Smoking status: Never    Smokeless tobacco: Never   Vaping Use    Vaping Use: Not on file   Substance and Sexual Activity    Alcohol use:  Yes     Alcohol/week: 3.0 standard drinks     Types: 3 Glasses of wine per week    Drug use: No    Sexual activity: Not on file   Other Topics Concern    Not on file   Social History Narrative    Not on file     Social Determinants of Health     Financial Resource Strain: Low Risk     Difficulty of Paying Living Expenses: Not very hard   Food Insecurity: No Food Insecurity    Worried About Running Out of Food in the Last Year: Never true    Ran Out of Food in the Last Year: Never true   Transportation Needs: Not on file   Physical Activity: Insufficiently Active    Days of Exercise per Week: 2 days    Minutes of Exercise per Session: 30 min   Stress: Not on file   Social Connections: Not on file   Intimate Partner Violence: Not on file   Housing Stability: Not on file       No Known Allergies    Current Outpatient Medications   Medication Sig Dispense Refill    ELIQUIS 5 MG TABS tablet 5 mg in the morning and at bedtime      zoster recombinant adjuvanted vaccine (SHINGRIX) 50 MCG/0.5ML SUSR injection Inject 0.5 mLs into the muscle See Admin Instructions 1 dose now and repeat in 2-6 months 0.5 mL 1    Memantine HCl ER (NAMENDA XR TITRATION PACK) 7 & 14 & 21 &28 MG CP24 Use as directed and call for continuing dose after that 28 capsule 0    alendronate (FOSAMAX) 70 MG tablet TAKE 1 TABLET BY MOUTH EVERY 7 DAYS. 12 tablet 2    vitamin E 450 MG (1000 UT) CAPS capsule Take 1,000 Units by mouth 2 times daily      Cholecalciferol (VITAMIN D3) 50 MCG (2000 UT) TABS Take 1 tablet by mouth daily 90 tablet 3    Calcium 600-200 MG-UNIT TABS Take 1 tablet by mouth 2 times daily 180 tablet 3    thiamine mononitrate (THIAMINE) 100 MG tablet Take 1 tablet by mouth daily 30 tablet 0     No current facility-administered medications for this visit.        Review of Systems    /78 (Site: Left Upper Arm, Position: Sitting)   Pulse 90   Resp 20   Ht 5' 4\" (1.626 m)   Wt 115 lb 9.6 oz (52.4 kg)   SpO2 97%   BMI 19.84 kg/m²   BP Readings from Last 7 Encounters:   10/04/22 134/78   09/12/22 126/71   09/02/22 (!) 142/86   08/24/22 138/64   08/19/22 137/72   08/09/22 120/70   05/09/22 110/72     Wt Readings from Last 7 Encounters:   10/04/22 115 lb 9.6 oz (52.4 kg)   09/12/22 113 lb event but will need close follow-up we instructed her on keeping active walking exercising legs etc.    2. 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 Results did indicate mild neurocognitive impairment etiology most likely Alzheimer's with family history and lack of vascular issues at that time and since then we had suggested or Namenda. Patient always very resistant to medicine however today she has noted further worsening of symptoms and is agreeable to adding medicine we are going to add it with a starter pack based on the fact that her symptoms are more advanced at this time. She also has a lot of hesitancy with medication and working to go with a once a day version and then hopefully add in Aricept second and the combination medication. She has no family social support but has apparently excellent neighbors who are helping her out and one of her neighbors is with we specifically talked about power of  living will and end-of-life type issues going ahead and addressing these things now and it sounds like she is doing that and in the process. Record in chart in August neurology had done an MRI of the brain  Watch again about avoidance of isolation and about walking with her neighbor doing puzzles reading books crossword puzzles anything to help keep her mind active and that walking may help with her memory also    3.  Other osteoporosis, unspecified pathological fracture presence  Ongoing treatment with fosamax we finally got her to take this after a long long period of encouragement.     4. Flu vaccine need    - Influenza, FLUAD, (age 72 y+), IM, Preservative Free, 0.5 mL

## 2022-10-06 NOTE — TELEPHONE ENCOUNTER
Justino Villalobos called back and said the best thing to do would be order 5mg tablets and take 1 QD X 7 days, BID X 7 days, 2QAM &1QPM X 7 days, and then 2 BID. Please advise if ok to order this way.

## 2022-10-06 NOTE — TELEPHONE ENCOUNTER
Ok to order this way- give them verbal for ok and since it is same as titration pack dont have to change what is is chart

## 2022-10-16 PROBLEM — F02.A0 MILD EARLY ONSET ALZHEIMER'S DEMENTIA WITHOUT BEHAVIORAL DISTURBANCE, PSYCHOTIC DISTURBANCE, MOOD DISTURBANCE, OR ANXIETY (HCC): Status: ACTIVE | Noted: 2017-12-28

## 2022-10-16 PROBLEM — G30.0 MILD EARLY ONSET ALZHEIMER'S DEMENTIA WITHOUT BEHAVIORAL DISTURBANCE, PSYCHOTIC DISTURBANCE, MOOD DISTURBANCE, OR ANXIETY (HCC): Status: ACTIVE | Noted: 2017-12-28

## 2022-10-18 NOTE — ANESTHESIA PREPROCEDURE EVALUATION
Anesthesia Evaluation     Patient summary reviewed and Nursing notes reviewed   no history of anesthetic complications:  NPO Status: > 6 hours   Airway   Mallampati: I  TM distance: >3 FB  Neck ROM: full  no difficulty expected  Dental - normal exam     Pulmonary - normal exam   Cardiovascular - negative cardio ROS and normal exam        Neuro/Psych  (+) psychiatric history Anxiety,    GI/Hepatic/Renal/Endo - negative ROS     Musculoskeletal     (+) back pain,   Abdominal  - normal exam   Substance History - negative use     OB/GYN negative ob/gyn ROS         Other   (+) arthritis                                 Anesthesia Plan    ASA 2     general     intravenous induction   Anesthetic plan and risks discussed with patient and spouse/significant other.      
OB Provider reported that the vagina was inspected and no foreign body was found/Laps, needles and instrument count was correct

## 2022-11-03 NOTE — TELEPHONE ENCOUNTER
[facility] is calling to see if a higher dose of Namenda is needed? She has been taking the 7mg dose.

## 2022-11-07 RX ORDER — MEMANTINE HYDROCHLORIDE 7 MG/1
CAPSULE, EXTENDED RELEASE ORAL
Qty: 70 CAPSULE | Refills: 0 | OUTPATIENT
Start: 2022-11-07

## 2022-11-07 RX ORDER — MEMANTINE HYDROCHLORIDE 28 MG/1
28 CAPSULE, EXTENDED RELEASE ORAL DAILY
Qty: 90 CAPSULE | Refills: 3 | Status: SHIPPED | OUTPATIENT
Start: 2022-11-07 | End: 2022-11-08 | Stop reason: SDUPTHER

## 2022-11-08 RX ORDER — MEMANTINE HYDROCHLORIDE 28 MG/1
28 CAPSULE, EXTENDED RELEASE ORAL DAILY
Qty: 90 CAPSULE | Refills: 3 | Status: SHIPPED | OUTPATIENT
Start: 2022-11-08

## 2022-11-08 RX ORDER — APIXABAN 5 MG/1
5 TABLET, FILM COATED ORAL 2 TIMES DAILY
Qty: 180 TABLET | Refills: 2 | Status: SHIPPED | OUTPATIENT
Start: 2022-11-08

## 2022-11-21 ENCOUNTER — OFFICE VISIT (OUTPATIENT)
Dept: NEUROLOGY | Age: 77
End: 2022-11-21
Payer: MEDICARE

## 2022-11-21 VITALS
BODY MASS INDEX: 19.63 KG/M2 | OXYGEN SATURATION: 100 % | HEIGHT: 64 IN | SYSTOLIC BLOOD PRESSURE: 138 MMHG | DIASTOLIC BLOOD PRESSURE: 86 MMHG | WEIGHT: 115 LBS | HEART RATE: 110 BPM

## 2022-11-21 DIAGNOSIS — R41.3 MEMORY LOSS: Primary | ICD-10-CM

## 2022-11-21 PROCEDURE — G8420 CALC BMI NORM PARAMETERS: HCPCS | Performed by: NURSE PRACTITIONER

## 2022-11-21 PROCEDURE — 99213 OFFICE O/P EST LOW 20 MIN: CPT | Performed by: NURSE PRACTITIONER

## 2022-11-21 PROCEDURE — G8427 DOCREV CUR MEDS BY ELIG CLIN: HCPCS | Performed by: NURSE PRACTITIONER

## 2022-11-21 PROCEDURE — G8399 PT W/DXA RESULTS DOCUMENT: HCPCS | Performed by: NURSE PRACTITIONER

## 2022-11-21 PROCEDURE — G8484 FLU IMMUNIZE NO ADMIN: HCPCS | Performed by: NURSE PRACTITIONER

## 2022-11-21 PROCEDURE — 1123F ACP DISCUSS/DSCN MKR DOCD: CPT | Performed by: NURSE PRACTITIONER

## 2022-11-21 PROCEDURE — 1090F PRES/ABSN URINE INCON ASSESS: CPT | Performed by: NURSE PRACTITIONER

## 2022-11-21 PROCEDURE — 3017F COLORECTAL CA SCREEN DOC REV: CPT | Performed by: NURSE PRACTITIONER

## 2022-11-21 PROCEDURE — 1036F TOBACCO NON-USER: CPT | Performed by: NURSE PRACTITIONER

## 2022-11-21 NOTE — PROGRESS NOTES
[facility] Neurology Office Note      Patient:   Azalia Harley  MR#:    [id number]  Account Number:                         YOB: 1945  Date of Evaluation:  11/21/2022  Time of Note:                          12:00 PM  Primary/Referring Physician:  Dick Saha MD   Consulting Physician:  Pam Wynne DNP, APRN    FOLLOW UP    Chief Complaint   Patient presents with    Follow-up     Pt states memory is about the same. She notes worsening with anxiety or stress. Memory Loss     HISTORY OF PRESENT ILLNESS    Azalia Harley is a 78y.o. year old female here for follow up of memory loss. She is alone at today's appointment but able to provide a good history. Doing about the same from prior visit, no clear progression from prior. She lives on her own, she has friends and neighbors who keep an eye on her as well. She has noted memory changes for about a year now, progressive. Primarily short term memory loss. There is repetition of stories and questions. No clear word recall difficulty. Memory loss is not interfering with ADLs. She is handling her own medications but using a chart to help keep track of this. Her friend is monitoring these as well. She is driving, no tickets, accidents or getting lost. She is cooking without difficulty. No issues with handling finances. Able to write a check. No tremor. No urinary incontinence. No gait changes. No depression. Notes some anxiety but situational in nature. No personality changes. Recently started on Namenda XR through primary. No stroke history. Mother had memory loss. She was previously admitted with chest pain and found to have PEs, occurred about 2 weeks after flying home from [facility]. Dr. Irais Calderon was consulted while in the hospital in regards to her memory.      Past Medical History:   Diagnosis Date    Cancer ([facility] [address]     basil cell skin cancers removed     Low back pain 2/7/2017    Mixed hyperlipidemia 5/18/2021    Osteoporosis     Osteoporosis with pathological fracture with routine healing 6/23/2017       Past Surgical History:   Procedure Laterality Date    SKIN CANCER EXCISION         Family History   Problem Relation Age of Onset    Kidney Disease Mother     Cancer Father         Prostate        Social History     Socioeconomic History    Marital status: Single     Spouse name: Not on file    Number of children: Not on file    Years of education: Not on file    Highest education level: Not on file   Occupational History    Not on file   Tobacco Use    Smoking status: Never    Smokeless tobacco: Never   Vaping Use    Vaping Use: Not on file   Substance and Sexual Activity    Alcohol use: Yes     Alcohol/week: 3.0 standard drinks     Types: 3 Glasses of wine per week    Drug use: No    Sexual activity: Not on file   Other Topics Concern    Not on file   Social History Narrative    Not on file     Social Determinants of Health     Financial Resource Strain: Low Risk     Difficulty of Paying Living Expenses: Not very hard   Food Insecurity: No Food Insecurity    Worried About Running Out of Food in the Last Year: Never true    Ran Out of Food in the Last Year: Never true   Transportation Needs: Not on file   Physical Activity: Insufficiently Active    Days of Exercise per Week: 2 days    Minutes of Exercise per Session: 30 min   Stress: Not on file   Social Connections: Not on file   Intimate Partner Violence: Not on file   Housing Stability: Not on file       Current Outpatient Medications   Medication Sig Dispense Refill    ELIQUIS 5 MG TABS tablet Take 1 tablet by mouth in the morning and at bedtime 180 tablet 2    memantine ER (NAMENDA XR) 28 MG CP24 extended release capsule Take 1 capsule by mouth daily 90 capsule 3    vitamin E 450 MG (1000 UT) CAPS capsule Take 1,000 Units by mouth 2 times daily       No current facility-administered medications for this visit.        No Known Allergies    REVIEW OF SYSTEMS  Constitutional: []Fever []Sweats []Chills [] Recent Injury [x] Denies all unless marked  HEENT:[]Headache  [] Head Injury [] Hearing Loss  [] Sore Throat  [] Ear Ache [x] Denies all unless marked  Spine:  [] Neck pain  [] Back pain  [] Sciaticia  [x] Denies all unless marked  Cardiovascular:[]Heart Disease []Palpitations [] Chest Pain   [x] Denies all unless marked  Pulmonary: []Shortness of Breath []Cough   [x] Denies all unless marked  Psychiatric/Behavioral:[] Depression [] Anxiety [x] Denies all unless marked  Gastrointestinal: []Nausea  []Vomiting  []Abdominal Pain  []Constipation  []Diarrhea  [x] Denies all unless marked  Genitourinary:   [] Frequency  [] Urgency  [] Dysuria [] Incontinence  [x] Denies all unless marked  Extremities: []Pain  []Swelling  [x] Denies all unless marked  Musculoskeletal: [] Myalgias  [] Joint Pain  [] Arthritis [] Muscle Cramps [] Muscle Twitches  [x] Denies all unless marked  Sleep: []Insomnia[]Snoring []Restless Legs  []Sleep Apnea  []Daytime Sleepiness  [x] Denies all unless marked  Skin:[] Rash [] Color Change [x] Denies all unless marked   Neurological:[]Visual Disturbance [x] Memory Loss []Loss of Balance []Slurred Speech []Weakness []Seizures  [] Dizziness [x] Denies all unless marked    The MA has completed the ROS with the patient. I have reviewed it in its' entirety with the patient and agree with the documentation. PHYSICAL EXAM  /86   Pulse (!) 110   Ht 5' 4\" (1.626 m)   Wt 115 lb (52.2 kg)   SpO2 100%   BMI 19.74 kg/m²       Constitutional - No acute distress    HEENT- Conjunctiva normal.  No scars, masses, or lesions over external nose or ears, no neck masses noted, no jugular vein distension, no bruit  Cardiac- Regular rate and rhythm  Pulmonary- Good expansion, normal effort without use of accessory muscles  Musculoskeletal - No significant wasting of muscles noted, no bony deformities  Extremities - No clubbing, cyanosis or edema  Skin - Warm, dry, and intact.   No rash, erythema, or pallor  Psychiatric - Mood, affect, and behavior appear normal      NEUROLOGICAL EXAM     Mental status   [x] Awake, alert, oriented   [x]Affect attention and concentration appear appropriate  []Recent and remote memory appears unremarkable  [x]Speech normal without dysarthria or aphasia, comprehension and repetition intact. COMMENTS: MoCA 22/30    Cranial Nerves [x]No VF deficit to confrontation,  no papilledema on fundoscopic exam.  [x]PERRLA, EOMI, no nystagmus, conjugate eye movements, no ptosis  [x]Face symmetric  [x]Facial sensation intact  [x]Tongue midline no atrophy or fasciculations present  [x]Palate midline, hearing to finger rub normal bilaterally  [x]Shoulder shrug and SCM testing normal bilaterally  COMMENTS:   Motor   [x]5/5 strength x 4 extremities  [x]Normal bulk and tone  [x]No tremor present  [x]No rigidity or bradykinesia noted  COMMENTS:   Sensory  [x]Sensation intact to light touch, pin prick, vibration, and proprioception BLE  [x]Sensation intact to light touch, pin prick, vibration, and proprioception BUE  COMMENTS:   Coordination [x]FTN normal bilaterally   [x]HTS normal bilaterally  [x]POONAM normal bilaterally.    COMMENTS:   Reflexes  [x]Symmetric and non-pathological  [x]Toes down going bilaterally  [x]No clonus present  COMMENTS:   Gait                  [x]Normal steady gait    []Ataxic    []Spastic     []Magnetic     []Shuffling  COMMENTS:       LABS RECORD AND IMAGING REVIEW (As below and per HPI)    Lab Results   Component Value Date    PBEQHGVL13 511 08/18/2022     Lab Results   Component Value Date    WBC 6.40 09/02/2022    HGB 13.5 09/02/2022    HCT 42.9 09/02/2022    MCV 94.3 09/02/2022     09/02/2022     Lab Results   Component Value Date     08/19/2022    K 3.9 08/19/2022     08/19/2022    CO2 25 08/19/2022    BUN 9 08/19/2022    CREATININE 0.4 (L) 08/19/2022    GLUCOSE 104 08/19/2022    CALCIUM 9.0 08/19/2022    PROT 6.9 08/18/2022    LABALBU 4.2 08/18/2022 BILITOT 0.4 2022    ALKPHOS 64 2022    AST 24 2022    ALT 10 2022    LABGLOM >60 2022    GFRAA >59 2022     Lab Results   Component Value Date    CHOL 269 (H) 2022    TRIG 73 2022    HDL 97 2022    LDLCALC 157 2022     Lab Results   Component Value Date    TSH 3.920 2022    T4FREE 1.1 2017     Lab Results   Component Value Date    CRP <0.30 2022    SEDRATE 5 2022      MRI brain (2022)-   Impression   1. Mild nonspecific but presumed microvascular changes in the periventricular white matter. 2.  Mild atrophic changes. 3.  Small right frontal and left pontine venous angiomas versus developmental venous anomalies. Recommendation:    Follow up as clinically indicated. Electronically Signed by Camilo Butterfield MD at 19-Aug-2022 02:23:03 PM      Reviewed referral records    ASSESSMENT:    Dedra Haque is a 78y.o. year old female here for follow up of memory loss. Largely unchanged from prior visit. Prior MoCA  with difficulty in the recall portion primarily. MRI brain with  and mild atrophy. Labs unremarkable for secondary source. She was recently started on Namenda through primary, will continue this. History is more consistent with MCI however question underlying dementia given MoCA. Will plan to continue Namenda XR, add Aricept with any progression. On Eliquis for PE and following with hematology. ICD-10-CM    1. Memory loss  R41.3         PLAN:  Continue with Namenda XR. Add Aricept with progression. Discussed safety concerns, increase supervision, ok to drive at this time from neuro perspective- just local, medication monitoring/management. Recommend 30 minutes daily exercise, daily mental stimulation, and healthy diet with fish, fruits, vegetables, fiber and water   Continue Eliquis for PE, continue f/u with hematology as previously scheduled   4.    Return in about 6 months (around 2023) for follow up, sooner if worsening.     Jett Prince DNP, APRN

## 2022-12-01 NOTE — PROGRESS NOTES
Progress Note      Pt Name: Maikel Whitehead  Birthdate: [birth year]  MRN: [id number]    Date of evaluation: 12/2/2022  History Obtained From:  Patient, EMR  PCP: Dr. Mari Agudelo  Neurology: INDERJIT Bergman    HISTORY OF PRESENT ILLNESS:    Diagnosed  Provoked bilateral PE/chronic LLE DVT, 8/18/2022     Treatment summary  Lovenox 1 mg/kg twice daily, changed to Eliquis     Lee Ann Mcclure was seen in hematology consultation 8/18/2022 as an inpatient at [facility] regarding pulmonary embolism and DVT of the lower extremity. The patient presented to the ER with complaint of acute onset chest pain. She has no significant cardiac history. PMH significant for osteoporosis, on Fosamax. O2 saturation 98% on room air. Heart rate 95.  8/18/2022-CTA pulmonary with contrast showed pulmonary emboli to the bilateral lower lobes. Patchy groundglass and streaky markings in the posterior lungs likely dependent atelectasis. The lungs are otherwise grossly clear. 8/18/2022-proBNP 31, troponin x2 normal, D-Dimer 1.80  8/18/2022-patient started on Lovenox 1 mg/kg twice daily. Normal renal function. 8/18/2022-2D echo showed LVEF 55-60%. Mild concentric LVH. Normal right-sided chambers with preserved RV systolic function. 8/18/2022-VL BILATERAL LE small area of nonocclusive chronic appearing DVT in a left Gastro vein in the calf. No svt, reflux noted at this time. 8/19/2022-Lovenox discontinued, Eliquis 10 mg po BID x7 days (followed by 5 mg po BID thereafter) initiated     Bilateral PE/ LLE DVT initially thought to be unprovoked. In hindsight, Genny Smith states she traveled to [facility] 6/29/2022-7/12/2022. Upon return, she tested positive for COVID-19. Bilateral PE/LLE DVT thus likely provoked. INTERVAL HISTORY/HISTORY OF PRESENT ILLNESS:    Lee Ann Mcclure is a pleasant 81-year-old  returning to the clinic to discuss prothrombotic work-up regarding her diagnosis of PE/DVT No evidence of disease on 8/18/2022. Initial serology was negative for antiphospholipid syndrome. Bryan Terry continues treatment with Eliquis 5 mg po BID. she reports compliance with medication. She denies bleeding issues. Bryan Terry further denies shortness of breath, chest pain, lower extremity swelling or claudication. Estee Siegel is forgetful. She was recently evaluated by neurology and approved to drive locally. She drove herself today. She tells me she completes tasks such as driving herself to the local grocery store, etc.  She is feeling well and is without complaint today. Past Medical History:   Diagnosis Date    [facility]     basil cell skin cancers removed     DVT (deep venous thrombosis) ([facility] 08/18/2022    LLE    Low back pain 02/07/2017    Mixed hyperlipidemia 05/18/2021    Osteoporosis     Osteoporosis with pathological fracture with routine healing 06/23/2017    Pulmonary embolism ([facility] 08/18/2022    bilateral lower lobes     Past Surgical History:   Procedure Laterality Date    SKIN CANCER EXCISION       Current Outpatient Medications   Medication Sig Dispense Refill    alendronate (FOSAMAX) 70 MG tablet Take 70 mg by mouth every 7 days Takes on Sunday      ELIQUIS 5 MG TABS tablet Take 1 tablet by mouth in the morning and at bedtime 180 tablet 2    memantine ER (NAMENDA XR) 28 MG CP24 extended release capsule Take 1 capsule by mouth daily 90 capsule 3    vitamin E 450 MG (1000 UT) CAPS capsule Take 1,000 Units by mouth 2 times daily       No current facility-administered medications for this visit. No Known Allergies  Social History     Tobacco Use    Smoking status: Never    Smokeless tobacco: Never   Substance Use Topics    Alcohol use: Yes     Alcohol/week: 3.0 standard drinks     Types: 3 Glasses of wine per week    Drug use: No     Family History   Problem Relation Age of Onset    Kidney Disease Mother     Cancer Father         Prostate      Review of Systems   Constitutional:  Negative for fatigue and fever.    HENT:  Negative for dental problem, hearing loss, mouth sores, nosebleeds, sore throat and trouble swallowing. Eyes:  Negative for discharge and itching. Respiratory:  Negative for cough, shortness of breath and wheezing. Cardiovascular:  Negative for chest pain, palpitations and leg swelling. Gastrointestinal:  Negative for abdominal pain, constipation, diarrhea, nausea and vomiting. Endocrine: Negative for cold intolerance and heat intolerance. Genitourinary:  Negative for dysuria, frequency, hematuria and urgency. Musculoskeletal:  Negative for arthralgias, joint swelling and myalgias. Skin:  Negative for pallor and rash. Allergic/Immunologic: Negative for environmental allergies and immunocompromised state. Neurological:  Negative for seizures, syncope and numbness. Hematological:  Negative for adenopathy. Does not bruise/bleed easily. Psychiatric/Behavioral:  Negative for agitation, behavioral problems and confusion. The patient is not nervous/anxious. Forgetful   Physical Exam  Vitals reviewed. Constitutional:       General: She is not in acute distress. Appearance: She is well-developed. She is not toxic-appearing or diaphoretic. Comments: thin   HENT:      Head: Normocephalic and atraumatic. Right Ear: External ear normal.      Left Ear: External ear normal.      Nose: Nose normal.      Mouth/Throat:      Mouth: Mucous membranes are moist.   Eyes:      General: No scleral icterus. Right eye: No discharge. Left eye: No discharge. Conjunctiva/sclera: Conjunctivae normal.   Neck:      Trachea: No tracheal deviation. Cardiovascular:      Rate and Rhythm: Normal rate and regular rhythm. Pulmonary:      Effort: Pulmonary effort is normal. No respiratory distress. Breath sounds: Normal breath sounds. No wheezing or rales. Abdominal:      General: Bowel sounds are normal. There is no distension. Palpations: Abdomen is soft. Tenderness:  There is no abdominal tenderness. There is no guarding. Genitourinary:     Comments: Exam deferred  Musculoskeletal:         General: No tenderness or deformity. Cervical back: Neck supple. No muscular tenderness. Comments: Normal ROM all four extremities   Lymphadenopathy:      Cervical:      Right cervical: No superficial or deep cervical adenopathy. Left cervical: No superficial or deep cervical adenopathy. Upper Body:      Right upper body: No supraclavicular adenopathy. Left upper body: No supraclavicular adenopathy. Comments:      Skin:     General: Skin is warm and dry. Findings: No rash. Neurological:      Mental Status: She is alert and oriented to person, place, and time. Comments: follows commands, non-focal   Psychiatric:         Behavior: Behavior normal. Behavior is cooperative. Thought Content: Thought content normal.         Judgment: Judgment normal.      Comments: Alert and oriented to person, place and time. Vitals:    12/02/22 0927   BP: 126/80   Pulse: 81   SpO2: 98%   Weight: 117 lb 3.2 oz (53.2 kg)   Height: 5' 4\" (1.626 m)      Wt Readings from Last 3 Encounters:   12/02/22 117 lb 3.2 oz (53.2 kg)   11/21/22 115 lb (52.2 kg)   10/04/22 115 lb 9.6 oz (52.4 kg)     Result Date: 8/18/2022  No acute pathology. Recommendation: Follow up as clinically indicated. Electronically Signed by El Melgoza MD at 18-Aug-2022 12:38:15 PM             CTA PULMONARY W CONTRAST    Result Date: 8/18/2022  1. Pulmonary emboli to the bilateral lower lobes. 2. Patchy ground-glass and streaky markings in the posterior lungs, likely dependent atelectasis, with a few scattered blebs bilaterally. 3. The lungs are otherwise grossly clear. Recommendation: Clinical findings; follow-up is recommended.  All CT scans at this facility utilize dose modulation, iterative reconstruction, and/or weight based dosing when appropriate to reduce radiation dose to as low as reasonably achievable. Amended by Mary Méndez MD at 18-Aug-2022 02:00:46 PM Electronically Signed by Mary Méndez MD at 18-Aug-2022 12:55:46 PM             MRI BRAIN W WO CONTRAST    Result Date: 8/19/2022  1. Mild nonspecific but presumed microvascular changes in the periventricular white matter. 2.  Mild atrophic changes. 3.  Small right frontal and left pontine venous angiomas versus developmental venous anomalies. Recommendation: Follow up as clinically indicated. Electronically Signed by Carson Juarez MD at 19-Aug-2022 02:23:03 PM                CBC:   Lab Results   Component Value Date    WBC 5.35 12/02/2022    HGB 13.7 12/02/2022    HCT 43.1 12/02/2022    MCV 94.5 12/02/2022     (L) 12/02/2022     Labs 9/12/2022:  CRP: <0.30  Sed Rate: 5  Vit D 25: 36.4  HIV: non-reactive   RPR: non-reactive    ASSESSMENT/PLAN:  No diagnosis found. Provoked pulmonary embolism/left lower extremity chronic DVT, August 2022  Anticoagulated  -h/o recent travel/COVID-19  -Bilateral pulmonary embolism  -Small chronic DVT below knee on the left  -Normal troponin, normal proBNP  -PESI score: Class II, low risk     Labs 8/19/22:   Beta 2 Glycoprotein antibodies: IgG: <10, IgM: <10  Cardiolipin antibodies IgG: <10, IgM: <10  Lupus anticoagulant: not detected    Labs 9/2/2022:  Factor V Leiden: Negative   AT III: 114  Prothrombin Gene (Factor II): Negative   Free protein S: 141  Total protein C:  170   JAK2 V617F:, Exon12-15:, CALR:, MPL mutations: negative     Prothrombotic work-up negative  Continue Eliquis 5 mg po BID with strict compliance. Encourage frequent ambulation (q1-2 hours) with prolonged travel. Recommend routine, age-appropriate tumor screenings. Recommend minimum 6 months anticoagulation. I will see Carol Crystal back in 2 months at which time decision will be made for discontinuation of anticoagulation    Verbal and written instructions were provided to Carol Crystal regarding medication and follow-up.     No orders of the defined types were placed in this encounter. Return in about 2 months (around 2/2/2023) for follow up with INDERJIT Leary. I have seen, examined and reviewed this patient medication list, appropriate labs and imaging studies. I reviewed relevant medical records and others physicians notes. I discussed the plan of care with the patient. I answered all questions to the patients satisfaction. I have also reviewed the chief complaint (CC) and part of the history (History of Present Illness (HPI), Past Family Social History [facility]), or Review of Systems (ROS) and made changes when appropriated. Dictated utilizing Dragon transcription software.         INDERJIT Conley  7:09 AM  12/3/2022

## 2022-12-02 ENCOUNTER — OFFICE VISIT (OUTPATIENT)
Dept: HEMATOLOGY | Age: 77
End: 2022-12-02
Payer: MEDICARE

## 2022-12-02 ENCOUNTER — HOSPITAL ENCOUNTER (OUTPATIENT)
Dept: INFUSION THERAPY | Age: 77
Discharge: HOME | End: 2022-12-02
Payer: MEDICARE

## 2022-12-02 ENCOUNTER — CLINICAL DOCUMENTATION (OUTPATIENT)
Dept: HEMATOLOGY | Age: 77
End: 2022-12-02

## 2022-12-02 VITALS
WEIGHT: 117.2 LBS | OXYGEN SATURATION: 98 % | DIASTOLIC BLOOD PRESSURE: 80 MMHG | SYSTOLIC BLOOD PRESSURE: 126 MMHG | HEART RATE: 81 BPM | HEIGHT: 64 IN | BODY MASS INDEX: 20.01 KG/M2

## 2022-12-02 DIAGNOSIS — I82.5Z2 LOWER LEG DVT (DEEP VENOUS THROMBOEMBOLISM), CHRONIC, LEFT (HCC): ICD-10-CM

## 2022-12-02 DIAGNOSIS — I26.99 BILATERAL PULMONARY EMBOLISM (HCC): ICD-10-CM

## 2022-12-02 DIAGNOSIS — Z79.01 ANTICOAGULATED: ICD-10-CM

## 2022-12-02 DIAGNOSIS — I26.99 BILATERAL PULMONARY EMBOLISM (HCC): Primary | ICD-10-CM

## 2022-12-02 DIAGNOSIS — Z71.89 CARE PLAN DISCUSSED WITH PATIENT: ICD-10-CM

## 2022-12-02 LAB
BASOPHILS ABSOLUTE: 0.03 K/UL (ref 0.01–0.08)
BASOPHILS RELATIVE PERCENT: 0.6 % (ref 0.1–1.2)
EOSINOPHILS ABSOLUTE: 0.06 K/UL (ref 0.04–0.54)
EOSINOPHILS RELATIVE PERCENT: 1.1 % (ref 0.7–7)
HCT VFR BLD CALC: 43.1 % (ref 34.1–44.9)
HEMOGLOBIN: 13.7 G/DL (ref 11.2–15.7)
LYMPHOCYTES ABSOLUTE: 1.57 K/UL (ref 1.18–3.74)
LYMPHOCYTES RELATIVE PERCENT: 29.3 % (ref 19.3–53.1)
MCH RBC QN AUTO: 30 PG (ref 25.6–32.2)
MCHC RBC AUTO-ENTMCNC: 31.8 G/DL (ref 32.3–35.5)
MCV RBC AUTO: 94.5 FL (ref 79.4–94.8)
MONOCYTES ABSOLUTE: 0.7 K/UL (ref 0.24–0.82)
MONOCYTES RELATIVE PERCENT: 13.1 % (ref 4.7–12.5)
NEUTROPHILS ABSOLUTE: 2.97 K/UL (ref 1.56–6.13)
NEUTROPHILS RELATIVE PERCENT: 55.5 % (ref 34–71.1)
PDW BLD-RTO: 13.4 % (ref 11.7–14.4)
PLATELET # BLD: 162 K/UL (ref 182–369)
PMV BLD AUTO: 10.8 FL (ref 7.4–10.4)
RBC # BLD: 4.56 M/UL (ref 3.93–5.22)
WBC # BLD: 5.35 K/UL (ref 3.98–10.04)

## 2022-12-02 PROCEDURE — 99211 OFF/OP EST MAY X REQ PHY/QHP: CPT

## 2022-12-02 PROCEDURE — 99213 OFFICE O/P EST LOW 20 MIN: CPT | Performed by: NURSE PRACTITIONER

## 2022-12-02 PROCEDURE — G8427 DOCREV CUR MEDS BY ELIG CLIN: HCPCS | Performed by: NURSE PRACTITIONER

## 2022-12-02 PROCEDURE — 36415 COLL VENOUS BLD VENIPUNCTURE: CPT

## 2022-12-02 PROCEDURE — 1123F ACP DISCUSS/DSCN MKR DOCD: CPT | Performed by: NURSE PRACTITIONER

## 2022-12-02 PROCEDURE — 3017F COLORECTAL CA SCREEN DOC REV: CPT | Performed by: NURSE PRACTITIONER

## 2022-12-02 PROCEDURE — G8484 FLU IMMUNIZE NO ADMIN: HCPCS | Performed by: NURSE PRACTITIONER

## 2022-12-02 PROCEDURE — 1036F TOBACCO NON-USER: CPT | Performed by: NURSE PRACTITIONER

## 2022-12-02 PROCEDURE — G8420 CALC BMI NORM PARAMETERS: HCPCS | Performed by: NURSE PRACTITIONER

## 2022-12-02 PROCEDURE — 1090F PRES/ABSN URINE INCON ASSESS: CPT | Performed by: NURSE PRACTITIONER

## 2022-12-02 PROCEDURE — G8399 PT W/DXA RESULTS DOCUMENT: HCPCS | Performed by: NURSE PRACTITIONER

## 2022-12-02 PROCEDURE — 85025 COMPLETE CBC W/AUTO DIFF WBC: CPT

## 2022-12-02 RX ORDER — ALENDRONATE SODIUM 70 MG/1
70 TABLET ORAL
COMMUNITY

## 2022-12-02 NOTE — PROGRESS NOTES
Pt called to confirm to medication list in particularly  Eliquis 5mg po BID.  Added Fosamax to the list.

## 2022-12-03 ASSESSMENT — ENCOUNTER SYMPTOMS
DIARRHEA: 0
SHORTNESS OF BREATH: 0
COUGH: 0
WHEEZING: 0
EYE ITCHING: 0
SORE THROAT: 0
VOMITING: 0
CONSTIPATION: 0
TROUBLE SWALLOWING: 0
EYE DISCHARGE: 0
ABDOMINAL PAIN: 0
NAUSEA: 0

## 2022-12-13 NOTE — TELEPHONE ENCOUNTER
Prabhakar Felix called requesting a refill of the below medication which has been pended for you:     Requested Prescriptions     Pending Prescriptions Disp Refills    alendronate (FOSAMAX) 70 MG tablet [Pharmacy Med Name: ALENDRONATE SODIUM 70MG TABLET] 12 tablet 0     Sig: TAKE 1 TABLET BY MOUTH EVERY 7 DAYS.        Last Appointment Date: 10/4/2022  Next Appointment Date: 1/5/2023    No Known Allergies

## 2022-12-19 RX ORDER — ALENDRONATE SODIUM 70 MG/1
TABLET ORAL
Qty: 12 TABLET | Refills: 0 | Status: SHIPPED | OUTPATIENT
Start: 2022-12-19

## 2023-01-05 ENCOUNTER — OFFICE VISIT (OUTPATIENT)
Dept: INTERNAL MEDICINE | Age: 78
End: 2023-01-05
Payer: MEDICARE

## 2023-01-05 VITALS
HEART RATE: 84 BPM | WEIGHT: 120 LBS | SYSTOLIC BLOOD PRESSURE: 122 MMHG | BODY MASS INDEX: 20.49 KG/M2 | OXYGEN SATURATION: 97 % | DIASTOLIC BLOOD PRESSURE: 64 MMHG | HEIGHT: 64 IN

## 2023-01-05 DIAGNOSIS — E78.2 MIXED HYPERLIPIDEMIA: ICD-10-CM

## 2023-01-05 DIAGNOSIS — M81.8 OTHER OSTEOPOROSIS, UNSPECIFIED PATHOLOGICAL FRACTURE PRESENCE: ICD-10-CM

## 2023-01-05 DIAGNOSIS — F02.A0 MILD EARLY ONSET ALZHEIMER'S DEMENTIA WITHOUT BEHAVIORAL DISTURBANCE, PSYCHOTIC DISTURBANCE, MOOD DISTURBANCE, OR ANXIETY (HCC): Primary | ICD-10-CM

## 2023-01-05 DIAGNOSIS — E55.9 VITAMIN D DEFICIENCY: ICD-10-CM

## 2023-01-05 DIAGNOSIS — I26.99 BILATERAL PULMONARY EMBOLISM (HCC): ICD-10-CM

## 2023-01-05 DIAGNOSIS — G30.0 MILD EARLY ONSET ALZHEIMER'S DEMENTIA WITHOUT BEHAVIORAL DISTURBANCE, PSYCHOTIC DISTURBANCE, MOOD DISTURBANCE, OR ANXIETY (HCC): Primary | ICD-10-CM

## 2023-01-05 PROCEDURE — G8399 PT W/DXA RESULTS DOCUMENT: HCPCS | Performed by: INTERNAL MEDICINE

## 2023-01-05 PROCEDURE — 3017F COLORECTAL CA SCREEN DOC REV: CPT | Performed by: INTERNAL MEDICINE

## 2023-01-05 PROCEDURE — 1036F TOBACCO NON-USER: CPT | Performed by: INTERNAL MEDICINE

## 2023-01-05 PROCEDURE — G8420 CALC BMI NORM PARAMETERS: HCPCS | Performed by: INTERNAL MEDICINE

## 2023-01-05 PROCEDURE — 99214 OFFICE O/P EST MOD 30 MIN: CPT | Performed by: INTERNAL MEDICINE

## 2023-01-05 PROCEDURE — 1090F PRES/ABSN URINE INCON ASSESS: CPT | Performed by: INTERNAL MEDICINE

## 2023-01-05 PROCEDURE — 1123F ACP DISCUSS/DSCN MKR DOCD: CPT | Performed by: INTERNAL MEDICINE

## 2023-01-05 PROCEDURE — G8427 DOCREV CUR MEDS BY ELIG CLIN: HCPCS | Performed by: INTERNAL MEDICINE

## 2023-01-05 PROCEDURE — G8484 FLU IMMUNIZE NO ADMIN: HCPCS | Performed by: INTERNAL MEDICINE

## 2023-01-05 ASSESSMENT — PATIENT HEALTH QUESTIONNAIRE - PHQ9
SUM OF ALL RESPONSES TO PHQ QUESTIONS 1-9: 0
SUM OF ALL RESPONSES TO PHQ9 QUESTIONS 1 & 2: 0
1. LITTLE INTEREST OR PLEASURE IN DOING THINGS: 0
SUM OF ALL RESPONSES TO PHQ QUESTIONS 1-9: 0
2. FEELING DOWN, DEPRESSED OR HOPELESS: 0

## 2023-01-05 NOTE — PROGRESS NOTES
Chief Complaint   Patient presents with    3 Month Follow-Up       HPI: Pt is here today to follow up medical problems- early Alzheimers/ osteoporosis/ PE and other medical problems. No cp or dyspnea or abd pain. Seems ok - seems a little depressed. Past Medical History:   Diagnosis Date    Cancer ([facility]     basil cell skin cancers removed     DVT (deep venous thrombosis) ([facility] 08/18/2022    LLE    Low back pain 02/07/2017    Mixed hyperlipidemia 05/18/2021    Osteoporosis     Osteoporosis with pathological fracture with routine healing 06/23/2017    Pulmonary embolism ([facility] 08/18/2022    bilateral lower lobes       Past Surgical History:   Procedure Laterality Date    SKIN CANCER EXCISION         Family History   Problem Relation Age of Onset    Kidney Disease Mother     Cancer Father         Prostate        Social History     Socioeconomic History    Marital status: Single     Spouse name: Not on file    Number of children: Not on file    Years of education: Not on file    Highest education level: Not on file   Occupational History    Not on file   Tobacco Use    Smoking status: Never    Smokeless tobacco: Never   Vaping Use    Vaping Use: Not on file   Substance and Sexual Activity    Alcohol use:  Yes     Alcohol/week: 3.0 standard drinks     Types: 3 Glasses of wine per week    Drug use: No    Sexual activity: Not on file   Other Topics Concern    Not on file   Social History Narrative    Not on file     Social Determinants of Health     Financial Resource Strain: Low Risk     Difficulty of Paying Living Expenses: Not very hard   Food Insecurity: No Food Insecurity    Worried About Running Out of Food in the Last Year: Never true    Ran Out of Food in the Last Year: Never true   Transportation Needs: Not on file   Physical Activity: Insufficiently Active    Days of Exercise per Week: 2 days    Minutes of Exercise per Session: 30 min   Stress: Not on file   Social Connections: Not on file   Intimate Partner Please see my chart message below     Please review and advise     Thank you     Cristy Zepeda RN, BSN  [place] [facility]      Violence: Not on file   Housing Stability: Not on file       No Known Allergies    Current Outpatient Medications   Medication Sig Dispense Refill    alendronate (FOSAMAX) 70 MG tablet TAKE 1 TABLET BY MOUTH EVERY 7 DAYS. 12 tablet 0    ELIQUIS 5 MG TABS tablet Take 1 tablet by mouth in the morning and at bedtime 180 tablet 2    memantine ER (NAMENDA XR) 28 MG CP24 extended release capsule Take 1 capsule by mouth daily 90 capsule 3    vitamin E 450 MG (1000 UT) CAPS capsule Take 1,000 Units by mouth 2 times daily      rivaroxaban (XARELTO) 20 MG TABS tablet Take 1 tablet by mouth Daily with supper 90 tablet 1     No current facility-administered medications for this visit. Review of Systems    /64   Pulse 84   Ht 5' 4\" (1.626 m)   Wt 120 lb (54.4 kg)   SpO2 97%   BMI 20.60 kg/m²   BP Readings from Last 7 Encounters:   01/05/23 122/64   12/02/22 126/80   11/21/22 138/86   10/04/22 134/78   09/12/22 126/71   09/02/22 (!) 142/86   08/24/22 138/64     Wt Readings from Last 7 Encounters:   01/05/23 120 lb (54.4 kg)   12/02/22 117 lb 3.2 oz (53.2 kg)   11/21/22 115 lb (52.2 kg)   10/04/22 115 lb 9.6 oz (52.4 kg)   09/12/22 113 lb (51.3 kg)   09/02/22 113 lb 3.2 oz (51.3 kg)   08/24/22 115 lb (52.2 kg)     BMI Readings from Last 7 Encounters:   01/05/23 20.60 kg/m²   12/02/22 20.12 kg/m²   11/21/22 19.74 kg/m²   10/04/22 19.84 kg/m²   09/12/22 19.40 kg/m²   09/02/22 19.43 kg/m²   08/24/22 19.74 kg/m²     Resp Readings from Last 7 Encounters:   10/04/22 20   08/19/22 16   10/28/19 17   01/03/19 18   10/01/16 18       Physical Exam  Constitutional:       General: She is not in acute distress. HENT:      Head: Normocephalic. Eyes:      General: No scleral icterus. Cardiovascular:      Heart sounds: Normal heart sounds. Pulmonary:      Breath sounds: Normal breath sounds. Musculoskeletal:      Cervical back: Neck supple. Right lower leg: No edema. Left lower leg: No edema.    Lymphadenopathy:      Cervical: No cervical adenopathy.   Skin:     Findings: No rash.   Psychiatric:         Mood and Affect: Mood is depressed.       Results for orders placed or performed during the hospital encounter of 12/02/22   CBC with Auto Differential   Result Value Ref Range    WBC 5.35 3.98 - 10.04 K/uL    RBC 4.56 3.93 - 5.22 M/uL    Hemoglobin 13.7 11.2 - 15.7 g/dL    Hematocrit 43.1 34.1 - 44.9 %    MCV 94.5 79.4 - 94.8 fL    MCH 30.0 25.6 - 32.2 pg    MCHC 31.8 (L) 32.3 - 35.5 g/dL    RDW 13.4 11.7 - 14.4 %    Platelets 162 (L) 182 - 369 K/uL    MPV 10.8 (H) 7.4 - 10.4 fL    Neutrophils % 55.5 34.0 - 71.1 %    Lymphocytes % 29.3 19.3 - 53.1 %    Monocytes % 13.1 (H) 4.7 - 12.5 %    Eosinophils % 1.1 0.7 - 7.0 %    Basophils % 0.6 0.1 - 1.2 %    Neutrophils Absolute 2.97 1.56 - 6.13 K/uL    Lymphocytes Absolute 1.57 1.18 - 3.74 K/uL    Monocytes Absolute 0.70 0.24 - 0.82 K/uL    Eosinophils Absolute 0.06 0.04 - 0.54 K/uL    Basophils Absolute 0.03 0.01 - 0.08 K/uL       ASSESSMENT/ PLAN:  1. Mild early onset Alzheimer's dementia without behavioral disturbance, psychotic disturbance, mood disturbance, or anxiety (HCC)  Continue with Namenda 28 mg extended release-- long hesitancy with medications- reconsider aricept in the future.     2. Other osteoporosis, unspecified pathological fracture presence  After lots of encouragement finally got her to take fosamax - continue to follow    3. Vitamin D deficiency    - Vitamin D 25 Hydroxy; Future    4. Mixed hyperlipidemia    - CBC; Future  - Comprehensive Metabolic Panel; Future  - TSH; Future  - Lipid Panel; Future    5. Bilateral pulmonary embolism (HCC)  Small pe- still on anticoagulant therapy- reassess in future     Chart, medications, labs, vaccines reviewed.  Keep up to date with routine care and follow up.  Call with any problems or complaints.  Keep up to date with routine screening recomendations and vaccines.

## 2023-01-11 ENCOUNTER — TELEPHONE (OUTPATIENT)
Dept: INTERNAL MEDICINE | Age: 78
End: 2023-01-11

## 2023-01-12 NOTE — TELEPHONE ENCOUNTER
It looks like xarelto is covered  -when eliquis runs out change to [address] -- make sure she knows it is once a day - take with dineer -- just once a day

## 2023-01-26 DIAGNOSIS — I26.99 BILATERAL PULMONARY EMBOLISM (HCC): Primary | ICD-10-CM

## 2023-01-26 NOTE — PROGRESS NOTES
Progress Note      Pt Name: Carl Poole  Birthdate: [birth year]  MRN: [id number]    Date of evaluation: 1/27/2023  History Obtained From: Patient, EMR  PCP: Dr. Enzo May  Neurology: Curley Hodgkin, APRN    HISTORY OF PRESENT ILLNESS:  Mahesh Maharaj is a pleasant 46-year-old  female returning to the clinic for follow-up and anticoagulation decisions regarding her history of provoked PE/DVT 8/2022. Prothrombotic work-up was negative. Ender Muñoz has been treated with Eliquis 5 mg po BID. she was changed to Xarelto 20 mg daily earlier this month by PCP, due to high co-pay of Eliquis. She denies chest pain, shortness of breath, palpitations. She has no lower extremity swelling. She denies bleeding issues. HEMATOLOGY HISTORY:    Diagnosed  Provoked bilateral PE/chronic LLE DVT, 8/18/2022     Treatment summary  Lovenox 1 mg/kg twice daily, changed to Eliquis  Eliquis changed to Xarelto 1/2023 per PCP, due to high co-pay     Mahesh Maharaj was seen in hematology consultation 8/18/2022 as an inpatient at [facility] regarding pulmonary embolism and DVT of the lower extremity. The patient presented to the ER with complaint of acute onset chest pain. She has no significant cardiac history. PMH significant for osteoporosis, on Fosamax. O2 saturation 98% on room air. Heart rate 95.  8/18/2022-CTA pulmonary with contrast showed pulmonary emboli to the bilateral lower lobes. Patchy groundglass and streaky markings in the posterior lungs likely dependent atelectasis. The lungs are otherwise grossly clear. 8/18/2022-proBNP 31, troponin x2 normal, D-Dimer 1.80  8/18/2022-patient started on Lovenox 1 mg/kg twice daily. Normal renal function. 8/18/2022-2D echo showed LVEF 55-60%. Mild concentric LVH. Normal right-sided chambers with preserved RV systolic function. 8/18/2022-VL BILATERAL LE small area of nonocclusive chronic appearing DVT in a left Gastro vein in the calf.  No svt, reflux noted at this time.  8/19/2022-Lovenox discontinued, Eliquis 10 mg po BID x7 days (followed by 5 mg po BID thereafter) initiated  Labs 9/12/2022-  CRP: <0.30  Sed Rate: 5  Vit D 25: 36.4  HIV: non-reactive   RPR: non-reactive  1/12/2023-Eliquis changed to Xarelto 20 mg daily per PCP due to high co-pay of Eliquis    Bilateral PE/ LLE DVT initially thought to be unprovoked. In [facility] she traveled to [facility] 6/29/2022-7/12/2022. Upon return, she tested positive for COVID-19. Bilateral PE/LLE DVT thus likely provoked. Recommend a minimum of 6 months anticoagulation. Past Medical History:   Diagnosis Date    Cancer ([facility]     basil cell skin cancers removed     DV[facility] (deep [facility] thrombosis) [facility] 08/18/2022    LLE    Low back pain 02/07/2017    Mixed hyperlipidemia 05/18/2021    Osteoporosis     Osteoporosis with pathological fracture with routine healing 06/23/2017    Pulmonary embolism ([facility] 08/18/2022    bilateral lower lobes     Past Surgical History:   Procedure Laterality Date    SKIN CANCER EXCISION       Current Outpatient Medications   Medication Sig Dispense Refill    rivaroxaban (XARELTO) 20 MG TABS tablet Take 1 tablet by mouth Daily with supper 90 tablet 1    alendronate (FOSAMAX) 70 MG tablet TAKE 1 TABLET BY MOUTH EVERY 7 DAYS. 12 tablet 0    memantine ER (NAMENDA XR) 28 MG CP24 extended release capsule Take 1 capsule by mouth daily 90 capsule 3    vitamin E 450 MG (1000 UT) CAPS capsule Take 1,000 Units by mouth 2 times daily       No current facility-administered medications for this visit. No Known Allergies  Social History     Tobacco Use    Smoking status: Never    Smokeless tobacco: Never   Substance Use Topics    Alcohol use:  Yes     Alcohol/week: 3.0 standard drinks     Types: 3 Glasses of wine per week    Drug use: No     Family History   Problem Relation Age of Onset    Kidney Disease Mother     Cancer Father         Prostate      Review of Systems   Constitutional:  Negative for fatigue and fever. HENT:  Negative for dental problem, hearing loss, mouth sores, nosebleeds, sore throat and trouble swallowing. Eyes:  Negative for discharge and itching. Respiratory:  Negative for cough, shortness of breath and wheezing. Cardiovascular:  Negative for chest pain, palpitations and leg swelling. Gastrointestinal:  Negative for abdominal pain, blood in stool, constipation, diarrhea, nausea and vomiting. Endocrine: Negative for cold intolerance and heat intolerance. Genitourinary:  Negative for dysuria, frequency, hematuria and urgency. Musculoskeletal:  Negative for arthralgias, joint swelling and myalgias. Skin:  Negative for pallor and rash. Allergic/Immunologic: Negative for environmental allergies and immunocompromised state. Neurological:  Negative for seizures, syncope and numbness. Hematological:  Negative for adenopathy. Does not bruise/bleed easily. Psychiatric/Behavioral:  Negative for agitation, behavioral problems and confusion. The patient is not nervous/anxious. Forgetful   Physical Exam  Vitals reviewed. Constitutional:       General: She is not in acute distress. Appearance: She is well-developed. She is not toxic-appearing or diaphoretic. Comments: thin   HENT:      Head: Normocephalic and atraumatic. Right Ear: External ear normal.      Left Ear: External ear normal.      Nose: Nose normal.      Mouth/Throat:      Mouth: Mucous membranes are moist.   Eyes:      General: No scleral icterus. Right eye: No discharge. Left eye: No discharge. Conjunctiva/sclera: Conjunctivae normal.   Neck:      Trachea: No tracheal deviation. Cardiovascular:      Rate and Rhythm: Normal rate and regular rhythm. Pulmonary:      Effort: Pulmonary effort is normal. No respiratory distress. Breath sounds: Normal breath sounds. No wheezing or rales. Abdominal:      General: Bowel sounds are normal. There is no distension. Palpations: Abdomen is soft. Tenderness: There is no abdominal tenderness. There is no guarding. Genitourinary:     Comments: Exam deferred  Musculoskeletal:         General: No tenderness or deformity. Cervical back: Neck supple. No muscular tenderness. Comments: Normal ROM all four extremities   Lymphadenopathy:      Cervical:      Right cervical: No superficial or deep cervical adenopathy. Left cervical: No superficial or deep cervical adenopathy. Upper Body:      Right upper body: No supraclavicular adenopathy. Left upper body: No supraclavicular adenopathy. Comments:      Skin:     General: Skin is warm and dry. Findings: No rash. Neurological:      Mental Status: She is alert and oriented to person, place, and time. Comments: follows commands, non-focal   Psychiatric:         Behavior: Behavior normal. Behavior is cooperative. Thought Content: Thought content normal.         Judgment: Judgment normal.      Comments: Alert and oriented to person, place and time. Vitals:    01/27/23 0917   BP: 130/74   Pulse: 73   SpO2: 98%   Weight: 118 lb 6.4 oz (53.7 kg)      Wt Readings from Last 3 Encounters:   01/27/23 118 lb 6.4 oz (53.7 kg)   01/05/23 120 lb (54.4 kg)   12/02/22 117 lb 3.2 oz (53.2 kg)     Result Date: 8/18/2022  No acute pathology. Recommendation: Follow up as clinically indicated. Electronically Signed by Ulysses Arriaza MD at 18-Aug-2022 12:38:15 PM             CTA PULMONARY W CONTRAST    Result Date: 8/18/2022  1. Pulmonary emboli to the bilateral lower lobes. 2. Patchy ground-glass and streaky markings in the posterior lungs, likely dependent atelectasis, with a few scattered blebs bilaterally. 3. The lungs are otherwise grossly clear. Recommendation: Clinical findings; follow-up is recommended.  All CT scans at this facility utilize dose modulation, iterative reconstruction, and/or weight based dosing when appropriate to reduce radiation dose to as low as reasonably achievable. Amended by Mancil Siemens MD at 18-Aug-2022 02:00:46 PM Electronically Signed by Mancil Siemens MD at 18-Aug-2022 12:55:46 PM             MRI BRAIN W WO CONTRAST    Result Date: 8/19/2022  1. Mild nonspecific but presumed microvascular changes in the periventricular white matter. 2.  Mild atrophic changes. 3.  Small right frontal and left pontine venous angiomas versus developmental venous anomalies. Recommendation: Follow up as clinically indicated. Electronically Signed by Goldie Alejandro MD at 19-Aug-2022 02:23:03 PM                CBC:   Lab Results   Component Value Date    WBC 5.03 01/27/2023    HGB 14.1 01/27/2023    HCT 42.4 01/27/2023    MCV 91.8 01/27/2023     (L) 01/27/2023    LABLYMP 1.10 07/19/2011    LYMPHOPCT 28.2 01/27/2023    RBC 4.62 01/27/2023    MCH 30.5 01/27/2023    MCHC 33.3 01/27/2023    RDW 13.5 01/27/2023     ASSESSMENT/PLAN:  1. History of pulmonary embolus (PE)    2. History of deep venous thrombosis (DVT) of distal vein of left lower extremity    3. Anticoagulated    4. Care plan discussed with patient        Provoked pulmonary embolism/left lower extremity chronic DVT, August 2022  Anticoagulated  -h/o travel/COVID-19  -Bilateral pulmonary embolism 8/2022  -Small chronic DVT below knee on the left 8/2022  -Normal troponin, normal proBNP  -PESI score: Class II, low risk     Labs 8/19/22:   Beta 2 Glycoprotein antibodies: IgG: <10, IgM: <10  Cardiolipin antibodies IgG: <10, IgM: <10  Lupus anticoagulant: not detected    Labs 9/2/2022:  Factor V Leiden: Negative   AT III: 114  Prothrombin Gene (Factor II): Negative   Free protein S: 141  Total protein C:  170   JAK2 V617F:, Exon12-15:, CALR:, MPL mutations: negative     Prothrombotic work-up negative  Continue anticoagulation (currently treated with Xarelto 20 mg daily) x6 months (through ~2/18/2023) patient will continue till 3/1/2023 as it is easier for her to remember.   Brian Aguirre will be contacted 3/1/2023 and be reminded to discontinue Xarelto  Encourage frequent ambulation (q1-2 hours) with prolonged travel. Recommend routine, age-appropriate tumor screenings. Verbal and written instructions were provided to Naresh Aguilar regarding medication and follow-up. No orders of the defined types were placed in this encounter. Return in about 31 weeks (around 9/1/2023) for follow up with Altamease Bamberger, APRN. If stable, she will be released back to PCP thereafter    I have seen, examined and reviewed this patient medication list, appropriate labs and imaging studies. I reviewed relevant medical records and others physicians notes. I discussed the plan of care with the patient. I answered all questions to the patients satisfaction. I have also reviewed the chief complaint (CC) and part of the history (History of Present Illness (HPI), Past Family Social History [facility]), or Review of Systems (ROS) and made changes when appropriate. Dictated utilizing Dragon transcription software.         INDERJIT Oneal  1:17 PM  1/27/2023

## 2023-01-27 ENCOUNTER — OFFICE VISIT (OUTPATIENT)
Dept: HEMATOLOGY | Age: 78
End: 2023-01-27

## 2023-01-27 ENCOUNTER — HOSPITAL ENCOUNTER (OUTPATIENT)
Dept: INFUSION THERAPY | Age: 78
Discharge: HOME | End: 2023-01-27
Payer: MEDICARE

## 2023-01-27 VITALS
WEIGHT: 118.4 LBS | OXYGEN SATURATION: 98 % | HEART RATE: 73 BPM | SYSTOLIC BLOOD PRESSURE: 130 MMHG | DIASTOLIC BLOOD PRESSURE: 74 MMHG | BODY MASS INDEX: 20.32 KG/M2

## 2023-01-27 DIAGNOSIS — I26.99 BILATERAL PULMONARY EMBOLISM (HCC): ICD-10-CM

## 2023-01-27 DIAGNOSIS — Z79.01 ANTICOAGULATED: ICD-10-CM

## 2023-01-27 DIAGNOSIS — Z86.711 HISTORY OF PULMONARY EMBOLUS (PE): Primary | ICD-10-CM

## 2023-01-27 DIAGNOSIS — Z71.89 CARE PLAN DISCUSSED WITH PATIENT: ICD-10-CM

## 2023-01-27 DIAGNOSIS — Z86.718 HISTORY OF DEEP VENOUS THROMBOSIS (DVT) OF DISTAL VEIN OF LEFT LOWER EXTREMITY: ICD-10-CM

## 2023-01-27 LAB
BASOPHILS ABSOLUTE: 0.03 K/UL (ref 0.01–0.08)
BASOPHILS RELATIVE PERCENT: 0.6 % (ref 0.1–1.2)
EOSINOPHILS ABSOLUTE: 0.07 K/UL (ref 0.04–0.54)
EOSINOPHILS RELATIVE PERCENT: 1.4 % (ref 0.7–7)
HCT VFR BLD CALC: 42.4 % (ref 34.1–44.9)
HEMOGLOBIN: 14.1 G/DL (ref 11.2–15.7)
LYMPHOCYTES ABSOLUTE: 1.42 K/UL (ref 1.18–3.74)
LYMPHOCYTES RELATIVE PERCENT: 28.2 % (ref 19.3–53.1)
MCH RBC QN AUTO: 30.5 PG (ref 25.6–32.2)
MCHC RBC AUTO-ENTMCNC: 33.3 G/DL (ref 32.3–35.5)
MCV RBC AUTO: 91.8 FL (ref 79.4–94.8)
MONOCYTES ABSOLUTE: 0.77 K/UL (ref 0.24–0.82)
MONOCYTES RELATIVE PERCENT: 15.3 % (ref 4.7–12.5)
NEUTROPHILS ABSOLUTE: 2.72 K/UL (ref 1.56–6.13)
NEUTROPHILS RELATIVE PERCENT: 54.1 % (ref 34–71.1)
PDW BLD-RTO: 13.5 % (ref 11.7–14.4)
PLATELET # BLD: 157 K/UL (ref 182–369)
PMV BLD AUTO: 10.3 FL (ref 7.4–10.4)
RBC # BLD: 4.62 M/UL (ref 3.93–5.22)
WBC # BLD: 5.03 K/UL (ref 3.98–10.04)

## 2023-01-27 PROCEDURE — 36415 COLL VENOUS BLD VENIPUNCTURE: CPT

## 2023-01-27 PROCEDURE — 99211 OFF/OP EST MAY X REQ PHY/QHP: CPT

## 2023-01-27 PROCEDURE — 85025 COMPLETE CBC W/AUTO DIFF WBC: CPT

## 2023-01-27 ASSESSMENT — ENCOUNTER SYMPTOMS
SHORTNESS OF BREATH: 0
WHEEZING: 0
ABDOMINAL PAIN: 0
EYE ITCHING: 0
SORE THROAT: 0
TROUBLE SWALLOWING: 0
CONSTIPATION: 0
EYE DISCHARGE: 0
COUGH: 0
DIARRHEA: 0
BLOOD IN STOOL: 0
VOMITING: 0
NAUSEA: 0

## 2023-03-01 ENCOUNTER — TELEPHONE (OUTPATIENT)
Dept: HEMATOLOGY | Age: 78
End: 2023-03-01

## 2023-03-01 NOTE — TELEPHONE ENCOUNTER
Called pt to remind her to stop taking her Xarelto. Verbalizes understanding.  No questions at this time

## 2023-03-03 ENCOUNTER — OFFICE VISIT (OUTPATIENT)
Dept: INTERNAL MEDICINE | Age: 78
End: 2023-03-03

## 2023-03-03 VITALS
RESPIRATION RATE: 18 BRPM | HEIGHT: 64 IN | HEART RATE: 93 BPM | BODY MASS INDEX: 21.34 KG/M2 | DIASTOLIC BLOOD PRESSURE: 82 MMHG | OXYGEN SATURATION: 96 % | WEIGHT: 125 LBS | SYSTOLIC BLOOD PRESSURE: 140 MMHG

## 2023-03-03 DIAGNOSIS — E55.9 VITAMIN D DEFICIENCY: ICD-10-CM

## 2023-03-03 DIAGNOSIS — F02.A0 MILD EARLY ONSET ALZHEIMER'S DEMENTIA WITHOUT BEHAVIORAL DISTURBANCE, PSYCHOTIC DISTURBANCE, MOOD DISTURBANCE, OR ANXIETY (HCC): Primary | ICD-10-CM

## 2023-03-03 DIAGNOSIS — G30.0 MILD EARLY ONSET ALZHEIMER'S DEMENTIA WITHOUT BEHAVIORAL DISTURBANCE, PSYCHOTIC DISTURBANCE, MOOD DISTURBANCE, OR ANXIETY (HCC): Primary | ICD-10-CM

## 2023-03-03 DIAGNOSIS — I26.99 BILATERAL PULMONARY EMBOLISM (HCC): ICD-10-CM

## 2023-03-03 DIAGNOSIS — E78.2 MIXED HYPERLIPIDEMIA: ICD-10-CM

## 2023-03-03 DIAGNOSIS — M81.8 OTHER OSTEOPOROSIS, UNSPECIFIED PATHOLOGICAL FRACTURE PRESENCE: ICD-10-CM

## 2023-03-03 RX ORDER — ASPIRIN 81 MG/1
81 TABLET ORAL DAILY
Qty: 90 TABLET | Refills: 3 | Status: SHIPPED | OUTPATIENT
Start: 2023-03-03

## 2023-03-03 RX ORDER — ASPIRIN 81 MG/1
81 TABLET ORAL DAILY
Qty: 90 TABLET | Refills: 3
Start: 2023-03-03 | End: 2023-03-03 | Stop reason: SDUPTHER

## 2023-03-03 SDOH — ECONOMIC STABILITY: FOOD INSECURITY: WITHIN THE PAST 12 MONTHS, YOU WORRIED THAT YOUR FOOD WOULD RUN OUT BEFORE YOU GOT MONEY TO BUY MORE.: NEVER TRUE

## 2023-03-03 SDOH — ECONOMIC STABILITY: HOUSING INSECURITY
IN THE LAST 12 MONTHS, WAS THERE A TIME WHEN YOU DID NOT HAVE A STEADY PLACE TO SLEEP OR SLEPT IN A SHELTER (INCLUDING NOW)?: NO

## 2023-03-03 SDOH — ECONOMIC STABILITY: FOOD INSECURITY: WITHIN THE PAST 12 MONTHS, THE FOOD YOU BOUGHT JUST DIDN'T LAST AND YOU DIDN'T HAVE MONEY TO GET MORE.: NEVER TRUE

## 2023-03-03 SDOH — ECONOMIC STABILITY: INCOME INSECURITY: HOW HARD IS IT FOR YOU TO PAY FOR THE VERY BASICS LIKE FOOD, HOUSING, MEDICAL CARE, AND HEATING?: NOT HARD AT ALL

## 2023-03-03 NOTE — PROGRESS NOTES
Chief Complaint   Patient presents with    Follow-up     DISCUSS PT SCREENING       HPI: Patient is here today to follow-up dementia Alzheimer's type osteoporosis other issues she is here alone today she does not like the way that some of her medicine is made her feel she does not like the way the Xarelto is making her feel she was on Eliquis then we had to switch to Xarelto fortunately were at a point we could stop Xarelto and hematology is already done this I encouraged her I think she will feel better. We really like her to stay on her Namenda and her Fosamax. She continues to do this she has always been resistant to meds but seems able to stay on these. Her memory appears to be similar she seems tired may be a little depressed today    Past Medical History:   Diagnosis Date    Cancer ([facility]     basil cell skin cancers removed     DVT (deep venous thrombosis) ([facility] 08/18/2022    LLE    Low back pain 02/07/2017    Mixed hyperlipidemia 05/18/2021    Osteoporosis     Osteoporosis with pathological fracture with routine healing 06/23/2017    Pulmonary embolism ([facility] [address] 08/18/2022    bilateral lower lobes       Past Surgical History:   Procedure Laterality Date    SKIN CANCER EXCISION         Family History   Problem Relation Age of Onset    Kidney Disease Mother     Cancer Father         Prostate        Social History     Socioeconomic History    Marital status: Single     Spouse name: Not on file    Number of children: Not on file    Years of education: Not on file    Highest education level: Not on file   Occupational History    Not on file   Tobacco Use    Smoking status: Never    Smokeless tobacco: Never   Vaping Use    Vaping Use: Not on file   Substance and Sexual Activity    Alcohol use:  Yes     Alcohol/week: 3.0 standard drinks     Types: 3 Glasses of wine per week    Drug use: No    Sexual activity: Not on file   Other Topics Concern    Not on file   Social History Narrative    Not on file     Social Determinants of Health     Financial Resource Strain: Low Risk     Difficulty of Paying Living Expenses: Not hard at all   Food Insecurity: No Food Insecurity    Worried About Running Out of Food in the Last Year: Never true    Ran Out of Food in the Last Year: Never true   Transportation Needs: Unknown    Lack of Transportation (Medical): Not on file    Lack of Transportation (Non-Medical): No   Physical Activity: Insufficiently Active    Days of Exercise per Week: 2 days    Minutes of Exercise per Session: 30 min   Stress: Not on file   Social Connections: Not on file   Intimate Partner Violence: Not on file   Housing Stability: Unknown    Unable to Pay for Housing in the Last Year: Not on file    Number of Places Lived in the Last Year: Not on file    Unstable Housing in the Last Year: No       No Known Allergies    Current Outpatient Medications   Medication Sig Dispense Refill    aspirin EC 81 MG EC tablet Take 1 tablet by mouth daily 90 tablet 3    alendronate (FOSAMAX) 70 MG tablet TAKE 1 TABLET BY MOUTH EVERY 7 DAYS. 12 tablet 0    memantine ER (NAMENDA XR) 28 MG CP24 extended release capsule Take 1 capsule by mouth daily 90 capsule 3    vitamin E 450 MG (1000 UT) CAPS capsule Take 1,000 Units by mouth 2 times daily       No current facility-administered medications for this visit.       Review of Systems    BP (!) 140/82 Comment: WILL NOTIDFY PROVIDER  Pulse 93   Resp 18   Ht 5' 4\" (1.626 m)   Wt 125 lb (56.7 kg)   SpO2 96%   BMI 21.46 kg/m²   BP Readings from Last 7 Encounters:   03/03/23 (!) 140/82   01/27/23 130/74   01/05/23 122/64   12/02/22 126/80   11/21/22 138/86   10/04/22 134/78   09/12/22 126/71     Wt Readings from Last 7 Encounters:   03/03/23 125 lb (56.7 kg)   01/27/23 118 lb 6.4 oz (53.7 kg)   01/05/23 120 lb (54.4 kg)   12/02/22 117 lb 3.2 oz (53.2 kg)   11/21/22 115 lb (52.2 kg)   10/04/22 115 lb 9.6 oz (52.4 kg)   09/12/22 113 lb (51.3 kg)     BMI Readings from Last 7 Encounters:  03/03/23 21.46 kg/m²   01/27/23 20.32 kg/m²   01/05/23 20.60 kg/m²   12/02/22 20.12 kg/m²   11/21/22 19.74 kg/m²   10/04/22 19.84 kg/m²   09/12/22 19.40 kg/m²     Resp Readings from Last 7 Encounters:   03/03/23 18   10/04/22 20   08/19/22 16   10/28/19 17   01/03/19 18   10/01/16 18       Physical Exam  Constitutional:       General: She is not in acute distress. Eyes:      General: No scleral icterus. Cardiovascular:      Heart sounds: Normal heart sounds. Pulmonary:      Breath sounds: Normal breath sounds. Musculoskeletal:      Cervical back: Neck supple. Lymphadenopathy:      Cervical: No cervical adenopathy. Skin:     Findings: No rash. Psychiatric:      Comments: Mildly down/ depressed. Results for orders placed or performed during the hospital encounter of 01/27/23   CBC with Auto Differential   Result Value Ref Range    WBC 5.03 3.98 - 10.04 K/uL    RBC 4.62 3.93 - 5.22 M/uL    Hemoglobin 14.1 11.2 - 15.7 g/dL    Hematocrit 42.4 34.1 - 44.9 %    MCV 91.8 79.4 - 94.8 fL    MCH 30.5 25.6 - 32.2 pg    MCHC 33.3 32.3 - 35.5 g/dL    RDW 13.5 11.7 - 14.4 %    Platelets 399 (L) 275 - 369 K/uL    MPV 10.3 7.4 - 10.4 fL    Neutrophils % 54.1 34.0 - 71.1 %    Lymphocytes % 28.2 19.3 - 53.1 %    Monocytes % 15.3 (H) 4.7 - 12.5 %    Eosinophils % 1.4 0.7 - 7.0 %    Basophils % 0.6 0.1 - 1.2 %    Neutrophils Absolute 2.72 1.56 - 6.13 K/uL    Lymphocytes Absolute 1.42 1.18 - 3.74 K/uL    Monocytes Absolute 0.77 0.24 - 0.82 K/uL    Eosinophils Absolute 0.07 0.04 - 0.54 K/uL    Basophils Absolute 0.03 0.01 - 0.08 K/uL       ASSESSMENT/ PLAN:  1. Mild early onset Alzheimer's dementia without behavioral disturbance, psychotic disturbance, mood disturbance, or anxiety ([facility] [address]  Reviewed and discussed her labs her chart her medication she did not tolerate Aricept we have her on high-dose Namenda continue with that she is here alone today but she has a support network of friends.   I encouraged her to get out and walk with her neighbors not on her own but with her neighbors patient is hoping to do this as weather gets better  - Vitamin B12; Future  - Folate; Future    2. Bilateral pulmonary embolism ([address]  I reviewed hematology's note and work-up--it was felt to be a provoked PE. Prothrombotic work-up was negative and because she has taken the anticoagulation for 6 months and no clot seen in her legs they felt it was okay to discontinue as of March 1.  start aspirin therapy I wrote this out for her  - aspirin EC 81 MG EC tablet; Take 1 tablet by mouth daily  Dispense: 90 tablet; Refill: 3  - CBC; Future  - Comprehensive Metabolic Panel; Future    3. Other osteoporosis, unspecified pathological fracture presence  Long history of us encouraging her to treat her osteoporosis that she had numerous fractures she did finally agree to Fosamax she is staying on that it sounds like she takes it as directed and she is not having side effects at the 5-year missy we will discontinue we reviewed this plan with her. Walking outside with neighbors will also be of much benefit    4. Vitamin D deficiency  With her other medical problems vitamin D level helpful  - Vitamin D 25 Hydroxy; Future    5. Mixed hyperlipidemia  - TSH; Future  - Lipid Panel; Future    Chart, medications, labs, vaccines reviewed. Keep up to date with routine care and follow up. Call with any problems or complaints. Keep up to date with routine screening recomendations and vaccines.

## 2023-05-11 ENCOUNTER — OFFICE VISIT (OUTPATIENT)
Dept: INTERNAL MEDICINE | Age: 78
End: 2023-05-11
Payer: MEDICARE

## 2023-05-11 VITALS
DIASTOLIC BLOOD PRESSURE: 80 MMHG | OXYGEN SATURATION: 98 % | HEIGHT: 65 IN | BODY MASS INDEX: 20.83 KG/M2 | WEIGHT: 125 LBS | SYSTOLIC BLOOD PRESSURE: 124 MMHG | HEART RATE: 94 BPM

## 2023-05-11 DIAGNOSIS — G30.0 MODERATE EARLY ONSET ALZHEIMER'S DEMENTIA WITHOUT BEHAVIORAL DISTURBANCE, PSYCHOTIC DISTURBANCE, MOOD DISTURBANCE, OR ANXIETY (HCC): ICD-10-CM

## 2023-05-11 DIAGNOSIS — Z00.00 MEDICARE ANNUAL WELLNESS VISIT, SUBSEQUENT: Primary | ICD-10-CM

## 2023-05-11 DIAGNOSIS — M81.0 AGE-RELATED OSTEOPOROSIS WITHOUT CURRENT PATHOLOGICAL FRACTURE: ICD-10-CM

## 2023-05-11 DIAGNOSIS — G30.0 MILD EARLY ONSET ALZHEIMER'S DEMENTIA WITHOUT BEHAVIORAL DISTURBANCE, PSYCHOTIC DISTURBANCE, MOOD DISTURBANCE, OR ANXIETY (HCC): ICD-10-CM

## 2023-05-11 DIAGNOSIS — E55.9 VITAMIN D DEFICIENCY: ICD-10-CM

## 2023-05-11 DIAGNOSIS — E78.2 MIXED HYPERLIPIDEMIA: ICD-10-CM

## 2023-05-11 DIAGNOSIS — F02.A0 MILD EARLY ONSET ALZHEIMER'S DEMENTIA WITHOUT BEHAVIORAL DISTURBANCE, PSYCHOTIC DISTURBANCE, MOOD DISTURBANCE, OR ANXIETY (HCC): ICD-10-CM

## 2023-05-11 DIAGNOSIS — F02.B0 MODERATE EARLY ONSET ALZHEIMER'S DEMENTIA WITHOUT BEHAVIORAL DISTURBANCE, PSYCHOTIC DISTURBANCE, MOOD DISTURBANCE, OR ANXIETY (HCC): ICD-10-CM

## 2023-05-11 LAB
25(OH)D3 SERPL-MCNC: 28.1 NG/ML
ALBUMIN SERPL-MCNC: 4.5 G/DL (ref 3.5–5.2)
ALP SERPL-CCNC: 58 U/L (ref 35–104)
ALT SERPL-CCNC: 13 U/L (ref 5–33)
ANION GAP SERPL CALCULATED.3IONS-SCNC: 11 MMOL/L (ref 7–19)
AST SERPL-CCNC: 21 U/L (ref 5–32)
BILIRUB SERPL-MCNC: 0.5 MG/DL (ref 0.2–1.2)
BUN SERPL-MCNC: 17 MG/DL (ref 8–23)
CALCIUM SERPL-MCNC: 9.4 MG/DL (ref 8.8–10.2)
CHLORIDE SERPL-SCNC: 108 MMOL/L (ref 98–111)
CO2 SERPL-SCNC: 26 MMOL/L (ref 22–29)
CREAT SERPL-MCNC: 0.6 MG/DL (ref 0.5–0.9)
ERYTHROCYTE [DISTWIDTH] IN BLOOD BY AUTOMATED COUNT: 13.4 % (ref 11.5–14.5)
FOLATE SERPL-MCNC: 13.8 NG/ML (ref 4.8–37.3)
GLUCOSE SERPL-MCNC: 104 MG/DL (ref 74–109)
HCT VFR BLD AUTO: 44.4 % (ref 37–47)
HGB BLD-MCNC: 14.2 G/DL (ref 12–16)
MCH RBC QN AUTO: 30.2 PG (ref 27–31)
MCHC RBC AUTO-ENTMCNC: 32 G/DL (ref 33–37)
MCV RBC AUTO: 94.5 FL (ref 81–99)
PLATELET # BLD AUTO: 200 K/UL (ref 130–400)
PMV BLD AUTO: 11 FL (ref 9.4–12.3)
POTASSIUM SERPL-SCNC: 4.9 MMOL/L (ref 3.5–5)
PROT SERPL-MCNC: 7.3 G/DL (ref 6.6–8.7)
RBC # BLD AUTO: 4.7 M/UL (ref 4.2–5.4)
SODIUM SERPL-SCNC: 145 MMOL/L (ref 136–145)
TSH SERPL DL<=0.005 MIU/L-ACNC: 3.97 UIU/ML (ref 0.27–4.2)
VIT B12 SERPL-MCNC: 353 PG/ML (ref 211–946)
WBC # BLD AUTO: 5.4 K/UL (ref 4.8–10.8)

## 2023-05-11 PROCEDURE — 99213 OFFICE O/P EST LOW 20 MIN: CPT | Performed by: INTERNAL MEDICINE

## 2023-05-11 PROCEDURE — 3017F COLORECTAL CA SCREEN DOC REV: CPT | Performed by: INTERNAL MEDICINE

## 2023-05-11 PROCEDURE — 1123F ACP DISCUSS/DSCN MKR DOCD: CPT | Performed by: INTERNAL MEDICINE

## 2023-05-11 PROCEDURE — G0439 PPPS, SUBSEQ VISIT: HCPCS | Performed by: INTERNAL MEDICINE

## 2023-05-11 PROCEDURE — G8399 PT W/DXA RESULTS DOCUMENT: HCPCS | Performed by: INTERNAL MEDICINE

## 2023-05-11 PROCEDURE — 1036F TOBACCO NON-USER: CPT | Performed by: INTERNAL MEDICINE

## 2023-05-11 PROCEDURE — 1090F PRES/ABSN URINE INCON ASSESS: CPT | Performed by: INTERNAL MEDICINE

## 2023-05-11 PROCEDURE — G8420 CALC BMI NORM PARAMETERS: HCPCS | Performed by: INTERNAL MEDICINE

## 2023-05-11 PROCEDURE — G8427 DOCREV CUR MEDS BY ELIG CLIN: HCPCS | Performed by: INTERNAL MEDICINE

## 2023-05-11 ASSESSMENT — PATIENT HEALTH QUESTIONNAIRE - PHQ9
SUM OF ALL RESPONSES TO PHQ QUESTIONS 1-9: 2
1. LITTLE INTEREST OR PLEASURE IN DOING THINGS: 1
2. FEELING DOWN, DEPRESSED OR HOPELESS: 1
SUM OF ALL RESPONSES TO PHQ9 QUESTIONS 1 & 2: 2
SUM OF ALL RESPONSES TO PHQ QUESTIONS 1-9: 2

## 2023-05-11 ASSESSMENT — ENCOUNTER SYMPTOMS
EYE REDNESS: 0
COUGH: 0
EYE DISCHARGE: 0
BACK PAIN: 0
ABDOMINAL PAIN: 0
ABDOMINAL DISTENTION: 0
SHORTNESS OF BREATH: 0
SINUS PRESSURE: 0

## 2023-05-22 ENCOUNTER — OFFICE VISIT (OUTPATIENT)
Dept: NEUROLOGY | Age: 78
End: 2023-05-22
Payer: MEDICARE

## 2023-05-22 VITALS
SYSTOLIC BLOOD PRESSURE: 136 MMHG | WEIGHT: 125 LBS | HEIGHT: 65 IN | BODY MASS INDEX: 20.83 KG/M2 | OXYGEN SATURATION: 98 % | DIASTOLIC BLOOD PRESSURE: 78 MMHG | HEART RATE: 105 BPM

## 2023-05-22 DIAGNOSIS — R41.3 MEMORY LOSS: Primary | ICD-10-CM

## 2023-05-22 PROCEDURE — 1123F ACP DISCUSS/DSCN MKR DOCD: CPT | Performed by: NURSE PRACTITIONER

## 2023-05-22 PROCEDURE — G8427 DOCREV CUR MEDS BY ELIG CLIN: HCPCS | Performed by: NURSE PRACTITIONER

## 2023-05-22 PROCEDURE — G8399 PT W/DXA RESULTS DOCUMENT: HCPCS | Performed by: NURSE PRACTITIONER

## 2023-05-22 PROCEDURE — 1090F PRES/ABSN URINE INCON ASSESS: CPT | Performed by: NURSE PRACTITIONER

## 2023-05-22 PROCEDURE — 99213 OFFICE O/P EST LOW 20 MIN: CPT | Performed by: NURSE PRACTITIONER

## 2023-05-22 PROCEDURE — 3017F COLORECTAL CA SCREEN DOC REV: CPT | Performed by: NURSE PRACTITIONER

## 2023-05-22 PROCEDURE — 1036F TOBACCO NON-USER: CPT | Performed by: NURSE PRACTITIONER

## 2023-05-22 PROCEDURE — G8420 CALC BMI NORM PARAMETERS: HCPCS | Performed by: NURSE PRACTITIONER

## 2023-05-22 RX ORDER — DONEPEZIL HYDROCHLORIDE 5 MG/1
5 TABLET, FILM COATED ORAL NIGHTLY
Qty: 30 TABLET | Refills: 3 | Status: SHIPPED
Start: 2023-05-22 | End: 2023-05-26 | Stop reason: SINTOL

## 2023-05-22 NOTE — PROGRESS NOTES
REVIEW OF SYSTEMS    Constitutional: []Fever []Sweats []Chills [] Recent Injury [x] Denies all unless marked  HEENT:[]Headache  [] Head Injury [] Hearing Loss  [] Sore Throat  [] Ear Ache [x] Denies all unless marked  Spine:  [] Neck pain  [] Back pain  [] Sciaticia  [x] Denies all unless marked  Cardiovascular:[]Heart Disease []Palpitations [] Chest Pain   [x] Denies all unless marked  Pulmonary: []Shortness of Breath []Cough   [x] Denies all unless marked  Psychiatric/Behavioral:[] Depression [] Anxiety [x] Denies all unless marked  Gastrointestinal: []Nausea  []Vomiting  []Abdominal Pain  []Constipation  []Diarrhea  [x] Denies all unless marked  Genitourinary:   [] Frequency  [] Urgency  [] Dysuria [] Incontinence  [x] Denies all unless marked  Extremities: []Pain  []Swelling  [x] Denies all unless marked  Musculoskeletal: [] Myalgias  [] Joint Pain  [] Arthritis [] Muscle Cramps [] Muscle Twitches  [x] Denies all unless marked  Sleep: []Insomnia[]Snoring []Restless Legs  []Sleep Apnea  []Daytime Sleepiness  [x] Denies all unless marked  Skin:[] Rash [] Color Change [x] Denies all unless marked   Neurological:[]Visual Disturbance [x] Memory Loss []Loss of Balance []Slurred Speech []Weakness []Seizures  [] Dizziness [x] Denies all unless marked
Dispense Refill    donepezil (ARICEPT) 5 MG tablet Take 1 tablet by mouth nightly 30 tablet 3    aspirin EC 81 MG EC tablet Take 1 tablet by mouth daily 90 tablet 3    memantine ER (NAMENDA XR) 28 MG CP24 extended release capsule Take 1 capsule by mouth daily 90 capsule 3    vitamin E 450 MG (1000 UT) CAPS capsule Take 1 capsule by mouth 2 times daily       No current facility-administered medications for this visit. No Known Allergies    REVIEW OF SYSTEMS  Constitutional: []Fever []Sweats []Chills [] Recent Injury [x] Denies all unless marked  HEENT:[]Headache  [] Head Injury [] Hearing Loss  [] Sore Throat  [] Ear Ache [x] Denies all unless marked  Spine:  [] Neck pain  [] Back pain  [] Sciaticia  [x] Denies all unless marked  Cardiovascular:[]Heart Disease []Palpitations [] Chest Pain   [x] Denies all unless marked  Pulmonary: []Shortness of Breath []Cough   [x] Denies all unless marked  Psychiatric/Behavioral:[] Depression [] Anxiety [x] Denies all unless marked  Gastrointestinal: []Nausea  []Vomiting  []Abdominal Pain  []Constipation  []Diarrhea  [x] Denies all unless marked  Genitourinary:   [] Frequency  [] Urgency  [] Dysuria [] Incontinence  [x] Denies all unless marked  Extremities: []Pain  []Swelling  [x] Denies all unless marked  Musculoskeletal: [] Myalgias  [] Joint Pain  [] Arthritis [] Muscle Cramps [] Muscle Twitches  [x] Denies all unless marked  Sleep: []Insomnia[]Snoring []Restless Legs  []Sleep Apnea  []Daytime Sleepiness  [x] Denies all unless marked  Skin:[] Rash [] Color Change [x] Denies all unless marked   Neurological:[]Visual Disturbance [x] Memory Loss []Loss of Balance []Slurred Speech []Weakness []Seizures  [] Dizziness [x] Denies all unless marked    The MA has completed the ROS with the patient. I have reviewed it in its' entirety with the patient and agree with the documentation.      PHYSICAL EXAM  /78   Pulse (!) 105   Ht 5' 5\" (1.651 m)   Wt 125 lb (56.7 kg)   SpO2

## 2023-05-22 NOTE — PATIENT INSTRUCTIONS
PLAN:  Continue with Namenda XR. Will add Aricept 5mg nightly. Discussed safety concerns, increase supervision, ok to drive at this time from neuro perspective- just local, medication monitoring/management. Recommend 30 minutes daily exercise, daily mental stimulation, and healthy diet with fish, fruits, vegetables, fiber and water   Continue f/u with hematology as previously scheduled   4. Return in about 4 months (around 9/22/2023) for follow up, sooner if worsening.

## 2023-08-31 ENCOUNTER — TELEPHONE (OUTPATIENT)
Dept: HEMATOLOGY | Age: 78
End: 2023-08-31

## 2023-08-31 NOTE — PROGRESS NOTES
preserved RV systolic function. 8/18/2022-VL BILATERAL LE small area of nonocclusive chronic appearing DVT in a left Gastro vein in the calf. No svt, reflux noted at this time. 8/19/2022-Lovenox discontinued, Eliquis 10 mg po BID x7 days (followed by 5 mg po BID thereafter) initiated    Labs 8/19/22:   Beta 2 Glycoprotein antibodies: IgG: <10, IgM: <10  Cardiolipin antibodies IgG: <10, IgM: <10  Lupus anticoagulant: not detected    Labs 9/2/2022:  Factor V Leiden: Negative   AT III: 114  Prothrombin Gene (Factor II): Negative   Free protein S: 141  Total protein C:  170   JAK2 V617F:, Exon12-15:, CALR:, MPL mutations: negative     Labs 9/12/2022-  CRP: <0.30  Sed Rate: 5  Vit D 25: 36.4  HIV: non-reactive   RPR: non-reactive    1/12/2023-Eliquis changed to Xarelto 20 mg daily per PCP due to high co-pay of Eliquis    Bilateral PE/ LLE DVT initially thought to be unprovoked. In [facility] states she traveled to \[facility] [place] [facility]\"" 6/29/2022-7/12/2022. Upon return, she tested positive for COVID-19. Bilateral PE/LLE DVT thus likely provoked. Recommend a minimum of 6 months anticoagulation. Xarelto discontinued 3/1/2023. Patient has continued aspirin 81 mg daily, okay to continue. Verified with patient's pharmacy.     Past Medical History:   Diagnosis Date    Cancer ([address])     basil cell skin cancers removed     DVT (deep venous thrombosis) ([address] 08/18/2022    LLE    Low back pain 02/07/2017    Mixed hyperlipidemia 05/18/2021    Osteoporosis     Osteoporosis with pathological fracture with routine healing 06/23/2017    Pulmonary embolism ([address] ) 08/18/2022    bilateral lower lobes     Past Surgical History:   Procedure Laterality Date    SKIN CANCER EXCISION       Current Outpatient Medications   Medication Sig Dispense Refill    aspirin EC 81 MG EC tablet Take 1 tablet by mouth daily 90 tablet 3    memantine ER (NAMENDA XR) 28 MG CP24 extended release capsule Take 1 capsule by mouth daily 90 capsule 3    vitamin E 450

## 2023-09-01 ENCOUNTER — HOSPITAL ENCOUNTER (OUTPATIENT)
Dept: INFUSION THERAPY | Age: 78
Discharge: HOME | End: 2023-09-01
Payer: MEDICARE

## 2023-09-01 ENCOUNTER — OFFICE VISIT (OUTPATIENT)
Dept: HEMATOLOGY | Age: 78
End: 2023-09-01

## 2023-09-01 VITALS
BODY MASS INDEX: 23.05 KG/M2 | DIASTOLIC BLOOD PRESSURE: 72 MMHG | OXYGEN SATURATION: 98 % | SYSTOLIC BLOOD PRESSURE: 130 MMHG | HEART RATE: 98 BPM | WEIGHT: 135 LBS | HEIGHT: 64 IN

## 2023-09-01 DIAGNOSIS — Z86.718 HISTORY OF DEEP VENOUS THROMBOSIS (DVT) OF DISTAL VEIN OF LEFT LOWER EXTREMITY: ICD-10-CM

## 2023-09-01 DIAGNOSIS — Z71.89 CARE PLAN DISCUSSED WITH PATIENT: ICD-10-CM

## 2023-09-01 DIAGNOSIS — I26.99 BILATERAL PULMONARY EMBOLISM (HCC): ICD-10-CM

## 2023-09-01 DIAGNOSIS — Z86.711 HISTORY OF PULMONARY EMBOLUS (PE): Primary | ICD-10-CM

## 2023-09-01 LAB
ERYTHROCYTE [DISTWIDTH] IN BLOOD BY AUTOMATED COUNT: 13.4 % (ref 11.7–14.4)
HCT VFR BLD AUTO: 40.4 % (ref 34.1–44.9)
HGB BLD-MCNC: 13.5 G/DL (ref 11.2–15.7)
LYMPHOCYTES # BLD: 1.7 K/UL (ref 1.18–3.74)
LYMPHOCYTES NFR BLD: 28.1 % (ref 19.3–53.1)
MCH RBC QN AUTO: 29.7 PG (ref 25.6–32.2)
MCHC RBC AUTO-ENTMCNC: 33.4 G/DL (ref 32.3–35.5)
MCV RBC AUTO: 89 FL (ref 79.4–94.8)
MONOCYTES # BLD: 1.05 K/UL (ref 0.24–0.82)
MONOCYTES NFR BLD: 17.4 % (ref 4.7–12.5)
NEUTROPHILS # BLD: 3.14 K/UL (ref 1.56–6.13)
NEUTS SEG NFR BLD: 52 % (ref 34–71.1)
PLATELET # BLD AUTO: 156 K/UL (ref 182–369)
PMV BLD AUTO: 11 FL (ref 7.4–10.4)
RBC # BLD AUTO: 4.54 M/UL (ref 3.93–5.22)
WBC # BLD AUTO: 6.04 K/UL (ref 3.98–10.04)

## 2023-09-01 PROCEDURE — 99211 OFF/OP EST MAY X REQ PHY/QHP: CPT

## 2023-09-01 PROCEDURE — 85025 COMPLETE CBC W/AUTO DIFF WBC: CPT

## 2023-09-01 PROCEDURE — 36415 COLL VENOUS BLD VENIPUNCTURE: CPT

## 2023-09-01 ASSESSMENT — ENCOUNTER SYMPTOMS
ABDOMINAL PAIN: 0
NAUSEA: 0
CONSTIPATION: 0
EYE DISCHARGE: 0
WHEEZING: 0
DIARRHEA: 0
SORE THROAT: 0
BLOOD IN STOOL: 0
VOMITING: 0
TROUBLE SWALLOWING: 0
COUGH: 0
SHORTNESS OF BREATH: 0
EYE ITCHING: 0

## 2023-09-05 ENCOUNTER — HOSPITAL ENCOUNTER (OUTPATIENT)
Dept: GENERAL RADIOLOGY | Age: 78
Discharge: HOME | End: 2023-09-05
Payer: MEDICARE

## 2023-09-05 ENCOUNTER — OFFICE VISIT (OUTPATIENT)
Age: 78
End: 2023-09-05
Payer: MEDICARE

## 2023-09-05 VITALS
RESPIRATION RATE: 18 BRPM | DIASTOLIC BLOOD PRESSURE: 80 MMHG | SYSTOLIC BLOOD PRESSURE: 132 MMHG | HEART RATE: 110 BPM | OXYGEN SATURATION: 97 % | WEIGHT: 132.8 LBS | TEMPERATURE: 97.3 F | BODY MASS INDEX: 22.8 KG/M2

## 2023-09-05 DIAGNOSIS — S42.214A CLOSED NONDISPLACED FRACTURE OF SURGICAL NECK OF RIGHT HUMERUS, UNSPECIFIED FRACTURE MORPHOLOGY, INITIAL ENCOUNTER: ICD-10-CM

## 2023-09-05 DIAGNOSIS — S49.91XA INJURY OF RIGHT SHOULDER, INITIAL ENCOUNTER: ICD-10-CM

## 2023-09-05 DIAGNOSIS — M25.511 ACUTE PAIN OF RIGHT SHOULDER: ICD-10-CM

## 2023-09-05 DIAGNOSIS — M25.511 ACUTE PAIN OF RIGHT SHOULDER: Primary | ICD-10-CM

## 2023-09-05 PROCEDURE — 1036F TOBACCO NON-USER: CPT

## 2023-09-05 PROCEDURE — 1123F ACP DISCUSS/DSCN MKR DOCD: CPT

## 2023-09-05 PROCEDURE — 1090F PRES/ABSN URINE INCON ASSESS: CPT

## 2023-09-05 PROCEDURE — 73030 X-RAY EXAM OF SHOULDER: CPT

## 2023-09-05 PROCEDURE — 3017F COLORECTAL CA SCREEN DOC REV: CPT

## 2023-09-05 PROCEDURE — 99213 OFFICE O/P EST LOW 20 MIN: CPT

## 2023-09-05 PROCEDURE — G8399 PT W/DXA RESULTS DOCUMENT: HCPCS

## 2023-09-05 PROCEDURE — G8427 DOCREV CUR MEDS BY ELIG CLIN: HCPCS

## 2023-09-05 PROCEDURE — G8420 CALC BMI NORM PARAMETERS: HCPCS

## 2023-09-05 ASSESSMENT — ENCOUNTER SYMPTOMS
COUGH: 0
SINUS PRESSURE: 0
VOMITING: 0
TROUBLE SWALLOWING: 0
WHEEZING: 0
EYE PAIN: 0
COLOR CHANGE: 0
ABDOMINAL DISTENTION: 0
DIARRHEA: 0
APNEA: 0
ABDOMINAL PAIN: 0
CHEST TIGHTNESS: 0
SORE THROAT: 0
CHOKING: 0
EYE REDNESS: 0
FACIAL SWELLING: 0
SHORTNESS OF BREATH: 0
SINUS PAIN: 0
EYE DISCHARGE: 0
STRIDOR: 0
NAUSEA: 0
CONSTIPATION: 0

## 2023-09-05 NOTE — PATIENT INSTRUCTIONS
Xray of right shoulder ordered; will call with results. Pt given an arm sling in office. Skin tear was cleaned in office and bandaged. Instructed patient to use antibiotic ointment on the area. Rotate ice/heat as needed - use only 15 minutes at a time    Encouraged adequate rest    Recommended compression/bracing    Recommended elevation of the area    Patient may use ibuprofen or tylenol for pain    The patient is to follow up with PCP or return to clinic if symptoms worsen/fail to improve. Right humerus fracture is noted. Referral to ortho initiated. Advised pt to continue using sling, RICE therapy, avoid using the affected arm. F/u with PCP if worsening. Report to ER if severe.

## 2023-09-12 ENCOUNTER — CARE COORDINATION (OUTPATIENT)
Dept: CARE COORDINATION | Age: 78
End: 2023-09-12

## 2023-09-12 ENCOUNTER — ENROLLMENT (OUTPATIENT)
Dept: CARE COORDINATION | Age: 78
End: 2023-09-12

## 2023-09-12 ENCOUNTER — HOSPITAL ENCOUNTER (OUTPATIENT)
Dept: VASCULAR LAB | Age: 78
Discharge: HOME | End: 2023-09-12
Attending: INTERNAL MEDICINE
Payer: MEDICARE

## 2023-09-12 ENCOUNTER — OFFICE VISIT (OUTPATIENT)
Dept: INTERNAL MEDICINE | Age: 78
End: 2023-09-12

## 2023-09-12 VITALS
BODY MASS INDEX: 22.88 KG/M2 | WEIGHT: 134 LBS | HEIGHT: 64 IN | SYSTOLIC BLOOD PRESSURE: 130 MMHG | OXYGEN SATURATION: 96 % | HEART RATE: 90 BPM | DIASTOLIC BLOOD PRESSURE: 80 MMHG

## 2023-09-12 DIAGNOSIS — G30.0 MODERATE EARLY ONSET ALZHEIMER'S DEMENTIA WITHOUT BEHAVIORAL DISTURBANCE, PSYCHOTIC DISTURBANCE, MOOD DISTURBANCE, OR ANXIETY (HCC): Primary | ICD-10-CM

## 2023-09-12 DIAGNOSIS — F02.B0 MODERATE EARLY ONSET ALZHEIMER'S DEMENTIA WITHOUT BEHAVIORAL DISTURBANCE, PSYCHOTIC DISTURBANCE, MOOD DISTURBANCE, OR ANXIETY (HCC): Primary | ICD-10-CM

## 2023-09-12 DIAGNOSIS — R60.0 LOCALIZED EDEMA: ICD-10-CM

## 2023-09-12 DIAGNOSIS — S42.211K CLOSED DISPLACED FRACTURE OF SURGICAL NECK OF RIGHT HUMERUS WITH NONUNION, UNSPECIFIED FRACTURE MORPHOLOGY, SUBSEQUENT ENCOUNTER: ICD-10-CM

## 2023-09-12 PROCEDURE — 93970 EXTREMITY STUDY: CPT

## 2023-09-12 RX ORDER — MELATONIN
1000 DAILY
Qty: 90 TABLET | Refills: 1
Start: 2023-09-12

## 2023-09-12 NOTE — PROGRESS NOTES
Chief Complaint   Patient presents with    Follow-up     4 months - dementia       HPI: Is here today to follow-up dementia and other medical problems she also recently had a fall and broke her humerus and is in a sling. She lives alone she has dementia she is here with the caregiver/friend today we talked about the situation patient would be best served by living in assisted living somewhere with a memory facility attached. Pt needs help with ADLS and meals and taking meds. Can also could have a sitter at home. Has some notable edema on exam today    Past Medical History:   Diagnosis Date    Cancer ([address])     basil cell skin cancers removed     DVT (deep venous thrombosis) ([address]) 08/18/2022    LLE    Low back pain 02/07/2017    Mixed hyperlipidemia 05/18/2021    Osteoporosis     Osteoporosis with pathological fracture with routine healing 06/23/2017    Pulmonary embolism ([address]) 08/18/2022    bilateral lower lobes       Past Surgical History:   Procedure Laterality Date    FRACTURE SURGERY  2/7/17? For back    SKIN CANCER EXCISION         Family History   Problem Relation Age of Onset    Kidney Disease Mother     Cancer Father         Prostate        Social History     Socioeconomic History    Marital status: Single     Spouse name: Not on file    Number of children: Not on file    Years of education: Not on file    Highest education level: Not on file   Occupational History    Not on file   Tobacco Use    Smoking status: Never    Smokeless tobacco: Never   Vaping Use    Vaping Use: Not on file   Substance and Sexual Activity    Alcohol use:  Yes     Alcohol/week: 7.0 standard drinks of alcohol     Types: 5 Glasses of wine, 2 Cans of beer per week    Drug use: No    Sexual activity: Not Currently     Partners: Male   Other Topics Concern    Not on file   Social History Narrative    Not on file     Social Determinants of Health     Financial Resource Strain: Low Risk  (3/3/2023)    Overall Financial Resource

## 2023-09-12 NOTE — CARE COORDINATION
Ambulatory Care Coordination Note  2023    Patient Current Location:  [place]    ACM contacted the family and caregiver by telephone. Verified name and  with family and caregiver as identifiers. Provided introduction to self, and explanation of the ACM role. ACM: Barbara Sevilla RN    Challenges to be reviewed by the provider   Additional needs identified to be addressed with provider: No  none               Method of communication with provider: none. Ambulatory Care Nurse contacted the family via telephone to perform admission intake assessment for ambulatory care management. ACM Verified name and  with family as identifiers. Provided introduction to self, and explanation of the ACM role. Family agreed to future calls. Patient and POA are currently in the lobby of Imaging at this time. POA reports she is the patients only support at this time. Patient is single with no children or froylan spouse. POA explains she is not able to be patients full time caregiver. POA discussed with patient moving in to an assisted living. At this time patient refuses to go to assisted living. ACM discussed caregivers to aid in patients care patient refused. POA states they recently toured the  [address] [employer] who currently has availability. Patient has an history of falls with injury this past year, patient currently in a sling with a broken arm. ACM educated on falls and safety and need for [facility] therapy evaluation. POA reports provider has order therapy but she is concerned the patient will not be compliant with staff. Patient denies pain at this time and is compliant with wearing sling. Pharmacy assist with medication management. Patient called to complete imaging, ACM to send caregiver list via email as requested and complete admission next encounter.         [email or web address]  e-mail     Offered patient enrollment in the Remote Patient Monitoring (RPM) program for in-home monitoring: Patient is not

## 2023-09-21 ENCOUNTER — CARE COORDINATION (OUTPATIENT)
Dept: CARE COORDINATION | Age: 78
End: 2023-09-21

## 2023-09-21 NOTE — CARE COORDINATION
Ambulatory Care Coordination Note  2023    Patient Current Location:  [place]     ACM contacted the patient by telephone. Verified name and  with patient as identifiers. Provided introduction to self, and explanation of the ACM role. Challenges to be reviewed by the provider   Additional needs identified to be addressed with provider: No  none               Method of communication with provider: staff message. ACM: Tena Taylor RN    ACM received a call form patients POA identity verified via HIPAA form. Caregiver reports not receiving caregiver list from [facility]. [facility] sent list via e-mail as requested, patient does not have access to [employer] at this time. [facility] provided help line information for Mychart assistance. Caregiver inquired on providers recommendation for medications during last encounter. ACM educated patient on providers orders   Educated on new medication orders to discontinue Aspirin 81 mg and Vitamin E and taking Rivaroxaban  20 mg with dinner. Patient educate on pain management falls and following up with PCP. Patient and POA encouraged to notify provider of new or worsening symptoms. Patient educated on caregivers and role and safety with memory loss. Patient denied any questions verbalized understanding.        Offered patient enrollment in the Remote Patient Monitoring (RPM) program for in-home monitoring: Patient is not eligible for RPM program.      Future Appointments   Date Time Provider [address]  [facility]   2023  9:00 AM INDERJIT Modi [facility] Neurology -   10/5/2023 10:00 AM [facility]      Tena Taylor RN  Ambulatory Care Manager   C. [phone]

## 2023-09-24 PROBLEM — S42.213A CLOSED FRACTURE OF SURGICAL NECK OF HUMERUS: Status: ACTIVE | Noted: 2023-09-24

## 2023-09-25 ENCOUNTER — OFFICE VISIT (OUTPATIENT)
Dept: NEUROLOGY | Age: 78
End: 2023-09-25
Payer: MEDICARE

## 2023-09-25 VITALS
BODY MASS INDEX: 22.88 KG/M2 | HEART RATE: 98 BPM | OXYGEN SATURATION: 100 % | HEIGHT: 64 IN | SYSTOLIC BLOOD PRESSURE: 139 MMHG | DIASTOLIC BLOOD PRESSURE: 75 MMHG | WEIGHT: 134 LBS

## 2023-09-25 DIAGNOSIS — R41.3 MEMORY LOSS: Primary | ICD-10-CM

## 2023-09-25 PROCEDURE — 99213 OFFICE O/P EST LOW 20 MIN: CPT | Performed by: NURSE PRACTITIONER

## 2023-09-25 PROCEDURE — 1123F ACP DISCUSS/DSCN MKR DOCD: CPT | Performed by: NURSE PRACTITIONER

## 2023-09-25 PROCEDURE — G8420 CALC BMI NORM PARAMETERS: HCPCS | Performed by: NURSE PRACTITIONER

## 2023-09-25 PROCEDURE — G8399 PT W/DXA RESULTS DOCUMENT: HCPCS | Performed by: NURSE PRACTITIONER

## 2023-09-25 PROCEDURE — 1036F TOBACCO NON-USER: CPT | Performed by: NURSE PRACTITIONER

## 2023-09-25 PROCEDURE — G8427 DOCREV CUR MEDS BY ELIG CLIN: HCPCS | Performed by: NURSE PRACTITIONER

## 2023-09-25 PROCEDURE — 1090F PRES/ABSN URINE INCON ASSESS: CPT | Performed by: NURSE PRACTITIONER

## 2023-09-25 NOTE — PROGRESS NOTES
[facility] Neurology Office Note      Patient:   Gabriel Gutierrez  MR#:    [id number]  Account Number:                         YOB: 1945  Date of Evaluation:  9/25/2023  Time of Note:                          9:17 AM  Primary/Referring Physician:  Fortino Navarrete MD   Consulting Physician:  Lala Christianson DNP, APRN    FOLLOW UP    Chief Complaint   Patient presents with    Follow-up    Memory Loss     C/o worsening memory loss      HISTORY OF PRESENT ILLNESS    Gabriel Gutierrez is a 70y.o. year old female here for follow up of memory loss. She is with Tari Busby, friend and POA, today. Overall unchanged from prior visit. Has episodes of confusion at times, might have to stop and think about how to get places that she does not go to frequently. She lives on her own, she has friends and neighbors who keep an eye on her as well. She has noted memory changes for over a year now, progressive. Primarily short term memory loss. There is repetition of stories and questions. No clear word recall difficulty. Memory loss is not interfering with ADLs. She writes herself reminders. She is handling her own medications, using a chart to help keep track of this. Her friend is monitoring these as well. She is driving, no tickets, accidents or getting lost. She is cooking without difficulty. No issues with handling finances, able to write a check. No tremor. No urinary incontinence. No gait changes. No depression. Notes some anxiety but situational in nature. No personality changes. On Namenda XR. Per primary care notes, did not tolerate Aricept. No stroke history. Mother had memory loss. She was previously admitted with chest pain and found to have PEs, occurred about 2 weeks after flying home from [facility]. Dr. Kris Alva was consulted while in the hospital in regards to her memory. On Xarelto again, chronic DVT noted on recent scan, following with hematology.      Past Medical History:   Diagnosis Date    Cancer ([address])     basil

## 2023-09-28 ENCOUNTER — CARE COORDINATION (OUTPATIENT)
Dept: CARE COORDINATION | Age: 78
End: 2023-09-28

## 2023-09-28 NOTE — CARE COORDINATION
for RPM program.       Goals Addressed                   This Visit's Progress     Conditions and Symptoms   On track     I will schedule office visits, as directed by my provider. I will keep my appointment or reschedule if I have to cancel. I will notify my provider of any barriers to my plan of care. I will follow my Zone Management tool to seek urgent or emergent care. I will notify my provider of any symptoms that indicate a worsening of my condition.     Barriers: overwhelmed by complexity of regimen  Plan for overcoming my barriers:   Confidence: 5/10  Anticipated Goal Completion Date: 12/2023                Future Appointments   Date Time Provider [address] [place] [address]   10/5/2023 10:00 AM Michael Clements MD [facility]   1/24/2024  9:00 AM INDERJIT Dill [facility] [address] [facility] -     [address] [place] [address] RN  Ambulatory Care Manager   C. [phone]

## 2023-10-05 ENCOUNTER — OFFICE VISIT (OUTPATIENT)
Dept: INTERNAL MEDICINE | Age: 78
End: 2023-10-05

## 2023-10-05 VITALS
OXYGEN SATURATION: 98 % | DIASTOLIC BLOOD PRESSURE: 90 MMHG | BODY MASS INDEX: 22.36 KG/M2 | HEIGHT: 64 IN | HEART RATE: 116 BPM | WEIGHT: 131 LBS | SYSTOLIC BLOOD PRESSURE: 170 MMHG

## 2023-10-05 DIAGNOSIS — I82.562 CHRONIC DEEP VEIN THROMBOSIS (DVT) OF CALF MUSCLE VEIN OF LEFT LOWER EXTREMITY (HCC): ICD-10-CM

## 2023-10-05 DIAGNOSIS — S42.214D CLOSED NONDISPLACED FRACTURE OF SURGICAL NECK OF RIGHT HUMERUS WITH ROUTINE HEALING, UNSPECIFIED FRACTURE MORPHOLOGY, SUBSEQUENT ENCOUNTER: ICD-10-CM

## 2023-10-05 DIAGNOSIS — M81.0 AGE-RELATED OSTEOPOROSIS WITHOUT CURRENT PATHOLOGICAL FRACTURE: ICD-10-CM

## 2023-10-05 DIAGNOSIS — D22.9 MULTIPLE ATYPICAL SKIN MOLES: ICD-10-CM

## 2023-10-05 DIAGNOSIS — Z23 NEED FOR PNEUMOCOCCAL VACCINE: ICD-10-CM

## 2023-10-05 DIAGNOSIS — Z23 INFLUENZA VACCINE NEEDED: ICD-10-CM

## 2023-10-05 DIAGNOSIS — G30.0 MILD EARLY ONSET ALZHEIMER'S DEMENTIA WITHOUT BEHAVIORAL DISTURBANCE, PSYCHOTIC DISTURBANCE, MOOD DISTURBANCE, OR ANXIETY (HCC): Primary | ICD-10-CM

## 2023-10-05 DIAGNOSIS — F02.A0 MILD EARLY ONSET ALZHEIMER'S DEMENTIA WITHOUT BEHAVIORAL DISTURBANCE, PSYCHOTIC DISTURBANCE, MOOD DISTURBANCE, OR ANXIETY (HCC): Primary | ICD-10-CM

## 2023-10-05 NOTE — PROGRESS NOTES
follow-up with orthopedics much better in this regard pain was very extreme last time I saw her she is doing much better swelling and bruising are better    3. Age-related osteoporosis without current pathological fracture  Long history of osteoporosis with several treatment there plans in the past typically discontinued or did not tolerate. 4. Multiple atypical skin moles  Refer to dermatology  - External Referral To Dermatology    5. Influenza vaccine needed    - Influenza, FLUAD, (age 72 y+), IM, Preservative Free, 0.5 mL    6. Need for pneumococcal vaccine    - Pneumococcal, PCV20, PREVNAR 20, (age 6w+), IM, PF    7. Chronic DVT of calf muscle vein and gastrocnemius patient with a history of a PE at this point continue Xarelto.   Mobility still not as good with recent humerus fracture continue Xarelto for now

## 2023-10-15 PROBLEM — I82.569: Status: ACTIVE | Noted: 2023-10-15

## 2023-10-15 PROBLEM — I82.532 CHRONIC DEEP VEIN THROMBOSIS (DVT) OF LEFT POPLITEAL VEIN (HCC): Status: ACTIVE | Noted: 2023-10-15

## 2023-10-17 ENCOUNTER — CARE COORDINATION (OUTPATIENT)
Dept: CARE COORDINATION | Age: 78
End: 2023-10-17

## 2023-10-17 NOTE — CARE COORDINATION
to cancel. I will notify my provider of any barriers to my plan of care. I will follow my Zone Management tool to seek urgent or emergent care. I will notify my provider of any symptoms that indicate a worsening of my condition.     Barriers: overwhelmed by complexity of regimen  Plan for overcoming my barriers:   Confidence: 5/10  Anticipated Goal Completion Date: 12/2023                Future Appointments   Date Time Provider [address]  [address]   12/5/2023 12:00 PM [facility]   1/24/2024  9:00 AM Tenzin Blankor, APRN [facility] Amos Mallory RN  Ambulatory Care Manager   C. [phone]

## 2023-11-02 ENCOUNTER — CARE COORDINATION (OUTPATIENT)
Dept: CARE COORDINATION | Age: 78
End: 2023-11-02

## 2023-11-09 ENCOUNTER — TELEPHONE (OUTPATIENT)
Dept: INTERNAL MEDICINE | Age: 78
End: 2023-11-09

## 2023-11-09 DIAGNOSIS — I10 ESSENTIAL HYPERTENSION: Primary | ICD-10-CM

## 2023-11-09 RX ORDER — LISINOPRIL 10 MG/1
10 TABLET ORAL DAILY
Qty: 30 TABLET | Refills: 1 | Status: SHIPPED | OUTPATIENT
Start: 2023-11-09

## 2023-11-09 NOTE — TELEPHONE ENCOUNTER
Her caregiver, Larisa Demarco, reports that BP for past 4 weeks has consistantly been around 160/90 - if she sits quietly for about 15 min. Before checking, then it is around 155/84.

## 2023-11-21 RX ORDER — MEMANTINE HYDROCHLORIDE 28 MG/1
28 CAPSULE, EXTENDED RELEASE ORAL DAILY
Qty: 90 CAPSULE | Refills: 3 | Status: SHIPPED | OUTPATIENT
Start: 2023-11-21 | End: 2023-11-28

## 2023-11-28 RX ORDER — MEMANTINE HYDROCHLORIDE 28 MG/1
28 CAPSULE, EXTENDED RELEASE ORAL DAILY
Qty: 30 CAPSULE | Refills: 11 | Status: SHIPPED | OUTPATIENT
Start: 2023-11-28

## 2023-12-05 ENCOUNTER — CARE COORDINATION (OUTPATIENT)
Dept: CARE COORDINATION | Age: 78
End: 2023-12-05

## 2023-12-05 ENCOUNTER — OFFICE VISIT (OUTPATIENT)
Dept: INTERNAL MEDICINE | Age: 78
End: 2023-12-05

## 2023-12-05 VITALS
BODY MASS INDEX: 22.36 KG/M2 | HEIGHT: 64 IN | WEIGHT: 131 LBS | HEART RATE: 104 BPM | OXYGEN SATURATION: 97 % | SYSTOLIC BLOOD PRESSURE: 122 MMHG | DIASTOLIC BLOOD PRESSURE: 74 MMHG

## 2023-12-05 DIAGNOSIS — E55.9 VITAMIN D DEFICIENCY: ICD-10-CM

## 2023-12-05 DIAGNOSIS — I10 ESSENTIAL HYPERTENSION: Primary | ICD-10-CM

## 2023-12-05 DIAGNOSIS — G30.0 MODERATE EARLY ONSET ALZHEIMER'S DEMENTIA WITH MOOD DISTURBANCE (HCC): ICD-10-CM

## 2023-12-05 DIAGNOSIS — I26.99 BILATERAL PULMONARY EMBOLISM (HCC): ICD-10-CM

## 2023-12-05 DIAGNOSIS — E78.2 MIXED HYPERLIPIDEMIA: ICD-10-CM

## 2023-12-05 DIAGNOSIS — I82.569 CHRONIC DEEP VEIN THROMBOSIS (DVT) OF CALF MUSCLE VEIN, UNSPECIFIED LATERALITY (HCC): ICD-10-CM

## 2023-12-05 DIAGNOSIS — F02.B3 MODERATE EARLY ONSET ALZHEIMER'S DEMENTIA WITH MOOD DISTURBANCE (HCC): ICD-10-CM

## 2023-12-05 PROBLEM — R93.89 ENDOMETRIAL THICKENING ON ULTRASOUND: Status: RESOLVED | Noted: 2020-12-18 | Resolved: 2023-12-05

## 2023-12-05 PROBLEM — R14.0 ABDOMINAL BLOATING: Status: RESOLVED | Noted: 2019-07-27 | Resolved: 2023-12-05

## 2023-12-05 PROBLEM — L65.9 HAIR LOSS: Status: RESOLVED | Noted: 2021-05-18 | Resolved: 2023-12-05

## 2023-12-05 RX ORDER — LISINOPRIL 10 MG/1
10 TABLET ORAL DAILY
Qty: 90 TABLET | Refills: 3 | Status: SHIPPED | OUTPATIENT
Start: 2023-12-05

## 2023-12-05 NOTE — CARE COORDINATION
ACM spoke to primary caregivers spouse who reports Brittany Thomason is not home at this time. Spouse agreed to have wife to return ACM call. ACM will await a return call.        [address] Pablo Schaefer RN  Ambulatory Care Manager   C. [phone]

## 2023-12-05 NOTE — PROGRESS NOTES
Chief Complaint   Patient presents with    Follow-up     2 months - Alzheimer's       HPI: Patient is here today to follow-up hypertension we recently added lisinopril to her medication blood pressure looks like it is doing well. She is also here to follow-up Alzheimer's disease and history of DVT. History of PE. Patient seems a little irritable agitated the person with her today is one of her good friends and helpers we both are encouraging her that we would prefer her not to drive and also do not go up and down her basement steps patient is resistant to this. Patient recognizes me remembers mood. Able to carry on a conversation and follow commands. Lives alone and mostly does ok but definitely forgetful. Past Medical History:   Diagnosis Date    Cancer ([address])     basil cell skin cancers removed     DVT (deep venous thrombosis) ([address]) 08/18/2022    LLE    Essential hypertension 12/5/2023    Low back pain 02/07/2017    Mixed hyperlipidemia 05/18/2021    Osteoporosis     Osteoporosis with pathological fracture with routine healing 06/23/2017    Pulmonary embolism ([address]) 08/18/2022    bilateral lower lobes       Past Surgical History:   Procedure Laterality Date    FRACTURE SURGERY  2/7/17? For back    SKIN CANCER EXCISION         Family History   Problem Relation Age of Onset    Kidney Disease Mother     Cancer Father         Prostate        Social History     Socioeconomic History    Marital status: Single     Spouse name: Not on file    Number of children: Not on file    Years of education: Not on file    Highest education level: Not on file   Occupational History    Not on file   Tobacco Use    Smoking status: Never    Smokeless tobacco: Never   Vaping Use    Vaping Use: Not on file   Substance and Sexual Activity    Alcohol use:  Yes     Alcohol/week: 7.0 standard drinks of alcohol     Types: 5 Glasses of wine, 2 Cans of beer per week    Drug use: No    Sexual activity: Not Currently     Partners: Male

## 2023-12-26 ENCOUNTER — TELEPHONE (OUTPATIENT)
Dept: INTERNAL MEDICINE | Age: 78
End: 2023-12-26

## 2023-12-26 NOTE — TELEPHONE ENCOUNTER
Patient came into office Xarelto is there a less expensive alternative for this medication? Can you suggest something else that would be covered under her insurance?

## 2024-01-08 ENCOUNTER — CARE COORDINATION (OUTPATIENT)
Dept: CARE COORDINATION | Age: 79
End: 2024-01-08

## 2024-01-08 NOTE — CARE COORDINATION
Ambulatory Care Coordination Note  2024    Patient Current Location:  [place]     ACM contacted the patient by telephone. Verified name and  with patient as identifiers. Provided introduction to self, and explanation of the ACM role.     Challenges to be reviewed by the provider   Additional needs identified to be addressed with provider: No  none               Method of communication with provider: none.    ACM: Stanford Estrada RN    ACM received a call from patients POA stating patient is out of Xarelto and last dosage was on last week. POA states patients  prescription cost approximately 400 dollars for a thirty day supply. POA reports previously having a discount card. ACM provided savings card information and notified providers offices. Savings card was sent to pharmacy on patients behalf. Per POA patient denies chest pain, SOA, vision changes or headache. Patient encouraged to notify provider if symptoms occur.     ACM  contacted patients local pharmacy awaiting a return call. POA to clarify when patients last dosage of medication was and notify ACM/PCP.        Offered patient enrollment in the Remote Patient Monitoring (RPM) program for in-home monitoring: Patient is not eligible for RPM program.    Care Coordination Interventions    Referral from Primary Care Provider: No  Suggested Interventions and Community Resources  Fall Risk Prevention: In Process  Disease Specific Clinic: In Process (Comment: neurology)  Meals on Wheels: In Process  Transportation Support: In Process  Zone Management Tools: In Process          Goals Addressed                   This Visit's Progress     Conditions and Symptoms   On track     I will schedule office visits, as directed by my provider.  I will keep my appointment or reschedule if I have to cancel.  I will notify my provider of any barriers to my plan of care.  I will follow my Zone Management tool to seek urgent or emergent care.  I will notify my provider of  Called and gave report to [facility]. [facility] ambulance eta [phone].      Karel Brown RN  02/23/20 [phone]

## 2024-01-08 NOTE — TELEPHONE ENCOUNTER
No, the whole office is out of xarelto right now.  A coupon can be printed off the [email or web address] page and it will be around $30 a month.  Does she need more sent to pharmacy?

## 2024-01-09 ENCOUNTER — CARE COORDINATION (OUTPATIENT)
Dept: CARE COORDINATION | Age: 79
End: 2024-01-09

## 2024-01-09 NOTE — CARE COORDINATION
ACM patient spoke to patients caregiver who requested assistance with educating patient on medication compliance. POA updated ACM the patient has four dosages remaining and prescription was filled a local for three month supply.     ACM returned patients call medication reconciliation completed and educating on  Xarelto route, frequency and indication. Patient verbalized understanding of teaching denying any questions at this time. Patient encouraged to notify provider/ ACM of questions as they arise. Patient agreed to do so.       Stanford Estrada RN  Ambulatory Care Manager   C. [phone]

## 2024-01-18 ENCOUNTER — CARE COORDINATION (OUTPATIENT)
Dept: CARE COORDINATION | Age: 79
End: 2024-01-18

## 2024-01-18 NOTE — CARE COORDINATION
Attempted call to patient this date for care coordination follow up. Phone rings. No answer. Voicemail is not set up.     Will attempt additional outreach call tomorrow.     [facility]  Care Coordination    [facility]   Cell: [phone]

## 2024-01-19 ENCOUNTER — CARE COORDINATION (OUTPATIENT)
Dept: CARE COORDINATION | Age: 79
End: 2024-01-19

## 2024-01-19 NOTE — CARE COORDINATION
Second attempt to call patient this date for care coordination follow up. Phone rings. No answer. Voicemail is not set up. Will notify ACM RN that I was unable to reach patient.     SOTO DuqueHIDEVIN  Care Coordination    [facility]   Cell: [phone]

## 2024-01-24 ENCOUNTER — TELEPHONE (OUTPATIENT)
Dept: NEUROLOGY | Age: 79
End: 2024-01-24

## 2024-01-24 ENCOUNTER — OFFICE VISIT (OUTPATIENT)
Dept: NEUROLOGY | Age: 79
End: 2024-01-24
Payer: MEDICARE

## 2024-01-24 VITALS
HEIGHT: 64 IN | OXYGEN SATURATION: 99 % | HEART RATE: 102 BPM | WEIGHT: 131 LBS | SYSTOLIC BLOOD PRESSURE: 135 MMHG | BODY MASS INDEX: 22.36 KG/M2 | DIASTOLIC BLOOD PRESSURE: 75 MMHG

## 2024-01-24 DIAGNOSIS — R41.3 MEMORY LOSS: Primary | ICD-10-CM

## 2024-01-24 PROCEDURE — G8427 DOCREV CUR MEDS BY ELIG CLIN: HCPCS | Performed by: NURSE PRACTITIONER

## 2024-01-24 PROCEDURE — G8420 CALC BMI NORM PARAMETERS: HCPCS | Performed by: NURSE PRACTITIONER

## 2024-01-24 PROCEDURE — 1123F ACP DISCUSS/DSCN MKR DOCD: CPT | Performed by: NURSE PRACTITIONER

## 2024-01-24 PROCEDURE — 3075F SYST BP GE 130 - 139MM HG: CPT | Performed by: NURSE PRACTITIONER

## 2024-01-24 PROCEDURE — 99213 OFFICE O/P EST LOW 20 MIN: CPT | Performed by: NURSE PRACTITIONER

## 2024-01-24 PROCEDURE — 3078F DIAST BP <80 MM HG: CPT | Performed by: NURSE PRACTITIONER

## 2024-01-24 PROCEDURE — G8399 PT W/DXA RESULTS DOCUMENT: HCPCS | Performed by: NURSE PRACTITIONER

## 2024-01-24 PROCEDURE — 1036F TOBACCO NON-USER: CPT | Performed by: NURSE PRACTITIONER

## 2024-01-24 PROCEDURE — 1090F PRES/ABSN URINE INCON ASSESS: CPT | Performed by: NURSE PRACTITIONER

## 2024-01-24 PROCEDURE — G8484 FLU IMMUNIZE NO ADMIN: HCPCS | Performed by: NURSE PRACTITIONER

## 2024-01-24 NOTE — PROGRESS NOTES
[facility] Office Note      Patient:   Josefa Thompson  MR#:    [id number]  Account Number:                         YOB: 1945  Date of Evaluation:  1/24/2024  Time of Note:                          9:15 AM  Primary/Referring Physician:  Jacki Yepez MD   Consulting Physician:  Carol Brooks DNP, APRN    FOLLOW UP    Chief Complaint   Patient presents with    Follow-up    Memory Loss     Pt states memory is some worse. Pt does note she had trouble remembering how to get into our office when she parked today.      HISTORY OF PRESENT ILLNESS    Josefa Thompson is a 78 y.o. year old female here for follow up of memory loss. She is alone at today's appointment. Patient denies worsening from prior visit but we got a phone call from POA prior to appointment that she has gotten worse, more argumentative/irritable. Primary care provider notes this as well. Continues to note episodic confusion, might have to stop and think about how to get places that she does not go to frequently. She lives on her own, she has friends and neighbors who keep an eye on her as well. She has noted memory changes for over a year now, progressive. Primarily short term memory loss. There is repetition of stories and questions. No clear word recall difficulty. Memory loss is not interfering with ADLs. She writes herself reminders. She is handling her own medications, using a chart to help keep track of this. She did have difficulty knowing what medications she takes today however. Her friend is monitoring these as well. She is driving, she has gotten lost recently although patient denies this today. She is cooking without difficulty. Appetite is good. No issues with handling finances, able to write a check. No tremor. No urinary incontinence. No gait changes. No depression. Notes some anxiety but situational in nature. No personality changes. She is on a list for a room at [facility], recently declined an available room.

## 2024-01-24 NOTE — TELEPHONE ENCOUNTER
Pt's POA Lisa just called and wanted Carol to know pt is having more memory problems and she is becoming more argumentative and hateful in her responses. Dr. Yepez recommended that pt not drive but she insisted and drove to her appt today and will drive to other places around town. Pt is currently on waiting list for the [facility]. I told Lisa I will relay that message to Carol. She states understanding. Pt has appt at 9 am today.

## 2024-01-25 ENCOUNTER — CARE COORDINATION (OUTPATIENT)
Dept: CARE COORDINATION | Age: 79
End: 2024-01-25

## 2024-02-09 ENCOUNTER — HOSPITAL ENCOUNTER (OUTPATIENT)
Dept: GENERAL RADIOLOGY | Age: 79
Discharge: HOME | End: 2024-02-09
Payer: MEDICARE

## 2024-02-09 ENCOUNTER — OFFICE VISIT (OUTPATIENT)
Dept: INTERNAL MEDICINE | Age: 79
End: 2024-02-09
Payer: MEDICARE

## 2024-02-09 VITALS
SYSTOLIC BLOOD PRESSURE: 130 MMHG | OXYGEN SATURATION: 98 % | HEART RATE: 100 BPM | BODY MASS INDEX: 22.36 KG/M2 | WEIGHT: 131 LBS | DIASTOLIC BLOOD PRESSURE: 76 MMHG | HEIGHT: 64 IN

## 2024-02-09 DIAGNOSIS — R31.9 HEMATURIA, UNSPECIFIED TYPE: ICD-10-CM

## 2024-02-09 DIAGNOSIS — M54.50 ACUTE BILATERAL LOW BACK PAIN WITHOUT SCIATICA: ICD-10-CM

## 2024-02-09 DIAGNOSIS — M54.50 ACUTE BILATERAL LOW BACK PAIN WITHOUT SCIATICA: Primary | ICD-10-CM

## 2024-02-09 LAB
APPEARANCE FLUID: CLEAR
BILIRUBIN, POC: ABNORMAL
BLOOD URINE, POC: ABNORMAL
CLARITY, POC: CLEAR
COLOR, POC: ABNORMAL
GLUCOSE URINE, POC: ABNORMAL
KETONES, POC: ABNORMAL
LEUKOCYTE EST, POC: ABNORMAL
NITRITE, POC: ABNORMAL
PH, POC: 6
PROTEIN, POC: ABNORMAL
SPECIFIC GRAVITY, POC: 1.03
UROBILINOGEN, POC: 0.2

## 2024-02-09 PROCEDURE — G8399 PT W/DXA RESULTS DOCUMENT: HCPCS | Performed by: INTERNAL MEDICINE

## 2024-02-09 PROCEDURE — 81002 URINALYSIS NONAUTO W/O SCOPE: CPT | Performed by: INTERNAL MEDICINE

## 2024-02-09 PROCEDURE — 1090F PRES/ABSN URINE INCON ASSESS: CPT | Performed by: INTERNAL MEDICINE

## 2024-02-09 PROCEDURE — G8427 DOCREV CUR MEDS BY ELIG CLIN: HCPCS | Performed by: INTERNAL MEDICINE

## 2024-02-09 PROCEDURE — 3078F DIAST BP <80 MM HG: CPT | Performed by: INTERNAL MEDICINE

## 2024-02-09 PROCEDURE — 72100 X-RAY EXAM L-S SPINE 2/3 VWS: CPT

## 2024-02-09 PROCEDURE — G8420 CALC BMI NORM PARAMETERS: HCPCS | Performed by: INTERNAL MEDICINE

## 2024-02-09 PROCEDURE — 1123F ACP DISCUSS/DSCN MKR DOCD: CPT | Performed by: INTERNAL MEDICINE

## 2024-02-09 PROCEDURE — 1036F TOBACCO NON-USER: CPT | Performed by: INTERNAL MEDICINE

## 2024-02-09 PROCEDURE — G8484 FLU IMMUNIZE NO ADMIN: HCPCS | Performed by: INTERNAL MEDICINE

## 2024-02-09 PROCEDURE — 99213 OFFICE O/P EST LOW 20 MIN: CPT | Performed by: INTERNAL MEDICINE

## 2024-02-09 PROCEDURE — 3075F SYST BP GE 130 - 139MM HG: CPT | Performed by: INTERNAL MEDICINE

## 2024-02-09 ASSESSMENT — PATIENT HEALTH QUESTIONNAIRE - PHQ9
SUM OF ALL RESPONSES TO PHQ9 QUESTIONS 1 & 2: 0
SUM OF ALL RESPONSES TO PHQ QUESTIONS 1-9: 0
SUM OF ALL RESPONSES TO PHQ QUESTIONS 1-9: 0
1. LITTLE INTEREST OR PLEASURE IN DOING THINGS: 0
SUM OF ALL RESPONSES TO PHQ QUESTIONS 1-9: 0
2. FEELING DOWN, DEPRESSED OR HOPELESS: 0
SUM OF ALL RESPONSES TO PHQ QUESTIONS 1-9: 0

## 2024-02-09 NOTE — PROGRESS NOTES
General: She is not in acute distress.  Eyes:      General: No scleral icterus.  Cardiovascular:      Heart sounds: Normal heart sounds.   Pulmonary:      Breath sounds: Normal breath sounds.   Musculoskeletal:      Cervical back: Neck supple.      Right lower leg: No edema.      Left lower leg: No edema.      Comments: Tender over lower back   Lymphadenopathy:      Cervical: No cervical adenopathy.   Skin:     Findings: No rash.   Psychiatric:         Mood and Affect: Mood normal.         Results for orders placed or performed in visit on 02/09/24   POCT Urinalysis no Micro   Result Value Ref Range    Color, UA gold     Clarity, UA clear     Glucose, UA POC neg     Bilirubin, UA neg     Ketones, UA neg     Spec Grav, UA 1.030     Blood, UA POC large (A)     pH, UA 6.0     Protein, UA POC trace (A)     Urobilinogen, UA 0.2     Leukocytes, UA trace (A)     Nitrite, UA neg     Appearance, Fluid Clear Clear, Slightly Cloudy       ASSESSMENT/ PLAN:  1. Acute bilateral low back pain without sciatica  Check xray and f/u - also check urine although less likely cause   - POCT Urinalysis no Micro  - XR LUMBAR SPINE (2-3 VIEWS); Future  - Urinalysis with Microscopic; Future  - Culture, Urine; Future    2. Hematuria, unspecified type  Poct urine with microscopic hematuria- send for further evaluation.   - Culture, Urine; Future

## 2024-02-11 LAB — BACTERIA UR CULT: NORMAL

## 2024-02-13 ENCOUNTER — CARE COORDINATION (OUTPATIENT)
Dept: CARE COORDINATION | Age: 79
End: 2024-02-13

## 2024-02-13 ENCOUNTER — TELEPHONE (OUTPATIENT)
Dept: INTERNAL MEDICINE | Age: 79
End: 2024-02-13

## 2024-02-13 DIAGNOSIS — M54.50 ACUTE LOW BACK PAIN WITHOUT SCIATICA, UNSPECIFIED BACK PAIN LATERALITY: Primary | ICD-10-CM

## 2024-02-13 NOTE — CARE COORDINATION
Attempted call to patient this date for care coordination follow up. Phone rings. No answer. Voicemail is not set up. Will attempt second outreach later this week.     [facility]  Care Coordination    [facility]   Cell: [phone]

## 2024-02-13 NOTE — TELEPHONE ENCOUNTER
They are calling for xray results from Friday.  I looked and these haven't been released yet, but I called and they are putting it in stat to be read.

## 2024-02-13 NOTE — TELEPHONE ENCOUNTER
Sorry - they just got us report - they see old compression fractures-- the ones at the 1st and 2nd vertebrae and at the fourth vertebrae are described as similar to how they were described in 2017.  On the [facility] record it says these may be new but when I look back in the [facility] record they were described with a similar loss of height.  She had kyphoplasty by Dr. Haney back then I think probably I need to refer her back to Dr. Haney's office to look everything over.---please fax xray report and the report from [facility] in 2018 to Dr. Haney and a referral

## 2024-02-15 ENCOUNTER — TELEPHONE (OUTPATIENT)
Dept: INTERNAL MEDICINE | Age: 79
End: 2024-02-15

## 2024-02-15 ENCOUNTER — CARE COORDINATION (OUTPATIENT)
Dept: CARE COORDINATION | Age: 79
End: 2024-02-15

## 2024-02-15 NOTE — TELEPHONE ENCOUNTER
We sent a message and it looks like spoke to Faby but I think this may be Lisa -- let her know No new fracture when compare back to [facility] but need Dr. Haney to verify - we had sent referral (see note 2/13)--- make sure referral sent and let me know if pain worse I could order MRI at [facility] if alot worse

## 2024-02-15 NOTE — CARE COORDINATION
Attempted call to patient this date for care coordination follow up. Phone rings. No answer. Voicemail is not set up. This was second attempt this week. Will notify ACM RN and place on schedule for follow-up next week.     SOTO DuqueHIM  Care Coordination    [facility]   Cell: [phone]

## 2024-02-21 ENCOUNTER — TELEPHONE (OUTPATIENT)
Dept: NEUROSURGERY | Facility: CLINIC | Age: 79
End: 2024-02-21
Payer: MEDICARE

## 2024-02-21 NOTE — TELEPHONE ENCOUNTER
Placed a call to get pt scheduled for an appt with our office. Ended call with pt acknowledgment, and understanding pt agreeing with date 02/22/24 and time 1130.

## 2024-02-22 ENCOUNTER — OFFICE VISIT (OUTPATIENT)
Dept: NEUROSURGERY | Facility: CLINIC | Age: 79
End: 2024-02-22
Payer: MEDICARE

## 2024-02-22 VITALS — BODY MASS INDEX: 22.49 KG/M2 | HEIGHT: 65 IN | WEIGHT: 135 LBS

## 2024-02-22 DIAGNOSIS — Z78.9 NONSMOKER: ICD-10-CM

## 2024-02-22 DIAGNOSIS — G89.29 CHRONIC BILATERAL LOW BACK PAIN WITHOUT SCIATICA: ICD-10-CM

## 2024-02-22 DIAGNOSIS — M54.50 CHRONIC BILATERAL LOW BACK PAIN WITHOUT SCIATICA: ICD-10-CM

## 2024-02-22 PROCEDURE — 99203 OFFICE O/P NEW LOW 30 MIN: CPT | Performed by: NURSE PRACTITIONER

## 2024-02-22 PROCEDURE — 1159F MED LIST DOCD IN RCRD: CPT | Performed by: NURSE PRACTITIONER

## 2024-02-22 PROCEDURE — 1160F RVW MEDS BY RX/DR IN RCRD: CPT | Performed by: NURSE PRACTITIONER

## 2024-02-22 RX ORDER — MEMANTINE HYDROCHLORIDE 28 MG/1
1 CAPSULE, EXTENDED RELEASE ORAL DAILY
COMMUNITY
Start: 2023-11-28

## 2024-02-22 RX ORDER — LISINOPRIL 10 MG/1
1 TABLET ORAL DAILY
COMMUNITY
Start: 2023-12-05

## 2024-02-22 NOTE — PROGRESS NOTES
Chief complaint:   Chief Complaint   Patient presents with    Back Pain     Pt here for lbp. Pt states she has not had any physical therapy, pain mgmt or seen a chiropractor.        Subjective     HPI: This is a 78-year-old female patient who we have seen in the past for compression fracture.  She underwent an L3 kyphoplasty in 2016.  She comes in today and says that she has been dealing with increasing back pain for the last several months.  Currently the pain in her back is intermittent.  It is worse with standing and walking and better with sitting down.  Denies any lower extremity pain or numbness and tingling.  Denies any recent accident or injury.  Denies any bowel or bladder incontinence which has not done any recent physical therapy or chiropractic care pain management injections.  She is right-hand dominant.  She is retired.  She is single.  Denies any tobacco or illicit drug use but she does drink alcohol occasionally.    Review of Systems   Musculoskeletal:  Positive for back pain.   Neurological: Negative.    All other systems reviewed and are negative.       Past Medical History:   Diagnosis Date    Compression fracture of lumbar vertebra     Depression     Disc degeneration, lumbar     History of basal cell carcinoma     History of hyperlipidemia     History of insomnia     History of multiple pulmonary nodules     History of palpitations     History of urinary tract infection     Low back pain     Osteoporosis     Osteoporosis     Skin cancer     basal cell    Vitamin D insufficiency      Past Surgical History:   Procedure Laterality Date    BASAL CELL CARCINOMA EXCISION      SEVERAL OFF FACE    BREAST BIOPSY      benign    COLONOSCOPY  12/09/2006    COLONOSCOPY N/A 3/9/2017    Procedure: COLONOSCOPY WITH ANESTHESIA;  Surgeon: Arthur Harris MD;  Location: [facility] ENDOSCOPY;  Service:     KYPHOPLASTY N/A 11/11/2016    Procedure: KYPHOPLASTY L3;  Surgeon: Ricky Ferguson MD;  Location: [facility]  "OR;  Service:      History reviewed. No pertinent family history.  Social History     Tobacco Use    Smoking status: Never    Smokeless tobacco: Never   Vaping Use    Vaping Use: Never used   Substance Use Topics    Alcohol use: Yes     Alcohol/week: 7.0 standard drinks of alcohol     Types: 7 Glasses of wine per week     Comment: daily    Drug use: No     (Not in a hospital admission)    Allergies:  Patient has no known allergies.    Objective      Vital Signs  Ht 163.8 cm (64.5\")   Wt 61.2 kg (135 lb)   BMI 22.81 kg/m²     Physical Exam  Constitutional:       Appearance: Normal appearance. She is well-developed.   HENT:      Head: Normocephalic.   Eyes:      General: Lids are normal.      Extraocular Movements: EOM normal.      Conjunctiva/sclera: Conjunctivae normal.      Pupils: Pupils are equal, round, and reactive to light.   Pulmonary:      Effort: Pulmonary effort is normal.      Breath sounds: Normal breath sounds.   Musculoskeletal:         General: Normal range of motion.      Cervical back: Normal range of motion.   Skin:     General: Skin is warm.   Neurological:      Mental Status: She is alert and oriented to person, place, and time.      GCS: GCS eye subscore is 4. GCS verbal subscore is 5. GCS motor subscore is 6.      Cranial Nerves: No cranial nerve deficit.      Sensory: No sensory deficit.      Motor: Motor strength is normal.     Gait: Gait is intact.      Deep Tendon Reflexes: Reflexes are normal and symmetric. Reflexes normal.   Psychiatric:         Speech: Speech normal.         Behavior: Behavior normal.         Thought Content: Thought content normal.         Neurologic Exam     Mental Status   Oriented to person, place, and time.   Attention: normal. Concentration: normal.   Speech: speech is normal   Level of consciousness: alert  Normal comprehension.     Cranial Nerves     CN II   Visual fields full to confrontation.     CN III, IV, VI   Pupils are equal, round, and reactive to " light.  Extraocular motions are normal.     CN V   Facial sensation intact.     CN VII   Facial expression full, symmetric.     CN VIII   CN VIII normal.     CN IX, X   CN IX normal.   CN X normal.     CN XI   CN XI normal.     CN XII   CN XII normal.     Motor Exam   Muscle bulk: normal    Strength   Strength 5/5 throughout.     Sensory Exam   Light touch normal.     Gait, Coordination, and Reflexes     Gait  Gait: normal    Reflexes   Reflexes 2+ except as noted.       Imaging review: X-rays of the lumbar spine that was done on February 9, 2024 shows a previous kyphoplasty at L4.  There is chronic compression fractures noted of L1 and L2.  There is also a compression fracture at L5 which was not present on a CT scan that was done in 2019.        Assessment/Plan: Patient is complaining of back pain.  For first line conservative care of lumbar pain, I would like to send Ms. Villeda for a dedicated course of physician directed physical therapy consisting of 2-3 times a week for 4-6 weeks.    Return for reassessment with me after 6-8 weeks after physical therapy.     Should Ms. Villeda not have any improvement from physical therapy, I think it would be prudent to send the patient for an MRI of the lumbar spine to see if there is anything from a surgical standpoint that needs to be addressed.     I advised the patient to call and return sooner for new or worsening complaints of weakness, paresthesias, gait disturbances, or any additional concerns.  Treatment options discussed in detail with Shellie and the patient voiced understanding.  Ms. Villeda agrees with this plan of care.     Patient is a nonsmoker  The patient's Body mass index is 22.81 kg/m².. BMI is within normal parameters. No follow-up required.  Advance Care Planning   ACP discussion was held with the patient during this visit. Patient does not have an advance directive, information provided.  STEADI Fall Risk Assessment was completed, and patient is at  LOW risk for falls.Assessment completed on:2/22/2024       Diagnoses and all orders for this visit:    1. Chronic bilateral low back pain without sciatica  -     Ambulatory Referral to Physical Therapy Evaluate and treat, Neuro; Strengthening, ROM, Stretching; Full weight bearing    2. Body mass index (BMI) of 22.0-22.9 in adult    3. Nonsmoker          I discussed the patients findings and my recommendations with patient    Chato Howe, ROZINA  02/22/24  12:09 CST

## 2024-02-22 NOTE — PATIENT INSTRUCTIONS
Advance Care Planning and Advance Directives     You make decisions on a daily basis - decisions about where you want to live, your career, your home, your life. Perhaps one of the most important decisions you face is your choice for future medical care. Take time to talk with your family and your healthcare team and start planning today.  Advance Care Planning is a process that can help you:  Understand possible future healthcare decisions in light of your own experiences  Reflect on those decision in light of your goals and values  Discuss your decisions with those closest to you and the healthcare professionals that care for you  Make a plan by creating a document that reflects your wishes    Surrogate Decision Maker  In the event of a medical emergency, which has left you unable to communicate or to make your own decisions, you would need someone to make decisions for you.  It is important to discuss your preferences for medical treatment with this person while you are in good health.     Qualities of a surrogate decision maker:  Willing to take on this role and responsibility  Knows what you want for future medical care  Willing to follow your wishes even if they don't agree with them  Able to make difficult medical decisions under stressful circumstances    Advance Directives  These are legal documents you can create that will guide your healthcare team and decision maker(s) when needed. These documents can be stored in the electronic medical record.    Living Will - a legal document to guide your care if you have a terminal condition or a serious illness and are unable to communicate. States vary by statute in document names/types, but most forms may include one or more of the following:        -  Directions regarding life-prolonging treatments        -  Directions regarding artificially provided nutrition/hydration        -  Choosing a healthcare decision maker        -  Direction regarding organ/tissue  donation    Durable Power of  for Healthcare - this document names an -in-fact to make medical decisions for you, but it may also allow this person to make personal and financial decisions for you. Please seek the advice of an  if you need this type of document.    **Advance Directives are not required and no one may discriminate against you if you do not sign one.    Medical Orders  Many states allow specific forms/orders signed by your physician to record your wishes for medical treatment in your current state of health. This form, signed in personal communication with your physician, addresses resuscitation and other medical interventions that you may or may not want.      For more information or to schedule a time with a [facility] Advance Care Planning Facilitator contact: [email or web address] or call [phone] and someone will contact you directly.

## 2024-03-06 ENCOUNTER — HOSPITAL ENCOUNTER (OUTPATIENT)
Dept: PHYSICAL THERAPY | Age: 79
Setting detail: THERAPIES SERIES
Discharge: HOME | End: 2024-03-06
Payer: MEDICARE

## 2024-03-06 VITALS — DIASTOLIC BLOOD PRESSURE: 87 MMHG | SYSTOLIC BLOOD PRESSURE: 144 MMHG | HEART RATE: 98 BPM | OXYGEN SATURATION: 98 %

## 2024-03-06 PROCEDURE — 97530 THERAPEUTIC ACTIVITIES: CPT

## 2024-03-06 PROCEDURE — 97162 PT EVAL MOD COMPLEX 30 MIN: CPT

## 2024-03-06 ASSESSMENT — 10 METER WALK TEST (10METWT)
TEST DIRECTIONS: SELF-SELECTED OR FAST-VELOCITY: SELF, NO AD
AVERAGE: 7.7
SCORE (M/S): 1.3

## 2024-03-06 ASSESSMENT — PAIN DESCRIPTION - LOCATION: LOCATION: BACK

## 2024-03-06 ASSESSMENT — PAIN SCALES - GENERAL: PAINLEVEL_OUTOF10: 3

## 2024-03-06 NOTE — PROGRESS NOTES
PHYSICAL THERAPY  [facility] [address]  Dept: [phone]  Dept Fax: [phone]  Loc: [phone]       POC NOTE

## 2024-03-08 ENCOUNTER — CARE COORDINATION (OUTPATIENT)
Dept: CARE COORDINATION | Age: 79
End: 2024-03-08

## 2024-03-08 NOTE — CARE COORDINATION
Ambulatory Care Coordination Note  3/8/2024    Patient Current Location:  [place]     ACM contacted the patient by telephone. Verified name and  with patient as identifiers. Provided introduction to self, and explanation of the ACM role.     Challenges to be reviewed by the provider   Additional needs identified to be addressed with provider: No  none               Method of communication with provider: none.    ACM: Stanford Estrada RN    ACM RN followed up with patients family friend Lisa whose identity was verified via HIPAA form. Family denies patient having immediate needs at time. ACM reviewed neurosurgery encounter educating on providers recommendation. Patient is currently active in PT and family reports services are helping at this time. ACM educated on falls and medication compliance. Family encouraged to notify provider of new or worsening symptoms family agreed.  ACM to prepare to discharge.     Offered patient enrollment in the Remote Patient Monitoring (RPM) program for in-home monitoring: Patient is not eligible for RPM program.    Lab Results       None            Care Coordination Interventions    Referral from Primary Care Provider: No  Suggested Interventions and Community Resources  Fall Risk Prevention: In Process  Disease Specific Clinic: In Process (Comment: neurology)  Meals on Wheels: Completed (Comment: Denied)  Physical Therapy: In Process (Comment: [facility])  Transportation Support: In Process  Zone Management Tools: Completed          Goals Addressed                   This Visit's Progress     Conditions and Symptoms   On track     I will schedule office visits, as directed by my provider.  I will keep my appointment or reschedule if I have to cancel.  I will notify my provider of any barriers to my plan of care.  I will follow my Zone Management tool to seek urgent or emergent care.  I will notify my provider of any symptoms that indicate a worsening of my condition.    Barriers:

## 2024-03-13 ENCOUNTER — HOSPITAL ENCOUNTER (OUTPATIENT)
Dept: PHYSICAL THERAPY | Age: 79
Setting detail: THERAPIES SERIES
Discharge: HOME | End: 2024-03-13
Payer: MEDICARE

## 2024-03-13 PROCEDURE — 97110 THERAPEUTIC EXERCISES: CPT

## 2024-03-13 ASSESSMENT — PAIN SCALES - GENERAL: PAINLEVEL_OUTOF10: 2

## 2024-03-13 ASSESSMENT — PAIN DESCRIPTION - DESCRIPTORS: DESCRIPTORS: ACHING

## 2024-03-13 ASSESSMENT — PAIN DESCRIPTION - LOCATION: LOCATION: BACK

## 2024-03-13 NOTE — PROGRESS NOTES
Physical Therapy: Daily Note   Patient: Josefa Thompson (78 y.o. female)   Examination Date: 2024  Plan of Care/Certification Expiration Date: 24    No data recorded   :  1945 # of Visits since SOC:   2   MRN: [id number]  CSN: [id number] Start of Care Date:   3/6/2024   Insurance: Payor: MEDICARE / Plan: MEDICARE PART A AND B / Product Type: *No Product type* /   Insurance ID: [id number] - (Medicare) Secondary Insurance (if applicable): [facility]   Referring Physician: Jordan Salinas MD Jonathan Gipson   PCP: Jacki Yepez MD Visits to Date/Visits Approved: 2 /      No Show/Cancelled Appts:   /       Medical Diagnosis: Low back pain, unspecified [M54.50]  Other chronic pain [G89.29] Low back pain  Treatment Diagnosis: LBP; LE weakness        SUBJECTIVE EXAMINATION   Pain Level: Pain Screening  Pain Assessment: 0-10  Pain Level: 2  Pain Location: Back  Pain Descriptors: Aching    Patient Comments: Subjective: My back bothers me at times.        TREATMENT     Exercises:      Treatment Reasoning    Exercise 1: 6 MWT  1231'-slilghtly winded  Exercise 2: Supine, TA series, 10 reps  Exercise 3: Supine, lower trunk rotation, 10 reps  Exercise 4: Supine, bridges, 10 reps  Exercise 5: Supine, check leg length, treat accordingly,  Exercise 6: Supine, 1 SUMANTH, 2 SUMANTH, 5 x 5 sec each  Exercise 7: Supine, pelvic tilt, 10 reps-poor quality even with mod - max cueing  Exercise 8: Supine, partial crunch, 10 reps  Exercise 9: Sitting, LAQ, 3#, 10 reps  Exercise 10: Sitting, sit to stand, mat to chair progression, 10 reps  Exercise 11: Standing, multifidus row, Paloff press, shoulder retraction, 10 reps with red band  Exercise 12: Standing, hip abd, ext, mini squat, heel raise x 10 each  Exercise 13: Standing, press down on top of physio ball for abd contraction, 10 reps  Exercise 14: LBE, 5 min, level 1  Exercise 15: IFC wth MH as needed-NOT TODAY  Exercise 16: *HEP IN CHART*                               ASSESSMENT

## 2024-03-15 ENCOUNTER — HOSPITAL ENCOUNTER (OUTPATIENT)
Dept: PHYSICAL THERAPY | Age: 79
Setting detail: THERAPIES SERIES
Discharge: HOME | End: 2024-03-15
Payer: MEDICARE

## 2024-03-15 PROCEDURE — G0283 ELEC STIM OTHER THAN WOUND: HCPCS

## 2024-03-15 PROCEDURE — 97110 THERAPEUTIC EXERCISES: CPT

## 2024-03-15 ASSESSMENT — PAIN SCALES - GENERAL: PAINLEVEL_OUTOF10: 2

## 2024-03-15 ASSESSMENT — PAIN DESCRIPTION - LOCATION: LOCATION: BACK;KNEE

## 2024-03-15 ASSESSMENT — PAIN DESCRIPTION - ORIENTATION: ORIENTATION: LEFT;LOWER

## 2024-03-15 ASSESSMENT — PAIN DESCRIPTION - PAIN TYPE: TYPE: CHRONIC PAIN

## 2024-03-15 ASSESSMENT — PAIN DESCRIPTION - DESCRIPTORS: DESCRIPTORS: ACHING

## 2024-03-15 NOTE — PROGRESS NOTES
order to reduce fall risk as demonstrated by improvement of 5XSS from 18 sec to 11 sec.                                                            TREATMENT PLAN   Plan Frequency: 2 X weekly  Plan weeks: 6-8 weeks  Current Treatment Recommendations: Strengthening, ROM, Balance training, Functional mobility training, Transfer training, ADL/Self-care training, Pain management, Manual, Gait training, Home exercise program, Modalities, Therapeutic activities           Therapy Time  Individual Time In: 0900       Individual Time Out: 1003  Minutes: 63  Timed Code Treatment Minutes: 43 Minutes (20 mins estim)     Electronically signed by Michelle Beauchamp PTA  on 3/15/2024 at 11:35 AM   POC NOTE  Electronically signed by Michelle Beauchamp PTA on 3/15/2024 at 11:36 AM

## 2024-03-19 ENCOUNTER — HOSPITAL ENCOUNTER (OUTPATIENT)
Dept: PHYSICAL THERAPY | Age: 79
Setting detail: THERAPIES SERIES
Discharge: HOME | End: 2024-03-19
Payer: MEDICARE

## 2024-03-19 PROCEDURE — 97110 THERAPEUTIC EXERCISES: CPT

## 2024-03-19 NOTE — PROGRESS NOTES
Assessment: Patient did well in session.  She stated her left knee felt tight but no pain.  Also denies any pain.  Patient reports she has memory issues.  She also had forgotten some of the same ex we did previous session and on her HEP.  And she forgot about the estim also from last session.  However, did not apply estim this session as her back and left knee felt better.  Body Structures, Functions, Activity Limitations Requiring Skilled Therapeutic Intervention: Decreased functional mobility , Decreased ADL status, Decreased ROM, Decreased strength, Decreased high-level IADLs, Decreased balance, Increased pain, Decreased posture    Post-Treatment Pain Level:      No data recorded  Therapy Prognosis: Good       GOALS      Short Term Goals Completed by 3-4 weeks Current Status Goal Status   Patient will improve ADL as demonstrated by mprovement of Oswestry Disability Index from  10% impairment to   5% impairment.       Patient will perform HEP with min cuing.       Patient will demonstrate safe lifting posture with min pain.       Patient will increase LE strength in order to reduce fall risk as demonstrated by improvement of 5XSS from 18 sec to 14 sec.                                                           Long Term Goals Completed by   Current Status Goal Status   Patient will improve ADL as demonstrated by mprovement of Oswestry Disability Index from 10% impairment to <5% impairment.       Patient will perform HEP with no cuing.       Patient will demonstrate safe lifting posture of 10# with no pain.       Patient will increase LE strength in order to reduce fall risk as demonstrated by improvement of 5XSS from 18 sec to 11 sec.                                                            TREATMENT PLAN   Plan Frequency: 2 X weekly  Plan weeks: 6-8 weeks  Current Treatment Recommendations: Strengthening, ROM, Balance training, Functional mobility training, Transfer training, ADL/Self-care training, Pain

## 2024-03-22 ENCOUNTER — HOSPITAL ENCOUNTER (OUTPATIENT)
Dept: PHYSICAL THERAPY | Age: 79
Setting detail: THERAPIES SERIES
Discharge: HOME | End: 2024-03-22
Payer: MEDICARE

## 2024-03-22 PROCEDURE — 97110 THERAPEUTIC EXERCISES: CPT

## 2024-03-22 NOTE — PROGRESS NOTES
pain;Decreased posture  Assessment: Patient presents with no pain in her back this date and is able to tolerate increased reps of exercises without issue. Continued memory issues noted with patient not remembering getting HEP on the 15th so another copy was given to patient today. Was able to complete pelvic tilts with less quing today compared to previous session. Denies pain post session. Will continue to progress as able.  Treatment Diagnosis: LBP; LE weakness      Goals:Short Term Goals  Time Frame for Short Term Goals: 3-4 weeks  Short Term Goal 1: Patient will improve ADL as demonstrated by mprovement of Oswestry Disability Index from  10% impairment to   5% impairment.  Short Term Goal 2: Patient will perform HEP with min cuing.  Short Term Goal 3: Patient will demonstrate safe lifting posture with min pain.  Short Term Goal 4: Patient will increase LE strength in order to reduce fall risk as demonstrated by improvement of 5XSS from 18 sec to 14 sec.  Long Term Goals  Long Term Goal 1: Patient will improve ADL as demonstrated by mprovement of Oswestry Disability Index from 10% impairment to <5% impairment.  Long Term Goal 2: Patient will perform HEP with no cuing.  Long Term Goal 3: Patient will demonstrate safe lifting posture of 10# with no pain.  Long Term Goal 4: Patient will increase LE strength in order to reduce fall risk as demonstrated by improvement of 5XSS from 18 sec to 11 sec.    Plan:    Physical Therapy Plan  Plan weeks: 6-8 weeks  Current Treatment Recommendations: Strengthening, ROM, Balance training, Functional mobility training, Transfer training, ADL/Self-care training, Pain management, Manual, Gait training, Home exercise program, Modalities, Therapeutic activities  Timed Code Treatment Minutes: 49 Minutes     Therapy Time   Individual Concurrent Group Co-treatment   Time In 0903         Time Out 0952         Minutes 49         Timed Code Treatment Minutes: 49 Minutes     Electronically

## 2024-03-26 ENCOUNTER — HOSPITAL ENCOUNTER (OUTPATIENT)
Dept: PHYSICAL THERAPY | Age: 79
Setting detail: THERAPIES SERIES
Discharge: HOME | End: 2024-03-26
Payer: MEDICARE

## 2024-03-26 PROCEDURE — 97110 THERAPEUTIC EXERCISES: CPT

## 2024-03-26 ASSESSMENT — PAIN SCALES - GENERAL: PAINLEVEL_OUTOF10: 2

## 2024-03-26 ASSESSMENT — PAIN DESCRIPTION - PAIN TYPE: TYPE: CHRONIC PAIN

## 2024-03-26 ASSESSMENT — PAIN DESCRIPTION - ORIENTATION: ORIENTATION: RIGHT;LEFT

## 2024-03-26 ASSESSMENT — PAIN DESCRIPTION - LOCATION: LOCATION: BACK;KNEE

## 2024-03-26 ASSESSMENT — PAIN DESCRIPTION - DESCRIPTORS: DESCRIPTORS: ACHING;SORE

## 2024-03-26 NOTE — PROGRESS NOTES
Physical Therapy  Daily Treatment Note  Date: 3/26/2024  Patient Name: Josefa Thompson  MRN: [id number]     :   1945    Subjective:      PT Visit Information  PT Insurance Information: 1. Medicare; Policy #: [id number] 2. [employer] Medicare Supp [id number]  Total # of Visits Approved:  (12-16)  Total # of Visits to Date: 6  Plan of Care/Certification Expiration Date: 24  Progress Note Due Date: 24  Referring Provider (secondary): Jordan Salinas  Subjective: Carolyn reports that her knees continue to be giving her more pain than her back here recently.    Pain Screening  Patient Currently in Pain: Yes  Pain Assessment: 0-10  Pain Level: 2 (With WB)  Pain Type: Chronic pain  Pain Location: Back;Knee  Pain Orientation: Right;Left  Pain Descriptors: Aching;Sore       Treatment Activities:   Exercises  Exercise 1: 6 MWT  1245'; Pain got to a 1-2/10 with walking and went away when patient sat back down.  Exercise 2: Supine, TA series, 10 reps  Exercise 3: Supine, lower trunk rotation, 10 reps  Exercise 4: Supine, bridges, 10 reps; painful going down but got less with each reps  Exercise 5: Supine, check leg length, treat accordingly, equal 3/26/2024  Exercise 6: Supine, 1 SUMANTH, 2 SUMANTH, 5 x 5 sec each-did not need towel  Exercise 7: Supine, pelvic tilt, 10 reps-poor quality even with mod - max cueing  Exercise 8: Supine, partial crunch, 15 reps  Exercise 9: Sitting, LAQ, 3#, 15 reps  Exercise 10: Sitting, sit to stand, mat to chair progression, 15 reps  Exercise 11: Standing, multifidus row, Paloff press, shoulder retraction, 15 reps with red band  Exercise 12: Standing, hip abd, ext, mini squat, heel raise x 15 each  Exercise 13: Standing, press down on top of physio ball for abd contraction, 15 reps  Exercise 14: LBE, 5 min, level 2  Exercise 15: IFC wth MH as needed-SEATED X 20'-NOT TODAY  Exercise 16: HEP issued 3/15/2024     Assessment:   Conditions Requiring Skilled Therapeutic Intervention  Body

## 2024-03-28 ENCOUNTER — CARE COORDINATION (OUTPATIENT)
Dept: CARE COORDINATION | Age: 79
End: 2024-03-28

## 2024-03-28 NOTE — CARE COORDINATION
Ambulatory Care Coordination Note  3/28/2024    Patient Current Location:  [place]     ACM contacted the patient by telephone. Verified name and  with patient as identifiers. Provided introduction to self, and explanation of the ACM role.     Challenges to be reviewed by the provider   Additional needs identified to be addressed with provider: No  none               Method of communication with provider: none.    ACM: Stanford Estrada RN    ACM followed up with patient family  friend who states patient is overall feeling well. Patient continues outpatient PT for shoulder range of motion and pain. Family reports patient reports decreased pain in extremity.  ACM completed medication reconciliation educating on medication compliance. Patient is compliant with medication, patient currently uses a spiral notebook with self made calendar. Once patient takes medications she writes them down.   ACM reviewed upcoming appointments caregiver verified appointments denying transportation  needs at this time.   ACM educated on safety and caring for memory impaired. Family denied questions and agreed to notify provider of needs. At this time patient is working on cleaning out home with assistance from hired caregivers. No definitive admission date to  assisted living at this time. ACM to make final contact next call.     Offered patient enrollment in the Remote Patient Monitoring (RPM) program for in-home monitoring: Patient is not eligible for RPM program.    Lab Results       None            Care Coordination Interventions    Referral from Primary Care Provider: No  Suggested Interventions and Community Resources  Fall Risk Prevention: In Process  Disease Specific Clinic: In Process (Comment: neurology)  Meals on Wheels: Completed (Comment: Denied)  Physical Therapy: In Process (Comment: [facility])  Transportation Support: In Process  Zone Management Tools: Completed          Goals Addressed                   This Visit's

## 2024-03-29 ENCOUNTER — HOSPITAL ENCOUNTER (OUTPATIENT)
Dept: PHYSICAL THERAPY | Age: 79
Setting detail: THERAPIES SERIES
Discharge: HOME | End: 2024-03-29
Payer: MEDICARE

## 2024-03-29 PROCEDURE — 97110 THERAPEUTIC EXERCISES: CPT

## 2024-03-29 NOTE — PROGRESS NOTES
Physical Therapy  Daily Treatment Note  Date: 3/29/2024  Patient Name: Josefa Thompson  MRN: [id number]     :   1945    Subjective:      PT Visit Information  PT Insurance Information: 1. Medicare; Policy #: [id number] 2. [facility] Medicare Supp [id number]  Total # of Visits Approved:  (12-16)  Total # of Visits to Date: 7  Plan of Care/Certification Expiration Date: 24  Progress Note Due Date: 24  Referring Provider (secondary): Jordan Salinas  Subjective: Patient reports that she is having a slight awareness in her back but not pain. States her R knee is bothering her today when she walks.    Pain Screening  Patient Currently in Pain: Denies       Treatment Activities:   Exercises  Exercise 1: 6 MWT 1240'; Increased back \"awareness\" but denies pain  Exercise 2: Supine, TA series, 10 reps  Exercise 3: Supine, lower trunk rotation, 10 reps  Exercise 4: Supine, bridges, 15 reps; painful going down but got less with each reps  Exercise 5: Supine, check leg length, treat accordingly, equal 3/29/2024  Exercise 6: Supine, 1 SUMANTH, 2 SUMANTH, 5 x 5 sec each-did not need towel  Exercise 7: Supine, pelvic tilt, 15 reps  Exercise 8: Supine, partial crunch, 15 reps  Exercise 9: Sitting, LAQ, 3#, 15 reps  Exercise 10: Sitting, sit to stand, mat to chair progression, 10 reps from  chair  Exercise 11: Standing, multifidus row, Paloff press, shoulder retraction, 10 reps with green band  Exercise 12: Standing, hip abd, ext, mini squat, heel raise x 15 each  Exercise 13: Standing, press down on top of physio ball for abd contraction, 15 reps  Exercise 14: LBE, 5 min, level 2.5  Exercise 15: IFC wt MH as needed-SEATED X 20'-NOT TODAY  Exercise 16: HEP issued 3/15/2024     Assessment:   Conditions Requiring Skilled Therapeutic Intervention  Body Structures, Functions, Activity Limitations Requiring Skilled Therapeutic Intervention: Decreased functional mobility ;Decreased ADL status;Decreased ROM;Decreased strength;Decreased

## 2024-04-02 ENCOUNTER — HOSPITAL ENCOUNTER (OUTPATIENT)
Dept: PHYSICAL THERAPY | Age: 79
Setting detail: THERAPIES SERIES
Discharge: HOME | End: 2024-04-02
Payer: MEDICARE

## 2024-04-02 PROCEDURE — 97110 THERAPEUTIC EXERCISES: CPT

## 2024-04-05 ENCOUNTER — HOSPITAL ENCOUNTER (OUTPATIENT)
Dept: PHYSICAL THERAPY | Age: 79
Setting detail: THERAPIES SERIES
Discharge: HOME | End: 2024-04-05
Payer: MEDICARE

## 2024-04-05 PROCEDURE — 97530 THERAPEUTIC ACTIVITIES: CPT

## 2024-04-05 NOTE — PROGRESS NOTES
Physical Therapy: Daily Note/Reassessment/Discharge Summary   Patient: Josefa Thompson (78 y.o. female)   Examination Date: 2024  Plan of Care/Certification Expiration Date: 24    No data recorded   :  1945 # of Visits since SOC:   9   MRN: [id number]  CSN: [id number] Start of Care Date:   3/6/2024   Insurance: Payor: MEDICARE / Plan: MEDICARE PART A AND B / Product Type: *No Product type* /   Insurance ID: [id number] - (Medicare) Secondary Insurance (if applicable): [facility]   Referring Physician: Jordan Salinas MD Jonathan Gipson   PCP: Jacki Yepez MD Visits to Date/Visits Approved:   ()    No Show/Cancelled Appts:   /       Medical Diagnosis: Low back pain, unspecified [M54.50]  Other chronic pain [G89.29] Low back pain  Treatment Diagnosis: LBP; LE weakness        SUBJECTIVE EXAMINATION   Pain Level: Pain Screening  Patient Currently in Pain: Denies    Patient Comments: Subjective: She denies pain today.  She feels her condition has improved.    HEP Compliance: Good        OBJECTIVE EXAMINATION   Restrictions:  No data recorded No data recorded No data recorded           Balance/Gait Assessment(s) Performed:   5 Times Sit to Stand   Current Score: 18.24 seconds (Date: 2024)    Interpretation of Score: The 5 Times Sit to Stand Test (FTSST) measures functional lower limb muscle strength and may be useful in quantifying functional change of transitional movements. Greater than 16.0 seconds indicates increased risk of falls. Cut-off scores of 16 seconds discriminates fallers from non-fallers. < 60 years old: 11 seconds = normal; 70-80 years old: 13 seconds = normal; 80-90 years old: 15 seconds = normal.    TREATMENT     Exercises:  Therapeutic exercise (CPT 89913)   Treatment Reasoning    Exercise 1: 6 MWT 1271'; Increased back \"awareness\" but denies pain  not today  Exercise 2: Supine, TA series, 10 reps  not today  Exercise 3: Supine, lower trunk rotation, 10 reps  not

## 2024-04-15 ENCOUNTER — OFFICE VISIT (OUTPATIENT)
Dept: NEUROSURGERY | Facility: CLINIC | Age: 79
End: 2024-04-15
Payer: MEDICARE

## 2024-04-15 VITALS — HEIGHT: 65 IN | BODY MASS INDEX: 22.16 KG/M2 | WEIGHT: 133 LBS

## 2024-04-15 DIAGNOSIS — M54.50 CHRONIC BILATERAL LOW BACK PAIN WITHOUT SCIATICA: Primary | ICD-10-CM

## 2024-04-15 DIAGNOSIS — Z78.9 NONSMOKER: ICD-10-CM

## 2024-04-15 DIAGNOSIS — G89.29 CHRONIC BILATERAL LOW BACK PAIN WITHOUT SCIATICA: Primary | ICD-10-CM

## 2024-04-15 PROCEDURE — 1159F MED LIST DOCD IN RCRD: CPT | Performed by: NURSE PRACTITIONER

## 2024-04-15 PROCEDURE — 99212 OFFICE O/P EST SF 10 MIN: CPT | Performed by: NURSE PRACTITIONER

## 2024-04-15 PROCEDURE — 1160F RVW MEDS BY RX/DR IN RCRD: CPT | Performed by: NURSE PRACTITIONER

## 2024-04-15 NOTE — PROGRESS NOTES
"    Chief complaint:   Chief Complaint   Patient presents with    FOLLOW UP AFTER PHYSICAL THERAPY     Patient with back pain.  She has completed therapy @ [facility] (approx 12).  She says it was helpful.  OF NOTE: today she states she is having a new pain under her RT breast.  She states this began about 24-48 hours.          Subjective     HPI: This is a 78-year-old female patient who we have seen in the past for compression fracture. She underwent an L3 kyphoplasty in 2016. She comes in today and says that she has been dealing with increasing back pain for the last several months. Currently the pain in her back is intermittent. It is worse with standing and walking and better with sitting down. Denies any lower extremity pain or numbness and tingling. Denies any recent accident or injury. Denies any bowel or bladder incontinence which has not done any recent physical therapy or chiropractic care pain management injections. She is right-hand dominant. She is retired. She is single. Denies any tobacco or illicit drug use but she does drink alcohol occasionally.     We did send the patient for dedicated course of physical therapy.  The patient says that she was able to complete the therapy and does not like she has had a significant improvement in her low back pain.  Currently she is not complaining of any back pain.  She does complain of some pain under her right breast that started in the last couple of days but says that this is something that she has dealt with off-and-on in the past and feels right now just more time needs to be given to allow this to calm down.    Review of Systems   Musculoskeletal:  Positive for arthralgias and myalgias.   Neurological: Negative.          Objective      Vital Signs  Ht 163.8 cm (64.5\")   Wt 60.3 kg (133 lb)   BMI 22.48 kg/m²     Physical Exam  Constitutional:       Appearance: She is well-developed.   HENT:      Head: Normocephalic.   Eyes:      Extraocular Movements: EOM " normal.      Pupils: Pupils are equal, round, and reactive to light.   Pulmonary:      Effort: Pulmonary effort is normal.   Musculoskeletal:         General: Normal range of motion.      Cervical back: Normal range of motion.   Skin:     General: Skin is warm.   Neurological:      Mental Status: She is alert and oriented to person, place, and time.      GCS: GCS eye subscore is 4. GCS verbal subscore is 5. GCS motor subscore is 6.      Cranial Nerves: No cranial nerve deficit.      Sensory: No sensory deficit.      Motor: Motor strength is normal.     Gait: Gait is intact. Gait normal.      Deep Tendon Reflexes: Reflexes are normal and symmetric.   Psychiatric:         Speech: Speech normal.         Behavior: Behavior normal.         Thought Content: Thought content normal.         Neurologic Exam     Mental Status   Oriented to person, place, and time.   Attention: normal. Concentration: normal.   Speech: speech is normal   Level of consciousness: alert  Normal comprehension.     Cranial Nerves     CN II   Visual fields full to confrontation.     CN III, IV, VI   Pupils are equal, round, and reactive to light.  Extraocular motions are normal.     CN V   Facial sensation intact.     CN VII   Facial expression full, symmetric.     CN VIII   CN VIII normal.     CN IX, X   CN IX normal.   CN X normal.     CN XI   CN XI normal.     CN XII   CN XII normal.     Motor Exam   Muscle bulk: normal    Strength   Strength 5/5 throughout.     Sensory Exam   Light touch normal.     Gait, Coordination, and Reflexes     Gait  Gait: normal    Reflexes   Reflexes 2+ except as noted.       Imaging review: X-rays of the lumbar spine that was done on February 9, 2024 shows a previous kyphoplasty at L4. There is chronic compression fractures noted of L1 and L2. There is also a compression fracture at L5 which was not present on a CT scan that was done in 2019.         Assessment/Plan: Patient is doing well from a back standpoint.  Her  pain symptoms are improved.  At this time I will need to see her on an as-needed basis.  She was told to call us if any further problems or concerns.    Patient is a nonsmoker  The patient's Body mass index is 22.48 kg/m².. BMI is within normal parameters. No follow-up required.  Advance Care Planning   ACP discussion was held with the patient during this visit. Patient does not have an advance directive, information provided.   STEADI Fall Risk Assessment was completed, and patient is at LOW risk for falls.Assessment completed on:2/22/2024     Diagnoses and all orders for this visit:    1. Chronic bilateral low back pain without sciatica (Primary)    2. Body mass index (BMI) of 22.0-22.9 in adult    3. Nonsmoker        I discussed the patients findings and my recommendations with patient  Chato Howe, ROZINA  04/15/24  10:14 CDT

## 2024-04-23 ENCOUNTER — CARE COORDINATION (OUTPATIENT)
Dept: CARE COORDINATION | Age: 79
End: 2024-04-23

## 2024-04-23 NOTE — CARE COORDINATION
Ambulatory Care Coordination Note  2024    Patient Current Location:  [place]     ACM contacted the patient by telephone. Verified name and  with patient as identifiers. Provided introduction to self, and explanation of the ACM role.     Challenges to be reviewed by the provider   Additional needs identified to be addressed with provider: No  none               Method of communication with provider: none.    ACM: Stanford Estrada RN    ACM RN spoke with patient caregiver who reports patient is doing well at this time. Denies compliance concerns with  medication or falls. Patient continues to reside at home. ACM congratulated patient and caregiver on meeting goals. ACM to discharge at this time family encouraged to notify provider offices of needs and seek medical evaluation. ACM to discharge at this time.     Offered patient enrollment in the Remote Patient Monitoring (RPM) program for in-home monitoring: Patient is not eligible for RPM program.    Lab Results       None            Care Coordination Interventions    Referral from Primary Care Provider: No  Suggested Interventions and Community Resources  Fall Risk Prevention: In Process  Disease Specific Clinic: In Process (Comment: neurology)  Meals on Wheels: Completed (Comment: Denied)  Physical Therapy: In Process (Comment: [facility])  Transportation Support: In Process  Zone Management Tools: Completed          Goals Addressed                   This Visit's Progress     COMPLETED: Conditions and Symptoms   On track     I will schedule office visits, as directed by my provider.  I will keep my appointment or reschedule if I have to cancel.  I will notify my provider of any barriers to my plan of care.  I will follow my Zone Management tool to seek urgent or emergent care.  I will notify my provider of any symptoms that indicate a worsening of my condition.    Barriers: overwhelmed by complexity of regimen  Plan for overcoming my barriers:   Confidence:

## 2024-05-01 DIAGNOSIS — I10 ESSENTIAL HYPERTENSION: ICD-10-CM

## 2024-05-01 DIAGNOSIS — E78.2 MIXED HYPERLIPIDEMIA: ICD-10-CM

## 2024-05-01 DIAGNOSIS — E55.9 VITAMIN D DEFICIENCY: ICD-10-CM

## 2024-05-01 LAB
25(OH)D3 SERPL-MCNC: 29.2 NG/ML
ALBUMIN SERPL-MCNC: 4.2 G/DL (ref 3.5–5.2)
ALP SERPL-CCNC: 81 U/L (ref 35–104)
ALT SERPL-CCNC: 8 U/L (ref 5–33)
ANION GAP SERPL CALCULATED.3IONS-SCNC: 14 MMOL/L (ref 7–19)
AST SERPL-CCNC: 19 U/L (ref 5–32)
BILIRUB SERPL-MCNC: 0.5 MG/DL (ref 0.2–1.2)
BUN SERPL-MCNC: 10 MG/DL (ref 8–23)
CALCIUM SERPL-MCNC: 9.2 MG/DL (ref 8.8–10.2)
CHLORIDE SERPL-SCNC: 106 MMOL/L (ref 98–111)
CHOLEST SERPL-MCNC: 258 MG/DL (ref 160–199)
CO2 SERPL-SCNC: 25 MMOL/L (ref 22–29)
CREAT SERPL-MCNC: 0.6 MG/DL (ref 0.5–0.9)
ERYTHROCYTE [DISTWIDTH] IN BLOOD BY AUTOMATED COUNT: 13.3 % (ref 11.5–14.5)
GLUCOSE SERPL-MCNC: 97 MG/DL (ref 74–109)
HCT VFR BLD AUTO: 40.2 % (ref 37–47)
HDLC SERPL-MCNC: 82 MG/DL (ref 65–121)
HGB BLD-MCNC: 12.8 G/DL (ref 12–16)
LDLC SERPL CALC-MCNC: 160 MG/DL
MCH RBC QN AUTO: 29.9 PG (ref 27–31)
MCHC RBC AUTO-ENTMCNC: 31.8 G/DL (ref 33–37)
MCV RBC AUTO: 93.9 FL (ref 81–99)
PLATELET # BLD AUTO: 203 K/UL (ref 130–400)
PMV BLD AUTO: 11.1 FL (ref 9.4–12.3)
POTASSIUM SERPL-SCNC: 4.2 MMOL/L (ref 3.5–5)
PROT SERPL-MCNC: 6.8 G/DL (ref 6.6–8.7)
RBC # BLD AUTO: 4.28 M/UL (ref 4.2–5.4)
SODIUM SERPL-SCNC: 145 MMOL/L (ref 136–145)
TRIGL SERPL-MCNC: 80 MG/DL (ref 0–149)
TSH SERPL DL<=0.005 MIU/L-ACNC: 3.29 UIU/ML (ref 0.27–4.2)
WBC # BLD AUTO: 5.3 K/UL (ref 4.8–10.8)

## 2024-05-05 SDOH — ECONOMIC STABILITY: FOOD INSECURITY: WITHIN THE PAST 12 MONTHS, THE FOOD YOU BOUGHT JUST DIDN'T LAST AND YOU DIDN'T HAVE MONEY TO GET MORE.: PATIENT DECLINED

## 2024-05-05 SDOH — ECONOMIC STABILITY: TRANSPORTATION INSECURITY
IN THE PAST 12 MONTHS, HAS LACK OF TRANSPORTATION KEPT YOU FROM MEETINGS, WORK, OR FROM GETTING THINGS NEEDED FOR DAILY LIVING?: PATIENT DECLINED

## 2024-05-05 SDOH — ECONOMIC STABILITY: HOUSING INSECURITY
IN THE LAST 12 MONTHS, WAS THERE A TIME WHEN YOU DID NOT HAVE A STEADY PLACE TO SLEEP OR SLEPT IN A SHELTER (INCLUDING NOW)?: PATIENT DECLINED

## 2024-05-05 SDOH — ECONOMIC STABILITY: INCOME INSECURITY: HOW HARD IS IT FOR YOU TO PAY FOR THE VERY BASICS LIKE FOOD, HOUSING, MEDICAL CARE, AND HEATING?: PATIENT DECLINED

## 2024-05-05 SDOH — ECONOMIC STABILITY: FOOD INSECURITY: WITHIN THE PAST 12 MONTHS, YOU WORRIED THAT YOUR FOOD WOULD RUN OUT BEFORE YOU GOT MONEY TO BUY MORE.: PATIENT DECLINED

## 2024-05-06 ENCOUNTER — OFFICE VISIT (OUTPATIENT)
Dept: INTERNAL MEDICINE | Age: 79
End: 2024-05-06
Payer: MEDICARE

## 2024-05-06 VITALS
HEART RATE: 106 BPM | BODY MASS INDEX: 22.53 KG/M2 | SYSTOLIC BLOOD PRESSURE: 110 MMHG | DIASTOLIC BLOOD PRESSURE: 66 MMHG | OXYGEN SATURATION: 97 % | HEIGHT: 64 IN | WEIGHT: 132 LBS

## 2024-05-06 DIAGNOSIS — G30.0 MODERATE EARLY ONSET ALZHEIMER'S DEMENTIA WITH MOOD DISTURBANCE (HCC): ICD-10-CM

## 2024-05-06 DIAGNOSIS — E55.9 VITAMIN D DEFICIENCY: ICD-10-CM

## 2024-05-06 DIAGNOSIS — I10 ESSENTIAL HYPERTENSION: Primary | ICD-10-CM

## 2024-05-06 DIAGNOSIS — I26.99 BILATERAL PULMONARY EMBOLISM (HCC): ICD-10-CM

## 2024-05-06 DIAGNOSIS — E78.00 PURE HYPERCHOLESTEROLEMIA: ICD-10-CM

## 2024-05-06 DIAGNOSIS — M81.0 AGE-RELATED OSTEOPOROSIS WITHOUT CURRENT PATHOLOGICAL FRACTURE: ICD-10-CM

## 2024-05-06 DIAGNOSIS — I82.569 CHRONIC DEEP VEIN THROMBOSIS (DVT) OF CALF MUSCLE VEIN, UNSPECIFIED LATERALITY (HCC): ICD-10-CM

## 2024-05-06 DIAGNOSIS — F02.B3 MODERATE EARLY ONSET ALZHEIMER'S DEMENTIA WITH MOOD DISTURBANCE (HCC): ICD-10-CM

## 2024-05-06 PROCEDURE — 1123F ACP DISCUSS/DSCN MKR DOCD: CPT | Performed by: INTERNAL MEDICINE

## 2024-05-06 PROCEDURE — G8420 CALC BMI NORM PARAMETERS: HCPCS | Performed by: INTERNAL MEDICINE

## 2024-05-06 PROCEDURE — 3074F SYST BP LT 130 MM HG: CPT | Performed by: INTERNAL MEDICINE

## 2024-05-06 PROCEDURE — 3078F DIAST BP <80 MM HG: CPT | Performed by: INTERNAL MEDICINE

## 2024-05-06 PROCEDURE — 99214 OFFICE O/P EST MOD 30 MIN: CPT | Performed by: INTERNAL MEDICINE

## 2024-05-06 PROCEDURE — 1036F TOBACCO NON-USER: CPT | Performed by: INTERNAL MEDICINE

## 2024-05-06 PROCEDURE — G8399 PT W/DXA RESULTS DOCUMENT: HCPCS | Performed by: INTERNAL MEDICINE

## 2024-05-06 PROCEDURE — 1090F PRES/ABSN URINE INCON ASSESS: CPT | Performed by: INTERNAL MEDICINE

## 2024-05-06 PROCEDURE — G8427 DOCREV CUR MEDS BY ELIG CLIN: HCPCS | Performed by: INTERNAL MEDICINE

## 2024-05-06 RX ORDER — MELATONIN
1000 DAILY
Qty: 90 TABLET | Refills: 1 | Status: SHIPPED | OUTPATIENT
Start: 2024-05-06

## 2024-05-06 RX ORDER — MEMANTINE HYDROCHLORIDE 28 MG/1
28 CAPSULE, EXTENDED RELEASE ORAL DAILY
Qty: 90 CAPSULE | Refills: 3 | Status: SHIPPED | OUTPATIENT
Start: 2024-05-06

## 2024-05-06 NOTE — PROGRESS NOTES
Difficulty of Paying Living Expenses: Patient declined   Food Insecurity: Patient Declined (5/5/2024)    Hunger Vital Sign     Worried About Running Out of Food in the Last Year: Patient declined     Ran Out of Food in the Last Year: Patient declined   Transportation Needs: Unknown (5/5/2024)    PRAPARE - Transportation     Lack of Transportation (Medical): Not on file     Lack of Transportation (Non-Medical): Patient declined   Physical Activity: Insufficiently Active (5/11/2023)    Exercise Vital Sign     Days of Exercise per Week: 4 days     Minutes of Exercise per Session: 30 min   Stress: Not on file   Social Connections: Not on file   Intimate Partner Violence: Not on file   Housing Stability: Unknown (5/5/2024)    Housing Stability Vital Sign     Unable to Pay for Housing in the Last Year: Not on file     Number of Places Lived in the Last Year: Not on file     Unstable Housing in the Last Year: Patient declined       No Known Allergies    Current Outpatient Medications   Medication Sig Dispense Refill    memantine ER (NAMENDA XR) 28 MG CP24 extended release capsule Take 1 capsule by mouth daily 90 capsule 3    rivaroxaban (XARELTO) 20 MG TABS tablet Take daily with a meal 90 tablet 3    vitamin D3 (CHOLECALCIFEROL) 25 MCG (1000 UT) TABS tablet Take 1 tablet by mouth daily 90 tablet 1    lisinopril (PRINIVIL;ZESTRIL) 10 MG tablet Take 1 tablet by mouth daily 90 tablet 3     No current facility-administered medications for this visit.       Review of Systems    /66   Pulse (!) 106   Ht 1.626 m (5' 4\")   Wt 59.9 kg (132 lb)   SpO2 97%   BMI 22.66 kg/m²   BP Readings from Last 7 Encounters:   05/09/24 139/75   05/06/24 110/66   03/06/24 (!) 144/87   02/09/24 130/76   01/24/24 135/75   12/05/23 122/74   10/05/23 (!) 170/90     Wt Readings from Last 7 Encounters:   05/09/24 59.9 kg (132 lb)   05/06/24 59.9 kg (132 lb)   02/09/24 59.4 kg (131 lb)   01/24/24 59.4 kg (131 lb)   12/05/23 59.4 kg (131 lb)

## 2024-05-09 ENCOUNTER — OFFICE VISIT (OUTPATIENT)
Dept: NEUROLOGY | Age: 79
End: 2024-05-09
Payer: MEDICARE

## 2024-05-09 VITALS
OXYGEN SATURATION: 97 % | HEIGHT: 64 IN | BODY MASS INDEX: 22.53 KG/M2 | SYSTOLIC BLOOD PRESSURE: 139 MMHG | HEART RATE: 103 BPM | WEIGHT: 132 LBS | DIASTOLIC BLOOD PRESSURE: 75 MMHG

## 2024-05-09 DIAGNOSIS — R41.3 MEMORY LOSS: Primary | ICD-10-CM

## 2024-05-09 PROCEDURE — 1036F TOBACCO NON-USER: CPT | Performed by: NURSE PRACTITIONER

## 2024-05-09 PROCEDURE — 3078F DIAST BP <80 MM HG: CPT | Performed by: NURSE PRACTITIONER

## 2024-05-09 PROCEDURE — G8399 PT W/DXA RESULTS DOCUMENT: HCPCS | Performed by: NURSE PRACTITIONER

## 2024-05-09 PROCEDURE — G8427 DOCREV CUR MEDS BY ELIG CLIN: HCPCS | Performed by: NURSE PRACTITIONER

## 2024-05-09 PROCEDURE — G8420 CALC BMI NORM PARAMETERS: HCPCS | Performed by: NURSE PRACTITIONER

## 2024-05-09 PROCEDURE — 99213 OFFICE O/P EST LOW 20 MIN: CPT | Performed by: NURSE PRACTITIONER

## 2024-05-09 PROCEDURE — 1090F PRES/ABSN URINE INCON ASSESS: CPT | Performed by: NURSE PRACTITIONER

## 2024-05-09 PROCEDURE — 1123F ACP DISCUSS/DSCN MKR DOCD: CPT | Performed by: NURSE PRACTITIONER

## 2024-05-09 PROCEDURE — 3075F SYST BP GE 130 - 139MM HG: CPT | Performed by: NURSE PRACTITIONER

## 2024-06-14 NOTE — PATIENT INSTRUCTIONS
Personalized Preventive Plan for Jeremy Whitehead - 4/12/2021  Medicare offers a range of preventive health benefits. Some of the tests and screenings are paid in full while other may be subject to a deductible, co-insurance, and/or copay. Some of these benefits include a comprehensive review of your medical history including lifestyle, illnesses that may run in your family, and various assessments and screenings as appropriate. After reviewing your medical record and screening and assessments performed today your provider may have ordered immunizations, labs, imaging, and/or referrals for you. A list of these orders (if applicable) as well as your Preventive Care list are included within your After Visit Summary for your review. Other Preventive Recommendations:    · A preventive eye exam performed by an eye specialist is recommended every 1-2 years to screen for glaucoma; cataracts, macular degeneration, and other eye disorders. · A preventive dental visit is recommended every 6 months. · Try to get at least 150 minutes of exercise per week or 10,000 steps per day on a pedometer . · Order or download the FREE \"Exercise & Physical Activity: Your Everyday Guide\" from [employer]. Call 8-634.196.6669 or search [employer] online. · You need 9088-8348 mg of calcium and 6931-8422 IU of vitamin D per day. It is possible to meet your calcium requirement with diet alone, but a vitamin D supplement is usually necessary to meet this goal.  · When exposed to the sun, use a sunscreen that protects against both UVA and UVB radiation with an SPF of 30 or greater. Reapply every 2 to 3 hours or after sweating, drying off with a towel, or swimming. · Always wear a seat belt when traveling in a car. Always wear a helmet when riding a bicycle or motorcycle.
done

## 2024-08-15 RX ORDER — ASPIRIN 81 MG/1
81 TABLET, COATED ORAL DAILY
Qty: 90 TABLET | Refills: 1 | OUTPATIENT
Start: 2024-08-15

## 2024-08-20 RX ORDER — ASPIRIN 81 MG/1
81 TABLET, COATED ORAL DAILY
Qty: 90 TABLET | Refills: 1 | Status: SHIPPED | OUTPATIENT
Start: 2024-08-20

## 2024-09-04 ENCOUNTER — OFFICE VISIT (OUTPATIENT)
Dept: NEUROLOGY | Age: 79
End: 2024-09-04
Payer: MEDICARE

## 2024-09-04 VITALS
WEIGHT: 132 LBS | BODY MASS INDEX: 22.53 KG/M2 | HEART RATE: 102 BPM | DIASTOLIC BLOOD PRESSURE: 86 MMHG | HEIGHT: 64 IN | SYSTOLIC BLOOD PRESSURE: 149 MMHG | OXYGEN SATURATION: 97 %

## 2024-09-04 DIAGNOSIS — R41.3 MEMORY LOSS: Primary | ICD-10-CM

## 2024-09-04 PROCEDURE — 1090F PRES/ABSN URINE INCON ASSESS: CPT | Performed by: NURSE PRACTITIONER

## 2024-09-04 PROCEDURE — G8399 PT W/DXA RESULTS DOCUMENT: HCPCS | Performed by: NURSE PRACTITIONER

## 2024-09-04 PROCEDURE — G8427 DOCREV CUR MEDS BY ELIG CLIN: HCPCS | Performed by: NURSE PRACTITIONER

## 2024-09-04 PROCEDURE — 1123F ACP DISCUSS/DSCN MKR DOCD: CPT | Performed by: NURSE PRACTITIONER

## 2024-09-04 PROCEDURE — 3079F DIAST BP 80-89 MM HG: CPT | Performed by: NURSE PRACTITIONER

## 2024-09-04 PROCEDURE — 1036F TOBACCO NON-USER: CPT | Performed by: NURSE PRACTITIONER

## 2024-09-04 PROCEDURE — 3077F SYST BP >= 140 MM HG: CPT | Performed by: NURSE PRACTITIONER

## 2024-09-04 PROCEDURE — G8420 CALC BMI NORM PARAMETERS: HCPCS | Performed by: NURSE PRACTITIONER

## 2024-09-04 PROCEDURE — 99213 OFFICE O/P EST LOW 20 MIN: CPT | Performed by: NURSE PRACTITIONER

## 2024-09-04 NOTE — PROGRESS NOTES
[facility] Office Note      Patient:   Josefa Thompson  MR#:    [id number]  Account Number:                         YOB: 1945  Date of Evaluation:  9/4/2024  Time of Note:                          10:55 AM  Primary/Referring Physician:  Jacki Yepez MD   Consulting Physician:  Carol Brooks DNP, APRN    FOLLOW UP    Chief Complaint   Patient presents with    Follow-up    Memory Loss     Pt states memory is going good.      HISTORY OF PRESENT ILLNESS    Josefa Thompson is a 78 y.o. year old female here for follow up of memory loss. Her friend, Lisa, accompanies her today. No clear clinical  progression from prior visit. She has noted memory changes for over a year now, progressive. Primarily short term memory loss. There is repetition of stories and questions. Some mild word or name recall difficulty. Continues to note episodic confusion, might have to stop and think about how to get places that she does not go to frequently. She lives on her own, she has friends and neighbors who keep an eye on her as well. Memory loss is not interfering with ADLs. She writes herself reminders. She is handling her own medications, using a chart to help keep track of this, using a pill organizer. Her friend is monitoring these as well. She is driving, she has gotten lost while driving. She drives locally only. She is cooking without difficulty. Appetite is good. No issues with handling finances, able to write a check. No tremor. No urinary incontinence. No gait changes. No depression. Notes some anxiety but situational in nature. Has noted some personality changes, irritability, more argumentative. She is on Namenda XR. Per primary care notes, did not tolerate Aricept. No stroke history. Mother had memory loss. She is [facility], patient does not feel that she is at this point of needing assisted living however. Her friend with her today feels that she is safe at

## 2024-09-06 ENCOUNTER — OFFICE VISIT (OUTPATIENT)
Dept: INTERNAL MEDICINE | Age: 79
End: 2024-09-06

## 2024-09-06 VITALS
BODY MASS INDEX: 22.71 KG/M2 | HEART RATE: 116 BPM | SYSTOLIC BLOOD PRESSURE: 130 MMHG | WEIGHT: 133 LBS | OXYGEN SATURATION: 97 % | DIASTOLIC BLOOD PRESSURE: 86 MMHG | HEIGHT: 64 IN

## 2024-09-06 DIAGNOSIS — G30.0 MODERATE EARLY ONSET ALZHEIMER'S DEMENTIA WITH MOOD DISTURBANCE (HCC): ICD-10-CM

## 2024-09-06 DIAGNOSIS — F02.B3 MODERATE EARLY ONSET ALZHEIMER'S DEMENTIA WITH MOOD DISTURBANCE (HCC): ICD-10-CM

## 2024-09-06 DIAGNOSIS — Z00.00 MEDICARE ANNUAL WELLNESS VISIT, SUBSEQUENT: Primary | ICD-10-CM

## 2024-09-06 DIAGNOSIS — E78.2 MIXED HYPERLIPIDEMIA: ICD-10-CM

## 2024-09-06 DIAGNOSIS — I82.569 CHRONIC DEEP VEIN THROMBOSIS (DVT) OF CALF MUSCLE VEIN, UNSPECIFIED LATERALITY (HCC): ICD-10-CM

## 2024-09-06 DIAGNOSIS — E55.9 VITAMIN D DEFICIENCY: ICD-10-CM

## 2024-09-06 DIAGNOSIS — R21 RASH: ICD-10-CM

## 2024-09-06 DIAGNOSIS — Z86.711 HISTORY OF PULMONARY EMBOLISM: ICD-10-CM

## 2024-09-06 DIAGNOSIS — I10 ESSENTIAL HYPERTENSION: ICD-10-CM

## 2024-09-06 DIAGNOSIS — M81.0 AGE-RELATED OSTEOPOROSIS WITHOUT CURRENT PATHOLOGICAL FRACTURE: ICD-10-CM

## 2024-09-06 RX ORDER — ACETAMINOPHEN 160 MG
1 TABLET,DISINTEGRATING ORAL DAILY
Qty: 90 CAPSULE | Refills: 3 | Status: SHIPPED | OUTPATIENT
Start: 2024-09-06

## 2024-09-06 ASSESSMENT — PATIENT HEALTH QUESTIONNAIRE - PHQ9
2. FEELING DOWN, DEPRESSED OR HOPELESS: NOT AT ALL
SUM OF ALL RESPONSES TO PHQ QUESTIONS 1-9: 0
SUM OF ALL RESPONSES TO PHQ QUESTIONS 1-9: 0
1. LITTLE INTEREST OR PLEASURE IN DOING THINGS: NOT AT ALL
SUM OF ALL RESPONSES TO PHQ9 QUESTIONS 1 & 2: 0
SUM OF ALL RESPONSES TO PHQ QUESTIONS 1-9: 0
SUM OF ALL RESPONSES TO PHQ QUESTIONS 1-9: 0

## 2024-09-06 ASSESSMENT — LIFESTYLE VARIABLES
HOW OFTEN DO YOU HAVE A DRINK CONTAINING ALCOHOL: 2-3 TIMES A WEEK
HOW MANY STANDARD DRINKS CONTAINING ALCOHOL DO YOU HAVE ON A TYPICAL DAY: 1 OR 2

## 2024-09-13 ASSESSMENT — ENCOUNTER SYMPTOMS
BACK PAIN: 1
SINUS PRESSURE: 0
EYE REDNESS: 0
ABDOMINAL PAIN: 0
ABDOMINAL DISTENTION: 0
COUGH: 0
EYE DISCHARGE: 0
SHORTNESS OF BREATH: 0

## 2024-09-25 ENCOUNTER — HOSPITAL ENCOUNTER (OUTPATIENT)
Dept: NON INVASIVE DIAGNOSTICS | Age: 79
Discharge: HOME | End: 2024-09-27
Payer: MEDICARE

## 2024-09-25 DIAGNOSIS — I82.569 CHRONIC DEEP VEIN THROMBOSIS (DVT) OF CALF MUSCLE VEIN, UNSPECIFIED LATERALITY (HCC): ICD-10-CM

## 2024-09-25 PROCEDURE — 93971 EXTREMITY STUDY: CPT

## 2024-09-26 LAB — VAS LEFT POP RFX: 1.2 S

## 2024-10-01 ENCOUNTER — TELEPHONE (OUTPATIENT)
Dept: INTERNAL MEDICINE | Age: 79
End: 2024-10-01

## 2024-10-02 ENCOUNTER — TELEPHONE (OUTPATIENT)
Dept: INTERNAL MEDICINE | Age: 79
End: 2024-10-02

## 2024-10-02 NOTE — TELEPHONE ENCOUNTER
Her doppler does not show a blood clot and since she travelled before the previous one occurred I am ok to stay off of the blood thinner and I would encourage her to walk and be active and if any concern of a recurrent clot please let us know.

## 2024-10-02 NOTE — TELEPHONE ENCOUNTER
Her caregiver is calling for venous scan results.  She has been off Xarelto for a week.  Does she need to stay off it?

## 2024-11-01 ENCOUNTER — HOSPITAL ENCOUNTER (INPATIENT)
Age: 79
LOS: 2 days | Discharge: HOME | DRG: 176 | End: 2024-11-03
Attending: STUDENT IN AN ORGANIZED HEALTH CARE EDUCATION/TRAINING PROGRAM | Admitting: STUDENT IN AN ORGANIZED HEALTH CARE EDUCATION/TRAINING PROGRAM
Payer: MEDICARE

## 2024-11-01 ENCOUNTER — APPOINTMENT (OUTPATIENT)
Dept: CT IMAGING | Age: 79
DRG: 176 | End: 2024-11-01
Payer: MEDICARE

## 2024-11-01 ENCOUNTER — APPOINTMENT (OUTPATIENT)
Dept: GENERAL RADIOLOGY | Age: 79
DRG: 176 | End: 2024-11-01
Payer: MEDICARE

## 2024-11-01 DIAGNOSIS — I82.562 CHRONIC DEEP VEIN THROMBOSIS (DVT) OF CALF MUSCLE VEIN OF LEFT LOWER EXTREMITY (HCC): ICD-10-CM

## 2024-11-01 DIAGNOSIS — I26.99 RECURRENT PULMONARY EMBOLISM (HCC): ICD-10-CM

## 2024-11-01 DIAGNOSIS — I26.99 BILATERAL PULMONARY EMBOLISM (HCC): Primary | ICD-10-CM

## 2024-11-01 DIAGNOSIS — I82.402 RECURRENT ACUTE DEEP VEIN THROMBOSIS (DVT) OF LEFT LOWER EXTREMITY (HCC): ICD-10-CM

## 2024-11-01 LAB
ALBUMIN SERPL-MCNC: 4.5 G/DL (ref 3.5–5.2)
ALP SERPL-CCNC: 84 U/L (ref 35–104)
ALT SERPL-CCNC: 10 U/L (ref 5–33)
ANION GAP SERPL CALCULATED.3IONS-SCNC: 13 MMOL/L (ref 7–19)
ANTI-XA UNFRAC HEPARIN: 1.1 IU/ML
AST SERPL-CCNC: 22 U/L (ref 5–32)
BASOPHILS # BLD: 0 K/UL (ref 0–0.2)
BASOPHILS NFR BLD: 0.4 % (ref 0–1)
BILIRUB SERPL-MCNC: 0.4 MG/DL (ref 0.2–1.2)
BNP BLD-MCNC: 97 PG/ML (ref 0–449)
BUN SERPL-MCNC: 11 MG/DL (ref 8–23)
CALCIUM SERPL-MCNC: 8.7 MG/DL (ref 8.8–10.2)
CHLORIDE SERPL-SCNC: 98 MMOL/L (ref 98–111)
CO2 SERPL-SCNC: 25 MMOL/L (ref 22–29)
CREAT SERPL-MCNC: 0.6 MG/DL (ref 0.5–0.9)
D DIMER PPP FEU-MCNC: 12.2 UG/ML FEU (ref 0–0.48)
EOSINOPHIL # BLD: 0 K/UL (ref 0–0.6)
EOSINOPHIL NFR BLD: 0.5 % (ref 0–5)
ERYTHROCYTE [DISTWIDTH] IN BLOOD BY AUTOMATED COUNT: 12.9 % (ref 11.5–14.5)
GLUCOSE SERPL-MCNC: 157 MG/DL (ref 70–99)
HCT VFR BLD AUTO: 40.6 % (ref 37–47)
HGB BLD-MCNC: 13.1 G/DL (ref 12–16)
IMM GRANULOCYTES # BLD: 0.1 K/UL
LYMPHOCYTES # BLD: 1.7 K/UL (ref 1.1–4.5)
LYMPHOCYTES NFR BLD: 20.3 % (ref 20–40)
MCH RBC QN AUTO: 30 PG (ref 27–31)
MCHC RBC AUTO-ENTMCNC: 32.3 G/DL (ref 33–37)
MCV RBC AUTO: 93.1 FL (ref 81–99)
MONOCYTES # BLD: 1.5 K/UL (ref 0–0.9)
MONOCYTES NFR BLD: 18.3 % (ref 0–10)
NEUTROPHILS # BLD: 4.9 K/UL (ref 1.5–7.5)
NEUTS SEG NFR BLD: 59.1 % (ref 50–65)
PLATELET # BLD AUTO: 193 K/UL (ref 130–400)
PMV BLD AUTO: 10.8 FL (ref 9.4–12.3)
POTASSIUM SERPL-SCNC: 3.6 MMOL/L (ref 3.5–5)
PROT SERPL-MCNC: 7.4 G/DL (ref 6.4–8.3)
RBC # BLD AUTO: 4.36 M/UL (ref 4.2–5.4)
SODIUM SERPL-SCNC: 136 MMOL/L (ref 136–145)
TROPONIN, HIGH SENSITIVITY: 9 NG/L (ref 0–14)
TROPONIN, HIGH SENSITIVITY: 9 NG/L (ref 0–14)
WBC # BLD AUTO: 8.4 K/UL (ref 4.8–10.8)

## 2024-11-01 PROCEDURE — 83880 ASSAY OF NATRIURETIC PEPTIDE: CPT

## 2024-11-01 PROCEDURE — 99285 EMERGENCY DEPT VISIT HI MDM: CPT

## 2024-11-01 PROCEDURE — 85025 COMPLETE CBC W/AUTO DIFF WBC: CPT

## 2024-11-01 PROCEDURE — 85520 HEPARIN ASSAY: CPT

## 2024-11-01 PROCEDURE — 71275 CT ANGIOGRAPHY CHEST: CPT

## 2024-11-01 PROCEDURE — 71045 X-RAY EXAM CHEST 1 VIEW: CPT

## 2024-11-01 PROCEDURE — 85379 FIBRIN DEGRADATION QUANT: CPT

## 2024-11-01 PROCEDURE — 84484 ASSAY OF TROPONIN QUANT: CPT

## 2024-11-01 PROCEDURE — 93005 ELECTROCARDIOGRAM TRACING: CPT | Performed by: EMERGENCY MEDICINE

## 2024-11-01 PROCEDURE — 36415 COLL VENOUS BLD VENIPUNCTURE: CPT

## 2024-11-01 PROCEDURE — 93005 ELECTROCARDIOGRAM TRACING: CPT | Performed by: NURSE PRACTITIONER

## 2024-11-01 PROCEDURE — 6360000002 HC RX W HCPCS: Performed by: NURSE PRACTITIONER

## 2024-11-01 PROCEDURE — 1200000000 HC SEMI PRIVATE

## 2024-11-01 PROCEDURE — 6360000004 HC RX CONTRAST MEDICATION: Performed by: NURSE PRACTITIONER

## 2024-11-01 PROCEDURE — 80053 COMPREHEN METABOLIC PANEL: CPT

## 2024-11-01 RX ORDER — HEPARIN SODIUM 1000 [USP'U]/ML
40 INJECTION, SOLUTION INTRAVENOUS; SUBCUTANEOUS PRN
Status: DISCONTINUED | OUTPATIENT
Start: 2024-11-01 | End: 2024-11-02

## 2024-11-01 RX ORDER — 0.9 % SODIUM CHLORIDE 0.9 %
500 INTRAVENOUS SOLUTION INTRAVENOUS ONCE
Status: DISCONTINUED | OUTPATIENT
Start: 2024-11-01 | End: 2024-11-03 | Stop reason: HOSPADM

## 2024-11-01 RX ORDER — HEPARIN SODIUM 1000 [USP'U]/ML
80 INJECTION, SOLUTION INTRAVENOUS; SUBCUTANEOUS ONCE
Status: COMPLETED | OUTPATIENT
Start: 2024-11-01 | End: 2024-11-01

## 2024-11-01 RX ORDER — SODIUM CHLORIDE 9 MG/ML
INJECTION, SOLUTION INTRAVENOUS PRN
Status: DISCONTINUED | OUTPATIENT
Start: 2024-11-01 | End: 2024-11-03 | Stop reason: HOSPADM

## 2024-11-01 RX ORDER — MAGNESIUM SULFATE IN WATER 40 MG/ML
2000 INJECTION, SOLUTION INTRAVENOUS PRN
Status: DISCONTINUED | OUTPATIENT
Start: 2024-11-01 | End: 2024-11-03 | Stop reason: HOSPADM

## 2024-11-01 RX ORDER — POTASSIUM CHLORIDE 7.45 MG/ML
10 INJECTION INTRAVENOUS PRN
Status: DISCONTINUED | OUTPATIENT
Start: 2024-11-01 | End: 2024-11-03 | Stop reason: HOSPADM

## 2024-11-01 RX ORDER — HEPARIN SODIUM 1000 [USP'U]/ML
80 INJECTION, SOLUTION INTRAVENOUS; SUBCUTANEOUS PRN
Status: DISCONTINUED | OUTPATIENT
Start: 2024-11-01 | End: 2024-11-02

## 2024-11-01 RX ORDER — POLYETHYLENE GLYCOL 3350 17 G/17G
17 POWDER, FOR SOLUTION ORAL DAILY PRN
Status: DISCONTINUED | OUTPATIENT
Start: 2024-11-01 | End: 2024-11-03 | Stop reason: HOSPADM

## 2024-11-01 RX ORDER — IOPAMIDOL 755 MG/ML
90 INJECTION, SOLUTION INTRAVASCULAR
Status: COMPLETED | OUTPATIENT
Start: 2024-11-01 | End: 2024-11-01

## 2024-11-01 RX ORDER — ACETAMINOPHEN 325 MG/1
650 TABLET ORAL EVERY 6 HOURS PRN
Status: DISCONTINUED | OUTPATIENT
Start: 2024-11-01 | End: 2024-11-03 | Stop reason: HOSPADM

## 2024-11-01 RX ORDER — MEMANTINE HYDROCHLORIDE 5 MG/1
10 TABLET ORAL 2 TIMES DAILY
Status: DISCONTINUED | OUTPATIENT
Start: 2024-11-01 | End: 2024-11-03 | Stop reason: HOSPADM

## 2024-11-01 RX ORDER — POTASSIUM CHLORIDE 1500 MG/1
40 TABLET, EXTENDED RELEASE ORAL PRN
Status: DISCONTINUED | OUTPATIENT
Start: 2024-11-01 | End: 2024-11-03 | Stop reason: HOSPADM

## 2024-11-01 RX ORDER — ONDANSETRON 4 MG/1
4 TABLET, ORALLY DISINTEGRATING ORAL EVERY 8 HOURS PRN
Status: DISCONTINUED | OUTPATIENT
Start: 2024-11-01 | End: 2024-11-03 | Stop reason: HOSPADM

## 2024-11-01 RX ORDER — LISINOPRIL 10 MG/1
10 TABLET ORAL DAILY
Status: DISCONTINUED | OUTPATIENT
Start: 2024-11-02 | End: 2024-11-03 | Stop reason: HOSPADM

## 2024-11-01 RX ORDER — HEPARIN SODIUM 10000 [USP'U]/100ML
5-30 INJECTION, SOLUTION INTRAVENOUS CONTINUOUS
Status: DISCONTINUED | OUTPATIENT
Start: 2024-11-01 | End: 2024-11-02

## 2024-11-01 RX ORDER — SODIUM CHLORIDE 0.9 % (FLUSH) 0.9 %
5-40 SYRINGE (ML) INJECTION EVERY 12 HOURS SCHEDULED
Status: DISCONTINUED | OUTPATIENT
Start: 2024-11-01 | End: 2024-11-03 | Stop reason: HOSPADM

## 2024-11-01 RX ORDER — SODIUM CHLORIDE 0.9 % (FLUSH) 0.9 %
5-40 SYRINGE (ML) INJECTION PRN
Status: DISCONTINUED | OUTPATIENT
Start: 2024-11-01 | End: 2024-11-03 | Stop reason: HOSPADM

## 2024-11-01 RX ORDER — ONDANSETRON 2 MG/ML
4 INJECTION INTRAMUSCULAR; INTRAVENOUS EVERY 6 HOURS PRN
Status: DISCONTINUED | OUTPATIENT
Start: 2024-11-01 | End: 2024-11-03 | Stop reason: HOSPADM

## 2024-11-01 RX ORDER — ACETAMINOPHEN 650 MG/1
650 SUPPOSITORY RECTAL EVERY 6 HOURS PRN
Status: DISCONTINUED | OUTPATIENT
Start: 2024-11-01 | End: 2024-11-03 | Stop reason: HOSPADM

## 2024-11-01 RX ORDER — LABETALOL HYDROCHLORIDE 5 MG/ML
20 INJECTION, SOLUTION INTRAVENOUS EVERY 6 HOURS PRN
Status: DISCONTINUED | OUTPATIENT
Start: 2024-11-01 | End: 2024-11-03 | Stop reason: HOSPADM

## 2024-11-01 RX ADMIN — IOPAMIDOL 90 ML: 755 INJECTION, SOLUTION INTRAVENOUS at 18:52

## 2024-11-01 RX ADMIN — HEPARIN SODIUM 4800 UNITS: 1000 INJECTION INTRAVENOUS; SUBCUTANEOUS at 20:34

## 2024-11-01 RX ADMIN — HEPARIN SODIUM 18 UNITS/KG/HR: 10000 INJECTION, SOLUTION INTRAVENOUS at 20:39

## 2024-11-01 ASSESSMENT — LIFESTYLE VARIABLES
HOW OFTEN DO YOU HAVE A DRINK CONTAINING ALCOHOL: 2-3 TIMES A WEEK
HOW MANY STANDARD DRINKS CONTAINING ALCOHOL DO YOU HAVE ON A TYPICAL DAY: 3 OR 4

## 2024-11-02 ENCOUNTER — APPOINTMENT (OUTPATIENT)
Age: 79
DRG: 176 | End: 2024-11-02
Attending: STUDENT IN AN ORGANIZED HEALTH CARE EDUCATION/TRAINING PROGRAM
Payer: MEDICARE

## 2024-11-02 ENCOUNTER — APPOINTMENT (OUTPATIENT)
Dept: VASCULAR LAB | Age: 79
DRG: 176 | End: 2024-11-02
Payer: MEDICARE

## 2024-11-02 PROBLEM — I26.99 RECURRENT PULMONARY EMBOLISM (HCC): Status: ACTIVE | Noted: 2024-11-02

## 2024-11-02 PROBLEM — I82.402 RECURRENT ACUTE DEEP VEIN THROMBOSIS (DVT) OF LEFT LOWER EXTREMITY (HCC): Status: ACTIVE | Noted: 2024-11-02

## 2024-11-02 LAB
ANION GAP SERPL CALCULATED.3IONS-SCNC: 11 MMOL/L (ref 7–19)
ANTI-XA UNFRAC HEPARIN: 0.32 IU/ML
ANTI-XA UNFRAC HEPARIN: 0.34 IU/ML
BASOPHILS # BLD: 0 K/UL (ref 0–0.2)
BASOPHILS NFR BLD: 0.2 % (ref 0–1)
BUN SERPL-MCNC: 7 MG/DL (ref 8–23)
CALCIUM SERPL-MCNC: 8.6 MG/DL (ref 8.8–10.2)
CHLORIDE SERPL-SCNC: 104 MMOL/L (ref 98–111)
CO2 SERPL-SCNC: 26 MMOL/L (ref 22–29)
CREAT SERPL-MCNC: 0.5 MG/DL (ref 0.5–0.9)
ECHO AO ASC DIAM: 2.8 CM
ECHO AO ASCENDING AORTA INDEX: 1.7 CM/M2
ECHO AO ROOT DIAM: 1.5 CM
ECHO AO ROOT INDEX: 0.91 CM/M2
ECHO AO SINUS VALSALVA DIAM: 2.4 CM
ECHO AO SINUS VALSALVA INDEX: 1.45 CM/M2
ECHO AO ST JNCT DIAM: 2.2 CM
ECHO AV AREA PEAK VELOCITY: 2.6 CM2
ECHO AV AREA VTI: 3.2 CM2
ECHO AV AREA/BSA PEAK VELOCITY: 1.6 CM2/M2
ECHO AV AREA/BSA VTI: 1.9 CM2/M2
ECHO AV MEAN GRADIENT: 2 MMHG
ECHO AV MEAN GRADIENT: 2 MMHG
ECHO AV MEAN VELOCITY: 0.7 M/S
ECHO AV PEAK GRADIENT: 5 MMHG
ECHO AV PEAK VELOCITY: 1.2 M/S
ECHO AV VELOCITY RATIO: 0.83
ECHO AV VTI: 18.1 CM
ECHO BSA: 1.64 M2
ECHO EST RA PRESSURE: 3 MMHG
ECHO IVC PROX: 1.5 CM
ECHO LA AREA 2C: 13 CM2
ECHO LA AREA 4C: 12.6 CM2
ECHO LA DIAMETER INDEX: 1.64 CM/M2
ECHO LA DIAMETER: 2.7 CM
ECHO LA MAJOR AXIS: 4.3 CM
ECHO LA MINOR AXIS: 4.1 CM
ECHO LA TO AORTIC ROOT RATIO: 1.8
ECHO LA VOL BP: 32 ML (ref 22–52)
ECHO LA VOL MOD A2C: 34 ML (ref 22–52)
ECHO LA VOL MOD A4C: 29 ML (ref 22–52)
ECHO LA VOL/BSA BIPLANE: 19 ML/M2 (ref 16–34)
ECHO LA VOLUME INDEX MOD A2C: 21 ML/M2 (ref 16–34)
ECHO LA VOLUME INDEX MOD A4C: 18 ML/M2 (ref 16–34)
ECHO LV E' LATERAL VELOCITY: 9.1 CM/S
ECHO LV E' SEPTAL VELOCITY: 6.4 CM/S
ECHO LV EJECTION FRACTION BIPLANE: 59 % (ref 55–100)
ECHO LV FRACTIONAL SHORTENING: 30 % (ref 28–44)
ECHO LV INTERNAL DIMENSION DIASTOLE INDEX: 2 CM/M2
ECHO LV INTERNAL DIMENSION DIASTOLIC: 3.3 CM (ref 3.9–5.3)
ECHO LV INTERNAL DIMENSION SYSTOLIC INDEX: 1.39 CM/M2
ECHO LV INTERNAL DIMENSION SYSTOLIC: 2.3 CM
ECHO LV IVSD: 1.1 CM (ref 0.6–0.9)
ECHO LV MASS 2D: 101.7 G (ref 67–162)
ECHO LV MASS INDEX 2D: 61.6 G/M2 (ref 43–95)
ECHO LV POSTERIOR WALL DIASTOLIC: 1 CM (ref 0.6–0.9)
ECHO LV RELATIVE WALL THICKNESS RATIO: 0.61
ECHO LVOT AREA: 3.1 CM2
ECHO LVOT AV VTI INDEX: 1.01
ECHO LVOT DIAM: 2 CM
ECHO LVOT MEAN GRADIENT: 1 MMHG
ECHO LVOT MEAN GRADIENT: 1 MMHG
ECHO LVOT PEAK GRADIENT: 4 MMHG
ECHO LVOT PEAK VELOCITY: 1 M/S
ECHO LVOT STROKE VOLUME INDEX: 34.8 ML/M2
ECHO LVOT SV: 57.5 ML
ECHO LVOT VTI: 18.3 CM
ECHO MV A VELOCITY: 0.64 M/S
ECHO MV AREA VTI: 2.9 CM2
ECHO MV E DECELERATION TIME (DT): 135 MS
ECHO MV E VELOCITY: 0.49 M/S
ECHO MV E/A RATIO: 0.77
ECHO MV E/E' LATERAL: 5.38
ECHO MV E/E' RATIO (AVERAGED): 6.52
ECHO MV E/E' SEPTAL: 7.66
ECHO MV LVOT VTI INDEX: 1.07
ECHO MV MAX VELOCITY: 1 M/S
ECHO MV MEAN GRADIENT: 1 MMHG
ECHO MV MEAN VELOCITY: 0.5 M/S
ECHO MV PEAK GRADIENT: 4 MMHG
ECHO MV VTI: 19.6 CM
ECHO RA AREA 4C: 10.1 CM2
ECHO RA END SYSTOLIC VOLUME APICAL 4 CHAMBER INDEX BSA: 13 ML/M2
ECHO RA VOLUME: 22 ML
ECHO RIGHT VENTRICULAR SYSTOLIC PRESSURE (RVSP): 34 MMHG
ECHO RV BASAL DIMENSION: 3.5 CM
ECHO RV INTERNAL DIMENSION: 2.7 CM
ECHO RV LONGITUDINAL DIMENSION: 4.6 CM
ECHO RV MID DIMENSION: 2.2 CM
ECHO RV TAPSE: 2.2 CM (ref 1.7–?)
ECHO TV REGURGITANT MAX VELOCITY: 2.78 M/S
ECHO TV REGURGITANT PEAK GRADIENT: 31 MMHG
EOSINOPHIL # BLD: 0 K/UL (ref 0–0.6)
EOSINOPHIL NFR BLD: 0.5 % (ref 0–5)
ERYTHROCYTE [DISTWIDTH] IN BLOOD BY AUTOMATED COUNT: 12.9 % (ref 11.5–14.5)
GLUCOSE SERPL-MCNC: 109 MG/DL (ref 70–99)
HCT VFR BLD AUTO: 37 % (ref 37–47)
HGB BLD-MCNC: 12.2 G/DL (ref 12–16)
IMM GRANULOCYTES # BLD: 0.1 K/UL
LYMPHOCYTES # BLD: 1.6 K/UL (ref 1.1–4.5)
LYMPHOCYTES NFR BLD: 19.3 % (ref 20–40)
MCH RBC QN AUTO: 30.7 PG (ref 27–31)
MCHC RBC AUTO-ENTMCNC: 33 G/DL (ref 33–37)
MCV RBC AUTO: 93 FL (ref 81–99)
MONOCYTES # BLD: 1.5 K/UL (ref 0–0.9)
MONOCYTES NFR BLD: 18.6 % (ref 0–10)
NEUTROPHILS # BLD: 4.9 K/UL (ref 1.5–7.5)
NEUTS SEG NFR BLD: 60.3 % (ref 50–65)
PLATELET # BLD AUTO: 183 K/UL (ref 130–400)
PMV BLD AUTO: 11.5 FL (ref 9.4–12.3)
POTASSIUM SERPL-SCNC: 3.8 MMOL/L (ref 3.5–5)
RBC # BLD AUTO: 3.98 M/UL (ref 4.2–5.4)
SODIUM SERPL-SCNC: 141 MMOL/L (ref 136–145)
WBC # BLD AUTO: 8.1 K/UL (ref 4.8–10.8)

## 2024-11-02 PROCEDURE — 80048 BASIC METABOLIC PNL TOTAL CA: CPT

## 2024-11-02 PROCEDURE — 93971 EXTREMITY STUDY: CPT

## 2024-11-02 PROCEDURE — 6370000000 HC RX 637 (ALT 250 FOR IP)

## 2024-11-02 PROCEDURE — 85520 HEPARIN ASSAY: CPT

## 2024-11-02 PROCEDURE — 85025 COMPLETE CBC W/AUTO DIFF WBC: CPT

## 2024-11-02 PROCEDURE — 36415 COLL VENOUS BLD VENIPUNCTURE: CPT

## 2024-11-02 PROCEDURE — 1200000000 HC SEMI PRIVATE

## 2024-11-02 PROCEDURE — 2580000003 HC RX 258

## 2024-11-02 PROCEDURE — 94760 N-INVAS EAR/PLS OXIMETRY 1: CPT

## 2024-11-02 PROCEDURE — 6360000002 HC RX W HCPCS: Performed by: STUDENT IN AN ORGANIZED HEALTH CARE EDUCATION/TRAINING PROGRAM

## 2024-11-02 PROCEDURE — C8929 TTE W OR WO FOL WCON,DOPPLER: HCPCS

## 2024-11-02 PROCEDURE — 6370000000 HC RX 637 (ALT 250 FOR IP): Performed by: STUDENT IN AN ORGANIZED HEALTH CARE EDUCATION/TRAINING PROGRAM

## 2024-11-02 PROCEDURE — 6360000004 HC RX CONTRAST MEDICATION: Performed by: STUDENT IN AN ORGANIZED HEALTH CARE EDUCATION/TRAINING PROGRAM

## 2024-11-02 RX ORDER — VITAMIN B COMPLEX
2000 TABLET ORAL DAILY
Status: DISCONTINUED | OUTPATIENT
Start: 2024-11-02 | End: 2024-11-03 | Stop reason: HOSPADM

## 2024-11-02 RX ORDER — ENOXAPARIN SODIUM 100 MG/ML
1 INJECTION SUBCUTANEOUS 2 TIMES DAILY
Status: DISCONTINUED | OUTPATIENT
Start: 2024-11-02 | End: 2024-11-03 | Stop reason: HOSPADM

## 2024-11-02 RX ADMIN — ENOXAPARIN SODIUM 60 MG: 100 INJECTION SUBCUTANEOUS at 08:26

## 2024-11-02 RX ADMIN — SODIUM CHLORIDE, PRESERVATIVE FREE 10 ML: 5 INJECTION INTRAVENOUS at 20:41

## 2024-11-02 RX ADMIN — ENOXAPARIN SODIUM 60 MG: 100 INJECTION SUBCUTANEOUS at 20:41

## 2024-11-02 RX ADMIN — Medication 2000 UNITS: at 08:25

## 2024-11-02 RX ADMIN — MEMANTINE HYDROCHLORIDE 10 MG: 5 TABLET ORAL at 20:41

## 2024-11-02 RX ADMIN — SODIUM CHLORIDE, PRESERVATIVE FREE 10 ML: 5 INJECTION INTRAVENOUS at 08:26

## 2024-11-02 RX ADMIN — LISINOPRIL 10 MG: 10 TABLET ORAL at 08:25

## 2024-11-02 RX ADMIN — PERFLUTREN 1.5 ML: 6.52 INJECTION, SUSPENSION INTRAVENOUS at 11:26

## 2024-11-02 RX ADMIN — MEMANTINE HYDROCHLORIDE 10 MG: 5 TABLET ORAL at 08:25

## 2024-11-02 ASSESSMENT — PAIN SCALES - GENERAL: PAINLEVEL_OUTOF10: 0

## 2024-11-02 NOTE — PLAN OF CARE
Problem: ABCDS Injury Assessment  Goal: Absence of physical injury  Outcome: Progressing     Problem: Chronic Conditions and Co-morbidities  Goal: Patient's chronic conditions and co-morbidity symptoms are monitored and maintained or improved  Outcome: Progressing  Flowsheets (Taken 11/1/2024 2150)  Care Plan - Patient's Chronic Conditions and Co-Morbidity Symptoms are Monitored and Maintained or Improved:   Monitor and assess patient's chronic conditions and comorbid symptoms for stability, deterioration, or improvement   Collaborate with multidisciplinary team to address chronic and comorbid conditions and prevent exacerbation or deterioration   Update acute care plan with appropriate goals if chronic or comorbid symptoms are exacerbated and prevent overall improvement and discharge  Note: Had chronic blood clots in the past.

## 2024-11-02 NOTE — PROGRESS NOTES
4 Eyes Skin Assessment     NAME:  Josefa Thompson  YOB: 1945  MEDICAL RECORD NUMBER:  [id number]    The patient is being assessed for  Admission    I agree that at least one RN has performed a thorough Head to Toe Skin Assessment on the patient. ALL assessment sites listed below have been assessed.      Areas assessed by both nurses:    Other none.        Does the Patient have a Wound? No noted wound(s)       Koffi Prevention initiated by RN: No  Wound Care Orders initiated by RN: No    Pressure Injury (Stage 3,4, Unstageable, DTI, NWPT, and Complex wounds) if present, place Wound referral order by RN under : No    New Ostomies, if present place, Ostomy referral order under : No     Nurse 1 eSignature: Electronically signed by Randi Paige LPN on 11/1/24 at 10:37 PM CDT    **SHARE this note so that the co-signing nurse can place an eSignature**    Nurse 2 eSignature: {Esignature:[id number]}  
Josefa Thompson arrived to room # 434.   Presented with: Bilateral P  Mental Status: Patient is oriented, alert, coherent, logical, thought processes intact, and able to concentrate and follow conversation.   Vitals:    11/01/24 2134   BP: (!) 193/88   Pulse: (!) 108   Resp: 20   Temp: 97.4 °F (36.3 °C)   SpO2: 94%     Patient safety contract and falls prevention contract reviewed with patient Yes.  Oriented Patient to room.  Call light within reach. Yes.  Needs, issues or concerns expressed at this time: no.      Electronically signed by Randi Paige LPN on 11/1/2024 at 9:51 PM  
Vascular Lab Notes:    LLE Venous duplex scan completed  Positive DVT Lt Popliteal Vein  No evidence for SVT or Reflux       Final report pending  
APRN - CNP    potassium chloride (KLOR-CON M) extended release tablet 40 mEq, 40 mEq, Oral, PRN **OR** potassium bicarb-citric acid (EFFER-K) effervescent tablet 40 mEq, 40 mEq, Oral, PRN **OR** potassium chloride 10 mEq/100 mL IVPB (Peripheral Line), 10 mEq, IntraVENous, PRN, Yolis Jimenez APRN - CNP    magnesium sulfate 2000 mg in 50 mL IVPB premix, 2,000 mg, IntraVENous, PRN, Yolis Jimenez APRN - CNP    ondansetron (ZOFRAN-ODT) disintegrating tablet 4 mg, 4 mg, Oral, Q8H PRN **OR** ondansetron (ZOFRAN) injection 4 mg, 4 mg, IntraVENous, Q6H PRN, Yolis Jimenez APRN - CNP    polyethylene glycol (GLYCOLAX) packet 17 g, 17 g, Oral, Daily PRN, Yolis Jimenez APRN - CNP    acetaminophen (TYLENOL) tablet 650 mg, 650 mg, Oral, Q6H PRN **OR** acetaminophen (TYLENOL) suppository 650 mg, 650 mg, Rectal, Q6H PRN, Yolis Jimenez APRN - CNP    labetalol (NORMODYNE;TRANDATE) injection 20 mg, 20 mg, IntraVENous, Q6H PRN, Franco Rubalcava MD      Assessment/plan  Principal Problem:    Pulmonary embolism, bilateral (HCC)  Active Problems:    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 *      Recurrent pulmonary embolism, without cor pulmonale  Recurrent DVT of left lower extremity  -- Has apparently been off Xarelto since September  -- No obvious provoking factors for this episode, however prior DVT/PE felt to be provoked from immobility/long flight  -- Lower extremity Doppler obtained and reviewed-positive for left popliteal DVT however no obvious high risk features, no floating Tail  -- Follow-up transthoracic echo to rule out right heart strain  -- Transition from IV heparin to therapeutic dose Lovenox 1 mg/kg twice daily, depending on further clinical course and test results, may transition back to Xarelto  -- Will arrange for outpatient follow-up with hematology to consider thrombophilia

## 2024-11-02 NOTE — ED PROVIDER NOTES
[facility] 4 ONCOLOGY UNIT  eMERGENCY dEPARTMENT eNCOUnter      Pt Name: Josefa Thompson  MRN: [id number]  Birthdate 1945  Date of evaluation: 11/1/2024  Provider: INDERJIT Butler    CHIEF COMPLAINT       Chief Complaint   Patient presents with    Chest Pain     Intermittent today          HISTORY OF PRESENT ILLNESS   (Location/Symptom, Timing/Onset,Context/Setting, Quality, Duration, Modifying Factors, Severity)  Note limiting factors.   Josefa Thompson is a 79 y.o. female who presents to the emergency department with chest pain and shortness of breath that started this am.  She describes it as pressure.  Pt also has a left leg that is swollen.  Pt has had PE and DVT before and has been off her xaralto since 10/2/24.  Pt has poor memory and is here with a friend who is POA    The history is provided by the patient.   Chest Pain  Chest pain location: under her bra.  Pain quality: pressure    Onset quality:  Sudden  Duration:  1 day  Timing:  Constant  Progression:  Unchanged  Chronicity:  New  Ineffective treatments:  None tried  Associated symptoms: shortness of breath    Associated symptoms: no fever    Risk factors: prior DVT/PE        NursingNotes were reviewed.    REVIEW OF SYSTEMS    (2-9 systems for level 4, 10 or more for level 5)     Review of Systems   Constitutional:  Negative for fever.   Respiratory:  Positive for chest tightness and shortness of breath.    Cardiovascular:  Positive for chest pain.       Except as noted above the remainder of the review of systems was reviewed and negative.       PAST MEDICAL HISTORY     Past Medical History:   Diagnosis Date    Cancer (HCC)     basil cell skin cancers removed     DVT (deep venous thrombosis) (HCC) 08/18/2022    LLE    Essential hypertension 12/05/2023    Low back pain 02/07/2017    Mixed hyperlipidemia 05/18/2021    Osteoporosis     Osteoporosis with pathological fracture with routine healing 06/23/2017    Pulmonary embolism (HCC) 08/18/2022

## 2024-11-02 NOTE — PLAN OF CARE
Problem: ABCDS Injury Assessment  Goal: Absence of physical injury  11/2/2024 0304 by Randi Paige LPN  Outcome: Progressing  Flowsheets (Taken 11/1/2024 2321)  Absence of Physical Injury: Implement safety measures based on patient assessment  11/1/2024 2150 by Randi Paige LPN  Outcome: Progressing     Problem: Chronic Conditions and Co-morbidities  Goal: Patient's chronic conditions and co-morbidity symptoms are monitored and maintained or improved  11/2/2024 0304 by Randi Paige LPN  Outcome: Progressing  Flowsheets (Taken 11/1/2024 2150)  Care Plan - Patient's Chronic Conditions and Co-Morbidity Symptoms are Monitored and Maintained or Improved:   Monitor and assess patient's chronic conditions and comorbid symptoms for stability, deterioration, or improvement   Collaborate with multidisciplinary team to address chronic and comorbid conditions and prevent exacerbation or deterioration   Update acute care plan with appropriate goals if chronic or comorbid symptoms are exacerbated and prevent overall improvement and discharge  11/1/2024 2150 by Randi Paige LPN  Outcome: Progressing  Flowsheets (Taken 11/1/2024 2150)  Care Plan - Patient's Chronic Conditions and Co-Morbidity Symptoms are Monitored and Maintained or Improved:   Monitor and assess patient's chronic conditions and comorbid symptoms for stability, deterioration, or improvement   Collaborate with multidisciplinary team to address chronic and comorbid conditions and prevent exacerbation or deterioration   Update acute care plan with appropriate goals if chronic or comorbid symptoms are exacerbated and prevent overall improvement and discharge  Note: Had chronic blood clots in the past.     Problem: Safety - Adult  Goal: Free from fall injury  Outcome: Progressing  Flowsheets (Taken 11/2/2024 0304)  Free From Fall Injury: Instruct family/caregiver on patient safety

## 2024-11-02 NOTE — H&P
spoken language may permit erroneous, or at times, nonsensical words or phrases to be inadvertently transcribed; although attempts have made to review the note for such errors, some may still exist.

## 2024-11-02 NOTE — ED NOTES
fracture with routine healing 06/23/2017    Pulmonary embolism (HCC) 08/18/2022    bilateral lower lobes       Assessment  Vitals:     Vitals:    11/01/24 1830 11/01/24 1930 11/01/24 2030 11/01/24 2100   BP: (!) 148/82 (!) 142/77 (!) 158/85 (!) 161/83   Pulse: 92 87 95 90   Resp: 16 19 25 20   Temp:       TempSrc:       SpO2: 97% 97% 96% 98%   Weight:       Height:         Predictive Model Details          28 (Normal)  Factor Value    Calculated 11/1/2024 21:04 44% Age 79 years old    Deterioration Index Model 19% Respiratory rate 20     14% Cardiac rhythm Sinus tachy     13% Systolic 161     4% Sodium 136 mmol/L     2% Pulse 90     2% Potassium 3.6 mmol/L     1% WBC count 8.4 K/uL     0% Pulse oximetry 98 %     0% Temperature 97.7 °F (36.5 °C)     0% Hematocrit 40.6 %       NPO? No  O2 Flow Rate: O2 Device: None (Room air)    Cardiac Rhythm: sinus rhythm   NIH Score: NIH     Active LDA's:   Peripheral IV 11/01/24 Left Antecubital (Active)   Site Assessment Clean, dry & intact 11/01/24 1705   Line Status Flushed;Specimen collected 11/01/24 1705     Pertinent or High Risk Medications/Drips: yes   If Yes, please provide details: heparin  Blood Product Administration: no  If Yes, please provide details:   Sepsis Risk Score      Admitted with Sepsis? No    Recommendation  Incomplete orders:   Patient Belongings:   Additional Comments:   If any further questions, please call Sending RN at 2150    Electronically signed by: Electronically signed by Salina Hutchins RN on 11/1/2024 at 9:04 PM

## 2024-11-03 VITALS
TEMPERATURE: 97.5 F | BODY MASS INDEX: 21.73 KG/M2 | SYSTOLIC BLOOD PRESSURE: 165 MMHG | HEART RATE: 96 BPM | DIASTOLIC BLOOD PRESSURE: 90 MMHG | RESPIRATION RATE: 18 BRPM | HEIGHT: 66 IN | OXYGEN SATURATION: 95 % | WEIGHT: 135.2 LBS

## 2024-11-03 LAB
BASOPHILS # BLD: 0 K/UL (ref 0–0.2)
BASOPHILS NFR BLD: 0 % (ref 0–1)
ECHO BSA: 1.64 M2
EKG P AXIS: 69 DEGREES
EKG P AXIS: 72 DEGREES
EKG P-R INTERVAL: 150 MS
EKG P-R INTERVAL: 152 MS
EKG Q-T INTERVAL: 334 MS
EKG Q-T INTERVAL: 358 MS
EKG QRS DURATION: 90 MS
EKG QRS DURATION: 92 MS
EKG QTC CALCULATION (BAZETT): 404 MS
EKG QTC CALCULATION (BAZETT): 415 MS
EKG T AXIS: 55 DEGREES
EKG T AXIS: 98 DEGREES
EOSINOPHIL # BLD: 0 K/UL (ref 0–0.6)
EOSINOPHIL NFR BLD: 0 % (ref 0–5)
ERYTHROCYTE [DISTWIDTH] IN BLOOD BY AUTOMATED COUNT: 13.1 % (ref 11.5–14.5)
HCT VFR BLD AUTO: 40.5 % (ref 37–47)
HGB BLD-MCNC: 13.1 G/DL (ref 12–16)
IMM GRANULOCYTES # BLD: 0.2 K/UL
LYMPHOCYTES # BLD: 1.2 K/UL (ref 1.1–4.5)
LYMPHOCYTES NFR BLD: 12 % (ref 20–40)
MCH RBC QN AUTO: 30.5 PG (ref 27–31)
MCHC RBC AUTO-ENTMCNC: 32.3 G/DL (ref 33–37)
MCV RBC AUTO: 94.4 FL (ref 81–99)
MONOCYTES # BLD: 2 K/UL (ref 0–0.9)
MONOCYTES NFR BLD: 20 % (ref 0–10)
NEUTROPHILS # BLD: 6.9 K/UL (ref 1.5–7.5)
NEUTS SEG NFR BLD: 68 % (ref 50–65)
PLATELET # BLD AUTO: 175 K/UL (ref 130–400)
PLATELET SLIDE REVIEW: ADEQUATE
PMV BLD AUTO: 11.2 FL (ref 9.4–12.3)
RBC # BLD AUTO: 4.29 M/UL (ref 4.2–5.4)
WBC # BLD AUTO: 10.1 K/UL (ref 4.8–10.8)

## 2024-11-03 PROCEDURE — 93010 ELECTROCARDIOGRAM REPORT: CPT | Performed by: INTERNAL MEDICINE

## 2024-11-03 PROCEDURE — 85025 COMPLETE CBC W/AUTO DIFF WBC: CPT

## 2024-11-03 PROCEDURE — 6360000002 HC RX W HCPCS: Performed by: STUDENT IN AN ORGANIZED HEALTH CARE EDUCATION/TRAINING PROGRAM

## 2024-11-03 PROCEDURE — 2580000003 HC RX 258

## 2024-11-03 PROCEDURE — 93971 EXTREMITY STUDY: CPT | Performed by: SURGERY

## 2024-11-03 PROCEDURE — 36415 COLL VENOUS BLD VENIPUNCTURE: CPT

## 2024-11-03 PROCEDURE — 6370000000 HC RX 637 (ALT 250 FOR IP)

## 2024-11-03 RX ADMIN — SODIUM CHLORIDE, PRESERVATIVE FREE 10 ML: 5 INJECTION INTRAVENOUS at 08:15

## 2024-11-03 RX ADMIN — ENOXAPARIN SODIUM 60 MG: 100 INJECTION SUBCUTANEOUS at 08:13

## 2024-11-03 RX ADMIN — MEMANTINE HYDROCHLORIDE 10 MG: 5 TABLET ORAL at 08:12

## 2024-11-03 RX ADMIN — LISINOPRIL 10 MG: 10 TABLET ORAL at 08:12

## 2024-11-03 NOTE — PLAN OF CARE
Problem: ABCDS Injury Assessment  Goal: Absence of physical injury  11/3/2024 0930 by Renetta Toney RN  Outcome: Adequate for Discharge  11/3/2024 0312 by Randi Paige LPN  Outcome: Progressing  Flowsheets (Taken 11/2/2024 2318)  Absence of Physical Injury: Implement safety measures based on patient assessment     Problem: Chronic Conditions and Co-morbidities  Goal: Patient's chronic conditions and co-morbidity symptoms are monitored and maintained or improved  11/3/2024 0930 by Renetta Toney RN  Outcome: Adequate for Discharge  11/3/2024 0312 by Randi Paige LPN  Outcome: Progressing  Flowsheets (Taken 11/2/2024 2042)  Care Plan - Patient's Chronic Conditions and Co-Morbidity Symptoms are Monitored and Maintained or Improved:   Monitor and assess patient's chronic conditions and comorbid symptoms for stability, deterioration, or improvement   Collaborate with multidisciplinary team to address chronic and comorbid conditions and prevent exacerbation or deterioration   Update acute care plan with appropriate goals if chronic or comorbid symptoms are exacerbated and prevent overall improvement and discharge     Problem: Safety - Adult  Goal: Free from fall injury  11/3/2024 0930 by Renetta Toney RN  Outcome: Adequate for Discharge  11/3/2024 0312 by Randi Paige LPN  Outcome: Progressing  Flowsheets (Taken 11/2/2024 2318)  Free From Fall Injury: Instruct family/caregiver on patient safety

## 2024-11-03 NOTE — CARE COORDINATION
Order Requisition for Josefa Thompson  CSN: [id number]   Order Date:  Nov 3, 2024  Nov 3, 2024             Patient Information: Josefa Thompson       :  1945  Age:  79 y.o.  Sex:  F  Home Phone: [phone]  Work Phone:  [phone] SSN: [id number]  Address:  [facility] [address]                    [place]  [address] MRN:  [id number]  Facility MRN:  [id number]  PCP: Jacki Yepez MD  PCP Phone: [phone]           Ordering Dept: [facility] 4 ONCOLOGY UNIT     Site: [facility]  Ph: [phone] Fax:   Address: [facility] [place] [address] Ordering User: Elia Bailon MD  Provider ID: [id number]  NPI:  [id number]               Test Ordered: Inpatient consult to Home Care Needs [CON71]   Code: CON71   ORD #: [id number]  Associated Diagnosis:   Comments: Certification and medical necessity: I certify that, based on my findings, the following services are medically necessary home health services for Josefa Thompson for the following reasons: - Vital signs monitoring: Nurse to perform BP, HR, RR, Temp, and Weight with each visit and teach patient and caregivers on taking daily.  Nurse to call physician if pulse less than 50 or greater than 120, respiratory rate less than 12 or greater than 25, oral temperature greater than or equal to 101 oF, systolic BP less than 90 or greater than 170, diastolic BP less than 50 or greater than 100.  - Medication compliance and education for  safety concerns  - Consult Physical Therapy for  Evaluate and Treat  - Consult Occupational Therapy for  Evaluate and Treat  - Consult Medical Social Worker  assessment and counseling and education on community resources  - Consult Home Health Aid for  assistance with Activities of Daily Living  Reason for Consult? Home Care Orders  I certify that I, or a nurse practitioner or physician assistant working with me, had an in-person encounter with the patient and the reason for the home care services is

## 2024-11-03 NOTE — DISCHARGE SUMMARY
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 *      Significant Diagnostic Studies:   Echo (TTE) complete (PRN contrast/bubble/strain/3D)    Result Date: 2024    Left Ventricle: Normal left ventricular systolic function with a visually estimated EF of 55 - 60%. Left ventricle size is normal. Normal wall thickness. Normal wall motion.   Mitral Valve: Mild regurgitation.   Tricuspid Valve: Mild regurgitation.   Image quality is technically difficult. Contrast used: Definity. Technically difficult study.   No significant valvular abnormlities   No pericardial effusion     CTA PULMONARY W CONTRAST    Result Date: 2024  EXAM: CHEST CTA WITH CONTRAST (PULMONARY ARTERY)  HISTORY: Chest pain, short of breath.  TECHNIQUE: CTA acquisition of the chest from the thoracic inlet to the upper abdomen following IV contrast administration timed to filling of the pulmonary artery.  IV Contrast: Administered.  3D/MIP/VR images were utilized.  CT Dose Reduction Techniques  Employed: Yes.  COMPARISON: 2022 CT chest.  FINDINGS: Lines, Tubes, Devices: None.  Pulmonary Embolism: - Diagnostic quality: Adequate. - Central(Main/Lobar/Interlobar): No embolus. - Peripheral (Segmental/Subsegmental): Filling defects are present involving the right lower segmental and left lower subsegmental pulmonary arteries. - Right ventricle/Left ventricle ratio (normal <0.9): Normal.  Lung Parenchyma and Airways:

## 2024-11-03 NOTE — PLAN OF CARE
Problem: ABCDS Injury Assessment  Goal: Absence of physical injury  Outcome: Progressing  Flowsheets (Taken 11/2/2024 2318)  Absence of Physical Injury: Implement safety measures based on patient assessment     Problem: Chronic Conditions and Co-morbidities  Goal: Patient's chronic conditions and co-morbidity symptoms are monitored and maintained or improved  Outcome: Progressing  Flowsheets (Taken 11/2/2024 2042)  Care Plan - Patient's Chronic Conditions and Co-Morbidity Symptoms are Monitored and Maintained or Improved:   Monitor and assess patient's chronic conditions and comorbid symptoms for stability, deterioration, or improvement   Collaborate with multidisciplinary team to address chronic and comorbid conditions and prevent exacerbation or deterioration   Update acute care plan with appropriate goals if chronic or comorbid symptoms are exacerbated and prevent overall improvement and discharge     Problem: Safety - Adult  Goal: Free from fall injury  Outcome: Progressing  Flowsheets (Taken 11/2/2024 2318)  Free From Fall Injury: Instruct family/caregiver on patient safety

## 2024-11-03 NOTE — CARE COORDINATION
Sent referral to Spoke with patient regarding MD orders for HH services.  Patient agreeable and has chosen [facility].  Referral Faxed.  --[phone].  [phone].  Please notify - when patient discharges and fax DC Summary,  DC med list and any new HH orders.     The Patient  was provided with a choice of provider and agrees with the discharge plan. [x] Yes [ ] No    Freedom of choice list was provided with basic dialogue that supports the patient's individualized plan of care/goals, treatment preferences and shares the quality data associated with the providers. [x] Yes [ ] No     Awaiting approval/ denial   Electronically signed by ADAN CONRAD on 11/3/2024 at 9:16 AM

## 2024-11-04 ENCOUNTER — TELEPHONE (OUTPATIENT)
Dept: PRIMARY CARE CLINIC | Age: 79
End: 2024-11-04

## 2024-11-04 NOTE — TELEPHONE ENCOUNTER
Care Transitions Initial Follow Up Call    Outreach made within 2 business days of discharge: Yes  Workman's comp claim? no  Patient: Josefa Thompson   Patient : 1945   MRN: [id number]   Reason for Admission: Recurrent pulmonary embolism   Discharge Date: 11/3/24       Spoke with: Josefa    Discharge department/facility: [facility]    TCM Interactive Patient Contact:  Was patient able to fill all prescriptions: Yes  Was patient instructed to bring all medications to the follow-up visit: Yes  Is patient taking all medications as directed in the discharge summary? Yes  Does patient understand their discharge instructions: Yes  Does patient have questions or concerns that need addressed prior to 7-14 day follow up office visit: no    Scheduled appointment with PCP within 7-14 days - yes    Spoke with Josefa. She did not recall why she had gone into the hospital, we discussed that it was for chest pain. She was diagnosed with a PE. She is still having some pain with this but otherwise states she is very healthy, feels great. She is eating and sleeping well. She has no other physical complaints. She will see DR. Yepez on 24.     Follow Up  Future Appointments   Date Time Provider Department Center   2024 11:00 AM [facility] DEP   1/15/2025  9:15 AM Carol Brooks APRN [facility] NEUROLOG Neurology -   3/17/2025  7:30 AM Jacki Yepez MD [facility] DEP       Domi Mcdowell MA

## 2024-11-05 ENCOUNTER — APPOINTMENT (OUTPATIENT)
Dept: GENERAL RADIOLOGY | Age: 79
End: 2024-11-05
Payer: MEDICARE

## 2024-11-05 ENCOUNTER — APPOINTMENT (OUTPATIENT)
Dept: CT IMAGING | Age: 79
End: 2024-11-05
Payer: MEDICARE

## 2024-11-05 ENCOUNTER — HOSPITAL ENCOUNTER (EMERGENCY)
Age: 79
Discharge: HOME | End: 2024-11-05
Attending: PEDIATRICS
Payer: MEDICARE

## 2024-11-05 VITALS
SYSTOLIC BLOOD PRESSURE: 144 MMHG | RESPIRATION RATE: 18 BRPM | DIASTOLIC BLOOD PRESSURE: 90 MMHG | HEART RATE: 93 BPM | TEMPERATURE: 98.3 F | OXYGEN SATURATION: 94 %

## 2024-11-05 DIAGNOSIS — I26.94 MULTIPLE SUBSEGMENTAL PULMONARY EMBOLI WITHOUT ACUTE COR PULMONALE (HCC): ICD-10-CM

## 2024-11-05 DIAGNOSIS — R07.89 OTHER CHEST PAIN: Primary | ICD-10-CM

## 2024-11-05 LAB
ALBUMIN SERPL-MCNC: 3.7 G/DL (ref 3.5–5.2)
ALP SERPL-CCNC: 80 U/L (ref 35–104)
ALT SERPL-CCNC: 30 U/L (ref 5–33)
ANION GAP SERPL CALCULATED.3IONS-SCNC: 11 MMOL/L (ref 7–19)
AST SERPL-CCNC: 47 U/L (ref 5–32)
BASOPHILS # BLD: 0 K/UL (ref 0–0.2)
BASOPHILS NFR BLD: 0.2 % (ref 0–1)
BILIRUB SERPL-MCNC: 0.4 MG/DL (ref 0.2–1.2)
BUN SERPL-MCNC: 18 MG/DL (ref 8–23)
CALCIUM SERPL-MCNC: 8.7 MG/DL (ref 8.8–10.2)
CHLORIDE SERPL-SCNC: 103 MMOL/L (ref 98–111)
CO2 SERPL-SCNC: 25 MMOL/L (ref 22–29)
CREAT SERPL-MCNC: 0.7 MG/DL (ref 0.5–0.9)
EOSINOPHIL # BLD: 0.1 K/UL (ref 0–0.6)
EOSINOPHIL NFR BLD: 1.2 % (ref 0–5)
ERYTHROCYTE [DISTWIDTH] IN BLOOD BY AUTOMATED COUNT: 13 % (ref 11.5–14.5)
GLUCOSE SERPL-MCNC: 101 MG/DL (ref 70–99)
HCT VFR BLD AUTO: 38.6 % (ref 37–47)
HGB BLD-MCNC: 12.5 G/DL (ref 12–16)
IMM GRANULOCYTES # BLD: 0.1 K/UL
LACTATE BLDV-SCNC: 1.5 MMOL/L (ref 0.5–1.9)
LYMPHOCYTES # BLD: 1.3 K/UL (ref 1.1–4.5)
LYMPHOCYTES NFR BLD: 16.5 % (ref 20–40)
MAGNESIUM SERPL-MCNC: 2.6 MG/DL (ref 1.6–2.4)
MCH RBC QN AUTO: 30.2 PG (ref 27–31)
MCHC RBC AUTO-ENTMCNC: 32.4 G/DL (ref 33–37)
MCV RBC AUTO: 93.2 FL (ref 81–99)
MONOCYTES # BLD: 1.6 K/UL (ref 0–0.9)
MONOCYTES NFR BLD: 20.5 % (ref 0–10)
NEUTROPHILS # BLD: 4.8 K/UL (ref 1.5–7.5)
NEUTS SEG NFR BLD: 60.4 % (ref 50–65)
PLATELET # BLD AUTO: 199 K/UL (ref 130–400)
PMV BLD AUTO: 10.6 FL (ref 9.4–12.3)
POTASSIUM SERPL-SCNC: 4.1 MMOL/L (ref 3.5–5)
PROT SERPL-MCNC: 6.5 G/DL (ref 6.4–8.3)
RBC # BLD AUTO: 4.14 M/UL (ref 4.2–5.4)
SODIUM SERPL-SCNC: 139 MMOL/L (ref 136–145)
TROPONIN, HIGH SENSITIVITY: 9 NG/L (ref 0–14)
TROPONIN, HIGH SENSITIVITY: 9 NG/L (ref 0–14)
WBC # BLD AUTO: 8 K/UL (ref 4.8–10.8)

## 2024-11-05 PROCEDURE — 36415 COLL VENOUS BLD VENIPUNCTURE: CPT

## 2024-11-05 PROCEDURE — 83605 ASSAY OF LACTIC ACID: CPT

## 2024-11-05 PROCEDURE — 85025 COMPLETE CBC W/AUTO DIFF WBC: CPT

## 2024-11-05 PROCEDURE — 71275 CT ANGIOGRAPHY CHEST: CPT

## 2024-11-05 PROCEDURE — 80053 COMPREHEN METABOLIC PANEL: CPT

## 2024-11-05 PROCEDURE — 6360000004 HC RX CONTRAST MEDICATION: Performed by: PEDIATRICS

## 2024-11-05 PROCEDURE — 83735 ASSAY OF MAGNESIUM: CPT

## 2024-11-05 PROCEDURE — 84484 ASSAY OF TROPONIN QUANT: CPT

## 2024-11-05 PROCEDURE — 71045 X-RAY EXAM CHEST 1 VIEW: CPT

## 2024-11-05 PROCEDURE — 99285 EMERGENCY DEPT VISIT HI MDM: CPT

## 2024-11-05 RX ORDER — IOPAMIDOL 755 MG/ML
70 INJECTION, SOLUTION INTRAVASCULAR
Status: COMPLETED | OUTPATIENT
Start: 2024-11-05 | End: 2024-11-05

## 2024-11-05 RX ORDER — HYDROCODONE BITARTRATE AND ACETAMINOPHEN 5; 325 MG/1; MG/1
1 TABLET ORAL EVERY 6 HOURS PRN
Qty: 12 TABLET | Refills: 0 | Status: SHIPPED | OUTPATIENT
Start: 2024-11-05 | End: 2024-11-08

## 2024-11-05 RX ADMIN — IOPAMIDOL 70 ML: 755 INJECTION, SOLUTION INTRAVENOUS at 10:53

## 2024-11-05 NOTE — DISCHARGE INSTRUCTIONS
Return or seek medical attention with increasing or severe pain, increasing difficulty breathing, or other concerns.

## 2024-11-07 NOTE — ED PROVIDER NOTES
[facility] EMERGENCY DEPT  eMERGENCY dEPARTMENT eNCOUnter      Pt Name: Josefa Thompson  MRN: [id number]  Birthdate 1945  Date of evaluation: 11/5/2024  Provider: Shandra Renee MD    CHIEF COMPLAINT       Chief Complaint   Patient presents with    Chest Pain     Under left breast since yesterday         HISTORY OF PRESENT ILLNESS   (Location/Symptom, Timing/Onset,Context/Setting, Quality, Duration, Modifying Factors, Severity)  Note limiting factors.   Josefa Thompson is a 79 y.o. female who presents to the emergency department with chest pain.  Patient points to just under her left breast as source of pain.  Patient states that pain began yesterday.  Pain is dull with intermittent sharp sensation.  Pain increases with deep breathing.  Patient denies alleviating factors.  Patient rates pain a 7 out of 10.  Patient has a history of recurrent PE, recently diagnosed on 11/1/2024.  Patient is currently on Xarelto.  Patient denies fever, cough, hemoptysis, nausea, vomiting, diaphoresis, lower extremity pain, or difficulty breathing.    HPI    NursingNotes were reviewed.    REVIEW OF SYSTEMS    (2-9 systems for level 4, 10 or more for level 5)     Review of Systems   Constitutional:  Negative for chills, diaphoresis and fever.   HENT:  Negative for congestion and rhinorrhea.    Respiratory:  Negative for cough and shortness of breath.    Cardiovascular:  Positive for chest pain. Negative for leg swelling.   Gastrointestinal:  Negative for abdominal pain, nausea and vomiting.   Genitourinary:  Negative for difficulty urinating and dysuria.   Musculoskeletal:  Positive for back pain (Chronic). Negative for neck pain.   Skin:  Negative for color change and rash.   Neurological:  Negative for syncope and weakness.   Psychiatric/Behavioral:  Negative for agitation and confusion.    All other systems reviewed and are negative.           PAST MEDICALHISTORY     Past Medical History:   Diagnosis Date    Cancer (HCC)     basil

## 2024-11-08 ASSESSMENT — ENCOUNTER SYMPTOMS
ABDOMINAL PAIN: 0
COUGH: 0
RHINORRHEA: 0
SHORTNESS OF BREATH: 0
VOMITING: 0
BACK PAIN: 1
NAUSEA: 0
COLOR CHANGE: 0

## 2024-11-10 LAB
EKG P AXIS: 58 DEGREES
EKG P AXIS: 68 DEGREES
EKG P-R INTERVAL: 132 MS
EKG P-R INTERVAL: 136 MS
EKG Q-T INTERVAL: 330 MS
EKG Q-T INTERVAL: 354 MS
EKG QRS DURATION: 82 MS
EKG QRS DURATION: 86 MS
EKG QTC CALCULATION (BAZETT): 396 MS
EKG QTC CALCULATION (BAZETT): 400 MS
EKG T AXIS: 45 DEGREES
EKG T AXIS: 46 DEGREES

## 2024-11-12 ENCOUNTER — TELEPHONE (OUTPATIENT)
Dept: HEMATOLOGY | Age: 79
End: 2024-11-12

## 2024-11-12 ENCOUNTER — OFFICE VISIT (OUTPATIENT)
Dept: INTERNAL MEDICINE | Age: 79
End: 2024-11-12

## 2024-11-12 VITALS
SYSTOLIC BLOOD PRESSURE: 136 MMHG | HEART RATE: 114 BPM | OXYGEN SATURATION: 97 % | BODY MASS INDEX: 20.89 KG/M2 | WEIGHT: 130 LBS | DIASTOLIC BLOOD PRESSURE: 86 MMHG | HEIGHT: 66 IN

## 2024-11-12 DIAGNOSIS — R00.0 TACHYCARDIA: ICD-10-CM

## 2024-11-12 DIAGNOSIS — I26.99 RECURRENT PULMONARY EMBOLISM (HCC): ICD-10-CM

## 2024-11-12 DIAGNOSIS — Z09 HOSPITAL DISCHARGE FOLLOW-UP: ICD-10-CM

## 2024-11-12 DIAGNOSIS — G30.0 MODERATE EARLY ONSET ALZHEIMER'S DEMENTIA WITH MOOD DISTURBANCE (HCC): ICD-10-CM

## 2024-11-12 DIAGNOSIS — F02.B3 MODERATE EARLY ONSET ALZHEIMER'S DEMENTIA WITH MOOD DISTURBANCE (HCC): ICD-10-CM

## 2024-11-12 DIAGNOSIS — I10 ESSENTIAL HYPERTENSION: ICD-10-CM

## 2024-11-12 DIAGNOSIS — I26.99 BILATERAL PULMONARY EMBOLISM (HCC): Primary | ICD-10-CM

## 2024-11-12 NOTE — TELEPHONE ENCOUNTER
Faby called to cancel the new patient appointment with Kelly Donovan on November 26. She stated they saw Dr Yepez her PCP today and that Ms Thompson did not need this appointment. I have made  aware.

## 2024-11-12 NOTE — PROGRESS NOTES
Post-Discharge Transitional Care  Follow Up      Josefa Thompson   YOB: 1945    Date of Office Visit:  11/12/2024  Date of Hospital Admission: 11/5/24  Date of Hospital Discharge: 11/5/24  Risk of hospital readmission (high >=14%. Medium >=10%) :Readmission Risk Score: 8.6      Care management risk score Rising risk (score 2-5) and Complex Care (Scores >=6): No Risk Score On File     Non face to face  following discharge, date last encounter closed (first attempt may have been earlier): 11/04/2024    Call initiated 2 business days of discharge: Yes    ASSESSMENT/PLAN:   Bilateral pulmonary embolism (HCC)  -     rivaroxaban (XARELTO) 20 MG TABS tablet; Take 1 tablet by mouth daily (with breakfast), Disp-90 tablet, R-1Normal  Recurrent pulmonary embolism (HCC)  Tachycardia  Essential hypertension  Moderate early onset Alzheimer's dementia with mood disturbance (HCC)  Explained to the patient she needs to remain on Xarelto with recurrent bilateral PE she does have slight effusion on second CT although very minimal and actually the 2D echo the same day says no effusion she may have had a little bit of pulmonary infarct her sats are good she feels okay going to monitor she is a little tachycardic might benefit from a beta-blocker her blood pressure is a little low with her skin to monitor this one of her neighbors is a retired nurse and is going to help monitor vital signs were also going to get home health to help us follow-up on her status.  I renewed the Xarelto.  No need for hypercoagulable workup as no matter what needs to remain on anticoagulation recent evaluations no signs or symptoms of underlying malignancy.    Medical Decision Making: high complexity  Return in about 7 weeks (around 12/31/2024).           Subjective:   HPI:  Follow up of Hospital problems/diagnosis(es): Patient is here today for high-level TCM follow-up recent hospitalization for PE went to the hospital after having an

## 2024-11-14 ENCOUNTER — CARE COORDINATION (OUTPATIENT)
Dept: CARE COORDINATION | Age: 79
End: 2024-11-14

## 2024-11-14 NOTE — CARE COORDINATION
Ambulatory Care Coordination Note     2024 3:20 PM     Patient Current Location:  [place]     This patient was received as a referral from Ambulatory Care Manager .    Caregiver, sukhdeep   contacted the caregiver by telephone. Verified name and  with caregiver as identifiers. Provided introduction to self, and explanation of the ACM role.   Caregiver accepted care management services at this time.          ACM: Stanford Estrada RN     Challenges to be reviewed by the provider   Additional needs identified to be addressed with provider No  none               Method of communication with provider: none.    Utilization: N/A - Initial Call     Care Summary Note:   RN spoke with patients POA whose identity was verified via HIPAA form. Family resides at home with support from friends and herself. Patient has deposits at two assisted livings and has declined in home caregivers at this time. RN educated on anticoagulant therapy. Caregiver confirms patient compliance with medications. ACM inquired on patients use of medication planner caregiver confirmed patient doing so. Caregiver reports family friend obtains patients vitals daily. ACM offered RPM services family denied at this time. Family educated on falls and notifying provider of new or worsening symptoms caregiver agreed to do so.     Offered patient enrollment in the Remote Patient Monitoring (RPM) program for in-home monitoring: Yes, but did not enroll at this time: already monitoring with home equipment.     Assessments Completed:   Ambulatory Care Coordination Assessment    Care Coordination Protocol  Referral from Primary Care Provider: No  Week 1 - Initial Assessment     Do you have all of your prescriptions and are they filled?: Yes  Barriers to medication adherence: Adverse effects/side effects  Are you able to afford your medications?: Yes  How often do you have trouble taking your medications the way you have been told to take them?: I always

## 2024-11-22 ENCOUNTER — CARE COORDINATION (OUTPATIENT)
Dept: CARE COORDINATION | Age: 79
End: 2024-11-22

## 2024-11-22 NOTE — CARE COORDINATION
Ambulatory Care Coordination Note     2024 2:50 PM     Patient Current Location:  [place]     ACM contacted the caregiver by telephone. Verified name and  with caregiver as identifiers.         ACM: Stanford Estrada RN     Challenges to be reviewed by the provider   Additional needs identified to be addressed with provider No  none               Method of communication with provider: none.    Utilization: Patient has not had any utilization since our last call.     Care Summary Note:     RN spoke with patient’s primary caregiver who reports patient is doing OK at this time. Family confirms patients compliance with medication’s and denies immediate needs at this time. Family denies patient having shortness of breath, chest pain or cough at this time. RN educated on symptoms to notify primary care provider of or seek medical evaluation.family denies medication. Refill needs at this time. RN to follow up family voiced understanding of notifying office symptoms.      Offered patient enrollment in the Remote Patient Monitoring (RPM) program for in-home monitoring: Yes, but did not enroll at this time: already monitoring with home equipment.     Medications Reviewed:   Completed during this call    Advance Care Planning:   Not on file; education provided     Care Planning:   Education Documentation  diet considerations - anticoagulation therapy, taught by Stanford Estrada RN at 2024  8:45 AM.  Learner: Patient  Readiness: Acceptance  Method: Explanation  Response: Verbalizes Understanding    complete medication list, taught by Stanford Estrada RN at 2024  8:45 AM.  Learner: Patient  Readiness: Acceptance  Method: Explanation  Response: Verbalizes Understanding    anticoagulation medication management, taught by Stanford Estrada RN at 2024  8:45 AM.  Learner: Patient  Readiness: Acceptance  Method: Explanation  Response: Verbalizes Understanding    Educate transfer safety,

## 2024-12-10 ENCOUNTER — CARE COORDINATION (OUTPATIENT)
Dept: CARE COORDINATION | Age: 79
End: 2024-12-10

## 2024-12-10 NOTE — CARE COORDINATION
Ambulatory Care Coordination Note     12/10/2024 11:57 AM     Patient Current Location:  [place]     ACM contacted the patient by telephone. Verified name and  with patient as identifiers.         ACM: Stanford Estrada RN     Challenges to be reviewed by the provider   Additional needs identified to be addressed with provider No  none               Method of communication with provider: none.    Utilization: Patient has not had any utilization since our last call.     Care Summary Note:     RN spoke with patient’s primary caregiver who reports patient is doing fairly well. Patient continues on anticoagulant therapy. Caregiver denies any adverse effects at this time. ACM educated on medication compliance caregiver reports having to remind patient to take medication daily. Patient is compliant at this time. ACM, assessed for immediate medical needs caregiver denied any at this time. ACM to follow up caregiver encouraged to notify office of healthcare needs as they arise. Caregiver voiced understanding and agree to do so.     Offered patient enrollment in the Remote Patient Monitoring (RPM) program for in-home monitoring: Yes, but did not enroll at this time: already monitoring with home equipment.     Assessments Completed:   No changes since last call    Medications Reviewed:   Completed during this call    Advance Care Planning:   Not on file; education provided     Care Planning:   Education Documentation  General medication information, taught by Stanford Estrada RN at 2024 10:06 AM.  Learner: Patient  Readiness: Acceptance  Method: Explanation  Response: Verbalizes Understanding    diet considerations - anticoagulation therapy, taught by Stanford Estrada RN at 2024 10:06 AM.  Learner: Patient  Readiness: Acceptance  Method: Explanation  Response: Verbalizes Understanding    complete medication list, taught by Stanford Estrada RN at 2024 10:06 AM.  Learner:

## 2024-12-18 ENCOUNTER — CARE COORDINATION (OUTPATIENT)
Dept: CARE COORDINATION | Age: 79
End: 2024-12-18

## 2025-01-02 ENCOUNTER — OFFICE VISIT (OUTPATIENT)
Dept: INTERNAL MEDICINE | Age: 80
End: 2025-01-02

## 2025-01-02 VITALS
DIASTOLIC BLOOD PRESSURE: 78 MMHG | SYSTOLIC BLOOD PRESSURE: 124 MMHG | BODY MASS INDEX: 21.53 KG/M2 | HEIGHT: 66 IN | OXYGEN SATURATION: 96 % | HEART RATE: 98 BPM | WEIGHT: 134 LBS

## 2025-01-02 DIAGNOSIS — G30.0 MODERATE EARLY ONSET ALZHEIMER'S DEMENTIA, UNSPECIFIED WHETHER BEHAVIORAL, PSYCHOTIC, OR MOOD DISTURBANCE OR ANXIETY (HCC): Primary | ICD-10-CM

## 2025-01-02 DIAGNOSIS — F02.B0 MODERATE EARLY ONSET ALZHEIMER'S DEMENTIA, UNSPECIFIED WHETHER BEHAVIORAL, PSYCHOTIC, OR MOOD DISTURBANCE OR ANXIETY (HCC): Primary | ICD-10-CM

## 2025-01-02 DIAGNOSIS — I10 ESSENTIAL HYPERTENSION: ICD-10-CM

## 2025-01-02 DIAGNOSIS — I26.99 RECURRENT PULMONARY EMBOLISM (HCC): ICD-10-CM

## 2025-01-02 DIAGNOSIS — E55.9 VITAMIN D DEFICIENCY: ICD-10-CM

## 2025-01-02 RX ORDER — LISINOPRIL 10 MG/1
10 TABLET ORAL DAILY
Qty: 90 TABLET | Refills: 3 | Status: SHIPPED | OUTPATIENT
Start: 2025-01-02

## 2025-01-02 RX ORDER — ACETAMINOPHEN 160 MG
2000 TABLET,DISINTEGRATING ORAL DAILY
Qty: 90 CAPSULE | Refills: 3 | Status: SHIPPED | OUTPATIENT
Start: 2025-01-02

## 2025-01-02 ASSESSMENT — PATIENT HEALTH QUESTIONNAIRE - PHQ9: DEPRESSION UNABLE TO ASSESS: FUNCTIONAL CAPACITY MOTIVATION LIMITS ACCURACY

## 2025-01-02 NOTE — PROGRESS NOTES
kg/m²   BP Readings from Last 7 Encounters:   01/02/25 124/78   11/12/24 136/86   11/05/24 (!) 144/90   11/03/24 (!) 165/90   09/06/24 130/86   09/04/24 (!) 149/86   05/09/24 139/75     Wt Readings from Last 7 Encounters:   01/02/25 60.8 kg (134 lb)   11/12/24 59 kg (130 lb)   11/03/24 61.3 kg (135 lb 3.2 oz)   09/06/24 60.3 kg (133 lb)   09/04/24 59.9 kg (132 lb)   05/09/24 59.9 kg (132 lb)   05/06/24 59.9 kg (132 lb)     BMI Readings from Last 7 Encounters:   01/02/25 21.63 kg/m²   11/12/24 20.98 kg/m²   11/03/24 21.82 kg/m²   09/06/24 22.83 kg/m²   09/04/24 22.66 kg/m²   05/09/24 22.66 kg/m²   05/06/24 22.66 kg/m²     Resp Readings from Last 7 Encounters:   11/05/24 18   11/03/24 18   09/05/23 18   03/03/23 18   10/04/22 20   08/19/22 16   10/28/19 17       Physical Exam  Constitutional:       General: She is not in acute distress.  Eyes:      General: No scleral icterus.  Cardiovascular:      Heart sounds: Normal heart sounds.   Pulmonary:      Breath sounds: Normal breath sounds.   Musculoskeletal:      Cervical back: Neck supple.   Lymphadenopathy:      Cervical: No cervical adenopathy.   Skin:     Findings: No rash.   Psychiatric:      Comments: Confused/forgetful..         Results for orders placed or performed during the hospital encounter of 11/05/24   CBC with Auto Differential   Result Value Ref Range    WBC 8.0 4.8 - 10.8 K/uL    RBC 4.14 (L) 4.20 - 5.40 M/uL    Hemoglobin 12.5 12.0 - 16.0 g/dL    Hematocrit 38.6 37.0 - 47.0 %    MCV 93.2 81.0 - 99.0 fL    MCH 30.2 27.0 - 31.0 pg    MCHC 32.4 (L) 33.0 - 37.0 g/dL    RDW 13.0 11.5 - 14.5 %    Platelets 199 130 - 400 K/uL    MPV 10.6 9.4 - 12.3 fL    Neutrophils % 60.4 50.0 - 65.0 %    Lymphocytes % 16.5 (L) 20.0 - 40.0 %    Monocytes % 20.5 (H) 0.0 - 10.0 %    Eosinophils % 1.2 0.0 - 5.0 %    Basophils % 0.2 0.0 - 1.0 %    Neutrophils Absolute 4.8 1.5 - 7.5 K/uL    Immature Granulocytes # 0.1 K/uL    Lymphocytes Absolute 1.3 1.1 - 4.5 K/uL    Monocytes

## 2025-01-10 ENCOUNTER — CARE COORDINATION (OUTPATIENT)
Dept: CARE COORDINATION | Age: 80
End: 2025-01-10

## 2025-01-10 NOTE — CARE COORDINATION
will follow my Zone Management tool to seek urgent or emergent care.  I will notify my provider of any symptoms that indicate a worsening of my condition.    Barriers: overwhelmed by complexity of regimen and stress  Plan for overcoming my barriers:   Patient is able to discuss self-management of condition(s):  Pt demonstrates adherence to medications  Pt demonstrates understanding of self-monitoring  Patient is able to identify Red Flags:  Alert to potential adverse drug reactions(s) or side effects and actions to take should they arise  Discuss target symptoms and actions to take should they arise  Identify problems that require immediate PCP or specialist visit  Patient demonstrates understanding of access to PCP/Specialist:  Understands about scheduling routine Follow Up appointments   Understands about sick day appointment options for worsening of symptoms/progression (Same Day, E Visits)    Confidence: 6/10  Anticipated Goal Completion Date: 1/2025                 PCP/Specialist follow up:   Future Appointments         Provider Specialty Dept Phone    1/15/2025 9:15 AM Carol Brooks APRN Neurology [phone]    3/17/2025 7:30 AM Jacki Yepez MD Internal Medicine [phone]    4/17/2025 9:00 AM Jacki Yepez MD Internal Medicine [phone]            Follow Up:   Plan for next ACM outreach in approximately 1 week to complete:  - disease specific assessments  - goal progression  - education .   Patient  is agreeable to this plan.        Stanford Estrada RN  Ambulatory Care Manager   C. [phone]

## 2025-01-24 ENCOUNTER — CARE COORDINATION (OUTPATIENT)
Dept: CARE COORDINATION | Age: 80
End: 2025-01-24

## 2025-01-24 NOTE — CARE COORDINATION
Ambulatory Care Coordination Note     1/24/2025 10:33 AM     ACM outreach attempt by this ACM today to perform care management follow up . ACM was unable to reach the caregiver, Lisa  by telephone today;   left voice message requesting a return phone call to this ACM.     ACM: Stanford Estrada RN     Care Summary Note:     PCP/Specialist follow up:   Future Appointments         Provider Specialty Dept Phone    4/17/2025 9:00 AM Jacki Yepez MD Internal Medicine [phone]            Follow Up:   Plan for next ACM outreach in approximately 1 week to complete:  - disease specific assessments  - goal progression  - education .         Stanford Estrada RN  Ambulatory Care Manager   C. [phone]

## 2025-01-30 ENCOUNTER — CARE COORDINATION (OUTPATIENT)
Dept: CASE MANAGEMENT | Age: 80
End: 2025-01-30

## 2025-01-30 NOTE — CARE COORDINATION
Ambulatory Care Coordination Note     1/30/2025 12:40 PM     Patient outreach attempt by this ACM today to perform care management follow up . ACM was unable to reach the patient by telephone today;   left voice message requesting a return phone call to this ACM.     ACM: Irma Garcia LPN     PCP/Specialist follow up:   Future Appointments         Provider Specialty Dept Phone    4/17/2025 9:00 AM Jacki Yepez MD Internal Medicine [phone]            Follow Up:   Plan for next ACM outreach in approximately 1 week to complete:  ACM follow up .

## 2025-02-06 ENCOUNTER — CARE COORDINATION (OUTPATIENT)
Dept: CARE COORDINATION | Age: 80
End: 2025-02-06

## 2025-02-06 NOTE — CARE COORDINATION
Ambulatory Care Coordination Note     2/6/2025 12:32 PM     ACM outreach attempt by this ACM today to perform care management follow up . ACM was unable to reach the patient by telephone today;   left voice message requesting a return phone call to this ACM.     ACM: Stanford Estrada RN     Care Summary Note:     PCP/Specialist follow up:   Future Appointments         Provider Specialty Dept Phone    4/17/2025 9:00 AM Jacki Yepez MD Internal Medicine [phone]            Follow Up:   Plan for next ACM outreach in approximately 1 week to complete:  - disease specific assessments  - advance care planning  - goal progression.               Stanford Estrada RN  Ambulatory Care Manager   C. [phone]

## 2025-02-11 ENCOUNTER — CARE COORDINATION (OUTPATIENT)
Dept: CARE COORDINATION | Age: 80
End: 2025-02-11

## 2025-02-11 NOTE — CARE COORDINATION
overwhelmed by complexity of regimen and stress  Plan for overcoming my barriers:   Patient is able to discuss self-management of condition(s):  Pt demonstrates adherence to medications  Pt demonstrates understanding of self-monitoring  Patient is able to identify Red Flags:  Alert to potential adverse drug reactions(s) or side effects and actions to take should they arise  Discuss target symptoms and actions to take should they arise  Identify problems that require immediate PCP or specialist visit  Patient demonstrates understanding of access to PCP/Specialist:  Understands about scheduling routine Follow Up appointments   Understands about sick day appointment options for worsening of symptoms/progression (Same Day, E Visits)    Confidence: 6/10  Anticipated Goal Completion Date: 1/2025                 PCP/Specialist follow up:   Future Appointments         Provider Specialty Dept Phone    4/17/2025 9:00 AM Jacki Yepez MD Internal Medicine [phone]            Follow Up:   Plan for next [facility] outreach in approximately 2 weeks to complete:  - disease specific assessments  - medication review   - education .   Caregiver is agreeable to this plan.          Stanford Estrada RN  Ambulatory Care Manager   C. [phone]

## 2025-02-27 ENCOUNTER — CARE COORDINATION (OUTPATIENT)
Dept: CASE MANAGEMENT | Age: 80
End: 2025-02-27

## 2025-03-07 ENCOUNTER — CARE COORDINATION (OUTPATIENT)
Dept: CARE COORDINATION | Age: 80
End: 2025-03-07

## 2025-03-07 NOTE — CARE COORDINATION
Ambulatory Care Coordination Note     3/7/2025 11:35 AM     ACM outreach attempt by this ACM today to perform care management follow up . ACM was unable to reach the patient by telephone today;   left voice message requesting a return phone call to this ACM.     ACM: Stanford Estrada RN     Care Summary Note:     PCP/Specialist follow up:   Future Appointments         Provider Specialty Dept Phone    4/17/2025 9:00 AM Jacki Yepez MD Internal Medicine [phone]            Follow Up:   Plan for next ACM outreach in approximately 2 weeks to complete:  - disease specific assessments  - medication review  - education .         Stanford Estrada RN  Ambulatory Care Manager   C. [phone]

## 2025-04-14 ENCOUNTER — CARE COORDINATION (OUTPATIENT)
Dept: CARE COORDINATION | Age: 80
End: 2025-04-14

## 2025-04-14 NOTE — CARE COORDINATION
Ambulatory Care Coordination Note     4/14/2025 3:50 PM     patient outreach attempt by this ACM today to perform care management follow up . [facility] was unable to reach the patient by telephone today;      Patient graduated from the High Risk Care Management program on 4/14/2025.  Patient has the ability to self-manage at this time..  Care management goals have been completed. No further Ambulatory Care Manager follow up scheduled.        Stanford Estrada RN  Ambulatory Care Manager   C. [phone]

## 2025-04-17 ENCOUNTER — OFFICE VISIT (OUTPATIENT)
Dept: INTERNAL MEDICINE | Age: 80
End: 2025-04-17

## 2025-04-17 VITALS
BODY MASS INDEX: 21.38 KG/M2 | HEART RATE: 102 BPM | SYSTOLIC BLOOD PRESSURE: 138 MMHG | WEIGHT: 133 LBS | DIASTOLIC BLOOD PRESSURE: 76 MMHG | HEIGHT: 66 IN | OXYGEN SATURATION: 96 %

## 2025-04-17 DIAGNOSIS — E78.2 MIXED HYPERLIPIDEMIA: ICD-10-CM

## 2025-04-17 DIAGNOSIS — M81.8 OTHER OSTEOPOROSIS WITHOUT CURRENT PATHOLOGICAL FRACTURE: ICD-10-CM

## 2025-04-17 DIAGNOSIS — G30.0 MODERATE EARLY ONSET ALZHEIMER'S DEMENTIA WITH MOOD DISTURBANCE (HCC): Primary | ICD-10-CM

## 2025-04-17 DIAGNOSIS — F02.B3 MODERATE EARLY ONSET ALZHEIMER'S DEMENTIA WITH MOOD DISTURBANCE (HCC): Primary | ICD-10-CM

## 2025-04-17 DIAGNOSIS — I10 ESSENTIAL HYPERTENSION: ICD-10-CM

## 2025-04-17 DIAGNOSIS — I26.99 PULMONARY EMBOLISM, BILATERAL (HCC): ICD-10-CM

## 2025-04-17 DIAGNOSIS — S22.000D COMPRESSION FRACTURE OF THORACIC VERTEBRA WITH ROUTINE HEALING, UNSPECIFIED THORACIC VERTEBRAL LEVEL, SUBSEQUENT ENCOUNTER: ICD-10-CM

## 2025-04-17 ASSESSMENT — PATIENT HEALTH QUESTIONNAIRE - PHQ9: DEPRESSION UNABLE TO ASSESS: FUNCTIONAL CAPACITY MOTIVATION LIMITS ACCURACY

## 2025-04-17 NOTE — PROGRESS NOTES
Chief Complaint   Patient presents with    6 Month Follow-Up       HPI:   History of Present Illness  The patient presents for alzheimers memory loss, back pain, blood clots, and blood pressure management. She is accompanied by a friend.    Gradual memory decline reported, but she manages well at home. No disorientation or getting lost. Friends occasionally check on her. Manages medication independently with a weekly pill box. Appetite is robust, no current health complaints. Takes memory medication regularly.    History of back fractures, no significant current back pain.    Concern about potential blood clots. On Xarelto for prevention.    Blood pressure well-controlled with current medication.    Discussed advanced directives, including power of  and living will. Paperwork may need refiling in the correct section of the chart.       Past Medical History:   Diagnosis Date    Cancer ([facility])     basil cell skin cancers removed     DVT (deep venous thrombosis) ([facility]) 08/18/2022    LLE    Essential hypertension 12/05/2023    Low back pain 02/07/2017    Mixed hyperlipidemia 05/18/2021    Osteoporosis     Osteoporosis with pathological fracture with routine healing 06/23/2017    Pulmonary embolism ([facility]) 08/18/2022    bilateral lower lobes       Past Surgical History:   Procedure Laterality Date    FRACTURE SURGERY  2/7/17?    For back    SKIN CANCER EXCISION         Family History   Problem Relation Age of Onset    Kidney Disease Mother     Cancer Father         Prostate        Social History     Socioeconomic History    Marital status: Single     Spouse name: Not on file    Number of children: Not on file    Years of education: Not on file    Highest education level: Not on file   Occupational History    Not on file   Tobacco Use    Smoking status: Never    Smokeless tobacco: Never   Vaping Use    Vaping status: Not on file   Substance and Sexual Activity    Alcohol use: Yes     Alcohol/week: 7.0 standard drinks

## 2025-05-22 ENCOUNTER — TELEPHONE (OUTPATIENT)
Dept: INTERNAL MEDICINE | Age: 80
End: 2025-05-22

## 2025-05-22 NOTE — TELEPHONE ENCOUNTER
She is hurting in her rib area under her right arm when she takes a deep breath and is concerned about another blood clot.  Please advise

## 2025-05-22 NOTE — TELEPHONE ENCOUNTER
I called Lisa and told Lisa that she should go to walk in clinic or see someone in office tomorrow.

## 2025-05-22 NOTE — TELEPHONE ENCOUNTER
If she is taking her blood thinner routinely she should not have another clot- she is confused - might call one of her listed friends and suggest they check on her and take her to ER if lots of pain - if mild pain and on blood thinner routinely we could get her seen- I am not here tomorrow but see who could see her

## 2025-05-27 ENCOUNTER — APPOINTMENT (OUTPATIENT)
Dept: GENERAL RADIOLOGY | Age: 80
End: 2025-05-27
Payer: MEDICARE

## 2025-05-27 ENCOUNTER — HOSPITAL ENCOUNTER (EMERGENCY)
Age: 80
Discharge: HOME | End: 2025-05-27
Payer: MEDICARE

## 2025-05-27 ENCOUNTER — APPOINTMENT (OUTPATIENT)
Dept: CT IMAGING | Age: 80
End: 2025-05-27
Payer: MEDICARE

## 2025-05-27 ENCOUNTER — OFFICE VISIT (OUTPATIENT)
Age: 80
End: 2025-05-27

## 2025-05-27 VITALS
RESPIRATION RATE: 16 BRPM | SYSTOLIC BLOOD PRESSURE: 132 MMHG | TEMPERATURE: 98.5 F | DIASTOLIC BLOOD PRESSURE: 84 MMHG | HEART RATE: 90 BPM | OXYGEN SATURATION: 99 %

## 2025-05-27 VITALS
HEIGHT: 64 IN | TEMPERATURE: 97.4 F | SYSTOLIC BLOOD PRESSURE: 138 MMHG | HEART RATE: 99 BPM | WEIGHT: 138.4 LBS | BODY MASS INDEX: 23.63 KG/M2 | OXYGEN SATURATION: 96 % | RESPIRATION RATE: 20 BRPM | DIASTOLIC BLOOD PRESSURE: 86 MMHG

## 2025-05-27 DIAGNOSIS — F02.B3 MODERATE EARLY ONSET ALZHEIMER'S DEMENTIA WITH MOOD DISTURBANCE (HCC): ICD-10-CM

## 2025-05-27 DIAGNOSIS — R03.0 ELEVATED BLOOD PRESSURE READING: ICD-10-CM

## 2025-05-27 DIAGNOSIS — G30.0 MODERATE EARLY ONSET ALZHEIMER'S DEMENTIA WITH MOOD DISTURBANCE (HCC): ICD-10-CM

## 2025-05-27 DIAGNOSIS — Z86.711 HX OF PULMONARY EMBOLUS: ICD-10-CM

## 2025-05-27 DIAGNOSIS — R07.9 CHEST PAIN, UNSPECIFIED TYPE: Primary | ICD-10-CM

## 2025-05-27 DIAGNOSIS — R07.81 PLEURITIC CHEST PAIN: Primary | ICD-10-CM

## 2025-05-27 LAB
ALBUMIN SERPL-MCNC: 4.4 G/DL (ref 3.5–5.2)
ALP SERPL-CCNC: 73 U/L (ref 35–104)
ALT SERPL-CCNC: 12 U/L (ref 10–35)
ANION GAP SERPL CALCULATED.3IONS-SCNC: 13 MMOL/L (ref 8–16)
AST SERPL-CCNC: 22 U/L (ref 10–35)
B PARAP IS1001 DNA NPH QL NAA+NON-PROBE: NOT DETECTED
B PERT.PT PRMT NPH QL NAA+NON-PROBE: NOT DETECTED
BASOPHILS # BLD: 0 K/UL (ref 0–0.2)
BASOPHILS NFR BLD: 0.2 % (ref 0–1)
BILIRUB SERPL-MCNC: 0.3 MG/DL (ref 0.2–1.2)
BUN SERPL-MCNC: 19 MG/DL (ref 8–23)
C PNEUM DNA NPH QL NAA+NON-PROBE: NOT DETECTED
CALCIUM SERPL-MCNC: 10.1 MG/DL (ref 8.8–10.2)
CHLORIDE SERPL-SCNC: 102 MMOL/L (ref 98–107)
CO2 SERPL-SCNC: 24 MMOL/L (ref 22–29)
CREAT SERPL-MCNC: 0.7 MG/DL (ref 0.5–0.9)
EOSINOPHIL # BLD: 0 K/UL (ref 0–0.6)
EOSINOPHIL NFR BLD: 0.3 % (ref 0–5)
ERYTHROCYTE [DISTWIDTH] IN BLOOD BY AUTOMATED COUNT: 13.1 % (ref 11.5–14.5)
FLUAV RNA NPH QL NAA+NON-PROBE: NOT DETECTED
FLUBV RNA NPH QL NAA+NON-PROBE: NOT DETECTED
GLUCOSE SERPL-MCNC: 97 MG/DL (ref 70–99)
HADV DNA NPH QL NAA+NON-PROBE: NOT DETECTED
HCOV 229E RNA NPH QL NAA+NON-PROBE: NOT DETECTED
HCOV HKU1 RNA NPH QL NAA+NON-PROBE: NOT DETECTED
HCOV NL63 RNA NPH QL NAA+NON-PROBE: NOT DETECTED
HCOV OC43 RNA NPH QL NAA+NON-PROBE: NOT DETECTED
HCT VFR BLD AUTO: 40.7 % (ref 37–47)
HGB BLD-MCNC: 13.2 G/DL (ref 12–16)
HMPV RNA NPH QL NAA+NON-PROBE: NOT DETECTED
HPIV1 RNA NPH QL NAA+NON-PROBE: NOT DETECTED
HPIV2 RNA NPH QL NAA+NON-PROBE: NOT DETECTED
HPIV3 RNA NPH QL NAA+NON-PROBE: NOT DETECTED
HPIV4 RNA NPH QL NAA+NON-PROBE: NOT DETECTED
IMM GRANULOCYTES # BLD: 0.1 K/UL
LYMPHOCYTES # BLD: 1.5 K/UL (ref 1.1–4.5)
LYMPHOCYTES NFR BLD: 15.6 % (ref 20–40)
M PNEUMO DNA NPH QL NAA+NON-PROBE: NOT DETECTED
MCH RBC QN AUTO: 29.8 PG (ref 27–31)
MCHC RBC AUTO-ENTMCNC: 32.4 G/DL (ref 33–37)
MCV RBC AUTO: 91.9 FL (ref 81–99)
MONOCYTES # BLD: 1.3 K/UL (ref 0–0.9)
MONOCYTES NFR BLD: 13.8 % (ref 0–10)
NEUTROPHILS # BLD: 6.4 K/UL (ref 1.5–7.5)
NEUTS SEG NFR BLD: 69.1 % (ref 50–65)
PLATELET # BLD AUTO: 231 K/UL (ref 130–400)
PMV BLD AUTO: 10.9 FL (ref 9.4–12.3)
POTASSIUM SERPL-SCNC: 4.2 MMOL/L (ref 3.5–5)
PROT SERPL-MCNC: 7 G/DL (ref 6.4–8.3)
RBC # BLD AUTO: 4.43 M/UL (ref 4.2–5.4)
RSV RNA NPH QL NAA+NON-PROBE: NOT DETECTED
RV+EV RNA NPH QL NAA+NON-PROBE: NOT DETECTED
SARS-COV-2 RNA NPH QL NAA+NON-PROBE: NOT DETECTED
SODIUM SERPL-SCNC: 139 MMOL/L (ref 136–145)
TROPONIN, HIGH SENSITIVITY: 8 NG/L (ref 0–14)
TROPONIN, HIGH SENSITIVITY: <6 NG/L (ref 0–14)
WBC # BLD AUTO: 9.3 K/UL (ref 4.8–10.8)

## 2025-05-27 PROCEDURE — 80053 COMPREHEN METABOLIC PANEL: CPT

## 2025-05-27 PROCEDURE — 84484 ASSAY OF TROPONIN QUANT: CPT

## 2025-05-27 PROCEDURE — 93005 ELECTROCARDIOGRAM TRACING: CPT | Performed by: EMERGENCY MEDICINE

## 2025-05-27 PROCEDURE — 99285 EMERGENCY DEPT VISIT HI MDM: CPT

## 2025-05-27 PROCEDURE — 85025 COMPLETE CBC W/AUTO DIFF WBC: CPT

## 2025-05-27 PROCEDURE — 36415 COLL VENOUS BLD VENIPUNCTURE: CPT

## 2025-05-27 PROCEDURE — 0202U NFCT DS 22 TRGT SARS-COV-2: CPT

## 2025-05-27 PROCEDURE — 6360000004 HC RX CONTRAST MEDICATION

## 2025-05-27 PROCEDURE — 71275 CT ANGIOGRAPHY CHEST: CPT

## 2025-05-27 RX ORDER — MEMANTINE HYDROCHLORIDE 28 MG/1
28 CAPSULE, EXTENDED RELEASE ORAL DAILY
Qty: 30 CAPSULE | Refills: 3 | Status: SHIPPED | OUTPATIENT
Start: 2025-05-27

## 2025-05-27 RX ORDER — IOPAMIDOL 755 MG/ML
75 INJECTION, SOLUTION INTRAVASCULAR
Status: COMPLETED | OUTPATIENT
Start: 2025-05-27 | End: 2025-05-27

## 2025-05-27 RX ADMIN — IOPAMIDOL 75 ML: 755 INJECTION, SOLUTION INTRAVENOUS at 16:40

## 2025-05-27 ASSESSMENT — ENCOUNTER SYMPTOMS
SHORTNESS OF BREATH: 1
CHEST TIGHTNESS: 0
RHINORRHEA: 0
WHEEZING: 0
GASTROINTESTINAL NEGATIVE: 1
DIARRHEA: 0
NAUSEA: 0
SORE THROAT: 0
CHEST TIGHTNESS: 0
VOMITING: 0
SHORTNESS OF BREATH: 1
ABDOMINAL PAIN: 0
SINUS PRESSURE: 0
COLOR CHANGE: 0
TROUBLE SWALLOWING: 0
WHEEZING: 0
STRIDOR: 0
EYE DISCHARGE: 0
SINUS PAIN: 0
CHOKING: 0
EYES NEGATIVE: 1
EYE ITCHING: 0
EYE PAIN: 0
APNEA: 0
COUGH: 0
CONSTIPATION: 0
ABDOMINAL DISTENTION: 0

## 2025-05-27 ASSESSMENT — HEART SCORE: ECG: NON-SPECIFC REPOLARIZATION DISTURBANCE/LBTB/PM

## 2025-05-27 NOTE — PROGRESS NOTES
[facility] URGENT CARE, [facility] [place] ([place])  [facility] URGENT CARE  [address]  [facility] [address]  Dept: [phone]  Dept Fax: [phone]    Josefa Thompson is a 79 y.o. female who presents today for her medical conditions/complaints as noted below.      HPI:   HPI    Josefa Thompson presents today with her POA for evaluation of chest pain. Patient has a history of Alzheimer's, DVT, and recurrent PE's. She is anticoagulated with Xarelto. The patient reached out to her PCP 5 days ago and was told to go to the ER to be evaluated, but the patient did not want to go. Reports pain with inspiration to her right chest wall that has been fairly constant. Denies fatigue, dizziness, cough, or hemoptysis. POA reports this is the same type of pain the patient reported when she previously had PE's. Patient denies shortness of breath, but POA reports that patient has reported shortness of breath periodically. POA states that the patient tends to downplay her symptoms.    Past Medical History:   Diagnosis Date    Cancer (HCC)     basil cell skin cancers removed     DVT (deep venous thrombosis) (HCC) 08/18/2022    LLE    Essential hypertension 12/05/2023    Low back pain 02/07/2017    Mixed hyperlipidemia 05/18/2021    Osteoporosis     Osteoporosis with pathological fracture with routine healing 06/23/2017    Pulmonary embolism (HCC) 08/18/2022    bilateral lower lobes       Past Surgical History:   Procedure Laterality Date    FRACTURE SURGERY  2/7/17?    For back    SKIN CANCER EXCISION         Family History   Problem Relation Age of Onset    Kidney Disease Mother     Cancer Father         Prostate        Social History     Tobacco Use    Smoking status: Never    Smokeless tobacco: Never   Substance Use Topics    Alcohol use: Yes     Alcohol/week: 7.0 standard drinks of alcohol     Types: 5 Glasses of wine, 2 Cans of beer per week        Current Outpatient Medications   Medication Sig Dispense Refill

## 2025-05-27 NOTE — ED PROVIDER NOTES
[facility] EMERGENCY DEPARTMENT  EMERGENCY DEPARTMENT ENCOUNTER      Pt Name: Josefa Thompson  MRN: [id number]  Birthdate 1945  Date of evaluation: 5/27/2025  Provider: Polina Magana PA-C    CHIEF COMPLAINT       Chief Complaint   Patient presents with    Chest Pain     Pain in chest and right shoulder, started two days ago, went to [facility] today sent here, hx blood clots in lungs, on xarelto          HISTORY OF PRESENT ILLNESS   (Location/Symptom, Timing/Onset,Context/Setting, Quality, Duration, Modifying Factors, Severity)  Note limiting factors.   HPI    Josefa Thompson is a 79 y.o. female with a past medical history significant for DVTs, bilateral pulmonary emboli, recurrent pulmonary emboli, osteoporosis, Alzheimer's who presents to the emergency department with a chief complaint of chest pain.  Patient reports that for the past 2 days she has been having pain in her right chest and right shoulder.  She denies any radiation of her pain, she denies shortness of breath, but her friend who provides some supplemental history states that she has noticed her breathing harder than she usually does.  Patient has Alzheimer's and states that she has difficulty remembering things, which is why she brought a friend to help provide additional history.  She has had multiple episodes of blood clots in the past, most recently in November when she had multiple bilateral pulmonary emboli and DVT.  She is now on Xarelto.  She was not on Xarelto prior to this most recent episode of blood clots.  She states that she is been doing well since November, up until 2 days ago when she started to have this right sided chest pain that feels like her history of pulmonary emboli.  She denies cough, shortness of breath, fevers, leg swelling, leg pain.  She does endorse some mild chronic left lower extremity swelling from her prior blood clot but has not noticed any new leg swelling.  Pain is pleuritic in nature, worsened by

## 2025-05-27 NOTE — PATIENT INSTRUCTIONS
Elevated Blood Pressure Reading  Blood pressure was elevated in office today.     1st BP readin/96  2nd BP readin/86    Encouraged physical activity, such as walking for at least 30 minutes most days of the week. Limit alcohol intake or avoid completely. Eat plenty of fruits, vegetables, clean protein, and healthy fats. Avoid saturated or trans fats. Monitor blood pressure at home with an at home blood pressure cuff. These can be found at most pharmacies, including [facility], and [facility]. Follow up with your primary care provider regarding elevated blood pressure reading in office today.

## 2025-05-28 ENCOUNTER — CARE COORDINATION (OUTPATIENT)
Dept: CARE COORDINATION | Age: 80
End: 2025-05-28

## 2025-05-28 LAB
EKG P AXIS: 42 DEGREES
EKG P-R INTERVAL: 126 MS
EKG Q-T INTERVAL: 342 MS
EKG QRS DURATION: 92 MS
EKG QTC CALCULATION (BAZETT): 389 MS
EKG T AXIS: 37 DEGREES

## 2025-05-28 PROCEDURE — 93010 ELECTROCARDIOGRAM REPORT: CPT | Performed by: INTERNAL MEDICINE

## 2025-05-28 NOTE — DISCHARGE INSTRUCTIONS
Please continue to take your home medications as prescribed, and follow-up with your primary care for further evaluation and recommendations.  Please return to the ED for any new or worsening symptoms or concerns.  You may use Tylenol, heating packs, ice packs for pain relief, and if you have any new or worsening symptoms you should return for reevaluation

## 2025-05-28 NOTE — CARE COORDINATION
Ambulatory Care Coordination Note     5/28/2025 1:39 PM     Patient outreach attempt by this ACM today to offer care management services. ACM was unable to reach the patient by telephone today;   left voice message requesting a return phone call to this ACM.     ACM: Elizabeth Nazario RN     Care Summary Note:     PCP/Specialist follow up:   Future Appointments         Provider Specialty Dept Phone    9/9/2025 9:30 AM Jacki Yepez MD Internal Medicine [phone]            Follow Up:   Plan for next ACM outreach in approximately 1 week to complete:  - outreach attempt to offer care management services.

## 2025-06-06 ENCOUNTER — CARE COORDINATION (OUTPATIENT)
Dept: CARE COORDINATION | Age: 80
End: 2025-06-06

## 2025-06-06 NOTE — CARE COORDINATION
Ambulatory Care Coordination Note     6/6/2025 7:35 AM     patient outreach attempt by this AC today to offer care management services. ACM was unable to reach the patient by telephone today;   left voice message requesting a return phone call to this ACM.  S[employer] message sent requesting patient to contact this ACM.     Patient closed (unable to reach patient) from the High Risk Care Management program on 6/6/2025.

## 2025-06-20 DIAGNOSIS — I26.99 BILATERAL PULMONARY EMBOLISM (HCC): ICD-10-CM

## 2025-06-20 RX ORDER — RIVAROXABAN 20 MG/1
20 TABLET, FILM COATED ORAL
Qty: 90 TABLET | Refills: 1 | Status: SHIPPED | OUTPATIENT
Start: 2025-06-20

## (undated) DEVICE — SENSR O2 OXIMAX FNGR A/ 18IN NONSTR

## (undated) DEVICE — THE CHANNEL CLEANING BRUSH IS A NYLON FLEXI BRUSH ATTACHED TO A FLEXIBLE PLASTIC SHEATH DESIGNED TO SAFELY REMOVE DEBRIS FROM FLEXIBLE ENDOSCOPES.

## (undated) DEVICE — Device: Brand: DEFENDO AIR/WATER/SUCTION AND BIOPSY VALVE

## (undated) DEVICE — ENDOGATOR AUXILIARY WATER JET CONNECTOR: Brand: ENDOGATOR

## (undated) DEVICE — TBG SMPL FLTR LINE NASL 02/C02 A/ BX/100

## (undated) DEVICE — CUFF,BP,DISP,1 TUBE,ADULT,HP: Brand: MEDLINE

## (undated) DEVICE — MSK O2 MD CONCENTR A/ LF 7FT 1P/U